# Patient Record
Sex: FEMALE | Race: BLACK OR AFRICAN AMERICAN | NOT HISPANIC OR LATINO | Employment: UNEMPLOYED | URBAN - METROPOLITAN AREA
[De-identification: names, ages, dates, MRNs, and addresses within clinical notes are randomized per-mention and may not be internally consistent; named-entity substitution may affect disease eponyms.]

---

## 2023-07-06 ENCOUNTER — HOSPITAL ENCOUNTER (INPATIENT)
Facility: HOSPITAL | Age: 53
LOS: 2 days | Discharge: HOME HEALTH | DRG: 305 | End: 2023-07-08
Attending: EMERGENCY MEDICINE | Admitting: HOSPITALIST
Payer: MEDICAID

## 2023-07-06 DIAGNOSIS — E87.6 HYPOKALEMIA: ICD-10-CM

## 2023-07-06 DIAGNOSIS — L30.9 ECZEMA, UNSPECIFIED TYPE: ICD-10-CM

## 2023-07-06 DIAGNOSIS — R51.9 ACUTE NONINTRACTABLE HEADACHE, UNSPECIFIED HEADACHE TYPE: ICD-10-CM

## 2023-07-06 DIAGNOSIS — I10 HYPERTENSION: ICD-10-CM

## 2023-07-06 DIAGNOSIS — R06.02 SHORTNESS OF BREATH: ICD-10-CM

## 2023-07-06 DIAGNOSIS — I10 HYPERTENSION, UNSPECIFIED TYPE: ICD-10-CM

## 2023-07-06 DIAGNOSIS — I16.0 HYPERTENSIVE URGENCY: Primary | ICD-10-CM

## 2023-07-06 DIAGNOSIS — R00.0 TACHYCARDIA: ICD-10-CM

## 2023-07-06 PROBLEM — R79.89 ELEVATED SERUM CREATININE: Status: ACTIVE | Noted: 2023-07-06

## 2023-07-06 LAB
ALBUMIN SERPL BCP-MCNC: 3.3 G/DL (ref 3.5–5.2)
ALP SERPL-CCNC: 81 U/L (ref 55–135)
ALT SERPL W/O P-5'-P-CCNC: 7 U/L (ref 10–44)
ANION GAP SERPL CALC-SCNC: 8 MMOL/L (ref 8–16)
AST SERPL-CCNC: 15 U/L (ref 10–40)
BASOPHILS # BLD AUTO: 0.05 K/UL (ref 0–0.2)
BASOPHILS # BLD AUTO: 0.05 K/UL (ref 0–0.2)
BASOPHILS NFR BLD: 0.6 % (ref 0–1.9)
BASOPHILS NFR BLD: 0.7 % (ref 0–1.9)
BILIRUB SERPL-MCNC: 0.4 MG/DL (ref 0.1–1)
BNP SERPL-MCNC: 78 PG/ML (ref 0–99)
BUN SERPL-MCNC: 22 MG/DL (ref 6–20)
CALCIUM SERPL-MCNC: 9.7 MG/DL (ref 8.7–10.5)
CHLORIDE SERPL-SCNC: 100 MMOL/L (ref 95–110)
CO2 SERPL-SCNC: 32 MMOL/L (ref 23–29)
CREAT SERPL-MCNC: 1.7 MG/DL (ref 0.5–1.4)
DIFFERENTIAL METHOD: ABNORMAL
DIFFERENTIAL METHOD: NORMAL
EOSINOPHIL # BLD AUTO: 0.5 K/UL (ref 0–0.5)
EOSINOPHIL # BLD AUTO: 0.6 K/UL (ref 0–0.5)
EOSINOPHIL NFR BLD: 6.7 % (ref 0–8)
EOSINOPHIL NFR BLD: 8.5 % (ref 0–8)
ERYTHROCYTE [DISTWIDTH] IN BLOOD BY AUTOMATED COUNT: 11.9 % (ref 11.5–14.5)
ERYTHROCYTE [DISTWIDTH] IN BLOOD BY AUTOMATED COUNT: 12 % (ref 11.5–14.5)
EST. GFR  (NO RACE VARIABLE): 36 ML/MIN/1.73 M^2
GLUCOSE SERPL-MCNC: 87 MG/DL (ref 70–110)
HCT VFR BLD AUTO: 38.2 % (ref 37–48.5)
HCT VFR BLD AUTO: 44.4 % (ref 37–48.5)
HGB BLD-MCNC: 12.7 G/DL (ref 12–16)
HGB BLD-MCNC: 15 G/DL (ref 12–16)
IMM GRANULOCYTES # BLD AUTO: 0.01 K/UL (ref 0–0.04)
IMM GRANULOCYTES # BLD AUTO: 0.03 K/UL (ref 0–0.04)
IMM GRANULOCYTES NFR BLD AUTO: 0.1 % (ref 0–0.5)
IMM GRANULOCYTES NFR BLD AUTO: 0.4 % (ref 0–0.5)
LACTATE SERPL-SCNC: 1.5 MMOL/L (ref 0.5–2.2)
LYMPHOCYTES # BLD AUTO: 2.6 K/UL (ref 1–4.8)
LYMPHOCYTES # BLD AUTO: 2.7 K/UL (ref 1–4.8)
LYMPHOCYTES NFR BLD: 34.2 % (ref 18–48)
LYMPHOCYTES NFR BLD: 38.4 % (ref 18–48)
MCH RBC QN AUTO: 29.3 PG (ref 27–31)
MCH RBC QN AUTO: 29.4 PG (ref 27–31)
MCHC RBC AUTO-ENTMCNC: 33.2 G/DL (ref 32–36)
MCHC RBC AUTO-ENTMCNC: 33.8 G/DL (ref 32–36)
MCV RBC AUTO: 87 FL (ref 82–98)
MCV RBC AUTO: 88 FL (ref 82–98)
MONOCYTES # BLD AUTO: 0.4 K/UL (ref 0.3–1)
MONOCYTES # BLD AUTO: 0.5 K/UL (ref 0.3–1)
MONOCYTES NFR BLD: 5.3 % (ref 4–15)
MONOCYTES NFR BLD: 6.5 % (ref 4–15)
NEUTROPHILS # BLD AUTO: 3.2 K/UL (ref 1.8–7.7)
NEUTROPHILS # BLD AUTO: 4.1 K/UL (ref 1.8–7.7)
NEUTROPHILS NFR BLD: 45.8 % (ref 38–73)
NEUTROPHILS NFR BLD: 52.8 % (ref 38–73)
NRBC BLD-RTO: 0 /100 WBC
NRBC BLD-RTO: 0 /100 WBC
PLATELET # BLD AUTO: 219 K/UL (ref 150–450)
PLATELET # BLD AUTO: 250 K/UL (ref 150–450)
PMV BLD AUTO: 9.2 FL (ref 9.2–12.9)
PMV BLD AUTO: 9.3 FL (ref 9.2–12.9)
POTASSIUM SERPL-SCNC: 3.3 MMOL/L (ref 3.5–5.1)
PROT SERPL-MCNC: 8.4 G/DL (ref 6–8.4)
RBC # BLD AUTO: 4.33 M/UL (ref 4–5.4)
RBC # BLD AUTO: 5.1 M/UL (ref 4–5.4)
SODIUM SERPL-SCNC: 140 MMOL/L (ref 136–145)
TROPONIN I SERPL DL<=0.01 NG/ML-MCNC: 0.02 NG/ML (ref 0–0.03)
WBC # BLD AUTO: 7.06 K/UL (ref 3.9–12.7)
WBC # BLD AUTO: 7.73 K/UL (ref 3.9–12.7)

## 2023-07-06 PROCEDURE — 93005 ELECTROCARDIOGRAM TRACING: CPT

## 2023-07-06 PROCEDURE — 99285 EMERGENCY DEPT VISIT HI MDM: CPT | Mod: 25

## 2023-07-06 PROCEDURE — 85025 COMPLETE CBC W/AUTO DIFF WBC: CPT | Performed by: EMERGENCY MEDICINE

## 2023-07-06 PROCEDURE — 96376 TX/PRO/DX INJ SAME DRUG ADON: CPT

## 2023-07-06 PROCEDURE — 83605 ASSAY OF LACTIC ACID: CPT | Performed by: STUDENT IN AN ORGANIZED HEALTH CARE EDUCATION/TRAINING PROGRAM

## 2023-07-06 PROCEDURE — 85025 COMPLETE CBC W/AUTO DIFF WBC: CPT | Mod: 91 | Performed by: STUDENT IN AN ORGANIZED HEALTH CARE EDUCATION/TRAINING PROGRAM

## 2023-07-06 PROCEDURE — 94640 AIRWAY INHALATION TREATMENT: CPT

## 2023-07-06 PROCEDURE — 80053 COMPREHEN METABOLIC PANEL: CPT | Performed by: EMERGENCY MEDICINE

## 2023-07-06 PROCEDURE — 93010 EKG 12-LEAD: ICD-10-PCS | Mod: ,,, | Performed by: INTERNAL MEDICINE

## 2023-07-06 PROCEDURE — 96361 HYDRATE IV INFUSION ADD-ON: CPT

## 2023-07-06 PROCEDURE — 96375 TX/PRO/DX INJ NEW DRUG ADDON: CPT

## 2023-07-06 PROCEDURE — 83880 ASSAY OF NATRIURETIC PEPTIDE: CPT | Performed by: EMERGENCY MEDICINE

## 2023-07-06 PROCEDURE — 96374 THER/PROPH/DIAG INJ IV PUSH: CPT

## 2023-07-06 PROCEDURE — 25000242 PHARM REV CODE 250 ALT 637 W/ HCPCS: Performed by: EMERGENCY MEDICINE

## 2023-07-06 PROCEDURE — 12000002 HC ACUTE/MED SURGE SEMI-PRIVATE ROOM

## 2023-07-06 PROCEDURE — 94761 N-INVAS EAR/PLS OXIMETRY MLT: CPT

## 2023-07-06 PROCEDURE — 25000003 PHARM REV CODE 250: Performed by: EMERGENCY MEDICINE

## 2023-07-06 PROCEDURE — 84484 ASSAY OF TROPONIN QUANT: CPT | Performed by: EMERGENCY MEDICINE

## 2023-07-06 PROCEDURE — 93010 ELECTROCARDIOGRAM REPORT: CPT | Mod: ,,, | Performed by: INTERNAL MEDICINE

## 2023-07-06 PROCEDURE — 63600175 PHARM REV CODE 636 W HCPCS: Performed by: EMERGENCY MEDICINE

## 2023-07-06 RX ORDER — IPRATROPIUM BROMIDE AND ALBUTEROL SULFATE 2.5; .5 MG/3ML; MG/3ML
3 SOLUTION RESPIRATORY (INHALATION)
Status: COMPLETED | OUTPATIENT
Start: 2023-07-06 | End: 2023-07-06

## 2023-07-06 RX ORDER — ASPIRIN 325 MG
325 TABLET ORAL
Status: COMPLETED | OUTPATIENT
Start: 2023-07-06 | End: 2023-07-06

## 2023-07-06 RX ORDER — LOSARTAN POTASSIUM 25 MG/1
50 TABLET ORAL ONCE
Status: COMPLETED | OUTPATIENT
Start: 2023-07-06 | End: 2023-07-06

## 2023-07-06 RX ORDER — NICARDIPINE HYDROCHLORIDE 0.2 MG/ML
0-15 INJECTION INTRAVENOUS CONTINUOUS
Status: DISCONTINUED | OUTPATIENT
Start: 2023-07-06 | End: 2023-07-07

## 2023-07-06 RX ORDER — AMLODIPINE BESYLATE 5 MG/1
10 TABLET ORAL
Status: COMPLETED | OUTPATIENT
Start: 2023-07-06 | End: 2023-07-06

## 2023-07-06 RX ORDER — SODIUM CHLORIDE 0.9 % (FLUSH) 0.9 %
10 SYRINGE (ML) INJECTION
Status: DISCONTINUED | OUTPATIENT
Start: 2023-07-06 | End: 2023-07-08 | Stop reason: HOSPADM

## 2023-07-06 RX ORDER — HYDRALAZINE HYDROCHLORIDE 25 MG/1
50 TABLET, FILM COATED ORAL
Status: COMPLETED | OUTPATIENT
Start: 2023-07-06 | End: 2023-07-06

## 2023-07-06 RX ORDER — NICARDIPINE HYDROCHLORIDE 0.2 MG/ML
0-15 INJECTION INTRAVENOUS CONTINUOUS
Status: DISCONTINUED | OUTPATIENT
Start: 2023-07-06 | End: 2023-07-06

## 2023-07-06 RX ORDER — PROCHLORPERAZINE EDISYLATE 5 MG/ML
10 INJECTION INTRAMUSCULAR; INTRAVENOUS ONCE
Status: COMPLETED | OUTPATIENT
Start: 2023-07-06 | End: 2023-07-06

## 2023-07-06 RX ORDER — HYDRALAZINE HYDROCHLORIDE 20 MG/ML
10 INJECTION INTRAMUSCULAR; INTRAVENOUS
Status: COMPLETED | OUTPATIENT
Start: 2023-07-06 | End: 2023-07-06

## 2023-07-06 RX ORDER — POTASSIUM CHLORIDE 20 MEQ/1
20 TABLET, EXTENDED RELEASE ORAL
Status: COMPLETED | OUTPATIENT
Start: 2023-07-06 | End: 2023-07-06

## 2023-07-06 RX ORDER — HYDROCORTISONE 1 %
CREAM (GRAM) TOPICAL
Qty: 30 G | Refills: 0 | Status: SHIPPED | OUTPATIENT
Start: 2023-07-06 | End: 2023-07-08 | Stop reason: HOSPADM

## 2023-07-06 RX ADMIN — HYDRALAZINE HYDROCHLORIDE 10 MG: 20 INJECTION INTRAMUSCULAR; INTRAVENOUS at 06:07

## 2023-07-06 RX ADMIN — POTASSIUM CHLORIDE 20 MEQ: 1500 TABLET, EXTENDED RELEASE ORAL at 07:07

## 2023-07-06 RX ADMIN — LOSARTAN POTASSIUM 50 MG: 25 TABLET, FILM COATED ORAL at 08:07

## 2023-07-06 RX ADMIN — AMLODIPINE BESYLATE 10 MG: 5 TABLET ORAL at 06:07

## 2023-07-06 RX ADMIN — NICARDIPINE HYDROCHLORIDE 5 MG/HR: 0.2 INJECTION, SOLUTION INTRAVENOUS at 11:07

## 2023-07-06 RX ADMIN — SODIUM CHLORIDE 500 ML: 9 INJECTION, SOLUTION INTRAVENOUS at 06:07

## 2023-07-06 RX ADMIN — HYDRALAZINE HYDROCHLORIDE 50 MG: 25 TABLET, FILM COATED ORAL at 07:07

## 2023-07-06 RX ADMIN — HYDRALAZINE HYDROCHLORIDE 10 MG: 20 INJECTION INTRAMUSCULAR; INTRAVENOUS at 10:07

## 2023-07-06 RX ADMIN — PROCHLORPERAZINE EDISYLATE 10 MG: 5 INJECTION INTRAMUSCULAR; INTRAVENOUS at 06:07

## 2023-07-06 RX ADMIN — IPRATROPIUM BROMIDE AND ALBUTEROL SULFATE 3 ML: .5; 3 SOLUTION RESPIRATORY (INHALATION) at 06:07

## 2023-07-06 RX ADMIN — ASPIRIN 325 MG ORAL TABLET 325 MG: 325 PILL ORAL at 08:07

## 2023-07-06 NOTE — ED PROVIDER NOTES
"Encounter Date: 7/6/2023    SCRIBE #1 NOTE: I, Janes Shepard, am scribing for, and in the presence of,  Billy Hoffmann MD. I have scribed the following portions of the note - Other sections scribed: HPI, ROS, PE, procedure note.     History     Chief Complaint   Patient presents with    Hypertension     253/124. Out of BP meds for unspecified amount of time. +headache and nontraumatic right arm pain since last stroke in 2018. Denies chest pain, dizziness, blurred vision     Jacqueline Manuel is a 53 y.o. female with a PMHx of HTN, asthma, CVA x3, who presents to the ED via EMS for evaluation of hypertension today. Patient reports home health nurse was checking her blood pressure today when they noticed it was high, after which EMS was called. She further notes she began having a headache this morning. Patient states she has not been compliant with her antihypertensives for the past few months, [place] and [place] not have a vehicle to  her medications. Additional History provided by independent historian, patient's daughter, who reports patient has some balance issues at baseline, noting she "falls or is about to fall when she walks" at baseline, which she reports is unchanged. Patient also reports some intermittent SOB. No medications taken PTA. No alleviating or exacerbating factors noted. Denies cough, double vision, or other associated symptoms. Patient reports she is a current smoker, and occasional EtOH user. NKDA.    The history is provided by the patient and a relative. No  was used.   Review of patient's allergies indicates:  No Known Allergies  History reviewed. No pertinent past medical history.  History reviewed. No pertinent surgical history.  History reviewed. No pertinent family history.     Review of Systems   Constitutional:  Negative for chills and fever.   HENT:  Negative for sore throat.    Eyes:  Negative for visual disturbance.   Respiratory:  " Positive for shortness of breath. Negative for cough.    Cardiovascular:  Negative for chest pain.        (+) Hypertension   Gastrointestinal:  Negative for nausea.   Genitourinary:  Negative for dysuria.   Musculoskeletal:  Negative for back pain.   Skin:  Negative for rash.   Neurological:  Positive for headaches. Negative for weakness.   Psychiatric/Behavioral:  Negative for confusion.      Physical Exam     Initial Vitals [07/06/23 1729]   BP Pulse Resp Temp SpO2   (!) 253/124 78 17 98.3 °F (36.8 °C) 98 %      MAP       --         Physical Exam    Nursing note and vitals reviewed.  Constitutional: She appears well-developed and well-nourished. She does not appear ill. No distress.   HENT:   Head: Normocephalic and atraumatic.   Mouth/Throat: Oropharynx is clear and moist.   Eyes: Conjunctivae and EOM are normal. Pupils are equal, round, and reactive to light. Right eye exhibits no nystagmus. Left eye exhibits no nystagmus.   Neck:   Normal range of motion.  Cardiovascular:  Regular rhythm and normal heart sounds.   Tachycardia present.         No murmur heard.  Pulmonary/Chest: No respiratory distress. She has wheezes (subtle, expiratory).   Abdominal: Abdomen is soft. There is no abdominal tenderness.   Musculoskeletal:         General: No edema.      Cervical back: Normal range of motion.     Neurological: She is alert. GCS score is 15.   No facial asymmetry.   Skin: Skin is warm.   Psychiatric: She has a normal mood and affect.       ED Course   Critical Care    Date/Time: 7/6/2023 10:54 PM  Performed by: Billy Hoffmann MD  Authorized by: Billy Hoffmann MD   Direct patient critical care time: 7 minutes  Additional history critical care time: 7 minutes  Ordering / reviewing critical care time: 7 minutes  Documentation critical care time: 7 minutes  Consulting other physicians critical care time: 7 minutes  Consult with family critical care time: 5 minutes  Other critical care time: 5 minutes  Total  critical care time (exclusive of procedural time) : 45 minutes  Critical care time was exclusive of separately billable procedures and treating other patients and teaching time.  Critical care was necessary to treat or prevent imminent or life-threatening deterioration of the following conditions: hypertension requiring multiple IV antihypertensives and titratable drip.  Critical care was time spent personally by me on the following activities: development of treatment plan with patient or surrogate, discussions with consultants, interpretation of cardiac output measurements, evaluation of patient's response to treatment, examination of patient, obtaining history from patient or surrogate, ordering and performing treatments and interventions, ordering and review of laboratory studies, ordering and review of radiographic studies and re-evaluation of patient's condition.  Comments: Hypertension requiring multiple IV antihypertensives and titratable antihypertensive      Labs Reviewed   CBC W/ AUTO DIFFERENTIAL - Abnormal; Notable for the following components:       Result Value    Eos # 0.6 (*)     Eosinophil % 8.5 (*)     All other components within normal limits   COMPREHENSIVE METABOLIC PANEL - Abnormal; Notable for the following components:    Potassium 3.3 (*)     CO2 32 (*)     BUN 22 (*)     Creatinine 1.7 (*)     Albumin 3.3 (*)     ALT 7 (*)     eGFR 36 (*)     All other components within normal limits   TROPONIN I   B-TYPE NATRIURETIC PEPTIDE   LACTIC ACID, PLASMA   CBC W/ AUTO DIFFERENTIAL   COMPREHENSIVE METABOLIC PANEL   MAGNESIUM   PHOSPHORUS          Imaging Results               CT Head Without Contrast (Final result)  Result time 07/06/23 19:37:10      Final result by Ayleen Jarquin MD (07/06/23 19:37:10)                   Impression:      Right temporoparietal encephalomalacia change.  Findings are likely related to prior infarct. Age-indeterminate bilateral basal ganglia and bilateral thalamic  lacunar infarcts.  Consider MRI of the brain with diffusion-weighted sequencing.    This report was flagged in Epic as abnormal.      Electronically signed by: Ayleen Jarquin  Date:    07/06/2023  Time:    19:37               Narrative:    EXAMINATION:  CT OF THE HEAD WITHOUT    CLINICAL HISTORY:  Headache, sudden, severe;    TECHNIQUE:  5 mm unenhanced axial images were obtained from the skull base to the vertex.    COMPARISON:  None.    FINDINGS:  Mild cerebral atrophy is seen.  Moderate chronic small vessel ischemic changes are present.  There are age-indeterminate bilateral basal ganglia and bilateral thalamic lacunar infarcts.  Encephalomalacia changes are seen in the right temporoparietal region.  There is no acute intracranial hemorrhage, mass effect, or midline shift. The visualized paranasal sinuses and mastoid air cells are clear.                                       Medications   niCARdipine 40 mg/200 mL (0.2 mg/mL) infusion (10 mg/hr Intravenous Verify Only 7/7/23 0222)   sodium chloride 0.9% flush 10 mL (has no administration in time range)   potassium chloride SA CR tablet 30 mEq (has no administration in time range)   0.9%  NaCl infusion ( Intravenous Verify Only 7/7/23 0222)   sodium chloride 0.9% bolus 500 mL 500 mL (0 mLs Intravenous Stopped 7/6/23 1900)   prochlorperazine injection Soln 10 mg (10 mg Intravenous Given 7/6/23 1806)   albuterol-ipratropium 2.5 mg-0.5 mg/3 mL nebulizer solution 3 mL (3 mLs Nebulization Given 7/6/23 1814)   amLODIPine tablet 10 mg (10 mg Oral Given 7/6/23 1806)   hydrALAZINE injection 10 mg (10 mg Intravenous Given 7/6/23 1806)   potassium chloride SA CR tablet 20 mEq (20 mEq Oral Given 7/6/23 1919)   losartan tablet 50 mg (50 mg Oral Given 7/6/23 2008)   hydrALAZINE tablet 50 mg (50 mg Oral Given 7/6/23 1944)   aspirin tablet 325 mg (325 mg Oral Given 7/6/23 2008)   hydrALAZINE injection 10 mg (10 mg Intravenous Given 7/6/23 2211)     Medical Decision Making:    History:   I obtained history from: someone other than patient.       <> Summary of History: Additional history provided by independent historian, patient's daughter.  Old Medical Records: I decided to obtain old medical records.  Old Records Summarized: other records.       <> Summary of Records: External documents reviewed.  Initial Assessment:     53-year-old female presenting with severe headache and uncontrolled hypertension.  Patient has been out of all medications for multiple months.  Initial exam revealed no extremity or cranial nerve deficit.  No dysarthria noted.  CT head obtained for rule out ICH.  History of previous CVA.  CT imaging showed age indeterminate lacunar infarcts.  Given history of previous CVA and no medications consideration must be given for possible new CVA.  Difficulty controlling the patient's hypertension with p.o. and multiple doses of IV antihypertensive.  Patient was transitioned to a Cardene drip.  Patient did report headache had improved.  Patient placed in ICU for titratable antihypertensive drip    Differential Diagnosis:   Renal failure, migraine, ICH  Clinical Tests:   Lab Tests: Ordered and Reviewed  Radiological Study: Ordered and Reviewed  Medical Tests: Ordered and Reviewed  ED Management:    Shared decision making with patient.       Problems considered includes headache (Signs & symptoms, differentials)  Chronic problems impacting care includes hypertension, previous CVA.  Please see workup section for emergency physician independent ECG interpretation.  Imaging independently reviewed.  Agree with radiologist's interpretation. Imaging includes no ICH noted on CT imaging.  All labs reviewed and considered in the patient's differential diagnosis. Discussion of labs includes elevated creatinine of 1.7.  Unknown baseline..      Decision regarding hospitalization.  The patient requires additional care in the hospital overnight.   Dr. Bill with hospital medicine will accept  the patient. Labs, imaging, or procedures were discussed.    Plan was discussed with the patient.  This includes plan for admission to the hospital, lab and imaging results, differential diagnosis.    All questions or concerns have been addressed.            Scribe Attestation:   Scribe #1: I performed the above scribed service and the documentation accurately describes the services I performed. I attest to the accuracy of the note.      ED Course as of 07/07/23 0247   Thu Jul 06, 2023   1843 Creatinine(!): 1.7  Unknown previous baseline.  CKD versus JEY.  Unclear if [JM]   1902 EKG 12-lead  Time 5:49 p.m.     Rate 70, sinus, regular rhythm, normal axis. [JM]   1936 Blood pressure continues to be elevated.  We will give other p.o. doses of medication [JM]   1939 The patient does not know her antihypertensives.  No records [JM]   2009 EKG 12-lead  Time 7:11 p.m.     Rate 79, sinus, regular rhythm, normal axis.    .  No ST elevation or depression.  T-wave inversion lead aVL, V2.  No hyperacute T-waves.  Q-wave aVL.      Normal sinus rhythm nonspecific Q-wave and T-wave inversion. [JM]      ED Course User Index  [JM] Billy Hoffmann MD                 Clinical Impression:   Final diagnoses:  [R51.9] Acute nonintractable headache, unspecified headache type (Primary)  [L30.9] Eczema, unspecified type  [I10] Hypertension, unspecified type  [E87.6] Hypokalemia  [I10] Hypertension        ED Disposition Condition    Admit                I, Billy Hoffmann, personally performed the services described in this documentation. All medical record entries made by the scribe were at my direction and in my presence. I have reviewed the chart and agree that the record reflects my personal performance and is accurate and complete.      Billy Hoffmann MD  07/07/23 0247

## 2023-07-06 NOTE — ED TRIAGE NOTES
Pt presents to the ED via EMS for CC of hypertension. Pt BP initially 253/124 in triage and is now 269/140. Pt endorses a HA but denies weakness and dizziness. Pt has hx of 3 previous strokes. Pt has not been taking her medications because she moved and states she was unable to obtain them because of lack of a vehicle. Pt also endorses SOB and states she has asthma and smokes cigarettes. Pt has also been out of her asthma medications and no longer has her at home nebulizer.Pt denies chest pain. She is not tachypniec or diaphoretic. Pt is able to speak complete sentences. Pt is AAOX4, daughter at bedside. Pt is in no apparent distress at this moment in time.

## 2023-07-07 PROBLEM — E83.39 HYPOPHOSPHATEMIA: Status: ACTIVE | Noted: 2023-07-07

## 2023-07-07 PROBLEM — I63.81 MULTIPLE LACUNAR INFARCTS: Status: RESOLVED | Noted: 2023-07-07 | Resolved: 2023-07-07

## 2023-07-07 PROBLEM — R93.0 ABNORMAL CT OF THE HEAD: Status: ACTIVE | Noted: 2023-07-07

## 2023-07-07 PROBLEM — I63.81 MULTIPLE LACUNAR INFARCTS: Status: ACTIVE | Noted: 2023-07-07

## 2023-07-07 PROBLEM — N28.9 RENAL INSUFFICIENCY: Status: ACTIVE | Noted: 2023-07-06

## 2023-07-07 PROBLEM — Z59.82: Status: ACTIVE | Noted: 2023-07-07

## 2023-07-07 PROBLEM — F19.90 SUBSTANCE USE: Status: ACTIVE | Noted: 2023-07-07

## 2023-07-07 PROBLEM — E87.6 HYPOKALEMIA: Status: ACTIVE | Noted: 2023-07-07

## 2023-07-07 PROBLEM — E87.8 ELECTROLYTE ABNORMALITY: Status: ACTIVE | Noted: 2023-07-07

## 2023-07-07 PROBLEM — Z86.73 HISTORY OF STROKE: Status: ACTIVE | Noted: 2023-07-07

## 2023-07-07 LAB
ALBUMIN SERPL BCP-MCNC: 3.6 G/DL (ref 3.5–5.2)
ALP SERPL-CCNC: 95 U/L (ref 55–135)
ALT SERPL W/O P-5'-P-CCNC: 8 U/L (ref 10–44)
AMPHET+METHAMPHET UR QL: NEGATIVE
ANION GAP SERPL CALC-SCNC: 14 MMOL/L (ref 8–16)
AST SERPL-CCNC: 16 U/L (ref 10–40)
BACTERIA #/AREA URNS HPF: NORMAL /HPF
BARBITURATES UR QL SCN>200 NG/ML: NEGATIVE
BENZODIAZ UR QL SCN>200 NG/ML: NEGATIVE
BILIRUB SERPL-MCNC: 0.5 MG/DL (ref 0.1–1)
BILIRUB UR QL STRIP: NEGATIVE
BUN SERPL-MCNC: 20 MG/DL (ref 6–20)
BZE UR QL SCN: ABNORMAL
CALCIUM SERPL-MCNC: 10.5 MG/DL (ref 8.7–10.5)
CANNABINOIDS UR QL SCN: ABNORMAL
CHLORIDE SERPL-SCNC: 101 MMOL/L (ref 95–110)
CLARITY UR: CLEAR
CO2 SERPL-SCNC: 27 MMOL/L (ref 23–29)
COLOR UR: YELLOW
CREAT SERPL-MCNC: 1.7 MG/DL (ref 0.5–1.4)
CREAT UR-MCNC: 61.3 MG/DL (ref 15–325)
EST. GFR  (NO RACE VARIABLE): 36 ML/MIN/1.73 M^2
GLUCOSE SERPL-MCNC: 113 MG/DL (ref 70–110)
GLUCOSE UR QL STRIP: NEGATIVE
HGB UR QL STRIP: NEGATIVE
KETONES UR QL STRIP: NEGATIVE
LEUKOCYTE ESTERASE UR QL STRIP: ABNORMAL
MAGNESIUM SERPL-MCNC: 1.6 MG/DL (ref 1.6–2.6)
METHADONE UR QL SCN>300 NG/ML: NEGATIVE
MICROSCOPIC COMMENT: NORMAL
NITRITE UR QL STRIP: NEGATIVE
OPIATES UR QL SCN: NEGATIVE
PCP UR QL SCN>25 NG/ML: NEGATIVE
PH UR STRIP: 8 [PH] (ref 5–8)
PHOSPHATE SERPL-MCNC: 1.8 MG/DL (ref 2.7–4.5)
POTASSIUM SERPL-SCNC: 3 MMOL/L (ref 3.5–5.1)
PROT SERPL-MCNC: 9.4 G/DL (ref 6–8.4)
PROT UR QL STRIP: ABNORMAL
RBC #/AREA URNS HPF: 4 /HPF (ref 0–4)
SODIUM SERPL-SCNC: 142 MMOL/L (ref 136–145)
SP GR UR STRIP: 1.01 (ref 1–1.03)
SQUAMOUS #/AREA URNS HPF: 2 /HPF
TOXICOLOGY INFORMATION: ABNORMAL
URN SPEC COLLECT METH UR: ABNORMAL
UROBILINOGEN UR STRIP-ACNC: ABNORMAL EU/DL
WBC #/AREA URNS HPF: 2 /HPF (ref 0–5)

## 2023-07-07 PROCEDURE — 94761 N-INVAS EAR/PLS OXIMETRY MLT: CPT

## 2023-07-07 PROCEDURE — 80307 DRUG TEST PRSMV CHEM ANLYZR: CPT | Performed by: HOSPITALIST

## 2023-07-07 PROCEDURE — 25000003 PHARM REV CODE 250: Performed by: HOSPITALIST

## 2023-07-07 PROCEDURE — 84100 ASSAY OF PHOSPHORUS: CPT | Performed by: STUDENT IN AN ORGANIZED HEALTH CARE EDUCATION/TRAINING PROGRAM

## 2023-07-07 PROCEDURE — 80053 COMPREHEN METABOLIC PANEL: CPT | Performed by: STUDENT IN AN ORGANIZED HEALTH CARE EDUCATION/TRAINING PROGRAM

## 2023-07-07 PROCEDURE — 25000003 PHARM REV CODE 250: Performed by: STUDENT IN AN ORGANIZED HEALTH CARE EDUCATION/TRAINING PROGRAM

## 2023-07-07 PROCEDURE — 81000 URINALYSIS NONAUTO W/SCOPE: CPT | Mod: 59 | Performed by: HOSPITALIST

## 2023-07-07 PROCEDURE — 83735 ASSAY OF MAGNESIUM: CPT | Performed by: STUDENT IN AN ORGANIZED HEALTH CARE EDUCATION/TRAINING PROGRAM

## 2023-07-07 PROCEDURE — 63600175 PHARM REV CODE 636 W HCPCS: Performed by: HOSPITALIST

## 2023-07-07 PROCEDURE — 97165 OT EVAL LOW COMPLEX 30 MIN: CPT

## 2023-07-07 PROCEDURE — 25000003 PHARM REV CODE 250: Performed by: EMERGENCY MEDICINE

## 2023-07-07 PROCEDURE — 20000000 HC ICU ROOM

## 2023-07-07 PROCEDURE — 97161 PT EVAL LOW COMPLEX 20 MIN: CPT

## 2023-07-07 PROCEDURE — 36415 COLL VENOUS BLD VENIPUNCTURE: CPT | Performed by: STUDENT IN AN ORGANIZED HEALTH CARE EDUCATION/TRAINING PROGRAM

## 2023-07-07 RX ORDER — MAGNESIUM SULFATE HEPTAHYDRATE 40 MG/ML
2 INJECTION, SOLUTION INTRAVENOUS ONCE
Status: COMPLETED | OUTPATIENT
Start: 2023-07-07 | End: 2023-07-07

## 2023-07-07 RX ORDER — MUPIROCIN 20 MG/G
OINTMENT TOPICAL 2 TIMES DAILY
Status: DISCONTINUED | OUTPATIENT
Start: 2023-07-07 | End: 2023-07-08 | Stop reason: HOSPADM

## 2023-07-07 RX ORDER — TALC
6 POWDER (GRAM) TOPICAL NIGHTLY PRN
Status: DISCONTINUED | OUTPATIENT
Start: 2023-07-07 | End: 2023-07-08 | Stop reason: HOSPADM

## 2023-07-07 RX ORDER — ONDANSETRON 2 MG/ML
4 INJECTION INTRAMUSCULAR; INTRAVENOUS EVERY 6 HOURS PRN
Status: DISCONTINUED | OUTPATIENT
Start: 2023-07-07 | End: 2023-07-08 | Stop reason: HOSPADM

## 2023-07-07 RX ORDER — LOSARTAN POTASSIUM 25 MG/1
100 TABLET ORAL DAILY
Status: DISCONTINUED | OUTPATIENT
Start: 2023-07-07 | End: 2023-07-08 | Stop reason: HOSPADM

## 2023-07-07 RX ORDER — HYDRALAZINE HYDROCHLORIDE 25 MG/1
25 TABLET, FILM COATED ORAL EVERY 8 HOURS PRN
Status: DISCONTINUED | OUTPATIENT
Start: 2023-07-07 | End: 2023-07-08

## 2023-07-07 RX ORDER — NICARDIPINE HYDROCHLORIDE 0.2 MG/ML
0-15 INJECTION INTRAVENOUS CONTINUOUS
Status: DISCONTINUED | OUTPATIENT
Start: 2023-07-07 | End: 2023-07-07

## 2023-07-07 RX ORDER — SODIUM CHLORIDE 9 MG/ML
INJECTION, SOLUTION INTRAVENOUS CONTINUOUS
Status: DISCONTINUED | OUTPATIENT
Start: 2023-07-07 | End: 2023-07-07

## 2023-07-07 RX ORDER — POTASSIUM CHLORIDE 750 MG/1
30 TABLET, EXTENDED RELEASE ORAL ONCE
Status: DISCONTINUED | OUTPATIENT
Start: 2023-07-08 | End: 2023-07-08

## 2023-07-07 RX ORDER — ACETAMINOPHEN 325 MG/1
650 TABLET ORAL EVERY 6 HOURS PRN
Status: DISCONTINUED | OUTPATIENT
Start: 2023-07-07 | End: 2023-07-08 | Stop reason: HOSPADM

## 2023-07-07 RX ORDER — AMOXICILLIN 250 MG
1 CAPSULE ORAL DAILY PRN
Status: DISCONTINUED | OUTPATIENT
Start: 2023-07-07 | End: 2023-07-08 | Stop reason: HOSPADM

## 2023-07-07 RX ORDER — AMLODIPINE BESYLATE 5 MG/1
10 TABLET ORAL DAILY
Status: DISCONTINUED | OUTPATIENT
Start: 2023-07-07 | End: 2023-07-08 | Stop reason: HOSPADM

## 2023-07-07 RX ORDER — SODIUM,POTASSIUM PHOSPHATES 280-250MG
2 POWDER IN PACKET (EA) ORAL ONCE
Status: COMPLETED | OUTPATIENT
Start: 2023-07-07 | End: 2023-07-07

## 2023-07-07 RX ORDER — CARVEDILOL 12.5 MG/1
25 TABLET ORAL 2 TIMES DAILY
Status: DISCONTINUED | OUTPATIENT
Start: 2023-07-07 | End: 2023-07-08 | Stop reason: HOSPADM

## 2023-07-07 RX ADMIN — LOSARTAN POTASSIUM 100 MG: 25 TABLET, FILM COATED ORAL at 08:07

## 2023-07-07 RX ADMIN — SODIUM CHLORIDE 1000 ML: 9 INJECTION, SOLUTION INTRAVENOUS at 12:07

## 2023-07-07 RX ADMIN — NICARDIPINE HYDROCHLORIDE 5 MG/HR: 0.2 INJECTION, SOLUTION INTRAVENOUS at 04:07

## 2023-07-07 RX ADMIN — MUPIROCIN: 20 OINTMENT TOPICAL at 08:07

## 2023-07-07 RX ADMIN — CARVEDILOL 25 MG: 12.5 TABLET, FILM COATED ORAL at 01:07

## 2023-07-07 RX ADMIN — MAGNESIUM SULFATE 2 G: 2 INJECTION INTRAVENOUS at 09:07

## 2023-07-07 RX ADMIN — MUPIROCIN: 20 OINTMENT TOPICAL at 10:07

## 2023-07-07 RX ADMIN — NICARDIPINE HYDROCHLORIDE 2.5 MG/HR: 0.2 INJECTION, SOLUTION INTRAVENOUS at 11:07

## 2023-07-07 RX ADMIN — AMLODIPINE BESYLATE 10 MG: 5 TABLET ORAL at 08:07

## 2023-07-07 RX ADMIN — CARVEDILOL 25 MG: 12.5 TABLET, FILM COATED ORAL at 08:07

## 2023-07-07 RX ADMIN — Medication 2 PACKET: at 08:07

## 2023-07-07 NOTE — PLAN OF CARE
Problem: Physical Therapy  Goal: Physical Therapy Goal  Description: Goals to be met by: 23     Patient will increase functional independence with mobility by performin. Supine to sit with Modified [place]  2. Sit to stand transfer with Modified [place]  3. Gait  x 150 feet with Modified [place]    4. Lower extremity exercise program x10 reps per handout, with independence    Outcome: Ongoing, Progressing   Initial PT evaluation completed today.  Pt could benefit from skilled PT services 2-3x/wk in order to maximize function prior to D/C.  Outpatient PT recommended for strengthening and conditioning.  OK for OOB to chair and bathroom with nursing staff.

## 2023-07-07 NOTE — ASSESSMENT & PLAN NOTE
Patient unable to get to her doctor appointments and get her medications due to lack of vehicle. Likely large reason she was hypertensive on admission.   -Consult case management.

## 2023-07-07 NOTE — H&P
"[facility] Medicine  History & Physical    Patient Name: Jacqueline Manuel  MRN: [id number]  Patient Class: IP- Inpatient  Admission Date: 7/6/2023  Attending Physician: Lucero Mcnally MD   Primary Care Provider: No primary care provider on file.         Patient information was obtained from patient, relative(s) and ER records.     Subjective:     Principal Problem:<principal problem not specified>    Chief Complaint:   Chief Complaint   Patient presents with    Hypertension     253/124. Out of BP meds for unspecified amount of time. +headache and nontraumatic right arm pain since last stroke in 2018. Denies chest pain, dizziness, blurred vision        HPI: Jacqueline Manuel is a 53 y.o. female with a PMHx of HTN, asthma, CVA x3, who presents to the ED via EMS for evaluation of hypertension today. Patient reports home health nurse was checking her blood pressure today when they noticed it was high, after which EMS was called. She further notes she began having a headache this morning. Patient states she has not been compliant with her antihypertensives and has not taken them for the past 6 months, endorsing [place] and does not have a vehicle to  her medications. Additional History provided by independent historian, patient's daughter, who reports patient has some balance issues at baseline, noting she "falls or is about to fall when she walks" at baseline, which she reports is unchanged. Patient also reports some intermittent SOB. No medications taken PTA. No alleviating or exacerbating factors noted. Denies cough, double vision, or other associated symptoms. Patient reports she is a current smoker, and occasional EtOH user. NKDA.     In the ED patient hypertensive with /140. Potassium 3.3 Cr 1.7, BUN mildly elevated at 22. CT head reveals Right temporoparietal encephalomalacia change.  Findings are likely related to prior infarct. Age-indeterminate bilateral " basal ganglia and bilateral thalamic lacunar infarcts.  Consider MRI of the brain with diffusion-weighted sequencing. Patient given several pushes of IV Hydralazine, given PO Losartan, and eventually started on Nicardipine Drip. Patient seen by medicine team and admitted for hypertensive emergency.       No past medical history on file.    No past surgical history on file.    Review of patient's allergies indicates:  No Known Allergies    No current facility-administered medications on file prior to encounter.     No current outpatient medications on file prior to encounter.     Family History    None       Tobacco Use    Smoking status: Not on file    Smokeless tobacco: Not on file   Substance and Sexual Activity    Alcohol use: Not on file    Drug use: Not on file    Sexual activity: Not on file     Review of Systems   All other systems reviewed and are negative.  Objective:     Vital Signs (Most Recent):  Temp: 97.9 °F (36.6 °C) (07/06/23 2008)  Pulse: 65 (07/06/23 2211)  Resp: 16 (07/06/23 2211)  BP: (!) 219/114 (07/06/23 2211)  SpO2: 98 % (07/06/23 2211) Vital Signs (24h Range):  Temp:  [97.9 °F (36.6 °C)-98.3 °F (36.8 °C)] 97.9 °F (36.6 °C)  Pulse:  [65-84] 65  Resp:  [16-17] 16  SpO2:  [96 %-99 %] 98 %  BP: (202-269)/(104-140) 219/114     Weight: 49.9 kg (110 lb)  There is no height or weight on file to calculate BMI.     Physical Exam  Constitutional:       Appearance: Normal appearance. She is normal weight.   HENT:      Head: Normocephalic and atraumatic.      Right Ear: External ear normal.      Left Ear: External ear normal.      Nose: Nose normal.      Mouth/Throat:      Mouth: Mucous membranes are dry.      Pharynx: Oropharynx is clear.   Eyes:      Extraocular Movements: Extraocular movements intact.      Pupils: Pupils are equal, round, and reactive to light.   Cardiovascular:      Rate and Rhythm: Regular rhythm. Tachycardia present.      Pulses: Normal pulses.      Heart sounds: Normal heart  sounds.   Pulmonary:      Effort: Pulmonary effort is normal.      Breath sounds: Normal breath sounds.   Abdominal:      General: Abdomen is flat.      Palpations: Abdomen is soft.   Musculoskeletal:         General: Normal range of motion.      Cervical back: Normal range of motion and neck supple.   Skin:     General: Skin is warm and dry.      Capillary Refill: Capillary refill takes less than 2 seconds.   Neurological:      General: No focal deficit present.      Mental Status: She is alert and oriented to person, place, and time.   Psychiatric:         Mood and Affect: Mood normal.         Behavior: Behavior normal.         Thought Content: Thought content normal.            CRANIAL NERVES     CN III, IV, VI   Pupils are equal, round, and reactive to light.     Significant Labs: All pertinent labs within the past 24 hours have been reviewed.    Significant Imaging: I have reviewed all pertinent imaging results/findings within the past 24 hours.    Assessment/Plan:     Transportation insecurity due to lack of access to vehicle  Patient unable to get to her doctor appointments and get her medications due to lack of vehicle. Likely large reason she was hypertensive on admission.   -Consult case management.       Abnormal CT of the head    Right temporoparietal encephalomalacia change.  Findings are likely related to prior infarct. Age-indeterminate bilateral basal ganglia and bilateral thalamic lacunar infarcts.    -Consider MRI of the brain with diffusion-weighted sequencing.       Electrolyte abnormality  Patient potassium 3.3. Will replete        Elevated serum creatinine  Patient with Cr 1.7 at admission. Unsure of patient baseline.   -Patient states she is on ARB at home, no medications listed. Given Losartan in the ED. Would continue with this as it will help her BP and is renal protective.  -Avoid other nephrotoxins  -Repeat AM CMP      Hypertensive urgency  Patient has a current diagnosis of Hypertensive  emergency with end organ damage evidenced by acute kidney injury which is uncontrolled.  Latest blood pressure and vitals reviewed-   Temp:  [97.9 °F (36.6 °C)-98.3 °F (36.8 °C)]   Pulse:  [65-84]   Resp:  [16-17]   BP: (202-269)/(104-140)   SpO2:  [96 %-99 %] .   Patient currently on IV antihypertensives.   Home meds for hypertension were reviewed and noted below.   Hypertension Medications     No Active Medications          Medication adjustment for hospital antihypertensives is as follows- Continue an titrate Nicardipine drip to acheive goal of BP <180/<120 mmHg for the first hour and <160/<110 mmHg for the next 23 hours . Plan to transition to home PO medications once BP reaches <160/110.     Will aim for controlled BP reduction by medications noted above. Monitor and mitigate end organ damage as indicated.      VTE Risk Mitigation (From admission, onward)         Ordered     IP VTE LOW RISK PATIENT  Once         07/06/23 2248     Place sequential compression device  Until discontinued         07/06/23 2248                           Uziel Bill DO  Department of Hospital Medicine  [facility] [place]

## 2023-07-07 NOTE — NURSING
Patient arrives to the ICU from ER. Patient was on the ER stretcher and was accompanied by ER nurse. Patient was AAOx4 with no signs of acute distress. Patient able to move self from stretcher to the bed. Hospital gown placed on patient. Patient connected to the cardiac monitor. Patient on room air, with only 1 functioning IV. New IV started to the RFA 22g. Cardene and 0.9% Normal Saline infusing. Cardene drip titrated down due to ER decreasing the Systolic BP to fast.

## 2023-07-07 NOTE — CARE UPDATE
Ms Jacqueline Manuel was admitted with hypertensive urgency with /124 at max. She denies any other complaints. She states she has not had BP Rx for 6 months but does have a  nurse coming to the house for multiple visits. She has had multiple strokes in the past. She lives with her . Started on cardene gtt with improved BP control. Started amlodipine and losartan with goal to wean off cardene gtt. She does have Cr 1.7, unknown if acute or chronic. UA pending. Electrolytes supplemented. Social work consulted for assistance with access to care.     Lucero Mcnally MD  07/07/2023  9:14 AM

## 2023-07-07 NOTE — SUBJECTIVE & OBJECTIVE
No past medical history on file.    No past surgical history on file.    Review of patient's allergies indicates:  No Known Allergies    No current facility-administered medications on file prior to encounter.     No current outpatient medications on file prior to encounter.     Family History    None       Tobacco Use    Smoking status: Not on file    Smokeless tobacco: Not on file   Substance and Sexual Activity    Alcohol use: Not on file    Drug use: Not on file    Sexual activity: Not on file     Review of Systems   All other systems reviewed and are negative.  Objective:     Vital Signs (Most Recent):  Temp: 97.9 °F (36.6 °C) (07/06/23 2008)  Pulse: 65 (07/06/23 2211)  Resp: 16 (07/06/23 2211)  BP: (!) 219/114 (07/06/23 2211)  SpO2: 98 % (07/06/23 2211) Vital Signs (24h Range):  Temp:  [97.9 °F (36.6 °C)-98.3 °F (36.8 °C)] 97.9 °F (36.6 °C)  Pulse:  [65-84] 65  Resp:  [16-17] 16  SpO2:  [96 %-99 %] 98 %  BP: (202-269)/(104-140) 219/114     Weight: 49.9 kg (110 lb)  There is no height or weight on file to calculate BMI.     Physical Exam  Constitutional:       Appearance: Normal appearance. She is normal weight.   HENT:      Head: Normocephalic and atraumatic.      Right Ear: External ear normal.      Left Ear: External ear normal.      Nose: Nose normal.      Mouth/Throat:      Mouth: Mucous membranes are dry.      Pharynx: Oropharynx is clear.   Eyes:      Extraocular Movements: Extraocular movements intact.      Pupils: Pupils are equal, round, and reactive to light.   Cardiovascular:      Rate and Rhythm: Regular rhythm. Tachycardia present.      Pulses: Normal pulses.      Heart sounds: Normal heart sounds.   Pulmonary:      Effort: Pulmonary effort is normal.      Breath sounds: Normal breath sounds.   Abdominal:      General: Abdomen is flat.      Palpations: Abdomen is soft.   Musculoskeletal:         General: Normal range of motion.      Cervical back: Normal range of motion and neck supple.   Skin:      General: Skin is warm and dry.      Capillary Refill: Capillary refill takes less than 2 seconds.   Neurological:      General: No focal deficit present.      Mental Status: She is alert and oriented to person, place, and time.   Psychiatric:         Mood and Affect: Mood normal.         Behavior: Behavior normal.         Thought Content: Thought content normal.            CRANIAL NERVES     CN III, IV, VI   Pupils are equal, round, and reactive to light.     Significant Labs: All pertinent labs within the past 24 hours have been reviewed.    Significant Imaging: I have reviewed all pertinent imaging results/findings within the past 24 hours.

## 2023-07-07 NOTE — PT/OT/SLP EVAL
Occupational Therapy Evaluation     Name: Jacqueline Manuel  MRN: [id number]  Admitting Diagnosis: Hypertensive urgency  Recent Surgery: * No surgery found *      Recommendations:     Discharge Recommendations: outpatient OT, outpatient PT  Level of Assistance Recommended: 24 hours light assistance  Discharge Equipment Recommendations:  (TBD pending progress)  Barriers to discharge:  (generalized weakness, currently in ICU)    Assessment:     Jacqueline Manuel is a 53 y.o. female with a medical diagnosis of Hypertensive urgency. She presents with performance deficits affecting function including weakness, impaired endurance, impaired self care skills, impaired functional mobility, gait instability, impaired balance, decreased upper extremity function, decreased lower extremity function, decreased safety awareness, impaired cardiopulmonary response to activity. The patient participated in OT eval with encouragement. The patient was initially sleeping and required verbal cues to awaken and remain alert to participate in OT eval. The patient was able to transfer yo the chair with CGA. The patient will benefit from OT to address functional deficits.       Rehab Prognosis: Good; patient would benefit from acute OT services to address these deficits and reach maximum level of function.    Plan:     Patient to be seen 3 x/week to address the above listed problems via self-care/home management, therapeutic activities, therapeutic exercises  Plan of Care Expires: 07/21/23  Plan of Care Reviewed with: patient, spouse    Subjective     Chief Complaint: feels sleepy  Patient Comments/Goals: agreeable to sit in the chair  Pain/Comfort:  Pain Rating 1: 0/10    Patients cultural, spiritual, Latter day conflicts given the current situation: no    Social History:  Living Environment: Patient lives with their spouse in a first floor apartment with number of outside stair(s): 0 and tub-shower combo  Prior Level of Function: Prior to  admission, patient was modified independent with ADLs using straight cane for mobility  Roles and Routines: Patient was not driving and not working prior to admission.  Equipment Used at Home: none  DME owned (not currently used): none  Assistance Upon Discharge: significant other    Objective:     Communicated with nurse prior to session. Patient found HOB elevated with blood pressure cuff, telemetry, pulse ox (continuous), peripheral IV upon OT entry to room.    General Precautions: Standard, fall   Orthopedic Precautions: N/A   Braces: N/A    Respiratory Status: Room air    Occupational Performance    Gait belt applied - Yes    Bed Mobility:   Scooting anteriorly to EOB to have both feet planted on floor: stand by assistance  Supine to sit from right side of bed with stand by assistance and verbal cues and assist to manage ICU lines/IV line    Functional Mobility/Transfers:  Sit <> Stand Transfer with contact guard assistance with hand-held assist  Functional Mobility: The patient stepped from bed to chair with CGA and VC.    Activities of Daily Living:  Upper Body Dressing: minimum assistance      Cognitive/Visual Perceptual:  Cognitive/Psychosocial Skills:    -     Oriented to: Person, Place, and unable to fully assess 2* drowsy and limited verbal response  -     Follows Commands/attention: Follows one-step commands and limited by drowsiness  -     Communication: clear/fluent and minimal response to questions  -     Memory: unable to fully assess  -     Safety awareness/insight to disability: impaired  -     Mood/Affect/Coping skills/emotional control: sleepy and became irritated with attempts to obtain PLOF info  Visual/Perceptual:    -     verbal cues to remain alert , appears intact    Physical Exam:  Balance:    -     Sitting: stand by assistance  -     Standing: contact guard assistance  Postural examination/scapula alignment:    -       Rounded shoulders  -       Forward head  Skin integrity: Visible skin  intact  Edema:  None noted  Upper Extremity Range of Motion:     -       Right Upper Extremity: WFL  -       Left Upper Extremity: WFL  Upper Extremity Strength:    -       Right Upper Extremity: WFL  -       Left Upper Extremity: WFL    AMPAC 6 Click ADL:  AMPAC Total Score: 20    Treatment & Education:  Participated in OT eval with her spouse present  HR 91, /76, SpO2 96%, RR 12  Educated the patient and spouse re: OT role and POC      Patient left up in chair with all lines intact, call button in reach, RN notified, and spouse present.    GOALS:   Multidisciplinary Problems       Occupational Therapy Goals          Problem: Occupational Therapy    Goal Priority Disciplines Outcome Interventions   Occupational Therapy Goal     OT, PT/OT Ongoing, Progressing    Description: Goals to be met by: 7/21/23     Patient will increase functional independence with ADLs by performing:    UE Dressing with Modified [place].  LE Dressing with Modified [place].  Grooming while standing at sink with Modified [place] and Assistive Devices as needed.  Toileting from toilet with Modified [place], Supervision, and Assistive Devices as needed for hygiene and clothing management.   Supine to sit with Modified [place].  Step transfer with Modified [place] and Supervision  Toilet transfer to toilet with Modified [place] and Supervision.  Upper extremity exercise program x15 reps per handout, with independence.                         History:     History reviewed. No pertinent past medical history.    History reviewed. No pertinent surgical history.    Time Tracking:     OT Date of Treatment: 07/07/23  OT Start Time: 1051  OT Stop Time: 1100  OT Total Time (min): 9 min    Billable Minutes: Evaluation 9 (with PT)    7/7/2023

## 2023-07-07 NOTE — NURSING
24 Hour chart check completed. Nurse Orders reviewed / No Omitted orders, labs / No duplicate orders / No Benzo's, Opioids, narcotics which are NOT in use and should be considered for DC.

## 2023-07-07 NOTE — ASSESSMENT & PLAN NOTE
Right temporoparietal encephalomalacia change.  Findings are likely related to prior infarct. Age-indeterminate bilateral basal ganglia and bilateral thalamic lacunar infarcts.    -Consider MRI of the brain with diffusion-weighted sequencing.

## 2023-07-07 NOTE — PLAN OF CARE
Problem: Occupational Therapy  Goal: Occupational Therapy Goal  Description: Goals to be met by: 7/21/23     Patient will increase functional independence with ADLs by performing:    UE Dressing with Modified [place].  LE Dressing with Modified [place].  Grooming while standing at sink with Modified [place] and Assistive Devices as needed.  Toileting from toilet with Modified [place], Supervision, and Assistive Devices as needed for hygiene and clothing management.   Supine to sit with Modified [place].  Step transfer with Modified [place] and Supervision  Toilet transfer to toilet with Modified [place] and Supervision.  Upper extremity exercise program x15 reps per handout, with independence.    Outcome: Ongoing, Progressing   The patient will benefit from OT to address her functional deficits.

## 2023-07-07 NOTE — EICU
New Patient Evaluation    HPI:  53 F history of hypertension, CVA, asthma presented with hypertension presented after home health nurse noted that her BP was high. She did have a headache and admits to not being compliant with her medications as he has moved 6 months ago and is without transportation to  her medications. /140 on presentation not responding to IV meds an eventually started on nicardipine drip.    Head CT Right temporoparietal encephalomalacia change.  Findings are likely related to prior infarct. Age-indeterminate bilateral basal ganglia and bilateral thalamic lacunar infarcts    Camera Assessment:  /111    O2 95%  Seen awake not in distress    Data:  WBC 7.73, H/H 15/44.4, platelets 250  Na 140, K 3.3, CO2 32, BUN 22, creatinine 1.7  BNP 78, Trop I 0.016    Assessment and Plans:   Hypertensive emergency due to medication non-compliance. Resume oral meds and titrate nicardipine to off  CVA Ct findings seems chronic. MRI if with clinical indication

## 2023-07-07 NOTE — ASSESSMENT & PLAN NOTE
Patient has a current diagnosis of Hypertensive emergency with end organ damage evidenced by acute kidney injury which is uncontrolled.  Latest blood pressure and vitals reviewed-   Temp:  [97.9 °F (36.6 °C)-98.3 °F (36.8 °C)]   Pulse:  [65-84]   Resp:  [16-17]   BP: (202-269)/(104-140)   SpO2:  [96 %-99 %] .   Patient currently on IV antihypertensives.   Home meds for hypertension were reviewed and noted below.   Hypertension Medications     No Active Medications          Medication adjustment for hospital antihypertensives is as follows- Continue an titrate Nicardipine drip to acheive goal of BP <180/<120 mmHg for the first hour and <160/<110 mmHg for the next 23 hours . Plan to transition to home PO medications once BP reaches <160/110.     Will aim for controlled BP reduction by medications noted above. Monitor and mitigate end organ damage as indicated.

## 2023-07-07 NOTE — PT/OT/SLP EVAL
Physical Therapy Evaluation    Patient Name:  Jacqueline Manuel   MRN:  [id number]    Recommendations:     Discharge Recommendations: outpatient PT   Discharge Equipment Recommendations:  (Ongoing assessment pending pt progress)   Barriers to discharge:  Pt in ICU    Assessment:     Jacqueline Manuel is a 53 y.o. female admitted with a medical diagnosis of Hypertensive urgency.  She presents with the following impairments/functional limitations: weakness, gait instability, impaired endurance, impaired balance, impaired coordination, decreased safety awareness, impaired self care skills, impaired functional mobility, decreased coordination .    Rehab Prognosis: Good; patient would benefit from acute skilled PT services to address these deficits and reach maximum level of function.    Recent Surgery: * No surgery found *      Plan:     During this hospitalization, patient to be seen  (2-3x/wk) to address the identified rehab impairments via gait training, therapeutic exercises, therapeutic activities, neuromuscular re-education and progress toward the following goals:    Plan of Care Expires:  07/14/23    Subjective     Chief Complaint: weakness and fatigue  Patient/Family Comments/goals: Pt agreeable to evaluation and OOB to chair  Pain/Comfort:  Pain Rating 1: 0/10    Patients cultural, spiritual, Mu-ism conflicts given the current situation: no    Living Environment:  Pt lives with her spouse in a [facility] with no concerns  Prior to admission, patients level of function was Independent.  Equipment used at home: none.  DME owned (not currently used): none.  Upon discharge, patient will have assistance from Spouse.    Objective:     Communicated with nsg prior to session.  Patient found HOB elevated with peripheral IV (ICU monitoring)  upon PT entry to room.    General Precautions: Standard, fall  Orthopedic Precautions:N/A   Braces: N/A  Respiratory Status: Room air    Exams:  Cognitive Exam:  Patient is oriented to  Person, Place, Time, and Situation  Gross Motor Coordination:  mildly impaired 2/2 weakness  Postural Exam:  Patient presented with the following abnormalities:    -       Rounded shoulders  -       Forward head  Sensation:    -       Intact  light/touch B LE's  Skin Integrity/Edema:      -       Skin integrity: Visible skin intact  -       Edema: None noted    RLE ROM: WFL  RLE Strength: WFL  LLE ROM: WFL  LLE Strength: WFL    Functional Mobility:  Bed Mobility:     Scooting: modified independence  Supine to Sit: modified independence  Transfers:     Sit to Stand:  contact guard assistance with no AD  Gait: Approx 5 steps from bed to chair with CGA via step transfer  Balance: Fair+ sit, Fair stand      AM-PAC 6 CLICK MOBILITY  Total Score:16       Treatment & Education:  Educated pt to perform AP's, GS, and TKE's while up in chair.  Pt verbalized/demonstrated understanding     Patient left up in chair with all lines intact, call button in reach, nsg notified, and spouse present.    GOALS:   Multidisciplinary Problems       Physical Therapy Goals          Problem: Physical Therapy    Goal Priority Disciplines Outcome Goal Variances Interventions   Physical Therapy Goal     PT, PT/OT Ongoing, Progressing     Description: Goals to be met by: 23     Patient will increase functional independence with mobility by performin. Supine to sit with Modified [place]  2. Sit to stand transfer with Modified [place]  3. Gait  x 150 feet with Modified [place]    4. Lower extremity exercise program x10 reps per handout, with independence                         History:     History reviewed. No pertinent past medical history.    History reviewed. No pertinent surgical history.    Time Tracking:     PT Received On: 23  PT Start Time: 1051     PT Stop Time: 1104  PT Total Time (min): 13 min     Billable Minutes: Evaluation 13 Co-evaluation with OT      2023

## 2023-07-07 NOTE — PLAN OF CARE
Patient remains in ICU, alert and oriented.Nicardipine stopped, blood pressure stable. Magnesium and Potasium replaced. Patient ambulates to toilet without difficulty. Family at bed side throughout the shift. Care ongoing.    Problem: Adult Inpatient Plan of Care  Goal: Plan of Care Review  Outcome: Ongoing, Progressing  Goal: Patient-Specific Goal (Individualized)  Outcome: Ongoing, Progressing  Goal: Absence of Hospital-Acquired Illness or Injury  Outcome: Ongoing, Progressing  Goal: Optimal Comfort and Wellbeing  Outcome: Ongoing, Progressing  Goal: Readiness for Transition of Care  Outcome: Ongoing, Progressing

## 2023-07-07 NOTE — PLAN OF CARE
"   07/07/23 1549   Discharge Assessment   Assessment Type Discharge Planning Assessment   Confirmed/corrected address, phone number and insurance Yes   Confirmed Demographics Correct on Facesheet   Source of Information patient   Communicated CLAY with patient/caregiver Yes   Reason For Admission Hypokalemia   People in Home spouse;child(sandor), adult   Do you expect to return to your current living situation? Yes   Do you have help at home or someone to help you manage your care at home? Yes   Prior to hospitilization cognitive status: Alert/Oriented   Current cognitive status: Alert/Oriented   Walking or Climbing Stairs ambulation difficulty, requires equipment   Equipment Currently Used at Home none   Readmission within 30 days? No   Patient currently being followed by outpatient case management? Unable to determine (comments)   Do you currently have service(s) that help you manage your care at home? No   Do you take prescription medications? Yes   Do you have prescription coverage? Yes   Do you have any problems affording any of your prescribed medications? No   Is the patient taking medications as prescribed? yes   Who is going to help you get home at discharge? "One of my children"   How do you get to doctors appointments? family or friend will provide   Are you on dialysis? No   Do you take coumadin? No   Discharge Plan A Home with family  (with OP PT/OT)   Discharge Plan B   (tbd)   DME Needed Upon Discharge  none   Transition of Care Barriers None   OTHER   Name(s) of People in Home spouse, Molina, 17 and 15 yo       "

## 2023-07-07 NOTE — HPI
"Jacqueline Manuel is a 53 y.o. female with a PMHx of HTN, asthma, CVA x3, who presents to the ED via EMS for evaluation of hypertension today. Patient reports home health nurse was checking her blood pressure today when they noticed it was high, after which EMS was called. She further notes she began having a headache this morning. Patient states she has not been compliant with her antihypertensives and has not taken them for the past 6 months, [place] a vehicle to  her medications. Additional History provided by independent historian, patient's daughter, who reports patient has some balance issues at baseline, noting she "falls or is about to fall when she walks" at baseline, which she reports is unchanged. Patient also reports some intermittent SOB. No medications taken PTA. No alleviating or exacerbating factors noted. Denies cough, double vision, or other associated symptoms. Patient reports she is a current smoker, and occasional EtOH user. NKDA.     In the ED patient hypertensive with /140. Potassium 3.3 Cr 1.7, BUN mildly elevated at 22. CT head reveals Right temporoparietal encephalomalacia change.  Findings are likely related to prior infarct. Age-indeterminate bilateral basal ganglia and bilateral thalamic lacunar infarcts.  Consider MRI of the brain with diffusion-weighted sequencing. Patient given several pushes of IV Hydralazine, given PO Losartan, and eventually started on Nicardipine Drip. Patient seen by medicine team and admitted for hypertensive emergency.   "

## 2023-07-07 NOTE — PLAN OF CARE
Patient remains free of distress. Patient is AAOx4. Afebrile. Room air with O2 Sats in the 96-99%. Sinus tach on the cardiac monitor. 2 PIV's that remain functional. NS infusing at 125mL/hr. Continuing to titrate the Cardene drip down. Chart check has been performed. All signed and held orders released. Spouse at bedside. Patient is fully aware of her non-compliance with home medications.

## 2023-07-07 NOTE — NURSING
[facility] [place]  Nurses Note -- 4 Eyes      7/7/2023       Skin assessed on: Q Shift      [x] No Pressure Injuries Present    [x]Prevention Measures Documented    [] Yes LDA  for Pressure Injury Previously documented     [] Yes New Pressure Injury Discovered   [] LDA for New Pressure Injury Added      Attending RN:  Kandy Castanon RN     Second RN:  JET Dong RN

## 2023-07-07 NOTE — ASSESSMENT & PLAN NOTE
Patient with Cr 1.7 at admission. Unsure of patient baseline.   -Patient states she is on ARB at home, no medications listed. Given Losartan in the ED. Would continue with this as it will help her BP and is renal protective.  -Avoid other nephrotoxins  -Repeat AM CMP

## 2023-07-07 NOTE — PLAN OF CARE
Case Management Assessment     PCP: none agrees to go to [facility]  [facility]    Patient Arrived From: home  Existing Help at Home: spouse and 18 yo    Barriers to Discharge: none    Discharge Plan:    A. Home with OP PT/OT   B. tbd        This patient has been screened for Case Management needs.  Treatment is ongoing at this time.     Patient's choice of someone to speak on her behalf if unable if no designated MPOA:  spouse, Molina    CM provided patient with contact info and encouraged her to call for any discharge needs.  CM will continue to follow while in the ICU and assist with DC planning as needed.

## 2023-07-07 NOTE — NURSING
[facility]  Nurses Note -- 4 Eyes      7/7/2023       Skin assessed on: Admit      [x] No Pressure Injuries Present    [x]Prevention Measures Documented    [] Yes LDA  for Pressure Injury Previously documented     [] Yes New Pressure Injury Discovered   [] LDA for New Pressure Injury Added      Attending RN:  Jaky Dong, RN     Second RN:  Lisa

## 2023-07-08 VITALS
WEIGHT: 127 LBS | RESPIRATION RATE: 57 BRPM | SYSTOLIC BLOOD PRESSURE: 156 MMHG | TEMPERATURE: 99 F | HEART RATE: 95 BPM | OXYGEN SATURATION: 97 % | BODY MASS INDEX: 18.18 KG/M2 | DIASTOLIC BLOOD PRESSURE: 97 MMHG | HEIGHT: 70 IN

## 2023-07-08 LAB
ANION GAP SERPL CALC-SCNC: 8 MMOL/L (ref 8–16)
ANION GAP SERPL CALC-SCNC: 8 MMOL/L (ref 8–16)
AV INDEX (PROSTH): 0.43
AV MEAN GRADIENT: 5 MMHG
AV PEAK GRADIENT: 10 MMHG
AV REGURGITATION PRESSURE HALF TIME: 610.66 MS
AV VALVE AREA: 1.49 CM2
AV VELOCITY RATIO: 0.51
BASOPHILS # BLD AUTO: 0.03 K/UL (ref 0–0.2)
BASOPHILS NFR BLD: 0.4 % (ref 0–1.9)
BNP SERPL-MCNC: 27 PG/ML (ref 0–99)
BSA FOR ECHO PROCEDURE: 1.69 M2
BUN SERPL-MCNC: 23 MG/DL (ref 6–20)
BUN SERPL-MCNC: 24 MG/DL (ref 6–20)
CALCIUM SERPL-MCNC: 10 MG/DL (ref 8.7–10.5)
CALCIUM SERPL-MCNC: 10 MG/DL (ref 8.7–10.5)
CHLORIDE SERPL-SCNC: 101 MMOL/L (ref 95–110)
CHLORIDE SERPL-SCNC: 103 MMOL/L (ref 95–110)
CO2 SERPL-SCNC: 29 MMOL/L (ref 23–29)
CO2 SERPL-SCNC: 31 MMOL/L (ref 23–29)
CREAT SERPL-MCNC: 1.6 MG/DL (ref 0.5–1.4)
CREAT SERPL-MCNC: 1.7 MG/DL (ref 0.5–1.4)
CV ECHO LV RWT: 0.49 CM
DIFFERENTIAL METHOD: ABNORMAL
DOP CALC AO PEAK VEL: 1.55 M/S
DOP CALC AO VTI: 23.2 CM
DOP CALC LVOT AREA: 3.5 CM2
DOP CALC LVOT DIAMETER: 2.11 CM
DOP CALC LVOT PEAK VEL: 0.79 M/S
DOP CALC LVOT STROKE VOLUME: 34.6 CM3
DOP CALCLVOT PEAK VEL VTI: 9.9 CM
E WAVE DECELERATION TIME: 120.15 MSEC
E/A RATIO: 0.87
E/E' RATIO: 15.27 M/S
ECHO LV POSTERIOR WALL: 1.16 CM (ref 0.6–1.1)
EJECTION FRACTION: 55 %
EOSINOPHIL # BLD AUTO: 0.4 K/UL (ref 0–0.5)
EOSINOPHIL NFR BLD: 5.1 % (ref 0–8)
ERYTHROCYTE [DISTWIDTH] IN BLOOD BY AUTOMATED COUNT: 12.2 % (ref 11.5–14.5)
EST. GFR  (NO RACE VARIABLE): 36 ML/MIN/1.73 M^2
EST. GFR  (NO RACE VARIABLE): 39 ML/MIN/1.73 M^2
FRACTIONAL SHORTENING: 24 % (ref 28–44)
GLUCOSE SERPL-MCNC: 100 MG/DL (ref 70–110)
GLUCOSE SERPL-MCNC: 101 MG/DL (ref 70–110)
HCT VFR BLD AUTO: 43.9 % (ref 37–48.5)
HGB BLD-MCNC: 13.9 G/DL (ref 12–16)
IMM GRANULOCYTES # BLD AUTO: 0.01 K/UL (ref 0–0.04)
IMM GRANULOCYTES NFR BLD AUTO: 0.1 % (ref 0–0.5)
INTERVENTRICULAR SEPTUM: 1.03 CM (ref 0.6–1.1)
LA MAJOR: 5.37 CM
LA MINOR: 4.62 CM
LA WIDTH: 4.6 CM
LEFT ATRIUM SIZE: 3.62 CM
LEFT ATRIUM VOLUME INDEX: 40.9 ML/M2
LEFT ATRIUM VOLUME: 70.3 CM3
LEFT INTERNAL DIMENSION IN SYSTOLE: 3.62 CM (ref 2.1–4)
LEFT VENTRICLE DIASTOLIC VOLUME INDEX: 61.35 ML/M2
LEFT VENTRICLE DIASTOLIC VOLUME: 105.53 ML
LEFT VENTRICLE MASS INDEX: 111 G/M2
LEFT VENTRICLE SYSTOLIC VOLUME INDEX: 32.1 ML/M2
LEFT VENTRICLE SYSTOLIC VOLUME: 55.17 ML
LEFT VENTRICULAR INTERNAL DIMENSION IN DIASTOLE: 4.76 CM (ref 3.5–6)
LEFT VENTRICULAR MASS: 190.17 G
LV LATERAL E/E' RATIO: 14 M/S
LV SEPTAL E/E' RATIO: 16.8 M/S
LVOT MG: 1.25 MMHG
LVOT MV: 0.53 CM/S
LYMPHOCYTES # BLD AUTO: 2.3 K/UL (ref 1–4.8)
LYMPHOCYTES NFR BLD: 33.4 % (ref 18–48)
MAGNESIUM SERPL-MCNC: 2.1 MG/DL (ref 1.6–2.6)
MCH RBC QN AUTO: 28.8 PG (ref 27–31)
MCHC RBC AUTO-ENTMCNC: 31.7 G/DL (ref 32–36)
MCV RBC AUTO: 91 FL (ref 82–98)
MONOCYTES # BLD AUTO: 0.5 K/UL (ref 0.3–1)
MONOCYTES NFR BLD: 6.9 % (ref 4–15)
MV PEAK A VEL: 0.97 M/S
MV PEAK E VEL: 0.84 M/S
MV STENOSIS PRESSURE HALF TIME: 34.84 MS
MV VALVE AREA P 1/2 METHOD: 6.31 CM2
NEUTROPHILS # BLD AUTO: 3.7 K/UL (ref 1.8–7.7)
NEUTROPHILS NFR BLD: 54.1 % (ref 38–73)
NRBC BLD-RTO: 0 /100 WBC
OHS LV EJECTION FRACTION SIMPSONS BIPLANE MOD: 5 %
PHOSPHATE SERPL-MCNC: 3.5 MG/DL (ref 2.7–4.5)
PISA AR MAX VEL: 5.34 M/S
PLATELET # BLD AUTO: 262 K/UL (ref 150–450)
PMV BLD AUTO: 9.2 FL (ref 9.2–12.9)
POTASSIUM SERPL-SCNC: 3.3 MMOL/L (ref 3.5–5.1)
POTASSIUM SERPL-SCNC: 3.7 MMOL/L (ref 3.5–5.1)
RA MAJOR: 4.34 CM
RA PRESSURE: 3 MMHG
RA WIDTH: 3.6 CM
RBC # BLD AUTO: 4.82 M/UL (ref 4–5.4)
RIGHT VENTRICULAR END-DIASTOLIC DIMENSION: 3.31 CM
RV TISSUE DOPPLER FREE WALL SYSTOLIC VELOCITY 1 (APICAL 4 CHAMBER VIEW): 9.19 CM/S
SINUS: 2.51 CM
SODIUM SERPL-SCNC: 140 MMOL/L (ref 136–145)
SODIUM SERPL-SCNC: 140 MMOL/L (ref 136–145)
STJ: 2.39 CM
TDI LATERAL: 0.06 M/S
TDI SEPTAL: 0.05 M/S
TDI: 0.06 M/S
TRICUSPID ANNULAR PLANE SYSTOLIC EXCURSION: 1.47 CM
WBC # BLD AUTO: 6.83 K/UL (ref 3.9–12.7)

## 2023-07-08 PROCEDURE — 36415 COLL VENOUS BLD VENIPUNCTURE: CPT | Performed by: STUDENT IN AN ORGANIZED HEALTH CARE EDUCATION/TRAINING PROGRAM

## 2023-07-08 PROCEDURE — 25000242 PHARM REV CODE 250 ALT 637 W/ HCPCS: Performed by: INTERNAL MEDICINE

## 2023-07-08 PROCEDURE — 80048 BASIC METABOLIC PNL TOTAL CA: CPT | Performed by: HOSPITALIST

## 2023-07-08 PROCEDURE — 94761 N-INVAS EAR/PLS OXIMETRY MLT: CPT

## 2023-07-08 PROCEDURE — 84100 ASSAY OF PHOSPHORUS: CPT | Performed by: STUDENT IN AN ORGANIZED HEALTH CARE EDUCATION/TRAINING PROGRAM

## 2023-07-08 PROCEDURE — 25000003 PHARM REV CODE 250: Performed by: HOSPITALIST

## 2023-07-08 PROCEDURE — 36415 COLL VENOUS BLD VENIPUNCTURE: CPT | Performed by: INTERNAL MEDICINE

## 2023-07-08 PROCEDURE — 94640 AIRWAY INHALATION TREATMENT: CPT

## 2023-07-08 PROCEDURE — 63600175 PHARM REV CODE 636 W HCPCS: Performed by: INTERNAL MEDICINE

## 2023-07-08 PROCEDURE — 25000003 PHARM REV CODE 250: Performed by: INTERNAL MEDICINE

## 2023-07-08 PROCEDURE — 80048 BASIC METABOLIC PNL TOTAL CA: CPT | Mod: 91 | Performed by: INTERNAL MEDICINE

## 2023-07-08 PROCEDURE — 85025 COMPLETE CBC W/AUTO DIFF WBC: CPT | Performed by: INTERNAL MEDICINE

## 2023-07-08 PROCEDURE — 83735 ASSAY OF MAGNESIUM: CPT | Performed by: STUDENT IN AN ORGANIZED HEALTH CARE EDUCATION/TRAINING PROGRAM

## 2023-07-08 PROCEDURE — 83880 ASSAY OF NATRIURETIC PEPTIDE: CPT | Performed by: INTERNAL MEDICINE

## 2023-07-08 RX ORDER — CARVEDILOL 25 MG/1
25 TABLET ORAL 2 TIMES DAILY
Qty: 180 TABLET | Refills: 3 | Status: SHIPPED | OUTPATIENT
Start: 2023-07-08 | End: 2024-07-07

## 2023-07-08 RX ORDER — HYDRALAZINE HYDROCHLORIDE 25 MG/1
100 TABLET, FILM COATED ORAL EVERY 8 HOURS
Status: DISCONTINUED | OUTPATIENT
Start: 2023-07-08 | End: 2023-07-08

## 2023-07-08 RX ORDER — LABETALOL HYDROCHLORIDE 5 MG/ML
10 INJECTION, SOLUTION INTRAVENOUS EVERY 6 HOURS PRN
Status: DISCONTINUED | OUTPATIENT
Start: 2023-07-08 | End: 2023-07-08 | Stop reason: HOSPADM

## 2023-07-08 RX ORDER — HYDRALAZINE HYDROCHLORIDE 20 MG/ML
10 INJECTION INTRAMUSCULAR; INTRAVENOUS ONCE
Status: COMPLETED | OUTPATIENT
Start: 2023-07-08 | End: 2023-07-08

## 2023-07-08 RX ORDER — HYDRALAZINE HYDROCHLORIDE 25 MG/1
100 TABLET, FILM COATED ORAL EVERY 8 HOURS
Status: DISCONTINUED | OUTPATIENT
Start: 2023-07-08 | End: 2023-07-08 | Stop reason: HOSPADM

## 2023-07-08 RX ORDER — LOSARTAN POTASSIUM 100 MG/1
100 TABLET ORAL DAILY
Qty: 90 TABLET | Refills: 3 | Status: ON HOLD | OUTPATIENT
Start: 2023-07-09 | End: 2024-03-04 | Stop reason: HOSPADM

## 2023-07-08 RX ORDER — ACETAMINOPHEN 500 MG
1 TABLET ORAL DAILY
Qty: 1 EACH | Refills: 0 | Status: SHIPPED | OUTPATIENT
Start: 2023-07-08

## 2023-07-08 RX ORDER — HYDRALAZINE HYDROCHLORIDE 100 MG/1
100 TABLET, FILM COATED ORAL EVERY 8 HOURS
Qty: 270 TABLET | Refills: 3 | Status: SHIPPED | OUTPATIENT
Start: 2023-07-08 | End: 2024-07-07

## 2023-07-08 RX ORDER — IPRATROPIUM BROMIDE AND ALBUTEROL SULFATE 2.5; .5 MG/3ML; MG/3ML
3 SOLUTION RESPIRATORY (INHALATION) EVERY 4 HOURS PRN
Status: DISCONTINUED | OUTPATIENT
Start: 2023-07-08 | End: 2023-07-08 | Stop reason: HOSPADM

## 2023-07-08 RX ORDER — AMLODIPINE BESYLATE 10 MG/1
10 TABLET ORAL DAILY
Qty: 90 TABLET | Refills: 3 | Status: SHIPPED | OUTPATIENT
Start: 2023-07-09 | End: 2024-07-08

## 2023-07-08 RX ORDER — POTASSIUM CHLORIDE 20 MEQ/1
40 TABLET, EXTENDED RELEASE ORAL ONCE
Status: COMPLETED | OUTPATIENT
Start: 2023-07-08 | End: 2023-07-08

## 2023-07-08 RX ADMIN — LOSARTAN POTASSIUM 100 MG: 25 TABLET, FILM COATED ORAL at 08:07

## 2023-07-08 RX ADMIN — MUPIROCIN: 20 OINTMENT TOPICAL at 09:07

## 2023-07-08 RX ADMIN — HYDRALAZINE HYDROCHLORIDE 25 MG: 25 TABLET, FILM COATED ORAL at 03:07

## 2023-07-08 RX ADMIN — CARVEDILOL 25 MG: 12.5 TABLET, FILM COATED ORAL at 08:07

## 2023-07-08 RX ADMIN — HYDRALAZINE HYDROCHLORIDE 100 MG: 25 TABLET, FILM COATED ORAL at 09:07

## 2023-07-08 RX ADMIN — POTASSIUM CHLORIDE 40 MEQ: 1500 TABLET, EXTENDED RELEASE ORAL at 06:07

## 2023-07-08 RX ADMIN — HYDRALAZINE HYDROCHLORIDE 10 MG: 20 INJECTION INTRAMUSCULAR; INTRAVENOUS at 04:07

## 2023-07-08 RX ADMIN — IPRATROPIUM BROMIDE AND ALBUTEROL SULFATE 3 ML: .5; 3 SOLUTION RESPIRATORY (INHALATION) at 04:07

## 2023-07-08 RX ADMIN — AMLODIPINE BESYLATE 10 MG: 5 TABLET ORAL at 08:07

## 2023-07-08 NOTE — PLAN OF CARE
Pt remains in ICU in room air. AAO x4. Pt's SBP >180, prn hydralazine PO given, no any change. So, informed to , inj. Hydralazine given, SBP  decreased. Pt had wheezing, informed to DR Espitia, breathing treatment given, relief obtained. Plan of care updated with patient and patient's daughter. No any injuries and fall during the shift. Possible transfer to the floor.

## 2023-07-08 NOTE — NURSING
[facility]  Nurses Note -- 4 Eyes      7/8/2023       Skin assessed on: Q Shift      [x] No Pressure Injuries Present    [x]Prevention Measures Documented    [] Yes LDA  for Pressure Injury Previously documented     [] Yes New Pressure Injury Discovered   [] LDA for New Pressure Injury Added      Attending RN:  Kandy Castanon RN     Second RN:  Erinn Gan RN

## 2023-07-08 NOTE — DISCHARGE SUMMARY
"[facility] Medicine  Discharge Summary      Patient Name: Jacqueline Manuel  MRN: [id number]  SHABBIR: [id number]  Patient Class: IP- Inpatient  Admission Date: 7/6/2023  Hospital Length of Stay: 2 days  Discharge Date and Time:  07/08/2023 10:16 AM  Attending Physician: Lucero Mcnally MD   Discharging Provider: Lucero Mcnally MD  Primary Care Provider: Primary Doctor No    Primary Care Team: Networked reference to record PCT     HPI:   Jacqueline Manuel is a 53 y.o. female with a PMHx of HTN, asthma, CVA x3, who presents to the ED via EMS for evaluation of hypertension today. Patient reports home health nurse was checking her blood pressure today when they noticed it was high, after which EMS was called. She further notes she began having a headache this morning. Patient states she has not been compliant with her antihypertensives and has not taken them for the past 6 months, [place] a vehicle to  her medications. Additional History provided by independent historian, patient's daughter, who reports patient has some balance issues at baseline, noting she "falls or is about to fall when she walks" at baseline, which she reports is unchanged. Patient also reports some intermittent SOB. No medications taken PTA. No alleviating or exacerbating factors noted. Denies cough, double vision, or other associated symptoms. Patient reports she is a current smoker, and occasional EtOH user. NKDA.     In the ED patient hypertensive with /140. Potassium 3.3 Cr 1.7, BUN mildly elevated at 22. CT head reveals Right temporoparietal encephalomalacia change.  Findings are likely related to prior infarct. Age-indeterminate bilateral basal ganglia and bilateral thalamic lacunar infarcts.  Consider MRI of the brain with diffusion-weighted sequencing. Patient given several pushes of IV Hydralazine, given PO Losartan, and eventually started on Nicardipine " Drip. Patient seen by medicine team and admitted for hypertensive emergency.       * No surgery found *      Hospital Course:   Ms Jacqueline Manuel is a 53 y.o. woman with HTN, prior CVA who presented with chronically uncontrolled hypertension/hypertensive urgency. She was placed on cardene gtt. She has not had access to medications in months. She does use cocaine. Started PO medications. BP control improved and weaned off cardene gtt. She had episode of shortness of breath that resolved with albuterol neb. TTE showed G2DD most likely secondary to uncontrolled HTN. She has no signs of volume overload and does not require lasix. Cr stable around 1.6-1.7- suspect this is more CKD than JEY. Stable for discharge to home. Prescribed 1 year of medications. Prescribed BP cuff. Encouraged her to follow up with PCP. [facility] referral placed. Home health nursing to continue.        Goals of Care Treatment Preferences:  Code Status: Full Code      Consults:   Consults (From admission, onward)          Status Ordering Provider     Inpatient consult to Social Work  Once        Provider:  (Not yet assigned)    SHERITA Conti            No new Assessment & Plan notes have been filed under this hospital service since the last note was generated.  Service: Hospital Medicine    Final Active Diagnoses:    Diagnosis Date Noted POA    PRINCIPAL PROBLEM:  Hypertensive urgency [I16.0] 07/06/2023 Yes    History of stroke [Z86.73] 07/07/2023 Not Applicable    Transportation insecurity due to lack of access to vehicle [Z59.82] 07/07/2023 Yes    Hypokalemia [E87.6] 07/07/2023 Yes    Hypophosphatemia [E83.39] 07/07/2023 Yes    Substance use [F19.90] 07/07/2023 Yes    Renal insufficiency [N28.9] 07/06/2023 Yes      Problems Resolved During this Admission:    Diagnosis Date Noted Date Resolved POA    Multiple lacunar infarcts [I63.81] 07/07/2023 07/07/2023 Unknown       Discharged Condition: good    Disposition:  [place] [facility]    Follow Up:   Follow-up Information       [facility] Follow up.    Contact information:  [address] [facility] [id number] [phone]                           Patient Instructions:      Ambulatory referral/consult to [facility]   Standing Status: Future   Referral Priority: Routine Referral Type: Consultation   Referral Reason: Specialty Services Required   Number of Visits Requested: 1     Diet Cardiac     Notify your health care provider if you experience any of the following:  temperature >100.4     Notify your health care provider if you experience any of the following:  persistent nausea and vomiting or diarrhea     Notify your health care provider if you experience any of the following:  severe uncontrolled pain     Notify your health care provider if you experience any of the following:  redness, tenderness, or signs of infection (pain, swelling, redness, odor or green/yellow discharge around incision site)     Notify your health care provider if you experience any of the following:  difficulty breathing or increased cough     Notify your health care provider if you experience any of the following:  severe persistent headache     Notify your health care provider if you experience any of the following:  worsening rash     Notify your health care provider if you experience any of the following:  persistent dizziness, light-headedness, or visual disturbances     Notify your health care provider if you experience any of the following:  increased confusion or weakness     Activity as tolerated       Significant Diagnostic Studies: N/A    Pending Diagnostic Studies:       Procedure Component Value Units Date/Time    Basic metabolic panel [[id number]]     Order Status: Sent Lab Status: No result     Specimen: Blood            Medications:  Reconciled Home Medications:      Medication List        START taking these medications       amLODIPine 10 MG tablet  Commonly known as: NORVASC  Take 1 tablet (10 mg total) by mouth once daily.  Start taking on: July 9, 2023     blood pressure monitor Kit  1 kit by Misc.(Non-Drug; Combo Route) route once daily. Use daily to check blood pressure     carvediloL 25 MG tablet  Commonly known as: COREG  Take 1 tablet (25 mg total) by mouth 2 (two) times daily.     hydrALAZINE 100 MG tablet  Commonly known as: APRESOLINE  Take 1 tablet (100 mg total) by mouth every 8 (eight) hours.     losartan 100 MG tablet  Commonly known as: COZAAR  Take 1 tablet (100 mg total) by mouth once daily.  Start taking on: July 9, 2023              Indwelling Lines/Drains at time of discharge:   Lines/Drains/Airways       None                   Time spent on the discharge of patient: 35 minutes        Lucero Mcnally MD  Department of Hospital Medicine  [facility] [place] - Intensive Care

## 2023-07-08 NOTE — NURSING
Patient discharged in the care of her daughter. Discharge instruction, medications, follow up appointments reviewed. All questions answered, patient verbalizes understanding of all information given.

## 2023-07-08 NOTE — HOSPITAL COURSE
Ms Jacqueline Manuel is a 53 y.o. woman with HTN, prior CVA who presented with chronically uncontrolled hypertension/hypertensive urgency. She was placed on cardene gtt. She has not had access to medications in months. She does use cocaine. Started PO medications. BP control improved and weaned off cardene gtt. She had episode of shortness of breath that resolved with albuterol neb. TTE showed G2DD most likely secondary to uncontrolled HTN. She has no signs of volume overload and does not require lasix. Stable for discharge to home. Prescribed 1 year of medications. Prescribed BP cuff. Encouraged her to follow up with PCP. [facility] referral placed. Home health nursing to continue.

## 2023-07-08 NOTE — PLAN OF CARE
[facility]      HOME HEALTH ORDERS  FACE TO FACE ENCOUNTER    Patient Name: Jacqueline Manuel  YOB: 1970    PCP: Primary Doctor No   PCP Address: None  PCP Phone Number: None  PCP Fax: None    Encounter Date: 7/6/23    Admit to Home Health    Diagnoses:  Active Hospital Problems    Diagnosis  POA    *Hypertensive urgency [I16.0]  Yes    History of stroke [Z86.73]  Not Applicable    Transportation insecurity due to lack of access to vehicle [Z59.82]  Yes    Hypokalemia [E87.6]  Yes    Hypophosphatemia [E83.39]  Yes    Substance use [F19.90]  Yes    Renal insufficiency [N28.9]  Yes      Resolved Hospital Problems    Diagnosis Date Resolved POA    Multiple lacunar infarcts [I63.81] 07/07/2023 Unknown       Follow Up Appointments:  Future Appointments   Date Time Provider Department Center   7/14/2023  9:00 AM VICKY Ramirez [facility]       Allergies:Review of patient's allergies indicates:  No Known Allergies    Medications: Review discharge medications with patient and family and provide education.    Current Facility-Administered Medications   Medication Dose Route Frequency Provider Last Rate Last Admin    acetaminophen tablet 650 mg  650 mg Oral Q6H PRN Lucero Mcnally MD        albuterol-ipratropium 2.5 mg-0.5 mg/3 mL nebulizer solution 3 mL  3 mL Nebulization Q4H PRN Jorge L De La Torre MD   3 mL at 07/08/23 0429    amLODIPine tablet 10 mg  10 mg Oral Daily Lucero Mcnally MD   10 mg at 07/08/23 0836    carvediloL tablet 25 mg  25 mg Oral BID Lucero Mcnally MD   25 mg at 07/08/23 0836    hydrALAZINE tablet 100 mg  100 mg Oral Q8H Lucero Mcnally MD   100 mg at 07/08/23 0951    labetaloL injection 10 mg  10 mg Intravenous Q6H PRN Lucero Mcnally MD        losartan tablet 100 mg  100 mg Oral Daily Lucero Mcnally MD   100 mg at 07/08/23 0837    melatonin tablet 6 mg  6 mg Oral Nightly PRN Lucero Mcnally MD        mupirocin 2 % ointment   Nasal BID Lucero Golden  MD Gege   Given at 07/08/23 0951    ondansetron injection 4 mg  4 mg Intravenous Q6H PRN Lucero Mcnally MD        senna-docusate 8.6-50 mg per tablet 1 tablet  1 tablet Oral Daily PRN Lucero Mcnally MD        sodium chloride 0.9% flush 10 mL  10 mL Intravenous PRN Uziel Bill,          Current Discharge Medication List        START taking these medications    Details   amLODIPine (NORVASC) 10 MG tablet Take 1 tablet (10 mg total) by mouth once daily.  Qty: 90 tablet, Refills: 3    Comments: .      blood pressure monitor Kit 1 kit by Misc.(Non-Drug; Combo Route) route once daily. Use daily to check blood pressure  Qty: 1 each, Refills: 0      carvediloL (COREG) 25 MG tablet Take 1 tablet (25 mg total) by mouth 2 (two) times daily.  Qty: 180 tablet, Refills: 3    Comments: .      hydrALAZINE (APRESOLINE) 100 MG tablet Take 1 tablet (100 mg total) by mouth every 8 (eight) hours.  Qty: 270 tablet, Refills: 3    Comments: .      losartan (COZAAR) 100 MG tablet Take 1 tablet (100 mg total) by mouth once daily.  Qty: 90 tablet, Refills: 3    Comments: .               I have seen and examined this patient within the last 30 days. My clinical findings that support the need for the home health skilled services and home bound status are the following:no   Patient with medication mismanagement issues requiring home bound status as evidenced by  Unstable vital signs (blood pressure, heart rate), Poor understanding of medication regimen/dosage, and Poor adherence to medication regimen/dosage.     Diet:   cardiac diet    Activities:   activity as tolerated    Nursing:   Agency to admit patient within 24 hours of hospital discharge unless specified on physician order or at patient request    SN to complete comprehensive assessment including routine vital signs. Instruct on disease process and s/s of complications to report to MD. Review/verify medication list sent home with the patient at time of discharge  and  instruct patient/caregiver as needed. Frequency may be adjusted depending on start of care date.     Skilled nurse to perform up to 3 visits PRN for symptoms related to diagnosis    Notify MD if SBP > 160 or < 90; DBP > 90 or < 50; HR > 120 or < 50; Temp > 101; O2 < 88%    Ok to schedule additional visits based on staff availability and patient request on consecutive days within the home health episode.    For all post-discharge communication and subsequent orders please contact patient's primary care physician.         I certify that this patient is confined to her home and needs intermittent skilled nursing care.    Lucero Mcnally MD  07/08/2023  10:00 AM

## 2023-07-08 NOTE — PLAN OF CARE
[facility] - Intensive Care  Discharge Final Note    Primary Care Provider: Primary Doctor No    Expected Discharge Date: 7/8/2023    Final Discharge Note (most recent)       Final Note - 07/08/23 1108          Final Note    Assessment Type Final Discharge Note     Anticipated Discharge Disposition Home-Health Care Svc     What phone number can be called within the next 1-3 days to see how you are doing after discharge? [phone]     Hospital Resources/Appts/Education Provided Appointments scheduled and added to AVS;Appointments scheduled by Navigator/Coordinator        Post-Acute Status    Post-Acute Authorization Home Health     Home Health Status Set-up Complete/Auth obtained     Discharge Delays None known at this time                     Important Message from Medicare             Contact Info       [facility]    [address] [place] [address] [address]   Phone: [phone]       Next Steps: Follow up    [facility] [place]   Specialty: Home Health Services, Home Therapy Services, Home Living Aide Services    [address] [facility] [address]  [facility] [place] [address]   Phone: [phone]       Next Steps: Follow up    Instructions: Home Health          SW secure chat nurses Kandy/Erinn: Patient cleared from case management standpoint.

## 2023-07-08 NOTE — EICU
Intervention Initiated From:  Bedside    Melanie intervened regarding:  Labs    Nurse Notified:  Yes    Doctor Notified:  Yes    Comments: bedside nurse called to report k-3.3. Infomred Dr. De La Torre.

## 2023-07-08 NOTE — NURSING
[facility]  Nurses Note -- 4 Eyes      7/7/2023       Skin assessed on: Q Shift      [x] No Pressure Injuries Present    [x]Prevention Measures Documented    [] Yes LDA  for Pressure Injury Previously documented     [] Yes New Pressure Injury Discovered   [] LDA for New Pressure Injury Added      Attending RN:  Karina Perez RN     Second RN:  Tunde Cano RN

## 2023-07-11 ENCOUNTER — PES CALL (OUTPATIENT)
Dept: ADMINISTRATIVE | Facility: CLINIC | Age: 53
End: 2023-07-11
Payer: MEDICAID

## 2023-07-14 ENCOUNTER — PES CALL (OUTPATIENT)
Dept: ADMINISTRATIVE | Facility: CLINIC | Age: 53
End: 2023-07-14
Payer: MEDICAID

## 2023-10-28 ENCOUNTER — HOSPITAL ENCOUNTER (EMERGENCY)
Facility: HOSPITAL | Age: 53
Discharge: HOME | End: 2023-10-28
Attending: EMERGENCY MEDICINE
Payer: MEDICAID

## 2023-10-28 VITALS
WEIGHT: 135 LBS | HEIGHT: 60 IN | HEART RATE: 83 BPM | DIASTOLIC BLOOD PRESSURE: 75 MMHG | SYSTOLIC BLOOD PRESSURE: 125 MMHG | TEMPERATURE: 98 F | RESPIRATION RATE: 29 BRPM | OXYGEN SATURATION: 98 % | BODY MASS INDEX: 26.5 KG/M2

## 2023-10-28 DIAGNOSIS — R06.2 WHEEZING: Primary | ICD-10-CM

## 2023-10-28 DIAGNOSIS — R06.02 SHORTNESS OF BREATH: ICD-10-CM

## 2023-10-28 DIAGNOSIS — N28.9 ABNORMAL KIDNEY FUNCTION: ICD-10-CM

## 2023-10-28 DIAGNOSIS — R74.01 TRANSAMINITIS: ICD-10-CM

## 2023-10-28 LAB
ALBUMIN SERPL BCP-MCNC: 3.1 G/DL (ref 3.5–5.2)
ALP SERPL-CCNC: 101 U/L (ref 55–135)
ALT SERPL W/O P-5'-P-CCNC: 301 U/L (ref 10–44)
ANION GAP SERPL CALC-SCNC: 6 MMOL/L (ref 8–16)
AST SERPL-CCNC: 22 U/L (ref 10–40)
BASOPHILS # BLD AUTO: 0.03 K/UL (ref 0–0.2)
BASOPHILS NFR BLD: 0.3 % (ref 0–1.9)
BILIRUB SERPL-MCNC: 0.5 MG/DL (ref 0.1–1)
BNP SERPL-MCNC: 138 PG/ML (ref 0–99)
BUN SERPL-MCNC: 22 MG/DL (ref 6–20)
CALCIUM SERPL-MCNC: 9.3 MG/DL (ref 8.7–10.5)
CHLORIDE SERPL-SCNC: 104 MMOL/L (ref 95–110)
CO2 SERPL-SCNC: 31 MMOL/L (ref 23–29)
CREAT SERPL-MCNC: 1.8 MG/DL (ref 0.5–1.4)
DIFFERENTIAL METHOD: ABNORMAL
EOSINOPHIL # BLD AUTO: 0.3 K/UL (ref 0–0.5)
EOSINOPHIL NFR BLD: 2.4 % (ref 0–8)
ERYTHROCYTE [DISTWIDTH] IN BLOOD BY AUTOMATED COUNT: 13.7 % (ref 11.5–14.5)
EST. GFR  (NO RACE VARIABLE): 34 ML/MIN/1.73 M^2
GLUCOSE SERPL-MCNC: 70 MG/DL (ref 70–110)
HCT VFR BLD AUTO: 35.8 % (ref 37–48.5)
HGB BLD-MCNC: 10.9 G/DL (ref 12–16)
IMM GRANULOCYTES # BLD AUTO: 0.04 K/UL (ref 0–0.04)
IMM GRANULOCYTES NFR BLD AUTO: 0.4 % (ref 0–0.5)
LYMPHOCYTES # BLD AUTO: 2.9 K/UL (ref 1–4.8)
LYMPHOCYTES NFR BLD: 27.6 % (ref 18–48)
MCH RBC QN AUTO: 30.9 PG (ref 27–31)
MCHC RBC AUTO-ENTMCNC: 30.4 G/DL (ref 32–36)
MCV RBC AUTO: 101 FL (ref 82–98)
MONOCYTES # BLD AUTO: 1.4 K/UL (ref 0.3–1)
MONOCYTES NFR BLD: 13.6 % (ref 4–15)
NEUTROPHILS # BLD AUTO: 5.8 K/UL (ref 1.8–7.7)
NEUTROPHILS NFR BLD: 55.7 % (ref 38–73)
NRBC BLD-RTO: 0 /100 WBC
PLATELET # BLD AUTO: 269 K/UL (ref 150–450)
PMV BLD AUTO: 9.7 FL (ref 9.2–12.9)
POTASSIUM SERPL-SCNC: 4 MMOL/L (ref 3.5–5.1)
PROT SERPL-MCNC: 8.2 G/DL (ref 6–8.4)
RBC # BLD AUTO: 3.53 M/UL (ref 4–5.4)
SODIUM SERPL-SCNC: 141 MMOL/L (ref 136–145)
TROPONIN I SERPL DL<=0.01 NG/ML-MCNC: 0.02 NG/ML (ref 0–0.03)
WBC # BLD AUTO: 10.32 K/UL (ref 3.9–12.7)

## 2023-10-28 PROCEDURE — 63600175 PHARM REV CODE 636 W HCPCS: Performed by: EMERGENCY MEDICINE

## 2023-10-28 PROCEDURE — 93005 ELECTROCARDIOGRAM TRACING: CPT

## 2023-10-28 PROCEDURE — 93010 EKG 12-LEAD: ICD-10-PCS | Mod: ,,, | Performed by: INTERNAL MEDICINE

## 2023-10-28 PROCEDURE — 85025 COMPLETE CBC W/AUTO DIFF WBC: CPT | Performed by: EMERGENCY MEDICINE

## 2023-10-28 PROCEDURE — 84484 ASSAY OF TROPONIN QUANT: CPT | Performed by: EMERGENCY MEDICINE

## 2023-10-28 PROCEDURE — 96366 THER/PROPH/DIAG IV INF ADDON: CPT

## 2023-10-28 PROCEDURE — 99285 EMERGENCY DEPT VISIT HI MDM: CPT | Mod: 25

## 2023-10-28 PROCEDURE — 96375 TX/PRO/DX INJ NEW DRUG ADDON: CPT

## 2023-10-28 PROCEDURE — 96365 THER/PROPH/DIAG IV INF INIT: CPT

## 2023-10-28 PROCEDURE — 83880 ASSAY OF NATRIURETIC PEPTIDE: CPT | Performed by: EMERGENCY MEDICINE

## 2023-10-28 PROCEDURE — 80053 COMPREHEN METABOLIC PANEL: CPT | Performed by: EMERGENCY MEDICINE

## 2023-10-28 PROCEDURE — 93010 ELECTROCARDIOGRAM REPORT: CPT | Mod: ,,, | Performed by: INTERNAL MEDICINE

## 2023-10-28 PROCEDURE — 25000242 PHARM REV CODE 250 ALT 637 W/ HCPCS: Performed by: EMERGENCY MEDICINE

## 2023-10-28 PROCEDURE — 94644 CONT INHLJ TX 1ST HOUR: CPT

## 2023-10-28 RX ORDER — IPRATROPIUM BROMIDE AND ALBUTEROL SULFATE 2.5; .5 MG/3ML; MG/3ML
3 SOLUTION RESPIRATORY (INHALATION)
Status: COMPLETED | OUTPATIENT
Start: 2023-10-28 | End: 2023-10-28

## 2023-10-28 RX ORDER — PREDNISONE 20 MG/1
40 TABLET ORAL DAILY
Qty: 10 TABLET | Refills: 0 | Status: SHIPPED | OUTPATIENT
Start: 2023-10-28 | End: 2023-11-02

## 2023-10-28 RX ORDER — METHYLPREDNISOLONE SOD SUCC 125 MG
125 VIAL (EA) INJECTION
Status: COMPLETED | OUTPATIENT
Start: 2023-10-28 | End: 2023-10-28

## 2023-10-28 RX ORDER — ALBUTEROL SULFATE 90 UG/1
1-2 AEROSOL, METERED RESPIRATORY (INHALATION) EVERY 4 HOURS PRN
Qty: 18 G | Refills: 3 | Status: SHIPPED | OUTPATIENT
Start: 2023-10-28 | End: 2023-11-16 | Stop reason: CLARIF

## 2023-10-28 RX ORDER — ALBUTEROL SULFATE 2.5 MG/.5ML
15 SOLUTION RESPIRATORY (INHALATION) CONTINUOUS
Status: DISCONTINUED | OUTPATIENT
Start: 2023-10-28 | End: 2023-10-28 | Stop reason: HOSPADM

## 2023-10-28 RX ORDER — DOXYCYCLINE 100 MG/1
100 CAPSULE ORAL 2 TIMES DAILY
Qty: 14 CAPSULE | Refills: 0 | Status: SHIPPED | OUTPATIENT
Start: 2023-10-28 | End: 2023-11-04

## 2023-10-28 RX ORDER — MAGNESIUM SULFATE 1 G/100ML
1 INJECTION INTRAVENOUS
Status: COMPLETED | OUTPATIENT
Start: 2023-10-28 | End: 2023-10-28

## 2023-10-28 RX ORDER — IPRATROPIUM BROMIDE 0.5 MG/2.5ML
1.5 SOLUTION RESPIRATORY (INHALATION) CONTINUOUS
Status: DISCONTINUED | OUTPATIENT
Start: 2023-10-28 | End: 2023-10-28 | Stop reason: HOSPADM

## 2023-10-28 RX ADMIN — IPRATROPIUM BROMIDE AND ALBUTEROL SULFATE 3 ML: 2.5; .5 SOLUTION RESPIRATORY (INHALATION) at 02:10

## 2023-10-28 RX ADMIN — IPRATROPIUM BROMIDE 1.5 MG: 0.5 SOLUTION RESPIRATORY (INHALATION) at 02:10

## 2023-10-28 RX ADMIN — ALBUTEROL SULFATE 15 MG: 2.5 SOLUTION RESPIRATORY (INHALATION) at 02:10

## 2023-10-28 RX ADMIN — MAGNESIUM SULFATE 1 G: 1 INJECTION INTRAVENOUS at 02:10

## 2023-10-28 RX ADMIN — METHYLPREDNISOLONE SODIUM SUCCINATE 125 MG: 125 INJECTION, POWDER, FOR SOLUTION INTRAMUSCULAR; INTRAVENOUS at 02:10

## 2023-10-28 NOTE — ED PROVIDER NOTES
"Encounter Date: 10/28/2023       History     Chief Complaint   Patient presents with    Shortness of Breath     Patient arrived via EMS w CC: asthma exacerbation which started approx 2 days ago, recently hospitalized w [facility] in [place] for 1 week.  Nebs given en route, 1 ventolin and 1 duoneb, EMS EKG wnls. Denies chest pain. Endorses some leg pain. Has "pumps" for her asthma which don't work. No PIV access so no steroids given.      Chief complaint:  Shortness of breath     History of present illness:  52-year-old female who states that she was recently admitted to hospital in [place], [place], for shortness of breath, presents with 2 days of worsening shortness of breath at home.  She denies chest pain.  She denies abdominal pain or vomiting.  No fever or chills.  She states that she is been using her inhalers at home.  She does smoke cigarettes.  She does not use home oxygen.      Review of patient's allergies indicates:  No Known Allergies  No past medical history on file.  No past surgical history on file.  No family history on file.     Review of Systems   Constitutional:  Negative for fever.   HENT:  Negative for sore throat.    Respiratory:  Positive for shortness of breath.    Cardiovascular:  Negative for chest pain.   Gastrointestinal:  Negative for nausea and vomiting.   Genitourinary:  Negative for dysuria.   Musculoskeletal:  Negative for back pain.   Skin:  Negative for rash.   Neurological:  Negative for weakness.   Hematological:  Does not bruise/bleed easily.       Physical Exam     Initial Vitals [10/28/23 1343]   BP Pulse Resp Temp SpO2   (!) 150/80 100 (!) 38 98.1 °F (36.7 °C) 100 %      MAP       --         Physical Exam    Nursing note and vitals reviewed.  Constitutional: She appears well-developed and well-nourished. She is not diaphoretic. No distress.   HENT:   Head: Normocephalic and atraumatic.   Right Ear: External ear normal.   Left Ear: External ear normal.   Nose: Nose normal. "   Mouth/Throat: Oropharynx is clear and moist.   Eyes: Conjunctivae and EOM are normal. Pupils are equal, round, and reactive to light. Right eye exhibits no discharge. Left eye exhibits no discharge. No scleral icterus.   Neck: Neck supple.   Normal range of motion.  Cardiovascular:  Normal rate, regular rhythm, normal heart sounds and intact distal pulses.           No murmur heard.  Pulmonary/Chest: Breath sounds normal. No respiratory distress. She has no wheezes. She has no rhonchi. She has no rales.   Patient has increased work of breathing.  She is tachypneic.  She has expiratory wheezes auscultated in all lung fields with decreased breath sounds.  There are scattered rhonchi.   Abdominal: Abdomen is soft. Bowel sounds are normal. She exhibits no distension and no mass. There is no abdominal tenderness. There is no rebound and no guarding.   Musculoskeletal:         General: No tenderness or edema. Normal range of motion.      Cervical back: Normal range of motion and neck supple.      Comments: No lower extremity edema, erythema, or asymmetry.     Neurological: She is alert and oriented to person, place, and time. She has normal strength. No cranial nerve deficit or sensory deficit.   Skin: Skin is warm and dry. No rash noted. No erythema. No pallor.   Psychiatric: Her behavior is normal. Judgment and thought content normal.   Patient tearful.         ED Course   Procedures  Labs Reviewed   CBC W/ AUTO DIFFERENTIAL - Abnormal; Notable for the following components:       Result Value    RBC 3.53 (*)     Hemoglobin 10.9 (*)     Hematocrit 35.8 (*)      (*)     MCHC 30.4 (*)     Mono # 1.4 (*)     All other components within normal limits   COMPREHENSIVE METABOLIC PANEL - Abnormal; Notable for the following components:    CO2 31 (*)     BUN 22 (*)     Creatinine 1.8 (*)     Albumin 3.1 (*)      (*)     eGFR 34 (*)     Anion Gap 6 (*)     All other components within normal limits   B-TYPE  NATRIURETIC PEPTIDE - Abnormal; Notable for the following components:     (*)     All other components within normal limits   TROPONIN I     EKG Readings: (Independently Interpreted)   Initial Reading: No STEMI. Ectopy: No Ectopy.   EKG reviewed and interpreted by me shows sinus rhythm at a rate of 86 beats per minute.  The RI, QRS, QTC intervals are grossly within normal limits.  There is a normal axis.  There is normal R-wave progression across the precordium.  There are no ST segment or T-wave ischemic findings.         Imaging Results              X-Ray Chest AP Portable (Final result)  Result time 10/28/23 15:09:32      Final result by Flaco Haskins MD (10/28/23 15:09:32)                   Impression:      No acute process.      Electronically signed by: Flaco Haskins MD  Date:    10/28/2023  Time:    15:09               Narrative:    EXAMINATION:  XR CHEST AP PORTABLE    CLINICAL HISTORY:  Shortness of breath    TECHNIQUE:  Single frontal view of the chest was performed.    COMPARISON:  None    FINDINGS:  Monitoring EKG leads are present.  The trachea is unremarkable.  The cardiomediastinal silhouette is within normal limits.  The hilar structures are unremarkable.  There is no evidence of free air beneath the hemidiaphragms.  There are no pleural effusions.  There is no evidence of a pneumothorax.  There is no evidence of pneumomediastinum.  No airspace opacity is present.  The osseous structures are unremarkable.                                    X-Rays:   Independently Interpreted Readings:   Other Readings:  Chest x-ray reveals no evidence of infiltrate, consolidation, pleural effusion, pulmonary edema, or pneumothorax    Medications   albuterol sulfate nebulizer solution 15 mg (15 mg Nebulization New Bag 10/28/23 1435)   ipratropium 0.02 % nebulizer solution 1.5 mg (1.5 mg Nebulization New Bag 10/28/23 1435)   magnesium sulfate in dextrose IVPB (premix) 1 g (1 g Intravenous New Bag 10/28/23 1438)  "  methylPREDNISolone sodium succinate injection 125 mg (125 mg Intravenous Given 10/28/23 1432)   albuterol-ipratropium 2.5 mg-0.5 mg/3 mL nebulizer solution 3 mL (3 mLs Nebulization Given 10/28/23 1435)     Medical Decision Making  This is the emergent evaluation of a 52-year-old female presents emergency department for evaluation of shortness of breath for the past 2 days.  Differential diagnosis at the time of initial evaluation included, but was not limited to:  Asthma/COPD exacerbation, pneumonia, pulmonary edema.  I considered but doubt pulmonary embolus.  Patient has wheezing and scattered rhonchi on exam.  She is afebrile.  She is a cigarette smoker.  She likely has reactive airway disease, either asthma or COPD.   She denies chest pain.    Patient denies history of DVT/PE.  No lower extremity edema, asymmetry, or erythema.  Patient is not tachycardic.  No hypotension.  No hypoxemia upon arrival to the emergency department.  No elevation in the patient's troponin.  Creatinine 1.8 with a BUN of 22.  Patient referred to "problems with her kidneys" that were diagnosed at recent hospitalization.  I do not have access to those records.  I advised follow-up with Nephrology.  She also reported that she had "liver problems".  She is no abdominal pain or vomiting.  She is no jaundice.  Total bilirubin is normal.  There is an elevation of her transaminases.  I will have her follow-up with Gastroenterology.  This patient likely has COPD.  I encouraged her to continue attempting smoking cessation.  I prescribed albuterol MDI as well as prednisone and doxycycline for what is likely COPD exacerbation with increased sputum production.  Patient advised return for new or worsening symptoms such as chest pain, fever, or worsening shortness of breath.    Patient had reassuring EKG.  No leukocytosis.  There is a macrocytic anemia.  Chest x-ray without acute concerning abnormalities.  BNP nonspecifically elevated in the " 100s.    Amount and/or Complexity of Data Reviewed  Labs: ordered. Decision-making details documented in ED Course.  Radiology: ordered and independent interpretation performed. Decision-making details documented in ED Course.  ECG/medicine tests: ordered and independent interpretation performed. Decision-making details documented in ED Course.    Risk  Prescription drug management.                               Clinical Impression:   Final diagnoses:  [R06.02] Shortness of breath  [R06.2] Wheezing (Primary)  [R74.01] Transaminitis  [N28.9] Abnormal kidney function        ED Disposition Condition    Discharge Stable          ED Prescriptions       Medication Sig Dispense Start Date End Date Auth. Provider    albuterol (PROVENTIL/VENTOLIN HFA) 90 mcg/actuation inhaler Inhale 1-2 puffs into the lungs every 4 (four) hours as needed for Wheezing or Shortness of Breath. 18 g 10/28/2023 10/27/2024 Varinder Lynn Jr., MD    predniSONE (DELTASONE) 20 MG tablet Take 2 tablets (40 mg total) by mouth once daily. for 5 days 10 tablet 10/28/2023 11/2/2023 Varinder Lynn Jr., MD    doxycycline (VIBRAMYCIN) 100 MG Cap Take 1 capsule (100 mg total) by mouth 2 (two) times daily. for 7 days 14 capsule 10/28/2023 11/4/2023 Varinder Lynn Jr., MD          Follow-up Information       Follow up With Specialties Details Why Contact Info Additional Information    [facility] Nephrology Schedule an appointment as soon as possible for a visit in 1 week  [address]  [facility] [address]  [phone]     [facility] Gastroenterology Schedule an appointment as soon as possible for a visit in 1 week  [address]  [facility] [id number] [phone] Please park in garage or surface lot and use Medical Office Bldg entrance. Check in at [address]             Varinder Lynn Jr., MD  10/28/23 5076

## 2023-10-28 NOTE — DISCHARGE INSTRUCTIONS
As discussed at your prior hospital admission, you do have some abnormal liver function tests and abnormal kidney function tests.  It is important that you follow-up with Gastroenterology and with a nephrologist for further evaluation.  Please use medications for what is likely COPD or possibly asthma.  Please continue to try to quit smoking.  Return if you get worse or if new symptoms develop such as fever, chest pain, or worsening shortness of breath.

## 2023-10-28 NOTE — ED NOTES
Pt daughter was called per pt request,  & was informed pt is ready for discharge. Daughter stated she will be on her way shortly.

## 2023-11-11 ENCOUNTER — HOSPITAL ENCOUNTER (EMERGENCY)
Facility: HOSPITAL | Age: 53
Discharge: HOME | End: 2023-11-11
Attending: EMERGENCY MEDICINE
Payer: MEDICAID

## 2023-11-11 VITALS
OXYGEN SATURATION: 95 % | SYSTOLIC BLOOD PRESSURE: 131 MMHG | WEIGHT: 125 LBS | BODY MASS INDEX: 24.54 KG/M2 | HEART RATE: 81 BPM | HEIGHT: 60 IN | RESPIRATION RATE: 17 BRPM | TEMPERATURE: 99 F | DIASTOLIC BLOOD PRESSURE: 73 MMHG

## 2023-11-11 DIAGNOSIS — J98.01 BRONCHOSPASM: ICD-10-CM

## 2023-11-11 DIAGNOSIS — R79.89 POSITIVE D DIMER: ICD-10-CM

## 2023-11-11 DIAGNOSIS — J44.9 CHRONIC OBSTRUCTIVE PULMONARY DISEASE, UNSPECIFIED COPD TYPE: Primary | ICD-10-CM

## 2023-11-11 DIAGNOSIS — R06.02 SHORTNESS OF BREATH: ICD-10-CM

## 2023-11-11 LAB
ALBUMIN SERPL BCP-MCNC: 2.8 G/DL (ref 3.5–5.2)
ALP SERPL-CCNC: 66 U/L (ref 55–135)
ALT SERPL W/O P-5'-P-CCNC: 17 U/L (ref 10–44)
ANION GAP SERPL CALC-SCNC: 9 MMOL/L (ref 8–16)
AST SERPL-CCNC: 11 U/L (ref 10–40)
BACTERIA #/AREA URNS HPF: ABNORMAL /HPF
BASOPHILS # BLD AUTO: 0.04 K/UL (ref 0–0.2)
BASOPHILS NFR BLD: 0.4 % (ref 0–1.9)
BILIRUB SERPL-MCNC: 0.5 MG/DL (ref 0.1–1)
BILIRUB UR QL STRIP: NEGATIVE
BNP SERPL-MCNC: 56 PG/ML (ref 0–99)
BUN SERPL-MCNC: 31 MG/DL (ref 6–20)
CALCIUM SERPL-MCNC: 8.9 MG/DL (ref 8.7–10.5)
CHLORIDE SERPL-SCNC: 106 MMOL/L (ref 95–110)
CLARITY UR: ABNORMAL
CO2 SERPL-SCNC: 26 MMOL/L (ref 23–29)
COLOR UR: YELLOW
CREAT SERPL-MCNC: 2 MG/DL (ref 0.5–1.4)
CTP QC/QA: YES
CTP QC/QA: YES
D DIMER PPP IA.FEU-MCNC: 0.59 MG/L FEU
DIFFERENTIAL METHOD: ABNORMAL
EOSINOPHIL # BLD AUTO: 0.2 K/UL (ref 0–0.5)
EOSINOPHIL NFR BLD: 2 % (ref 0–8)
ERYTHROCYTE [DISTWIDTH] IN BLOOD BY AUTOMATED COUNT: 14.4 % (ref 11.5–14.5)
EST. GFR  (NO RACE VARIABLE): 30 ML/MIN/1.73 M^2
GLUCOSE SERPL-MCNC: 99 MG/DL (ref 70–110)
GLUCOSE UR QL STRIP: NEGATIVE
HCT VFR BLD AUTO: 34.8 % (ref 37–48.5)
HGB BLD-MCNC: 11.1 G/DL (ref 12–16)
HGB UR QL STRIP: ABNORMAL
HYALINE CASTS #/AREA URNS LPF: 0 /LPF
IMM GRANULOCYTES # BLD AUTO: 0.07 K/UL (ref 0–0.04)
IMM GRANULOCYTES NFR BLD AUTO: 0.6 % (ref 0–0.5)
KETONES UR QL STRIP: NEGATIVE
LEUKOCYTE ESTERASE UR QL STRIP: ABNORMAL
LYMPHOCYTES # BLD AUTO: 2 K/UL (ref 1–4.8)
LYMPHOCYTES NFR BLD: 18.2 % (ref 18–48)
MCH RBC QN AUTO: 31.7 PG (ref 27–31)
MCHC RBC AUTO-ENTMCNC: 31.9 G/DL (ref 32–36)
MCV RBC AUTO: 99 FL (ref 82–98)
MICROSCOPIC COMMENT: ABNORMAL
MONOCYTES # BLD AUTO: 1.7 K/UL (ref 0.3–1)
MONOCYTES NFR BLD: 15.2 % (ref 4–15)
NEUTROPHILS # BLD AUTO: 7 K/UL (ref 1.8–7.7)
NEUTROPHILS NFR BLD: 63.6 % (ref 38–73)
NITRITE UR QL STRIP: NEGATIVE
NRBC BLD-RTO: 0 /100 WBC
PH UR STRIP: 7 [PH] (ref 5–8)
PLATELET # BLD AUTO: 253 K/UL (ref 150–450)
PMV BLD AUTO: 10.2 FL (ref 9.2–12.9)
POC MOLECULAR INFLUENZA A AGN: NEGATIVE
POC MOLECULAR INFLUENZA B AGN: NEGATIVE
POTASSIUM SERPL-SCNC: 4.1 MMOL/L (ref 3.5–5.1)
PROCALCITONIN SERPL IA-MCNC: 0.26 NG/ML
PROT SERPL-MCNC: 7.2 G/DL (ref 6–8.4)
PROT UR QL STRIP: ABNORMAL
RBC # BLD AUTO: 3.5 M/UL (ref 4–5.4)
RBC #/AREA URNS HPF: 25 /HPF (ref 0–4)
SARS-COV-2 RDRP RESP QL NAA+PROBE: NEGATIVE
SODIUM SERPL-SCNC: 141 MMOL/L (ref 136–145)
SP GR UR STRIP: 1.01 (ref 1–1.03)
SQUAMOUS #/AREA URNS HPF: 5 /HPF
TRICHOMONAS UR QL MICRO: ABNORMAL
TROPONIN I SERPL DL<=0.01 NG/ML-MCNC: 0.01 NG/ML (ref 0–0.03)
URN SPEC COLLECT METH UR: ABNORMAL
UROBILINOGEN UR STRIP-ACNC: NEGATIVE EU/DL
WBC # BLD AUTO: 10.94 K/UL (ref 3.9–12.7)
WBC #/AREA URNS HPF: 92 /HPF (ref 0–5)

## 2023-11-11 PROCEDURE — 83880 ASSAY OF NATRIURETIC PEPTIDE: CPT | Performed by: EMERGENCY MEDICINE

## 2023-11-11 PROCEDURE — 87635 SARS-COV-2 COVID-19 AMP PRB: CPT | Performed by: EMERGENCY MEDICINE

## 2023-11-11 PROCEDURE — 99285 EMERGENCY DEPT VISIT HI MDM: CPT | Mod: 25

## 2023-11-11 PROCEDURE — 81000 URINALYSIS NONAUTO W/SCOPE: CPT | Performed by: EMERGENCY MEDICINE

## 2023-11-11 PROCEDURE — 63600175 PHARM REV CODE 636 W HCPCS: Performed by: EMERGENCY MEDICINE

## 2023-11-11 PROCEDURE — 87086 URINE CULTURE/COLONY COUNT: CPT | Performed by: EMERGENCY MEDICINE

## 2023-11-11 PROCEDURE — 80053 COMPREHEN METABOLIC PANEL: CPT | Performed by: EMERGENCY MEDICINE

## 2023-11-11 PROCEDURE — 84484 ASSAY OF TROPONIN QUANT: CPT | Performed by: EMERGENCY MEDICINE

## 2023-11-11 PROCEDURE — 94640 AIRWAY INHALATION TREATMENT: CPT

## 2023-11-11 PROCEDURE — 93005 ELECTROCARDIOGRAM TRACING: CPT

## 2023-11-11 PROCEDURE — 93010 EKG 12-LEAD: ICD-10-PCS | Mod: ,,, | Performed by: INTERNAL MEDICINE

## 2023-11-11 PROCEDURE — 94760 N-INVAS EAR/PLS OXIMETRY 1: CPT

## 2023-11-11 PROCEDURE — 25000003 PHARM REV CODE 250: Performed by: EMERGENCY MEDICINE

## 2023-11-11 PROCEDURE — 85025 COMPLETE CBC W/AUTO DIFF WBC: CPT | Performed by: EMERGENCY MEDICINE

## 2023-11-11 PROCEDURE — 87502 INFLUENZA DNA AMP PROBE: CPT

## 2023-11-11 PROCEDURE — 25000242 PHARM REV CODE 250 ALT 637 W/ HCPCS: Performed by: EMERGENCY MEDICINE

## 2023-11-11 PROCEDURE — 85379 FIBRIN DEGRADATION QUANT: CPT | Performed by: EMERGENCY MEDICINE

## 2023-11-11 PROCEDURE — 93010 ELECTROCARDIOGRAM REPORT: CPT | Mod: ,,, | Performed by: INTERNAL MEDICINE

## 2023-11-11 PROCEDURE — 96374 THER/PROPH/DIAG INJ IV PUSH: CPT

## 2023-11-11 PROCEDURE — 84145 PROCALCITONIN (PCT): CPT | Performed by: EMERGENCY MEDICINE

## 2023-11-11 RX ORDER — DEXAMETHASONE SODIUM PHOSPHATE 4 MG/ML
6 INJECTION, SOLUTION INTRA-ARTICULAR; INTRALESIONAL; INTRAMUSCULAR; INTRAVENOUS; SOFT TISSUE
Status: COMPLETED | OUTPATIENT
Start: 2023-11-11 | End: 2023-11-11

## 2023-11-11 RX ORDER — IPRATROPIUM BROMIDE AND ALBUTEROL SULFATE 2.5; .5 MG/3ML; MG/3ML
3 SOLUTION RESPIRATORY (INHALATION) ONCE
Status: COMPLETED | OUTPATIENT
Start: 2023-11-11 | End: 2023-11-11

## 2023-11-11 RX ORDER — ALBUTEROL SULFATE 90 UG/1
1-2 AEROSOL, METERED RESPIRATORY (INHALATION) EVERY 6 HOURS PRN
Qty: 18 G | Refills: 2 | Status: SHIPPED | OUTPATIENT
Start: 2023-11-11 | End: 2024-02-09

## 2023-11-11 RX ORDER — PREDNISONE 20 MG/1
40 TABLET ORAL DAILY
Qty: 10 TABLET | Refills: 0 | Status: ON HOLD | OUTPATIENT
Start: 2023-11-11 | End: 2023-11-17 | Stop reason: HOSPADM

## 2023-11-11 RX ORDER — QUETIAPINE FUMARATE 50 MG/1
50 TABLET, FILM COATED ORAL NIGHTLY
Qty: 30 TABLET | Refills: 0 | Status: SHIPPED | OUTPATIENT
Start: 2023-11-11 | End: 2024-03-27

## 2023-11-11 RX ORDER — QUETIAPINE FUMARATE 25 MG/1
50 TABLET, FILM COATED ORAL ONCE
Status: COMPLETED | OUTPATIENT
Start: 2023-11-11 | End: 2023-11-11

## 2023-11-11 RX ADMIN — QUETIAPINE 50 MG: 25 TABLET ORAL at 08:11

## 2023-11-11 RX ADMIN — IPRATROPIUM BROMIDE AND ALBUTEROL SULFATE 3 ML: 2.5; .5 SOLUTION RESPIRATORY (INHALATION) at 04:11

## 2023-11-11 RX ADMIN — IPRATROPIUM BROMIDE AND ALBUTEROL SULFATE 3 ML: 2.5; .5 SOLUTION RESPIRATORY (INHALATION) at 02:11

## 2023-11-11 RX ADMIN — DEXAMETHASONE SODIUM PHOSPHATE 6 MG: 4 INJECTION INTRA-ARTICULAR; INTRALESIONAL; INTRAMUSCULAR; INTRAVENOUS; SOFT TISSUE at 02:11

## 2023-11-11 NOTE — ED PROVIDER NOTES
SCRIBE #1 NOTE: I, Carlie Gupta, am scribing for, and in the presence of,  Kelly Morales MD. I have scribed the following portions of the note - Other sections scribed: HPI, ROS, PE.           EM PHYSICIAN NOTE       This patient presents with a complaint of   Chief Complaint   Patient presents with    Shortness of Breath     Ems called to 51yo female that began getting SOB about 3 hours ago. Stated that she has not been feeling well x3 days. Denied any cough. Initial spo2 was 100% per Ems but rhonchi and expiratory wheezes noted and RR was 40. Given duoneb enroute and SOB improved but not resolved. Denied any pain       HPI: 51 y/o female with PMHx of COPD, who presents to the ED via EMS for worsening SOB beginning 3 hours PTA. Patient c/o weakness and intermittent episodes of alternating sweating and chills for the past 3 days. Patient admits to mid sternal chest pain for 2 weeks. Information provided by an independent historian, EMS personnel, who notes patient attempted treatment of SOB with home rescue inhaler with no relief. EMS notes administering DuoNeb en route. Patient denies fever, vomiting, and any other associated symptoms.     Review of patient's allergies indicates:  No Known Allergies    Preferred pharmacy: [facility]      Pertinent REVIEW of SYSTEMS  Source: Patient, EMS  The nurse's notes and triage vital signs were reviewed.  GENERAL/CONSTITUTIONAL: There is not a report of fever. There is a report of intermittent, alternating episodes of chills and diaphoresis   CARDIOVASCULAR: There is a report of chest pain   RESPIRATORY: There is not a report of cough. There is a report of SOB  GASTROINTESTINAL: There is not a report of  vomiting, diarrhea  HEMATOLOGIC/LYMPHATIC: There is not a report of anticoagulant/antithrombotic use.  NEUROLOGICAL: There is a report of generalized weakness         PHYSICAL EXAMINATION    ED Triage Vitals   Enc Vitals Group      BP 11/11/23 1319  118/74      Pulse 11/11/23 1319 90      Resp 11/11/23 1319 (!) 24      Temp 11/11/23 1354 99 °F (37.2 °C)      Temp Source 11/11/23 1354 Oral      SpO2 11/11/23 1319 100 %      Weight 11/11/23 1319 125 lb      Height 11/11/23 1319 5'      Head Circumference --       Peak Flow --       Pain Score --       Pain Loc --       Pain Edu? --       Excl. in GC? --      Vital signs and Pulse Ox reviewed in clinical context. Abnormalities noted:  Tachypnea noted with a normal pulse ox  Body mass index is 24.41 kg/m².  Pt's level of consciousness is Awake and Alert, and the patient is in no distress.  Skin: warm, pink and dry.  Capillary refill is less than 2 seconds.  Mucosa: normal  Head and Neck: mild JVD, neck supple  Cardiac exam: RRR. Unable to palpate pulse in left DP. +1 in right DP.  No lower extremity pain or discoloration.  Pulmonary exam: There are coarse expiratory wheezes bilaterally  Abd Exam: soft nontender   Musculoskeletal: no joint tenderness, deformity or swelling.  No calf pain or edema.  Neurologic: GCS 15; moving all extremities equally, no facial droop       Medical decision making:   Nurses notes and Vital Signs reviewed.     Problems: Today's visit reveals bronchospasm and tachypnea which is a/an Acute problem that is concerning for deterioration due to a differential diagnosis that includes pneumonia, COPD exacerbation, ACS.     Other problems today include worsening CKD    MDM Components integrated into this visit: Prescription drug management    Considerations: My decision to discharge home this patient is based on response to treatment and workup which ruled out pulmonary embolism, pneumonia, ACS.          See ER course below for lab test ordered, results reviewed, independent interpretation of images or EKG, discussion with consultants, data obtained from sources other than patient:  ED Course as of 11/11/23 1958   Sat Nov 11, 2023   1407 CBC reveals mild anemia of 11 and 34.  On chart review of  labs performed outside of the ED visit, this appears chronic [MH]   1407 My independent interpretation of the EKG is sinus rhythm at a rate of 90.  QT corrected is normal.  There are prominent T-waves in the inferior and anterior lateral leads.  There is flipped T-waves in V1 and V2.  There is no ST segment elevation or depression concerning for infarct. [MH]   1417 D-dimer is elevated at 0.59.  I will order a PE study. [MH]   1419 My wet read of the chest x-ray: I do not see an infiltrate.  There is cardiomegaly. [MH]   1431 CMP is concerning for a creatinine of 2.  When compared to prior labs performed outside of the CD visit this is an acute worsening of CKD [MH]   1431 I will cancel the CTA of the chest.  I will order lower extremity Dopplers. [MH]   1432 Troponin is normal.  BNP is normal. [MH]   1432 COVID and influenza are negative [MH]   1434 CXR final read:  Coarse interstitial attenuation, accentuated by habitus.  Early edema is a consideration versus other pneumonitis.  No large focal consolidation. [MH]   1519 BNP is normal.  Procalcitonin 0.26 just above the limit of normal [MH]   1557 No DVT on ultrasound [MH]   1558 Patient reports she is still a bit short of breath.  I will order a another breathing treatment.  Her sats are 96% on room air.  She also reports that she is out of her Seroquel had not had it for 2 days and would like a dose. []   1827 Call to [facility]:   Pt has been on seroquel 50 mg po qhs last refill was in October.  She is also been prescribed amlodipine and carvedilol     [MH]   1954 I discussed with the patient at length the findings of my workup.  I will discharge her home with a prescription for prednisone as well as a bridge prescription of her Seroquel. []      ED Course User Index  [] Kelly Morales MD   7:58 PM patient ambulating.  Post ambulatory sat is 93% on room air.      Orders Placed This Encounter   Procedures    X-Ray Chest AP Portable    US Lower Extremity  Veins Bilateral    CBC Auto Differential    Comprehensive Metabolic Panel    BNP    D-Dimer, Quantitative    Troponin I    Procalcitonin    Urinalysis, Reflex to Urine Culture Urine, Clean Catch    Urinalysis Microscopic    Nursing communication    Nursing communication    Pulse Oximetry Continuous    Inhalation Treatment Once    Inhalation Treatment Once    POCT COVID-19 Rapid Screening    POCT Influenza A/B Molecular    EKG 12-lead    Saline lock IV         Diagnoses that have been ruled out:   None   Diagnoses that are still under consideration:   None   Final diagnoses:   Shortness of breath   Positive D dimer   Chronic obstructive pulmonary disease, unspecified COPD type   Bronchospasm               Referral for follow-up  Follow-up Information       Follow up With Specialties Details Why Contact Info    [id number] -   [address] [facility] [place] [address] [facility] [phone], Call to schedule an appointment [address] [address] [phone]      [address] [place] [address]  Call in 2 days Call to schedule an appointment Call the following number to help you navigate the healthcare system and find a provider [phone]            Prescription management:  ED Prescriptions       Medication Sig Dispense Start Date End Date Auth. Provider    albuterol (PROVENTIL/VENTOLIN HFA) 90 mcg/actuation inhaler Inhale 1-2 puffs into the lungs every 6 (six) hours as needed for Wheezing. Rescue 18 g 11/11/2023 2/9/2024 Kelly Morales MD    predniSONE (DELTASONE) 20 MG tablet Take 2 tablets (40 mg total) by mouth once daily. for 5 days 10 tablet 11/11/2023 11/16/2023 Kelly Morales MD    QUEtiapine (SEROQUEL) 50 MG tablet Take 1 tablet (50 mg total) by mouth every evening. 30 tablet 11/11/2023 11/10/2024 Kelly Morales MD Micelle J Haydel      This note was created using Dict[employer].  This program may occasionally misinterpret certain words and phrases.      SCRIBE  ATTESTATION NOTE:   I attest that I personally performed the services documented by the scribe and acknowledged and confirm the content of the note.   Nurses notes were reviewed.  Kelly Figueredo MD  11/11/23 1826       Kelly Morales MD  11/11/23 1959

## 2023-11-12 NOTE — ED NOTES
Confirmed with MD aranda to give pt seroquel before departure so that she can rest at home. Pt reports ride is outside.

## 2023-11-12 NOTE — ED NOTES
Received report from CHARLES Pradhan. Introduced self at bedside, pt is resting comfortably, denies any pain at this time. Pt is alert, calm, and oriented x4, no acute distress noted.

## 2023-11-13 LAB — BACTERIA UR CULT: NORMAL

## 2023-11-16 ENCOUNTER — HOSPITAL ENCOUNTER (INPATIENT)
Facility: HOSPITAL | Age: 53
LOS: 1 days | Discharge: LEFT BEFORE DISCHARGE | DRG: 190 | End: 2023-11-17
Attending: EMERGENCY MEDICINE | Admitting: STUDENT IN AN ORGANIZED HEALTH CARE EDUCATION/TRAINING PROGRAM
Payer: MEDICAID

## 2023-11-16 DIAGNOSIS — R07.9 CHEST PAIN: ICD-10-CM

## 2023-11-16 DIAGNOSIS — J44.1 COPD EXACERBATION: Primary | ICD-10-CM

## 2023-11-16 DIAGNOSIS — J96.02 ACUTE RESPIRATORY FAILURE WITH HYPERCAPNIA: ICD-10-CM

## 2023-11-16 DIAGNOSIS — R06.02 SOB (SHORTNESS OF BREATH): ICD-10-CM

## 2023-11-16 PROBLEM — I10 ESSENTIAL HYPERTENSION: Status: ACTIVE | Noted: 2018-07-11

## 2023-11-16 PROBLEM — N18.30 STAGE 3 CHRONIC KIDNEY DISEASE: Status: ACTIVE | Noted: 2023-11-16

## 2023-11-16 LAB
ALBUMIN SERPL BCP-MCNC: 2.8 G/DL (ref 3.5–5.2)
ALLENS TEST: ABNORMAL
ALP SERPL-CCNC: 83 U/L (ref 55–135)
ALT SERPL W/O P-5'-P-CCNC: 7 U/L (ref 10–44)
ANION GAP SERPL CALC-SCNC: 12 MMOL/L (ref 8–16)
AST SERPL-CCNC: 10 U/L (ref 10–40)
BASOPHILS # BLD AUTO: 0.02 K/UL (ref 0–0.2)
BASOPHILS NFR BLD: 0.1 % (ref 0–1.9)
BILIRUB SERPL-MCNC: 0.1 MG/DL (ref 0.1–1)
BNP SERPL-MCNC: 17 PG/ML (ref 0–99)
BUN SERPL-MCNC: 48 MG/DL (ref 6–20)
CALCIUM SERPL-MCNC: 8.8 MG/DL (ref 8.7–10.5)
CHLORIDE SERPL-SCNC: 107 MMOL/L (ref 95–110)
CO2 SERPL-SCNC: 25 MMOL/L (ref 23–29)
CREAT SERPL-MCNC: 2.5 MG/DL (ref 0.5–1.4)
D DIMER PPP IA.FEU-MCNC: 0.46 MG/L FEU
DELSYS: ABNORMAL
DIFFERENTIAL METHOD: ABNORMAL
EOSINOPHIL # BLD AUTO: 0 K/UL (ref 0–0.5)
EOSINOPHIL NFR BLD: 0.2 % (ref 0–8)
ERYTHROCYTE [DISTWIDTH] IN BLOOD BY AUTOMATED COUNT: 14.2 % (ref 11.5–14.5)
EST. GFR  (NO RACE VARIABLE): 23 ML/MIN/1.73 M^2
GLUCOSE SERPL-MCNC: 149 MG/DL (ref 70–110)
HCO3 UR-SCNC: 30.6 MMOL/L (ref 24–28)
HCT VFR BLD AUTO: 32.6 % (ref 37–48.5)
HGB BLD-MCNC: 10.2 G/DL (ref 12–16)
IMM GRANULOCYTES # BLD AUTO: 0.07 K/UL (ref 0–0.04)
IMM GRANULOCYTES NFR BLD AUTO: 0.5 % (ref 0–0.5)
LYMPHOCYTES # BLD AUTO: 2.4 K/UL (ref 1–4.8)
LYMPHOCYTES NFR BLD: 17.3 % (ref 18–48)
MAGNESIUM SERPL-MCNC: 1.8 MG/DL (ref 1.6–2.6)
MCH RBC QN AUTO: 30.5 PG (ref 27–31)
MCHC RBC AUTO-ENTMCNC: 31.3 G/DL (ref 32–36)
MCV RBC AUTO: 98 FL (ref 82–98)
MONOCYTES # BLD AUTO: 0.9 K/UL (ref 0.3–1)
MONOCYTES NFR BLD: 6.2 % (ref 4–15)
NEUTROPHILS # BLD AUTO: 10.4 K/UL (ref 1.8–7.7)
NEUTROPHILS NFR BLD: 75.7 % (ref 38–73)
NRBC BLD-RTO: 0 /100 WBC
PCO2 BLDA: 58.3 MMHG (ref 35–45)
PH SMN: 7.33 [PH] (ref 7.35–7.45)
PLATELET # BLD AUTO: 215 K/UL (ref 150–450)
PMV BLD AUTO: 8.9 FL (ref 9.2–12.9)
PO2 BLDA: 79 MMHG (ref 40–60)
POC BE: 4 MMOL/L
POC SATURATED O2: 94 % (ref 95–100)
POC TCO2: 32 MMOL/L (ref 24–29)
POTASSIUM SERPL-SCNC: 3.7 MMOL/L (ref 3.5–5.1)
PROT SERPL-MCNC: 6.6 G/DL (ref 6–8.4)
RBC # BLD AUTO: 3.34 M/UL (ref 4–5.4)
SAMPLE: ABNORMAL
SITE: ABNORMAL
SODIUM SERPL-SCNC: 144 MMOL/L (ref 136–145)
TROPONIN I SERPL DL<=0.01 NG/ML-MCNC: 0.01 NG/ML (ref 0–0.03)
WBC # BLD AUTO: 13.78 K/UL (ref 3.9–12.7)

## 2023-11-16 PROCEDURE — 82803 BLOOD GASES ANY COMBINATION: CPT

## 2023-11-16 PROCEDURE — 99285 EMERGENCY DEPT VISIT HI MDM: CPT | Mod: 25

## 2023-11-16 PROCEDURE — 85379 FIBRIN DEGRADATION QUANT: CPT | Performed by: EMERGENCY MEDICINE

## 2023-11-16 PROCEDURE — 99900035 HC TECH TIME PER 15 MIN (STAT)

## 2023-11-16 PROCEDURE — 63600175 PHARM REV CODE 636 W HCPCS: Performed by: STUDENT IN AN ORGANIZED HEALTH CARE EDUCATION/TRAINING PROGRAM

## 2023-11-16 PROCEDURE — 94644 CONT INHLJ TX 1ST HOUR: CPT

## 2023-11-16 PROCEDURE — 63600175 PHARM REV CODE 636 W HCPCS: Performed by: EMERGENCY MEDICINE

## 2023-11-16 PROCEDURE — 84484 ASSAY OF TROPONIN QUANT: CPT | Performed by: EMERGENCY MEDICINE

## 2023-11-16 PROCEDURE — 93010 EKG 12-LEAD: ICD-10-PCS | Mod: ,,, | Performed by: INTERNAL MEDICINE

## 2023-11-16 PROCEDURE — 96365 THER/PROPH/DIAG IV INF INIT: CPT

## 2023-11-16 PROCEDURE — 85025 COMPLETE CBC W/AUTO DIFF WBC: CPT | Performed by: EMERGENCY MEDICINE

## 2023-11-16 PROCEDURE — 80053 COMPREHEN METABOLIC PANEL: CPT | Performed by: EMERGENCY MEDICINE

## 2023-11-16 PROCEDURE — 83735 ASSAY OF MAGNESIUM: CPT | Performed by: EMERGENCY MEDICINE

## 2023-11-16 PROCEDURE — 25000242 PHARM REV CODE 250 ALT 637 W/ HCPCS: Performed by: EMERGENCY MEDICINE

## 2023-11-16 PROCEDURE — 21400001 HC TELEMETRY ROOM

## 2023-11-16 PROCEDURE — 83880 ASSAY OF NATRIURETIC PEPTIDE: CPT | Performed by: EMERGENCY MEDICINE

## 2023-11-16 PROCEDURE — 93005 ELECTROCARDIOGRAM TRACING: CPT

## 2023-11-16 PROCEDURE — 93010 ELECTROCARDIOGRAM REPORT: CPT | Mod: ,,, | Performed by: INTERNAL MEDICINE

## 2023-11-16 RX ORDER — POLYETHYLENE GLYCOL 3350 17 G/17G
17 POWDER, FOR SOLUTION ORAL DAILY
Status: DISCONTINUED | OUTPATIENT
Start: 2023-11-17 | End: 2023-11-17 | Stop reason: HOSPADM

## 2023-11-16 RX ORDER — ALBUTEROL SULFATE 2.5 MG/.5ML
15 SOLUTION RESPIRATORY (INHALATION)
Status: COMPLETED | OUTPATIENT
Start: 2023-11-16 | End: 2023-11-16

## 2023-11-16 RX ORDER — CARVEDILOL 25 MG/1
1 TABLET ORAL 2 TIMES DAILY WITH MEALS
COMMUNITY
Start: 2023-11-02 | End: 2024-11-01

## 2023-11-16 RX ORDER — PROCHLORPERAZINE EDISYLATE 5 MG/ML
5 INJECTION INTRAMUSCULAR; INTRAVENOUS EVERY 6 HOURS PRN
Status: DISCONTINUED | OUTPATIENT
Start: 2023-11-16 | End: 2023-11-17 | Stop reason: HOSPADM

## 2023-11-16 RX ORDER — SIMETHICONE 80 MG
1 TABLET,CHEWABLE ORAL 4 TIMES DAILY PRN
Status: DISCONTINUED | OUTPATIENT
Start: 2023-11-16 | End: 2023-11-17 | Stop reason: HOSPADM

## 2023-11-16 RX ORDER — ONDANSETRON 2 MG/ML
4 INJECTION INTRAMUSCULAR; INTRAVENOUS EVERY 8 HOURS PRN
Status: DISCONTINUED | OUTPATIENT
Start: 2023-11-16 | End: 2023-11-17 | Stop reason: HOSPADM

## 2023-11-16 RX ORDER — AMLODIPINE BESYLATE 5 MG/1
10 TABLET ORAL DAILY
Status: DISCONTINUED | OUTPATIENT
Start: 2023-11-17 | End: 2023-11-17 | Stop reason: HOSPADM

## 2023-11-16 RX ORDER — GLUCAGON 1 MG
1 KIT INJECTION
Status: DISCONTINUED | OUTPATIENT
Start: 2023-11-16 | End: 2023-11-17 | Stop reason: HOSPADM

## 2023-11-16 RX ORDER — NALOXONE HCL 0.4 MG/ML
0.02 VIAL (ML) INJECTION
Status: DISCONTINUED | OUTPATIENT
Start: 2023-11-16 | End: 2023-11-17 | Stop reason: HOSPADM

## 2023-11-16 RX ORDER — LANOLIN ALCOHOL/MO/W.PET/CERES
800 CREAM (GRAM) TOPICAL
Status: DISCONTINUED | OUTPATIENT
Start: 2023-11-16 | End: 2023-11-17 | Stop reason: HOSPADM

## 2023-11-16 RX ORDER — AMLODIPINE BESYLATE 10 MG/1
1 TABLET ORAL DAILY
COMMUNITY
Start: 2023-11-02

## 2023-11-16 RX ORDER — SODIUM CHLORIDE 0.9 % (FLUSH) 0.9 %
10 SYRINGE (ML) INJECTION EVERY 12 HOURS PRN
Status: DISCONTINUED | OUTPATIENT
Start: 2023-11-16 | End: 2023-11-17 | Stop reason: HOSPADM

## 2023-11-16 RX ORDER — CARVEDILOL 12.5 MG/1
25 TABLET ORAL 2 TIMES DAILY WITH MEALS
Status: DISCONTINUED | OUTPATIENT
Start: 2023-11-17 | End: 2023-11-17 | Stop reason: HOSPADM

## 2023-11-16 RX ORDER — SODIUM CHLORIDE 0.9 % (FLUSH) 0.9 %
3 SYRINGE (ML) INJECTION
Status: DISCONTINUED | OUTPATIENT
Start: 2023-11-16 | End: 2023-11-17 | Stop reason: HOSPADM

## 2023-11-16 RX ORDER — TALC
6 POWDER (GRAM) TOPICAL NIGHTLY PRN
Status: DISCONTINUED | OUTPATIENT
Start: 2023-11-16 | End: 2023-11-17 | Stop reason: HOSPADM

## 2023-11-16 RX ORDER — ACETAMINOPHEN 325 MG/1
650 TABLET ORAL EVERY 8 HOURS PRN
Status: DISCONTINUED | OUTPATIENT
Start: 2023-11-16 | End: 2023-11-17 | Stop reason: HOSPADM

## 2023-11-16 RX ORDER — BISACODYL 10 MG
10 SUPPOSITORY, RECTAL RECTAL DAILY PRN
Status: DISCONTINUED | OUTPATIENT
Start: 2023-11-16 | End: 2023-11-17 | Stop reason: HOSPADM

## 2023-11-16 RX ORDER — HEPARIN SODIUM 5000 [USP'U]/ML
5000 INJECTION, SOLUTION INTRAVENOUS; SUBCUTANEOUS EVERY 8 HOURS
Status: DISCONTINUED | OUTPATIENT
Start: 2023-11-16 | End: 2023-11-17 | Stop reason: HOSPADM

## 2023-11-16 RX ORDER — ATORVASTATIN CALCIUM 40 MG/1
40 TABLET, FILM COATED ORAL DAILY
Status: DISCONTINUED | OUTPATIENT
Start: 2023-11-17 | End: 2023-11-17 | Stop reason: HOSPADM

## 2023-11-16 RX ORDER — METHYLPREDNISOLONE SOD SUCC 125 MG
40 VIAL (EA) INJECTION ONCE
Status: COMPLETED | OUTPATIENT
Start: 2023-11-16 | End: 2023-11-16

## 2023-11-16 RX ORDER — IBUPROFEN 200 MG
24 TABLET ORAL
Status: DISCONTINUED | OUTPATIENT
Start: 2023-11-16 | End: 2023-11-17 | Stop reason: HOSPADM

## 2023-11-16 RX ORDER — SODIUM,POTASSIUM PHOSPHATES 280-250MG
2 POWDER IN PACKET (EA) ORAL
Status: DISCONTINUED | OUTPATIENT
Start: 2023-11-16 | End: 2023-11-17 | Stop reason: HOSPADM

## 2023-11-16 RX ORDER — ACETAMINOPHEN 325 MG/1
650 TABLET ORAL EVERY 4 HOURS PRN
Status: DISCONTINUED | OUTPATIENT
Start: 2023-11-16 | End: 2023-11-17 | Stop reason: HOSPADM

## 2023-11-16 RX ORDER — IPRATROPIUM BROMIDE AND ALBUTEROL SULFATE 2.5; .5 MG/3ML; MG/3ML
3 SOLUTION RESPIRATORY (INHALATION) EVERY 4 HOURS
Status: DISCONTINUED | OUTPATIENT
Start: 2023-11-17 | End: 2023-11-17 | Stop reason: HOSPADM

## 2023-11-16 RX ORDER — ATORVASTATIN CALCIUM 40 MG/1
40 TABLET, FILM COATED ORAL DAILY
COMMUNITY
Start: 2023-11-02

## 2023-11-16 RX ORDER — DOXYCYCLINE 100 MG/1
CAPSULE ORAL
Status: ON HOLD | COMMUNITY
Start: 2023-11-12 | End: 2024-03-29 | Stop reason: HOSPADM

## 2023-11-16 RX ORDER — MAGNESIUM SULFATE HEPTAHYDRATE 40 MG/ML
2 INJECTION, SOLUTION INTRAVENOUS ONCE
Status: COMPLETED | OUTPATIENT
Start: 2023-11-16 | End: 2023-11-16

## 2023-11-16 RX ORDER — PREDNISONE 20 MG/1
40 TABLET ORAL DAILY
Status: DISCONTINUED | OUTPATIENT
Start: 2023-11-17 | End: 2023-11-17 | Stop reason: HOSPADM

## 2023-11-16 RX ORDER — IPRATROPIUM BROMIDE AND ALBUTEROL SULFATE 2.5; .5 MG/3ML; MG/3ML
3 SOLUTION RESPIRATORY (INHALATION) EVERY 4 HOURS PRN
Status: DISCONTINUED | OUTPATIENT
Start: 2023-11-16 | End: 2023-11-17 | Stop reason: HOSPADM

## 2023-11-16 RX ORDER — DULOXETIN HYDROCHLORIDE 60 MG/1
1 CAPSULE, DELAYED RELEASE ORAL DAILY
COMMUNITY
Start: 2023-11-02 | End: 2024-03-27

## 2023-11-16 RX ORDER — MAG HYDROX/ALUMINUM HYD/SIMETH 200-200-20
30 SUSPENSION, ORAL (FINAL DOSE FORM) ORAL 4 TIMES DAILY PRN
Status: DISCONTINUED | OUTPATIENT
Start: 2023-11-16 | End: 2023-11-17 | Stop reason: HOSPADM

## 2023-11-16 RX ORDER — IBUPROFEN 200 MG
16 TABLET ORAL
Status: DISCONTINUED | OUTPATIENT
Start: 2023-11-16 | End: 2023-11-17 | Stop reason: HOSPADM

## 2023-11-16 RX ADMIN — MAGNESIUM SULFATE HEPTAHYDRATE 2 G: 40 INJECTION, SOLUTION INTRAVENOUS at 08:11

## 2023-11-16 RX ADMIN — ALBUTEROL SULFATE 15 MG: 2.5 SOLUTION RESPIRATORY (INHALATION) at 07:11

## 2023-11-16 RX ADMIN — HEPARIN SODIUM 5000 UNITS: 5000 INJECTION INTRAVENOUS; SUBCUTANEOUS at 11:11

## 2023-11-16 RX ADMIN — METHYLPREDNISOLONE SODIUM SUCCINATE 40 MG: 125 INJECTION, POWDER, FOR SOLUTION INTRAMUSCULAR; INTRAVENOUS at 10:11

## 2023-11-17 VITALS
DIASTOLIC BLOOD PRESSURE: 83 MMHG | HEIGHT: 66 IN | SYSTOLIC BLOOD PRESSURE: 159 MMHG | BODY MASS INDEX: 27.25 KG/M2 | RESPIRATION RATE: 18 BRPM | OXYGEN SATURATION: 97 % | HEART RATE: 84 BPM | WEIGHT: 169.56 LBS | TEMPERATURE: 98 F

## 2023-11-17 LAB
ANION GAP SERPL CALC-SCNC: 9 MMOL/L (ref 8–16)
BASOPHILS # BLD AUTO: 0.01 K/UL (ref 0–0.2)
BASOPHILS NFR BLD: 0.1 % (ref 0–1.9)
BUN SERPL-MCNC: 42 MG/DL (ref 6–20)
CALCIUM SERPL-MCNC: 9.1 MG/DL (ref 8.7–10.5)
CHLORIDE SERPL-SCNC: 107 MMOL/L (ref 95–110)
CO2 SERPL-SCNC: 28 MMOL/L (ref 23–29)
CREAT SERPL-MCNC: 2 MG/DL (ref 0.5–1.4)
DIFFERENTIAL METHOD: ABNORMAL
EOSINOPHIL # BLD AUTO: 0 K/UL (ref 0–0.5)
EOSINOPHIL NFR BLD: 0.2 % (ref 0–8)
ERYTHROCYTE [DISTWIDTH] IN BLOOD BY AUTOMATED COUNT: 13.8 % (ref 11.5–14.5)
EST. GFR  (NO RACE VARIABLE): 30 ML/MIN/1.73 M^2
GLUCOSE SERPL-MCNC: 107 MG/DL (ref 70–110)
HCT VFR BLD AUTO: 35.9 % (ref 37–48.5)
HGB BLD-MCNC: 10.9 G/DL (ref 12–16)
IMM GRANULOCYTES # BLD AUTO: 0.07 K/UL (ref 0–0.04)
IMM GRANULOCYTES NFR BLD AUTO: 0.7 % (ref 0–0.5)
LYMPHOCYTES # BLD AUTO: 1.1 K/UL (ref 1–4.8)
LYMPHOCYTES NFR BLD: 10.1 % (ref 18–48)
MAGNESIUM SERPL-MCNC: 2.2 MG/DL (ref 1.6–2.6)
MCH RBC QN AUTO: 30.1 PG (ref 27–31)
MCHC RBC AUTO-ENTMCNC: 30.4 G/DL (ref 32–36)
MCV RBC AUTO: 99 FL (ref 82–98)
MONOCYTES # BLD AUTO: 0.1 K/UL (ref 0.3–1)
MONOCYTES NFR BLD: 1 % (ref 4–15)
NEUTROPHILS # BLD AUTO: 9.3 K/UL (ref 1.8–7.7)
NEUTROPHILS NFR BLD: 87.9 % (ref 38–73)
NRBC BLD-RTO: 0 /100 WBC
PHOSPHATE SERPL-MCNC: 2.8 MG/DL (ref 2.7–4.5)
PLATELET # BLD AUTO: 237 K/UL (ref 150–450)
PMV BLD AUTO: 9 FL (ref 9.2–12.9)
POTASSIUM SERPL-SCNC: 4.3 MMOL/L (ref 3.5–5.1)
RBC # BLD AUTO: 3.62 M/UL (ref 4–5.4)
SODIUM SERPL-SCNC: 144 MMOL/L (ref 136–145)
WBC # BLD AUTO: 10.62 K/UL (ref 3.9–12.7)

## 2023-11-17 PROCEDURE — 94640 AIRWAY INHALATION TREATMENT: CPT

## 2023-11-17 PROCEDURE — 85025 COMPLETE CBC W/AUTO DIFF WBC: CPT | Performed by: STUDENT IN AN ORGANIZED HEALTH CARE EDUCATION/TRAINING PROGRAM

## 2023-11-17 PROCEDURE — 84100 ASSAY OF PHOSPHORUS: CPT | Performed by: STUDENT IN AN ORGANIZED HEALTH CARE EDUCATION/TRAINING PROGRAM

## 2023-11-17 PROCEDURE — 25000242 PHARM REV CODE 250 ALT 637 W/ HCPCS: Performed by: STUDENT IN AN ORGANIZED HEALTH CARE EDUCATION/TRAINING PROGRAM

## 2023-11-17 PROCEDURE — 83735 ASSAY OF MAGNESIUM: CPT | Performed by: STUDENT IN AN ORGANIZED HEALTH CARE EDUCATION/TRAINING PROGRAM

## 2023-11-17 PROCEDURE — 36415 COLL VENOUS BLD VENIPUNCTURE: CPT | Performed by: STUDENT IN AN ORGANIZED HEALTH CARE EDUCATION/TRAINING PROGRAM

## 2023-11-17 PROCEDURE — 25000003 PHARM REV CODE 250: Performed by: STUDENT IN AN ORGANIZED HEALTH CARE EDUCATION/TRAINING PROGRAM

## 2023-11-17 PROCEDURE — 63600175 PHARM REV CODE 636 W HCPCS: Performed by: STUDENT IN AN ORGANIZED HEALTH CARE EDUCATION/TRAINING PROGRAM

## 2023-11-17 PROCEDURE — 94761 N-INVAS EAR/PLS OXIMETRY MLT: CPT

## 2023-11-17 PROCEDURE — 80048 BASIC METABOLIC PNL TOTAL CA: CPT | Performed by: STUDENT IN AN ORGANIZED HEALTH CARE EDUCATION/TRAINING PROGRAM

## 2023-11-17 RX ORDER — AZITHROMYCIN 250 MG/1
TABLET, FILM COATED ORAL
Qty: 6 TABLET | Refills: 0 | Status: SHIPPED | OUTPATIENT
Start: 2023-11-17 | End: 2023-11-22

## 2023-11-17 RX ORDER — METHYLPREDNISOLONE 4 MG/1
TABLET ORAL
Qty: 21 EACH | Refills: 0 | Status: SHIPPED | OUTPATIENT
Start: 2023-11-17 | End: 2024-03-27

## 2023-11-17 RX ADMIN — CARVEDILOL 25 MG: 12.5 TABLET, FILM COATED ORAL at 09:11

## 2023-11-17 RX ADMIN — PREDNISONE 40 MG: 20 TABLET ORAL at 09:11

## 2023-11-17 RX ADMIN — ATORVASTATIN CALCIUM 40 MG: 40 TABLET, FILM COATED ORAL at 09:11

## 2023-11-17 RX ADMIN — HEPARIN SODIUM 5000 UNITS: 5000 INJECTION INTRAVENOUS; SUBCUTANEOUS at 06:11

## 2023-11-17 RX ADMIN — IPRATROPIUM BROMIDE AND ALBUTEROL SULFATE 3 ML: 2.5; .5 SOLUTION RESPIRATORY (INHALATION) at 08:11

## 2023-11-17 RX ADMIN — IPRATROPIUM BROMIDE AND ALBUTEROL SULFATE 3 ML: 2.5; .5 SOLUTION RESPIRATORY (INHALATION) at 11:11

## 2023-11-17 RX ADMIN — AMLODIPINE BESYLATE 10 MG: 5 TABLET ORAL at 09:11

## 2023-11-17 RX ADMIN — IPRATROPIUM BROMIDE AND ALBUTEROL SULFATE 3 ML: 2.5; .5 SOLUTION RESPIRATORY (INHALATION) at 01:11

## 2023-11-17 RX ADMIN — HEPARIN SODIUM 5000 UNITS: 5000 INJECTION INTRAVENOUS; SUBCUTANEOUS at 02:11

## 2023-11-17 NOTE — PLAN OF CARE
Problem: Functional Ability Impaired COPD (Chronic Obstructive Pulmonary Disease)  Goal: Optimal Level of Functional Meagher  11/17/2023 1440 by Brianne Rehman, RN  Outcome: Ongoing, Progressing  11/17/2023 1338 by Brianne Rehman, RN  Outcome: Ongoing, Progressing

## 2023-11-17 NOTE — ASSESSMENT & PLAN NOTE
Patient's COPD is with exacerbation noted by continued dyspnea currently.  Patient is currently on COPD Pathway. Continue scheduled inhalers Steroids and Supplemental oxygen, DuoNebs and monitor respiratory status closely.

## 2023-11-17 NOTE — HPI
This is a 53-year-old female with a past medical history of COPD, hypertension, CKD who presents with shortness of breath.    Patient presents with shortness of breath that started the morning of presentation.  Additional symptoms include productive sputum, intermittent chest pain and headache.  Patient endorses smoking, about 3-4 cigarettes daily.    In the ED, the patient was placed on 3L O2, saturating 99%, and was quickly weaned off to room air.  Labs were remarkable for leukocytosis (13.7):  Elevated creatinine (2.5-baseline of 2.0).  VBG showed a pH of 7.32, pCO2 of 58.3.  Chest x-ray showed no acute process.  Patient was given DuoNeb, nitro paste and  mg by EMS, and received albuterol x1, Mag sulfate 2 g IV in the ED.  Patient was admitted for further management.

## 2023-11-17 NOTE — DISCHARGE SUMMARY
[facility] Medicine  Discharge Summary      Patient Name: Jacqueline Manuel  MRN: [id number]  [facility]: [id number]  Patient Class: IP- Inpatient  Admission Date: 11/16/2023  Hospital Length of Stay: 1 days  Discharge Date and Time:  11/17/2023 2:18 PM  Attending Physician: Torito Blas MD   Discharging Provider: Torito Blas MD  Primary Care Provider: Fan Ling    Primary Care Team: Networked reference to record PCT     HPI:   This is a 53-year-old female with a past medical history of COPD, hypertension, CKD who presents with shortness of breath.    Patient presents with shortness of breath that started the morning of presentation.  Additional symptoms include productive sputum, intermittent chest pain and headache.  Patient endorses smoking, about 3-4 cigarettes daily.    In the ED, the patient was placed on 3L O2, saturating 99%, and was quickly weaned off to room air.  Labs were remarkable for leukocytosis (13.7):  Elevated creatinine (2.5-baseline of 2.0).  VBG showed a pH of 7.32, pCO2 of 58.3.  Chest x-ray showed no acute process.  Patient was given DuoNeb, nitro paste and  mg by EMS, and received albuterol x1, Mag sulfate 2 g IV in the ED.  Patient was admitted for further management.    * No surgery found *      Hospital Course:   Patient admitted with AHHRF with pCO2 of 58, with wheezing and confusion. Getting standard tx for COPD ex.  Patient still dropped with ambulation today advised to stay another night but politely would like to leave AMA. She is AOX4.     Will send a steroid pack and azithromycin pack to your pharmacy  Follow up with your PCP soon    Thank you for trusting [facility] and me with your care.  We are honored that you entrusted us with your healthcare needs. Your satisfaction is very important to us and we hope you have been very pleased with your experience at [facility]. After your discharge you may receive a survey asking you to  rate your hospital experience. We encourage you to take the time to complete the survey as your feedback allows us to identify areas for improvement as well as recognize our staff for their care. We hope that you have received the very best care possible during your hospitalization at [facility], as your satisfaction is our top priority.    Let me know if there is anything more I can do!!        Torito Blas MD  Board-Certified Internal Medicine Attending  Section Head of Hospital Medicine    Goals of Care Treatment Preferences:  Code Status: Full Code      Consults:     No new Assessment & Plan notes have been filed under this hospital service since the last note was generated.  Service: Hospital Medicine    Final Active Diagnoses:    Diagnosis Date Noted POA    PRINCIPAL PROBLEM:  Acute respiratory failure with hypercapnia [J96.02] 11/16/2023 Yes    Stage 3 chronic kidney disease [N18.30] 11/16/2023 Yes    COPD exacerbation [J44.1] 05/22/2021 Yes    Essential hypertension [I10] 07/11/2018 Yes      Problems Resolved During this Admission:       Discharged Condition: good    Disposition: Left Against Medical Adv*    Follow Up:    Patient Instructions:      Ambulatory referral/consult to Internal Medicine   Standing Status: Future   Referral Priority: Routine Referral Type: Consultation   Referral Reason: Specialty Services Required   Requested Specialty: Internal Medicine   Number of Visits Requested: 1       Significant Diagnostic Studies:   Recent Results (from the past 100 hour(s))   CBC auto differential    Collection Time: 11/16/23  6:25 PM   Result Value Ref Range    WBC 13.78 (H) 3.90 - 12.70 K/uL    RBC 3.34 (L) 4.00 - 5.40 M/uL    Hemoglobin 10.2 (L) 12.0 - 16.0 g/dL    Hematocrit 32.6 (L) 37.0 - 48.5 %    MCV 98 82 - 98 fL    MCH 30.5 27.0 - 31.0 pg    MCHC 31.3 (L) 32.0 - 36.0 g/dL    RDW 14.2 11.5 - 14.5 %    Platelets 215 150 - 450 K/uL    MPV 8.9 (L) 9.2 - 12.9 fL    Immature Granulocytes 0.5  0.0 - 0.5 %    Gran # (ANC) 10.4 (H) 1.8 - 7.7 K/uL    Immature Grans (Abs) 0.07 (H) 0.00 - 0.04 K/uL    Lymph # 2.4 1.0 - 4.8 K/uL    Mono # 0.9 0.3 - 1.0 K/uL    Eos # 0.0 0.0 - 0.5 K/uL    Baso # 0.02 0.00 - 0.20 K/uL    nRBC 0 0 /100 WBC    Gran % 75.7 (H) 38.0 - 73.0 %    Lymph % 17.3 (L) 18.0 - 48.0 %    Mono % 6.2 4.0 - 15.0 %    Eosinophil % 0.2 0.0 - 8.0 %    Basophil % 0.1 0.0 - 1.9 %    Differential Method Automated    Comprehensive metabolic panel    Collection Time: 11/16/23  6:25 PM   Result Value Ref Range    Sodium 144 136 - 145 mmol/L    Potassium 3.7 3.5 - 5.1 mmol/L    Chloride 107 95 - 110 mmol/L    CO2 25 23 - 29 mmol/L    Glucose 149 (H) 70 - 110 mg/dL    BUN 48 (H) 6 - 20 mg/dL    Creatinine 2.5 (H) 0.5 - 1.4 mg/dL    Calcium 8.8 8.7 - 10.5 mg/dL    Total Protein 6.6 6.0 - 8.4 g/dL    Albumin 2.8 (L) 3.5 - 5.2 g/dL    Total Bilirubin 0.1 0.1 - 1.0 mg/dL    Alkaline Phosphatase 83 55 - 135 U/L    AST 10 10 - 40 U/L    ALT 7 (L) 10 - 44 U/L    eGFR 23 (A) >60 mL/min/1.73 m^2    Anion Gap 12 8 - 16 mmol/L   Troponin I #1    Collection Time: 11/16/23  6:25 PM   Result Value Ref Range    Troponin I 0.007 0.000 - 0.026 ng/mL   B-Type natriuretic peptide (BNP)    Collection Time: 11/16/23  6:25 PM   Result Value Ref Range    BNP 17 0 - 99 pg/mL   D dimer, quantitative    Collection Time: 11/16/23  7:22 PM   Result Value Ref Range    D-Dimer 0.46 <0.50 mg/L FEU   Magnesium    Collection Time: 11/16/23  7:22 PM   Result Value Ref Range    Magnesium 1.8 1.6 - 2.6 mg/dL   ISTAT PROCEDURE    Collection Time: 11/16/23  7:29 PM   Result Value Ref Range    POC PH 7.328 (L) 7.35 - 7.45    POC PCO2 58.3 (H) 35 - 45 mmHg    POC PO2 79 (HH) 40 - 60 mmHg    POC HCO3 30.6 (H) 24 - 28 mmol/L    POC BE 4 (H) -2 to 2 mmol/L    POC SATURATED O2 94 95 - 100 %    POC TCO2 32 (H) 24 - 29 mmol/L    Sample VENOUS     Site Other     Allens Test N/A     DelSys Nasal Can    Basic metabolic panel    Collection Time: 11/17/23   3:27 AM   Result Value Ref Range    Sodium 144 136 - 145 mmol/L    Potassium 4.3 3.5 - 5.1 mmol/L    Chloride 107 95 - 110 mmol/L    CO2 28 23 - 29 mmol/L    Glucose 107 70 - 110 mg/dL    BUN 42 (H) 6 - 20 mg/dL    Creatinine 2.0 (H) 0.5 - 1.4 mg/dL    Calcium 9.1 8.7 - 10.5 mg/dL    Anion Gap 9 8 - 16 mmol/L    eGFR 30 (A) >60 mL/min/1.73 m^2   Magnesium    Collection Time: 11/17/23  3:27 AM   Result Value Ref Range    Magnesium 2.2 1.6 - 2.6 mg/dL   Phosphorus    Collection Time: 11/17/23  3:27 AM   Result Value Ref Range    Phosphorus 2.8 2.7 - 4.5 mg/dL   CBC auto differential    Collection Time: 11/17/23  3:27 AM   Result Value Ref Range    WBC 10.62 3.90 - 12.70 K/uL    RBC 3.62 (L) 4.00 - 5.40 M/uL    Hemoglobin 10.9 (L) 12.0 - 16.0 g/dL    Hematocrit 35.9 (L) 37.0 - 48.5 %    MCV 99 (H) 82 - 98 fL    MCH 30.1 27.0 - 31.0 pg    MCHC 30.4 (L) 32.0 - 36.0 g/dL    RDW 13.8 11.5 - 14.5 %    Platelets 237 150 - 450 K/uL    MPV 9.0 (L) 9.2 - 12.9 fL    Immature Granulocytes 0.7 (H) 0.0 - 0.5 %    Gran # (ANC) 9.3 (H) 1.8 - 7.7 K/uL    Immature Grans (Abs) 0.07 (H) 0.00 - 0.04 K/uL    Lymph # 1.1 1.0 - 4.8 K/uL    Mono # 0.1 (L) 0.3 - 1.0 K/uL    Eos # 0.0 0.0 - 0.5 K/uL    Baso # 0.01 0.00 - 0.20 K/uL    nRBC 0 0 /100 WBC    Gran % 87.9 (H) 38.0 - 73.0 %    Lymph % 10.1 (L) 18.0 - 48.0 %    Mono % 1.0 (L) 4.0 - 15.0 %    Eosinophil % 0.2 0.0 - 8.0 %    Basophil % 0.1 0.0 - 1.9 %    Differential Method Automated        Microbiology Results (last 7 days)       ** No results found for the last 168 hours. **            Imaging Results              X-Ray Chest AP Portable (Final result)  Result time 11/16/23 18:32:27      Final result by Ivonne Almeida MD (11/16/23 18:32:27)                   Impression:      No acute cardiopulmonary process identified.      Electronically signed by: Ivonne Almeida MD  Date:    11/16/2023  Time:    18:32               Narrative:    EXAMINATION:  XR CHEST AP PORTABLE    CLINICAL  HISTORY:  Chest Pain;    TECHNIQUE:  Single frontal view of the chest was performed.    COMPARISON:  11/11/2023.    FINDINGS:  Cardiac silhouette is normal in size.  Lungs are symmetrically expanded.  No evidence of focal consolidative process, pneumothorax, or significant pleural effusion.  No acute osseous abnormality identified.                                          Pending Diagnostic Studies:       None           Medications:  Reconciled Home Medications:      Medication List        START taking these medications      azithromycin 250 MG tablet  Commonly known as: Z-ROX  Take 2 tablets by mouth on day 1; Take 1 tablet by mouth on days 2-5     methylPREDNISolone 4 mg tablet  Commonly known as: MEDROL DOSEPACK  use as directed            CONTINUE taking these medications      albuterol 90 mcg/actuation inhaler  Commonly known as: PROVENTIL/VENTOLIN HFA  Inhale 1-2 puffs into the lungs every 6 (six) hours as needed for Wheezing. Rescue     amLODIPine 10 MG tablet  Commonly known as: NORVASC  Take 1 tablet by mouth once daily.     atorvastatin 40 MG tablet  Commonly known as: LIPITOR  Take 40 mg by mouth once daily.     carvediloL 25 MG tablet  Commonly known as: COREG  Take 1 tablet by mouth 2 (two) times daily with meals.     doxycycline 100 MG Cap  Commonly known as: VIBRAMYCIN     DULoxetine 60 MG capsule  Commonly known as: CYMBALTA  Take 1 capsule by mouth once daily.     QUEtiapine 50 MG tablet  Commonly known as: SEROQUEL  Take 1 tablet (50 mg total) by mouth every evening.            STOP taking these medications      predniSONE 20 MG tablet  Commonly known as: DELTASONE              Indwelling Lines/Drains at time of discharge:   Lines/Drains/Airways       Drain  Duration             Female External Urinary Catheter 11/16/23 2200 <1 day                    Time spent on the discharge of patient: 35 minutes         Torito Blas MD  Department of [facility] Medicine  [facility]

## 2023-11-17 NOTE — ED PROVIDER NOTES
Encounter Date: 11/16/2023       History     Chief Complaint   Patient presents with    Shortness of Breath     BIB [facility] from home for shortness of breath starting this am. Hx of CHF and COPD. On arrival ems report pt had increased WOB and chest pain, fire had her on NRB so no RA sats obtained. EMS gave 2 nitro sprays, 1in of nitro paste, 324 ASA and a partial duo neb. Pt is improved at this time. Currently on 3 L NC with sats 99%.     52 yo female w/ Hx of COPD coming in with shortness of breath.  Per EMS, she was in significant extremis when EMS arrived and required a non-rebreather.  EMS reports giving her steroids, nebs, nitro paste.  On arrival here she was 90% on 3L.  She reports she has not been able to sleep for the last 2-3 days due to worsening shortness of breath.  She reports fatigue, cough, chest tightness. Denies f/c/abdominal pain, LE edema. She reports she is had to be hospitalized multiple times for similar presentations.  She was here a few days ago with similar.        Review of patient's allergies indicates:  No Known Allergies  History reviewed. No pertinent past medical history.  History reviewed. No pertinent surgical history.  History reviewed. No pertinent family history.     Review of Systems   Constitutional:  Negative for fever.   Respiratory:  Positive for cough, chest tightness, shortness of breath and wheezing.    Cardiovascular:  Negative for chest pain and leg swelling.   Gastrointestinal:  Negative for nausea and vomiting.   Genitourinary:  Negative for dysuria.   Musculoskeletal:  Negative for back pain.   Skin:  Negative for rash.   Neurological:  Negative for weakness.       Physical Exam     Initial Vitals [11/16/23 1759]   BP Pulse Resp Temp SpO2   135/84 101 (!) 28 97.9 °F (36.6 °C) 99 %      MAP       --         Physical Exam    Nursing note and vitals reviewed.  Constitutional: She appears well-developed and well-nourished. She is not diaphoretic. No distress.   Patient  sleepy but easily aroused with minimal stimulation   HENT:   Head: Normocephalic and atraumatic.   Eyes: Conjunctivae and EOM are normal. Pupils are equal, round, and reactive to light.   Neck: Neck supple.   Normal range of motion.  Cardiovascular:  Normal rate, regular rhythm, normal heart sounds and intact distal pulses.     Exam reveals no gallop and no friction rub.       No murmur heard.  Pulmonary/Chest: No stridor. No respiratory distress. She has wheezes. She has no rhonchi. She has no rales. She exhibits no tenderness.   Diffuse tight wheezing with prolonged expiratory phase   Abdominal: Abdomen is soft. Bowel sounds are normal. She exhibits no distension. There is no abdominal tenderness. There is no rebound and no guarding.   Musculoskeletal:         General: No tenderness or edema. Normal range of motion.      Cervical back: Normal range of motion and neck supple.     Neurological: She is alert and oriented to person, place, and time. She has normal strength. No cranial nerve deficit. GCS score is 15. GCS eye subscore is 4. GCS verbal subscore is 5. GCS motor subscore is 6.   Skin: Skin is warm and dry. No rash noted. No pallor.   Psychiatric: She has a normal mood and affect. Her behavior is normal.         ED Course   Procedures  Labs Reviewed   CBC W/ AUTO DIFFERENTIAL - Abnormal; Notable for the following components:       Result Value    WBC 13.78 (*)     RBC 3.34 (*)     Hemoglobin 10.2 (*)     Hematocrit 32.6 (*)     MCHC 31.3 (*)     MPV 8.9 (*)     Gran # (ANC) 10.4 (*)     Immature Grans (Abs) 0.07 (*)     Gran % 75.7 (*)     Lymph % 17.3 (*)     All other components within normal limits   COMPREHENSIVE METABOLIC PANEL - Abnormal; Notable for the following components:    Glucose 149 (*)     BUN 48 (*)     Creatinine 2.5 (*)     Albumin 2.8 (*)     ALT 7 (*)     eGFR 23 (*)     All other components within normal limits   ISTAT PROCEDURE - Abnormal; Notable for the following components:    POC  PH 7.328 (*)     POC PCO2 58.3 (*)     POC PO2 79 (*)     POC HCO3 30.6 (*)     POC BE 4 (*)     POC TCO2 32 (*)     All other components within normal limits   TROPONIN I   B-TYPE NATRIURETIC PEPTIDE   D DIMER, QUANTITATIVE   MAGNESIUM   TROPONIN I     EKG Readings: (Independently Interpreted)   Initial Reading: No STEMI. Rhythm: Normal Sinus Rhythm. Heart Rate: 82. Ectopy: No Ectopy. Conduction: Normal. ST Segments: Normal ST Segments. T Waves: Normal. Clinical Impression: Normal Sinus Rhythm   No ST segment elevation or depression concerning for acute ischemia.          Imaging Results              X-Ray Chest AP Portable (Final result)  Result time 11/16/23 18:32:27      Final result by Ivonne Almeida MD (11/16/23 18:32:27)                   Impression:      No acute cardiopulmonary process identified.      Electronically signed by: Ivonne Almeida MD  Date:    11/16/2023  Time:    18:32               Narrative:    EXAMINATION:  XR CHEST AP PORTABLE    CLINICAL HISTORY:  Chest Pain;    TECHNIQUE:  Single frontal view of the chest was performed.    COMPARISON:  11/11/2023.    FINDINGS:  Cardiac silhouette is normal in size.  Lungs are symmetrically expanded.  No evidence of focal consolidative process, pneumothorax, or significant pleural effusion.  No acute osseous abnormality identified.                                       Medications   magnesium sulfate 2g in water 50mL IVPB (premix) (2 g Intravenous New Bag 11/16/23 2030)   albuterol sulfate nebulizer solution 15 mg (15 mg Nebulization Given 11/16/23 1945)     Medical Decision Making  Amount and/or Complexity of Data Reviewed  Labs: ordered.  Radiology: ordered.    Risk  Prescription drug management.  Decision regarding hospitalization.                          Medical Decision Making:   Initial Assessment:   52 yo female w/ Hx of COPD coming in with a COPD exacerbation.  She was apparently in significant extremis when EMS arrived and required a  non-rebreather.  Her triage note shows a history of CHF though I can not find that in the chart.  They gave her steroids, nebs, nitro paste.  On arrival here she was still somewhat hypoxic, 90% on my exam.  She received a continuous neb treatment and is now doing much better.  She still has tight wheezing.  She reports she has not been able to sleep for the last 2-3 days due to worsening shortness of breath.  She reports she is had to be hospitalized multiple times for similar presentations.  She was here a few days ago with similar.  She also has a worsening JEY.  D-dimer is negative.  Differential Diagnosis:   Doubt PE given negative D-dimer.  Suspect COPD exacerbation.  Pneumonia felt less likely though not impossible.  History of similar.  Doubt CHF.  No elevated blood pressure, lungs not wet on chest x-ray.  ED Management:  Patient will be admitted to Hospital Medicine for further evaluation and treatment of COPD exacerbation, JEY  , new oxygen requirement.  Patient not on home O2.          Clinical Impression:  Final diagnoses:  [J44.1] COPD exacerbation (Primary)  [R06.02] SOB (shortness of breath)          ED Disposition Condition    Admit Stable                Tod Portillo MD  11/16/23 7199

## 2023-11-17 NOTE — PROGRESS NOTES
"[facility] Medicine  Progress Note    Patient Name: Jacqueline Manuel  MRN: [id number]  Patient Class: IP- Inpatient   Admission Date: 11/16/2023  Length of Stay: 1 days  Attending Physician: Torito Blas MD  Primary Care Provider: Unable, To Obtain        Subjective:     Principal Problem:COPD exacerbation        HPI:  This is a 53-year-old female with a past medical history of COPD, hypertension, CKD who presents with shortness of breath.    Patient presents with shortness of breath that started the morning of presentation.  Additional symptoms include productive sputum, intermittent chest pain and headache.  Patient endorses smoking, about 3-4 cigarettes daily.    In the ED, the patient was placed on 3L O2, saturating 99%, and was quickly weaned off to room air.  Labs were remarkable for leukocytosis (13.7):  Elevated creatinine (2.5-baseline of 2.0).  VBG showed a pH of 7.32, pCO2 of 58.3.  Chest x-ray showed no acute process.  Patient was given DuoNeb, nitro paste and  mg by EMS, and received albuterol x1, Mag sulfate 2 g IV in the ED.  Patient was admitted for further management.    Overview/Hospital Course:  Patient admitted with AHHRF with pCO2 of 58, with wheezing and confusion. Getting standard tx for COPD ex.     Interval History:  NAEON.        ROS:  General: Negative for fevers or chills.  Cardiac: Negative for chest pain or orthopnea   Pulmonary: +dyspnea and wheezing.  GI: Negative for abdominal distention or pain     Vitals:    11/16/23 2326 11/17/23 0100 11/17/23 0355 11/17/23 0736   BP: (!) 169/91  (!) 165/89 (!) 161/82   BP Location: Right arm  Right arm Right arm   Patient Position: Lying  Lying Lying   Pulse: 82 84 79 67   Resp: (!) 22 18 18 19   Temp: 97.5 °F (36.4 °C)  98.2 °F (36.8 °C) 97.8 °F (36.6 °C)   TempSrc: Oral  Oral Oral   SpO2: 95% 98% (!) 94% 96%   Weight: 76.9 kg (169 lb 8.5 oz)      Height: 5' 6" (1.676 m)             Body mass index is 27.36 " kg/m².      PHYSICAL EXAM:  GENERAL APPEARANCE: alert and cooperative  HEENT:     HEAD: NC/AT  NECK: Neck supple, non-tender without LAD, masses or thyromegaly.  CARDIAC: There is no peripheral edema, cyanosis or pallor.   LUNGS:+wheezing  ABDOMEN: Non-distended. No guarding.  MSK: No joint erythema or tenderness.   EXTREMITIES: No significant deformity or joint abnormality. No edema.   NEUROLOGICAL: CN II-XII grossly intact.   SKIN: Skin normal color, texture and turgor with no lesions or eruptions.  PSYCHIATRIC: No tangential speech. No Hyperactive features.          Recent Results (from the past 24 hour(s))   CBC auto differential    Collection Time: 11/16/23  6:25 PM   Result Value Ref Range    WBC 13.78 (H) 3.90 - 12.70 K/uL    RBC 3.34 (L) 4.00 - 5.40 M/uL    Hemoglobin 10.2 (L) 12.0 - 16.0 g/dL    Hematocrit 32.6 (L) 37.0 - 48.5 %    MCV 98 82 - 98 fL    MCH 30.5 27.0 - 31.0 pg    MCHC 31.3 (L) 32.0 - 36.0 g/dL    RDW 14.2 11.5 - 14.5 %    Platelets 215 150 - 450 K/uL    MPV 8.9 (L) 9.2 - 12.9 fL    Immature Granulocytes 0.5 0.0 - 0.5 %    Gran # (ANC) 10.4 (H) 1.8 - 7.7 K/uL    Immature Grans (Abs) 0.07 (H) 0.00 - 0.04 K/uL    Lymph # 2.4 1.0 - 4.8 K/uL    Mono # 0.9 0.3 - 1.0 K/uL    Eos # 0.0 0.0 - 0.5 K/uL    Baso # 0.02 0.00 - 0.20 K/uL    nRBC 0 0 /100 WBC    Gran % 75.7 (H) 38.0 - 73.0 %    Lymph % 17.3 (L) 18.0 - 48.0 %    Mono % 6.2 4.0 - 15.0 %    Eosinophil % 0.2 0.0 - 8.0 %    Basophil % 0.1 0.0 - 1.9 %    Differential Method Automated    Comprehensive metabolic panel    Collection Time: 11/16/23  6:25 PM   Result Value Ref Range    Sodium 144 136 - 145 mmol/L    Potassium 3.7 3.5 - 5.1 mmol/L    Chloride 107 95 - 110 mmol/L    CO2 25 23 - 29 mmol/L    Glucose 149 (H) 70 - 110 mg/dL    BUN 48 (H) 6 - 20 mg/dL    Creatinine 2.5 (H) 0.5 - 1.4 mg/dL    Calcium 8.8 8.7 - 10.5 mg/dL    Total Protein 6.6 6.0 - 8.4 g/dL    Albumin 2.8 (L) 3.5 - 5.2 g/dL    Total Bilirubin 0.1 0.1 - 1.0 mg/dL    Alkaline  Phosphatase 83 55 - 135 U/L    AST 10 10 - 40 U/L    ALT 7 (L) 10 - 44 U/L    eGFR 23 (A) >60 mL/min/1.73 m^2    Anion Gap 12 8 - 16 mmol/L   Troponin I #1    Collection Time: 11/16/23  6:25 PM   Result Value Ref Range    Troponin I 0.007 0.000 - 0.026 ng/mL   B-Type natriuretic peptide (BNP)    Collection Time: 11/16/23  6:25 PM   Result Value Ref Range    BNP 17 0 - 99 pg/mL   D dimer, quantitative    Collection Time: 11/16/23  7:22 PM   Result Value Ref Range    D-Dimer 0.46 <0.50 mg/L FEU   Magnesium    Collection Time: 11/16/23  7:22 PM   Result Value Ref Range    Magnesium 1.8 1.6 - 2.6 mg/dL   ISTAT PROCEDURE    Collection Time: 11/16/23  7:29 PM   Result Value Ref Range    POC PH 7.328 (L) 7.35 - 7.45    POC PCO2 58.3 (H) 35 - 45 mmHg    POC PO2 79 (HH) 40 - 60 mmHg    POC HCO3 30.6 (H) 24 - 28 mmol/L    POC BE 4 (H) -2 to 2 mmol/L    POC SATURATED O2 94 95 - 100 %    POC TCO2 32 (H) 24 - 29 mmol/L    Sample VENOUS     Site Other     Allens Test N/A     DelSys Nasal Can    Basic metabolic panel    Collection Time: 11/17/23  3:27 AM   Result Value Ref Range    Sodium 144 136 - 145 mmol/L    Potassium 4.3 3.5 - 5.1 mmol/L    Chloride 107 95 - 110 mmol/L    CO2 28 23 - 29 mmol/L    Glucose 107 70 - 110 mg/dL    BUN 42 (H) 6 - 20 mg/dL    Creatinine 2.0 (H) 0.5 - 1.4 mg/dL    Calcium 9.1 8.7 - 10.5 mg/dL    Anion Gap 9 8 - 16 mmol/L    eGFR 30 (A) >60 mL/min/1.73 m^2   Magnesium    Collection Time: 11/17/23  3:27 AM   Result Value Ref Range    Magnesium 2.2 1.6 - 2.6 mg/dL   Phosphorus    Collection Time: 11/17/23  3:27 AM   Result Value Ref Range    Phosphorus 2.8 2.7 - 4.5 mg/dL   CBC auto differential    Collection Time: 11/17/23  3:27 AM   Result Value Ref Range    WBC 10.62 3.90 - 12.70 K/uL    RBC 3.62 (L) 4.00 - 5.40 M/uL    Hemoglobin 10.9 (L) 12.0 - 16.0 g/dL    Hematocrit 35.9 (L) 37.0 - 48.5 %    MCV 99 (H) 82 - 98 fL    MCH 30.1 27.0 - 31.0 pg    MCHC 30.4 (L) 32.0 - 36.0 g/dL    RDW 13.8 11.5 - 14.5  %    Platelets 237 150 - 450 K/uL    MPV 9.0 (L) 9.2 - 12.9 fL    Immature Granulocytes 0.7 (H) 0.0 - 0.5 %    Gran # (ANC) 9.3 (H) 1.8 - 7.7 K/uL    Immature Grans (Abs) 0.07 (H) 0.00 - 0.04 K/uL    Lymph # 1.1 1.0 - 4.8 K/uL    Mono # 0.1 (L) 0.3 - 1.0 K/uL    Eos # 0.0 0.0 - 0.5 K/uL    Baso # 0.01 0.00 - 0.20 K/uL    nRBC 0 0 /100 WBC    Gran % 87.9 (H) 38.0 - 73.0 %    Lymph % 10.1 (L) 18.0 - 48.0 %    Mono % 1.0 (L) 4.0 - 15.0 %    Eosinophil % 0.2 0.0 - 8.0 %    Basophil % 0.1 0.0 - 1.9 %    Differential Method Automated        Microbiology Results (last 7 days)       ** No results found for the last 168 hours. **             Imaging Results              X-Ray Chest AP Portable (Final result)  Result time 11/16/23 18:32:27      Final result by Ivonne Almeida MD (11/16/23 18:32:27)                   Impression:      No acute cardiopulmonary process identified.      Electronically signed by: Ivonne Almeida MD  Date:    11/16/2023  Time:    18:32               Narrative:    EXAMINATION:  XR CHEST AP PORTABLE    CLINICAL HISTORY:  Chest Pain;    TECHNIQUE:  Single frontal view of the chest was performed.    COMPARISON:  11/11/2023.    FINDINGS:  Cardiac silhouette is normal in size.  Lungs are symmetrically expanded.  No evidence of focal consolidative process, pneumothorax, or significant pleural effusion.  No acute osseous abnormality identified.                                             Assessment/Plan:      * COPD exacerbation  Patient's COPD is with exacerbation noted by continued dyspnea currently.  Patient is currently on COPD Pathway. Continue scheduled inhalers Steroids and Supplemental oxygen, DuoNebs and monitor respiratory status closely.     Acute respiratory failure with hypercapnia  Patient with Hypercapnic Respiratory failure which is Acute.  she is not on home oxygen. Supplemental oxygen was provided and noted-    See plan for COPD    Stage 3 chronic kidney disease  Creatine stable for now.  BMP reviewed- noted Estimated Creatinine Clearance: 21.4 mL/min (A) (based on SCr of 2.5 mg/dL (H)). according to latest data. Based on current GFR, CKD stage is stage 3 - GFR 30-59.  Monitor UOP and serial BMP and adjust therapy as needed. Renally dose meds. Avoid nephrotoxic medications and procedures.    Essential hypertension  Chronic, controlled. Latest blood pressure and vitals reviewed-     Temp:  [97.8 °F (36.6 °C)-97.9 °F (36.6 °C)]   Pulse:  []   Resp:  [12-28]   BP: (109-143)/(58-84)   SpO2:  [96 %-100 %] .   Home meds for hypertension were reviewed and noted below.   Hypertension Medications               amLODIPine (NORVASC) 10 MG tablet Take 1 tablet by mouth once daily.    carvediloL (COREG) 25 MG tablet Take 1 tablet by mouth 2 (two) times daily with meals.            While in the hospital, will manage blood pressure as follows; Continue home antihypertensive regimen    Will utilize p.r.n. blood pressure medication only if patient's blood pressure greater than 180/110 and she develops symptoms such as worsening chest pain or shortness of breath.      VTE Risk Mitigation (From admission, onward)           Ordered     heparin (porcine) injection 5,000 Units  Every 8 hours         11/16/23 2217     Place sequential compression device  Until discontinued         11/16/23 2217     IP VTE HIGH RISK PATIENT  Once         11/16/23 2217     Place sequential compression device  Until discontinued         11/16/23 2217                    Discharge Planning   CLAY:      Code Status: Full Code   Is the patient medically ready for discharge?:     Reason for patient still in hospital (select all that apply): Patient trending condition and Treatment                     Torito Blas MD  Department of Hospital Medicine   [facility]

## 2023-11-17 NOTE — NURSING
[facility]  Nurses Note -- 4 Eyes      11/17/2023       Skin assessed on: Q Shift      [x] No Pressure Injuries Present    [x]Prevention Measures Documented    [] Yes LDA  for Pressure Injury Previously documented     [] Yes New Pressure Injury Discovered   [] LDA for New Pressure Injury Added      Attending RN:  Brianne Rehman RN     Second RN:  Chioma Weathers RN

## 2023-11-17 NOTE — NURSING
[facility], [facility]  Nurses Note -- 4 Eyes      11/17/2023       Skin assessed on: Admit      [x] No Pressure Injuries Present    [x]Prevention Measures Documented    [] Yes LDA  for Pressure Injury Previously documented     [] Yes New Pressure Injury Discovered   [] LDA for New Pressure Injury Added      Attending RN:  Chioma Weathers, RN     Second RN:  Alee Ibrahim RN

## 2023-11-17 NOTE — H&P
[facility]  [facility]  History & Physical    Patient Name: Jacqueline Manuel  MRN: [id number]  Patient Class: IP- Inpatient  Admission Date: 11/16/2023  Attending Physician: Torito Blas MD   Primary Care Provider: Unable, To Obtain         Patient information was obtained from patient and ER records.     Subjective:     Principal Problem:COPD exacerbation    Chief Complaint:   Chief Complaint   Patient presents with    Shortness of Breath     BIB [facility] from home for shortness of breath starting this am. Hx of CHF and COPD. On arrival ems report pt had increased WOB and chest pain, fire had her on NRB so no RA sats obtained. EMS gave 2 nitro sprays, 1in of nitro paste, 324 ASA and a partial duo neb. Pt is improved at this time. Currently on 3 L NC with sats 99%.        HPI: This is a 53-year-old female with a past medical history of COPD, hypertension, CKD who presents with shortness of breath.    Patient presents with shortness of breath that started the morning of presentation.  Additional symptoms include productive sputum, intermittent chest pain and headache.  Patient endorses smoking, about 3-4 cigarettes daily.    In the ED, the patient was placed on 3L O2, saturating 99%, and was quickly weaned off to room air.  Labs were remarkable for leukocytosis (13.7):  Elevated creatinine (2.5-baseline of 2.0).  VBG showed a pH of 7.32, pCO2 of 58.3.  Chest x-ray showed no acute process.  Patient was given DuoNeb, nitro paste and  mg by EMS, and received albuterol x1, Mag sulfate 2 g IV in the ED.  Patient was admitted for further management.    History reviewed. No pertinent past medical history.    History reviewed. No pertinent surgical history.    Review of patient's allergies indicates:  No Known Allergies    No current facility-administered medications on file prior to encounter.     Current Outpatient Medications on File Prior to Encounter   Medication Sig    amLODIPine (NORVASC) 10 MG  tablet Take 1 tablet by mouth once daily.    atorvastatin (LIPITOR) 40 MG tablet Take 40 mg by mouth once daily.    carvediloL (COREG) 25 MG tablet Take 1 tablet by mouth 2 (two) times daily with meals.    doxycycline (VIBRAMYCIN) 100 MG Cap     DULoxetine (CYMBALTA) 60 MG capsule Take 1 capsule by mouth once daily.    albuterol (PROVENTIL/VENTOLIN HFA) 90 mcg/actuation inhaler Inhale 1-2 puffs into the lungs every 6 (six) hours as needed for Wheezing. Rescue    predniSONE (DELTASONE) 20 MG tablet Take 2 tablets (40 mg total) by mouth once daily. for 5 days    QUEtiapine (SEROQUEL) 50 MG tablet Take 1 tablet (50 mg total) by mouth every evening.    [DISCONTINUED] albuterol (PROVENTIL/VENTOLIN HFA) 90 mcg/actuation inhaler Inhale 1-2 puffs into the lungs every 4 (four) hours as needed for Wheezing or Shortness of Breath.     Family History    None       Tobacco Use    Smoking status: Not on file    Smokeless tobacco: Not on file   Substance and Sexual Activity    Alcohol use: Not on file    Drug use: Not on file    Sexual activity: Not on file     Review of Systems   Constitutional: Negative.    HENT: Negative.     Eyes: Negative.    Respiratory:  Positive for cough and shortness of breath.    Cardiovascular: Negative.    Gastrointestinal: Negative.    Endocrine: Negative.    Genitourinary: Negative.    Musculoskeletal: Negative.    Skin: Negative.    Allergic/Immunologic: Negative.    Neurological: Negative.    Psychiatric/Behavioral: Negative.       Objective:     Vital Signs (Most Recent):  Temp: 97.8 °F (36.6 °C) (11/16/23 2202)  Pulse: 79 (11/16/23 2202)  Resp: 20 (11/16/23 2202)  BP: (!) 143/73 (11/16/23 2202)  SpO2: 99 % (11/16/23 2202) Vital Signs (24h Range):  Temp:  [97.8 °F (36.6 °C)-97.9 °F (36.6 °C)] 97.8 °F (36.6 °C)  Pulse:  [] 79  Resp:  [12-28] 20  SpO2:  [96 %-100 %] 99 %  BP: (109-143)/(58-84) 143/73     Weight: 59 kg (130 lb)  Body mass index is 25.39 kg/m².     Physical Exam  Vitals and  nursing note reviewed.   Constitutional:       General: She is not in acute distress.     Appearance: Normal appearance. She is not ill-appearing.   HENT:      Head: Normocephalic and atraumatic.      Nose: Nose normal.      Mouth/Throat:      Mouth: Mucous membranes are moist.   Eyes:      Extraocular Movements: Extraocular movements intact.   Cardiovascular:      Rate and Rhythm: Normal rate.      Pulses: Normal pulses.      Heart sounds: No murmur heard.  Pulmonary:      Effort: Pulmonary effort is normal. No respiratory distress.      Breath sounds: Wheezing present.   Abdominal:      General: Abdomen is flat.      Palpations: Abdomen is soft.      Tenderness: There is no abdominal tenderness.   Musculoskeletal:      Right lower leg: No edema.      Left lower leg: No edema.   Skin:     General: Skin is warm.      Capillary Refill: Capillary refill takes less than 2 seconds.   Neurological:      Mental Status: She is alert and oriented to person, place, and time.      Comments: Confused, but reorientable    Psychiatric:         Mood and Affect: Mood normal.                Significant Labs: All pertinent labs within the past 24 hours have been reviewed.    Significant Imaging: I have reviewed all pertinent imaging results/findings within the past 24 hours.  Assessment/Plan:     * COPD exacerbation  Patient's COPD is with exacerbation noted by continued dyspnea currently.  Patient is currently on COPD Pathway. Continue scheduled inhalers Steroids and Supplemental oxygen, DuoNebs and monitor respiratory status closely.     Acute respiratory failure with hypercapnia  Patient with Hypercapnic Respiratory failure which is Acute.  she is not on home oxygen. Supplemental oxygen was provided and noted-    See plan for COPD    Stage 3 chronic kidney disease  Creatine stable for now. BMP reviewed- noted Estimated Creatinine Clearance: 21.4 mL/min (A) (based on SCr of 2.5 mg/dL (H)). according to latest data. Based on  current GFR, CKD stage is stage 3 - GFR 30-59.  Monitor UOP and serial BMP and adjust therapy as needed. Renally dose meds. Avoid nephrotoxic medications and procedures.    Essential hypertension  Chronic, controlled. Latest blood pressure and vitals reviewed-     Temp:  [97.8 °F (36.6 °C)-97.9 °F (36.6 °C)]   Pulse:  []   Resp:  [12-28]   BP: (109-143)/(58-84)   SpO2:  [96 %-100 %] .   Home meds for hypertension were reviewed and noted below.   Hypertension Medications               amLODIPine (NORVASC) 10 MG tablet Take 1 tablet by mouth once daily.    carvediloL (COREG) 25 MG tablet Take 1 tablet by mouth 2 (two) times daily with meals.            While in the hospital, will manage blood pressure as follows; Continue home antihypertensive regimen    Will utilize p.r.n. blood pressure medication only if patient's blood pressure greater than 180/110 and she develops symptoms such as worsening chest pain or shortness of breath.      VTE Risk Mitigation (From admission, onward)           Ordered     heparin (porcine) injection 5,000 Units  Every 8 hours         11/16/23 2217     Place sequential compression device  Until discontinued         11/16/23 2217     IP VTE HIGH RISK PATIENT  Once         11/16/23 2217     Place sequential compression device  Until discontinued         11/16/23 2217                                   David Martínez MD  Department of Hospital Medicine  [facility]    N/A  History reviewed. No pertinent family history.  Pertinent information:

## 2023-11-17 NOTE — PLAN OF CARE
Case Management Assessment     PCP: [facility]  Pharmacy: [facility]     Patient Arrived From: Home   Existing Help at Home: Daughter     Barriers to Discharge: none    Discharge Plan:    A. Home with Family; TBD   B. Home with Family; TBD    Patient provided updated address and PCP location name. Patient stated she lives with her daughter who will be her help at home. Patient stated her plan is to return home with daughter and her daughter will provide transportation.      11/17/23 1355   Discharge Assessment   Assessment Type Discharge Planning Assessment   Confirmed/corrected address, phone number and insurance Yes   Confirmed Demographics Correct on Facesheet   Source of Information patient   If unable to respond/provide information was family/caregiver contacted? No Contact Information Available   Communicated CLAY with patient/caregiver Yes   Reason For Admission Shortness of Breath   People in Home child(sandor), adult   Facility Arrived From: Home   Do you expect to return to your current living situation? Yes   Do you have help at home or someone to help you manage your care at home? Yes   Who are your caregiver(s) and their phone number(s)? ManuelScottbambi (Daughter) [phone]   Prior to hospitilization cognitive status: Alert/Oriented   Current cognitive status: Alert/Oriented   Equipment Currently Used at Home cane, straight   Readmission within 30 days? No   Patient currently being followed by outpatient case management? No   Do you currently have service(s) that help you manage your care at home? No   Do you take prescription medications? Yes   Do you have prescription coverage? Yes   Coverage [place] [employer] [facility]   Do you have any problems affording any of your prescribed medications? No   Is the patient taking medications as prescribed? yes   Who is going to help you get home at discharge? ManuelScottbambi (Daughter) [phone]   How do you get to doctors  appointments? family or friend will provide   Are you on dialysis? No   Do you take coumadin? No   DME Needed Upon Discharge  other (see comments)  (TBD)   Discharge Plan discussed with: Patient   Transition of Care Barriers None   Discharge Plan A Home with family   Discharge Plan B Home with family   OTHER   Name(s) of People in Home Linda Manuel (Daughter) [phone]

## 2023-11-17 NOTE — HOSPITAL COURSE
Patient admitted with AHHRF with pCO2 of 58, with wheezing and confusion. Getting standard tx for COPD ex.

## 2023-11-17 NOTE — NURSING
Patient request to go home; stated that she can take care of herself.   Provider was notified and orders were written to discharge patient. Patient removed her IV during shift change, cardiac monitor was remove per nurse and patient was given medical advice prior leaving .  Patient didn't wait for virtual nurse discharge teaching or AVS print out.  Patient appear to be having difficult breathing but still refuse to stay.

## 2023-11-17 NOTE — PLAN OF CARE
Problem: Functional Ability Impaired COPD (Chronic Obstructive Pulmonary Disease)  Goal: Optimal Level of Functional [place]  Outcome: Ongoing, Progressing  Intervention: Optimize Functional Ability  Flowsheets (Taken 11/17/2023 0305)  Self-Care Promotion:   BADL personal objects within reach   BADL personal routines maintained   independence encouraged  Activity Management:   Rolling - L1   Sitting at edge of bed - L2  Environmental Support:   calm environment promoted   rest periods encouraged     Problem: Infection COPD (Chronic Obstructive Pulmonary Disease)  Goal: Absence of Infection Signs and Symptoms  Outcome: Ongoing, Progressing     Problem: Respiratory Compromise COPD (Chronic Obstructive Pulmonary Disease)  Goal: Effective Oxygenation and Ventilation  Outcome: Ongoing, Progressing  Intervention: Promote Airway Secretion Clearance  Flowsheets (Taken 11/17/2023 0305)  Cough And Deep Breathing: done independently per patient  Activity Management:   Rolling - L1   Sitting at edge of bed - L2   No c/o SOB. RA with O2 sat >95%.

## 2023-11-17 NOTE — ASSESSMENT & PLAN NOTE
Patient with Hypercapnic Respiratory failure which is Acute.  she is not on home oxygen. Supplemental oxygen was provided and noted-    See plan for COPD

## 2023-11-17 NOTE — ED NOTES
Jacqueline Manuel, a 53 y.o. female presents to the ED w/ complaint of SOB and CP for the past week. Patient also reports pain and weakness to her right lower extremity. Patient is AAOx4, VSS, NAD. Denies N/V/D, cough, fever, numbness, or tingling. Bed locked, in lowest position, bed rails up x2, call light in reach, all monitoring attached.

## 2023-11-17 NOTE — SUBJECTIVE & OBJECTIVE
History reviewed. No pertinent past medical history.    History reviewed. No pertinent surgical history.    Review of patient's allergies indicates:  No Known Allergies    No current facility-administered medications on file prior to encounter.     Current Outpatient Medications on File Prior to Encounter   Medication Sig    amLODIPine (NORVASC) 10 MG tablet Take 1 tablet by mouth once daily.    atorvastatin (LIPITOR) 40 MG tablet Take 40 mg by mouth once daily.    carvediloL (COREG) 25 MG tablet Take 1 tablet by mouth 2 (two) times daily with meals.    doxycycline (VIBRAMYCIN) 100 MG Cap     DULoxetine (CYMBALTA) 60 MG capsule Take 1 capsule by mouth once daily.    albuterol (PROVENTIL/VENTOLIN HFA) 90 mcg/actuation inhaler Inhale 1-2 puffs into the lungs every 6 (six) hours as needed for Wheezing. Rescue    predniSONE (DELTASONE) 20 MG tablet Take 2 tablets (40 mg total) by mouth once daily. for 5 days    QUEtiapine (SEROQUEL) 50 MG tablet Take 1 tablet (50 mg total) by mouth every evening.    [DISCONTINUED] albuterol (PROVENTIL/VENTOLIN HFA) 90 mcg/actuation inhaler Inhale 1-2 puffs into the lungs every 4 (four) hours as needed for Wheezing or Shortness of Breath.     Family History    None       Tobacco Use    Smoking status: Not on file    Smokeless tobacco: Not on file   Substance and Sexual Activity    Alcohol use: Not on file    Drug use: Not on file    Sexual activity: Not on file     Review of Systems   Constitutional: Negative.    HENT: Negative.     Eyes: Negative.    Respiratory:  Positive for cough and shortness of breath.    Cardiovascular: Negative.    Gastrointestinal: Negative.    Endocrine: Negative.    Genitourinary: Negative.    Musculoskeletal: Negative.    Skin: Negative.    Allergic/Immunologic: Negative.    Neurological: Negative.    Psychiatric/Behavioral: Negative.       Objective:     Vital Signs (Most Recent):  Temp: 97.8 °F (36.6 °C) (11/16/23 2202)  Pulse: 79 (11/16/23 2202)  Resp: 20  (11/16/23 2202)  BP: (!) 143/73 (11/16/23 2202)  SpO2: 99 % (11/16/23 2202) Vital Signs (24h Range):  Temp:  [97.8 °F (36.6 °C)-97.9 °F (36.6 °C)] 97.8 °F (36.6 °C)  Pulse:  [] 79  Resp:  [12-28] 20  SpO2:  [96 %-100 %] 99 %  BP: (109-143)/(58-84) 143/73     Weight: 59 kg (130 lb)  Body mass index is 25.39 kg/m².     Physical Exam  Vitals and nursing note reviewed.   Constitutional:       General: She is not in acute distress.     Appearance: Normal appearance. She is not ill-appearing.   HENT:      Head: Normocephalic and atraumatic.      Nose: Nose normal.      Mouth/Throat:      Mouth: Mucous membranes are moist.   Eyes:      Extraocular Movements: Extraocular movements intact.   Cardiovascular:      Rate and Rhythm: Normal rate.      Pulses: Normal pulses.      Heart sounds: No murmur heard.  Pulmonary:      Effort: Pulmonary effort is normal. No respiratory distress.      Breath sounds: Wheezing present.   Abdominal:      General: Abdomen is flat.      Palpations: Abdomen is soft.      Tenderness: There is no abdominal tenderness.   Musculoskeletal:      Right lower leg: No edema.      Left lower leg: No edema.   Skin:     General: Skin is warm.      Capillary Refill: Capillary refill takes less than 2 seconds.   Neurological:      Mental Status: She is alert and oriented to person, place, and time.      Comments: Confused, but reorientable    Psychiatric:         Mood and Affect: Mood normal.                Significant Labs: All pertinent labs within the past 24 hours have been reviewed.    Significant Imaging: I have reviewed all pertinent imaging results/findings within the past 24 hours.

## 2023-11-17 NOTE — ASSESSMENT & PLAN NOTE
Creatine stable for now. BMP reviewed- noted Estimated Creatinine Clearance: 21.4 mL/min (A) (based on SCr of 2.5 mg/dL (H)). according to latest data. Based on current GFR, CKD stage is stage 3 - GFR 30-59.  Monitor UOP and serial BMP and adjust therapy as needed. Renally dose meds. Avoid nephrotoxic medications and procedures.

## 2023-11-17 NOTE — DISCHARGE INSTRUCTIONS
Will send a steroid pack and azithromycin pack to your pharmacy  Follow up with your PCP soon    Thank you for trusting [facility] and me with your care.  We are honored that you entrusted us with your healthcare needs. Your satisfaction is very important to us and we hope you have been very pleased with your experience at [facility]. After your discharge you may receive a survey asking you to rate your hospital experience. We encourage you to take the time to complete the survey as your feedback allows us to identify areas for improvement as well as recognize our staff for their care. We hope that you have received the very best care possible during your hospitalization at [facility], as your satisfaction is our top priority.    Let me know if there is anything more I can do!!        Torito Blas MD  Board-Certified Internal Medicine Attending  Section Head of Hospital Medicine      PATIENT GENERAL DISCHARGE INFORMATION   Things that YOU are responsible for to Manage Your Care At Home:  1. Getting your prescriptions filled.  2. Taking you medications as directed. (DO NOT MISS ANY DOSES!)  3. Going to your follow-up doctor appointments.                 *This is important because it allows the doctor to monitor your progress and make changes.      If you are unable to make your follow up appointments, please call the number listed and reschedule this appointment.   After discharge, if you need assistance, you can call [facility] for 24/7 assistance at [phone]  If you are experience any signs or symptoms, Call your doctor or Call 911 and come to your nearest Emergency Room.    You should receive a call from [facility] within 48-72 hours to help manage your care after discharge.   Please try to make sure that you answer your phone for this important phone call.

## 2023-11-17 NOTE — PLAN OF CARE
Problem: Adult Inpatient Plan of Care  Goal: Plan of Care Review  11/17/2023 1441 by Brianne Rehman RN  Outcome: Ongoing, Not Progressing  11/17/2023 1440 by Brianne Rehman RN  Outcome: Ongoing, Progressing  11/17/2023 1338 by Brianne Rehman RN  Outcome: Ongoing, Progressing  Goal: Patient-Specific Goal (Individualized)  11/17/2023 1441 by Brianne Rehman RN  Outcome: Ongoing, Not Progressing  11/17/2023 1440 by Brianne Rehman RN  Outcome: Ongoing, Progressing  11/17/2023 1338 by Brianne Rehman RN  Outcome: Ongoing, Progressing  Goal: Absence of Hospital-Acquired Illness or Injury  11/17/2023 1441 by Brianne Rehman RN  Outcome: Ongoing, Not Progressing  11/17/2023 1440 by Brianne Rehman RN  Outcome: Ongoing, Progressing  11/17/2023 1338 by Brianne Rehman RN  Outcome: Ongoing, Progressing  Goal: Optimal Comfort and Wellbeing  11/17/2023 1441 by Brianne Rehman RN  Outcome: Ongoing, Not Progressing  11/17/2023 1440 by Brianne Rehman RN  Outcome: Ongoing, Progressing  11/17/2023 1338 by Brianne Rehman RN  Outcome: Ongoing, Progressing  Goal: Readiness for Transition of Care  11/17/2023 1441 by Brianne Rehman RN  Outcome: Ongoing, Not Progressing  11/17/2023 1440 by Brianne Rehman RN  Outcome: Ongoing, Progressing  11/17/2023 1338 by Brianne Rehman RN  Outcome: Ongoing, Progressing     Problem: Adjustment to Illness COPD (Chronic Obstructive Pulmonary Disease)  Goal: Optimal Chronic Illness Coping  11/17/2023 1441 by Brianne Rehman RN  Outcome: Ongoing, Not Progressing  11/17/2023 1440 by Brianne Rehman RN  Outcome: Ongoing, Progressing  11/17/2023 1338 by Brianne Rehman RN  Outcome: Ongoing, Progressing     Problem: Functional Ability Impaired COPD (Chronic Obstructive Pulmonary Disease)  Goal: Optimal Level of Functional [place]  11/17/2023 1441 by Brianne Rehman RN  Outcome: Ongoing, Not Progressing  11/17/2023 1440 by Brianne Rehman  RN  Outcome: Ongoing, Progressing  11/17/2023 1338 by Brianne Rehman RN  Outcome: Ongoing, Progressing     Problem: Infection COPD (Chronic Obstructive Pulmonary Disease)  Goal: Absence of Infection Signs and Symptoms  11/17/2023 1441 by Brianne Rehman RN  Outcome: Ongoing, Not Progressing  11/17/2023 1440 by Brianne Rehman RN  Outcome: Ongoing, Progressing  11/17/2023 1338 by Brianne Rehman RN  Outcome: Ongoing, Progressing     Problem: Oral Intake Inadequate COPD (Chronic Obstructive Pulmonary Disease)  Goal: Improved Nutrition Intake  11/17/2023 1441 by Brianne Rehman RN  Outcome: Ongoing, Not Progressing  11/17/2023 1440 by Brianne Rehman RN  Outcome: Ongoing, Progressing  11/17/2023 1338 by Brianne Rehman RN  Outcome: Ongoing, Progressing     Problem: Respiratory Compromise COPD (Chronic Obstructive Pulmonary Disease)  Goal: Effective Oxygenation and Ventilation  11/17/2023 1441 by Brianne Rehman RN  Outcome: Ongoing, Not Progressing  11/17/2023 1440 by Brianne Rehman RN  Outcome: Ongoing, Progressing  11/17/2023 1338 by Brianne Rehman RN  Outcome: Ongoing, Progressing     Problem: Adult Inpatient Plan of Care  Goal: Readiness for Transition of Care  11/17/2023 1441 by Brianne Rehman RN  Outcome: Ongoing, Not Progressing  11/17/2023 1440 by Brianne Rehman RN  Outcome: Ongoing, Progressing  11/17/2023 1338 by Brianne Rehman RN  Outcome: Ongoing, Progressing     Problem: Adjustment to Illness COPD (Chronic Obstructive Pulmonary Disease)  Goal: Optimal Chronic Illness Coping  11/17/2023 1441 by Brianne Rehman RN  Outcome: Ongoing, Not Progressing  11/17/2023 1440 by Brianne Rehman RN  Outcome: Ongoing, Progressing  11/17/2023 1338 by Brianne Rehman RN  Outcome: Ongoing, Progressing     Problem: Functional Ability Impaired COPD (Chronic Obstructive Pulmonary Disease)  Goal: Optimal Level of Functional [place]  11/17/2023 1441 by Brianne Rehman  RN  Outcome: Ongoing, Not Progressing  11/17/2023 1440 by Brianne Rehman RN  Outcome: Ongoing, Progressing  11/17/2023 1338 by Brianne Rehman RN  Outcome: Ongoing, Progressing     Problem: Infection COPD (Chronic Obstructive Pulmonary Disease)  Goal: Absence of Infection Signs and Symptoms  11/17/2023 1441 by Brianne Rehman RN  Outcome: Ongoing, Not Progressing  11/17/2023 1440 by Brianne Rehman RN  Outcome: Ongoing, Progressing  11/17/2023 1338 by Brianne Rehman RN  Outcome: Ongoing, Progressing     Problem: Oral Intake Inadequate COPD (Chronic Obstructive Pulmonary Disease)  Goal: Improved Nutrition Intake  11/17/2023 1441 by Brianne Rehman RN  Outcome: Ongoing, Not Progressing  11/17/2023 1440 by Brianne Rehman RN  Outcome: Ongoing, Progressing  11/17/2023 1338 by Brianne Rehman RN  Outcome: Ongoing, Progressing     Problem: Respiratory Compromise COPD (Chronic Obstructive Pulmonary Disease)  Goal: Effective Oxygenation and Ventilation  11/17/2023 1441 by Brianne Rehman RN  Outcome: Ongoing, Not Progressing  11/17/2023 1440 by Brianne Rehman RN  Outcome: Ongoing, Progressing  11/17/2023 1338 by Brianne Rehman RN  Outcome: Ongoing, Progressing

## 2023-11-17 NOTE — NURSING TRANSFER
Nursing Transfer Note      11/16/2023  11:50 PM      Nurse giving handoff:Madhuri  Nurse receiving handoff:Chioma RUIZ Rn    Reason patient is being transferred: Continuation of care    Transfer To: telemetry from ED    Transfer via stretcher    Transfer with cardiac monitoring    Transported by     Transfer Vital Signs:  Blood Pressure:169/91  Heart Rate:82  O2:95%  Temperature:97.5  Respirations:22    Telemetry: Box Number [id number], Rate 87, Rhythm SR, and Telemetry  Shannen  Order for Tele Monitor? Yes    Additional Lines: purwick in place    4eyes on Skin: yes    Medicines sent: None    Any special needs or follow-up needed: Resp treatments    Patient belongings transferred with patient: Yes    Chart send with patient: No    Notified: Pt notified family    Patient reassessed at: 11/16/2023, 2350  Upon arrival to floor: cardiac monitor applied, patient oriented to room, call bell in reach, and bed in lowest position. Explained plan of care, verbalized understanding.

## 2023-11-17 NOTE — ADMISSIONCARE
AdmissionCare    Guideline: COPD - INPT, Inpatient    Based on the indications selected for the patient, the bed status of Admit to Inpatient was determined to be MET    The following indications were selected as present at the time of evaluation of the patient:      New or worsened (eg, from baseline) signs or symptoms of COPD (eg, dyspnea, Tachypnea) that persist despite observation care   -    - New-onset hypoxemia (room air SaO2 less than 90%, PO2 less than 60 mm Hg (8.0 kPa)) that persists despite observation care   - Worsening of pre-existing hypoxemia (eg, increased requirement for supplemental oxygen to maintain oxygenation at baseline level) that persists despite observation care, with oxygen treatment needs performable only in acute inpatient setting (eg, need for close monitoring of clinical status)   - Hypercarbia (PCO2 greater than 40 mm Hg (5.3 kPa))-induced respiratory acidosis (pH less than 7.35) that persists despite observation care   High-risk active acute comorbidity (eg, dysrhythmia, heart failure, pleural effusion, pneumothorax, myopathy, rib fracture) with new or worsened (eg, from baseline) signs or symptoms of COPD (eg, dyspnea, Tachypnea)   -     AdmissionCare documentation entered by: SARA Narayan    [facility], 27th edition, Copyright © 2023 [facility] [place], [facility] All Rights Reserved.  [id number]:15:[id number]:00

## 2023-11-17 NOTE — PLAN OF CARE
11/17/23 1432   Final Note   Assessment Type Final Discharge Note   Anticipated Discharge Disposition Left Against   What phone number can be called within the next 1-3 days to see how you are doing after discharge? [phone]   Hospital Resources/Appts/Education Provided Appointments scheduled and added to AVS   Post-Acute Status   Coverage [place] [facility] (Medicaid)   Discharge Delays None known at this time

## 2023-12-17 ENCOUNTER — HOSPITAL ENCOUNTER (EMERGENCY)
Facility: HOSPITAL | Age: 53
Discharge: HOME | End: 2023-12-17
Attending: EMERGENCY MEDICINE
Payer: MEDICAID

## 2023-12-17 VITALS
RESPIRATION RATE: 17 BRPM | BODY MASS INDEX: 27 KG/M2 | HEART RATE: 81 BPM | DIASTOLIC BLOOD PRESSURE: 93 MMHG | HEIGHT: 66 IN | OXYGEN SATURATION: 95 % | SYSTOLIC BLOOD PRESSURE: 144 MMHG | TEMPERATURE: 98 F | WEIGHT: 168 LBS
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
## 2023-12-17 LAB
ALBUMIN SERPL BCP-MCNC: 3.8 G/DL (ref 3.5–5.2)
ALP SERPL-CCNC: 81 U/L (ref 55–135)
ALT SERPL W/O P-5'-P-CCNC: 16 U/L (ref 10–44)
AMPHET+METHAMPHET UR QL: NEGATIVE
ANION GAP SERPL CALC-SCNC: 9 MMOL/L (ref 8–16)
AST SERPL-CCNC: 22 U/L (ref 10–40)
BACTERIA #/AREA URNS HPF: ABNORMAL /HPF
BARBITURATES UR QL SCN>200 NG/ML: NEGATIVE
BASOPHILS # BLD AUTO: 0.04 K/UL (ref 0–0.2)
BASOPHILS NFR BLD: 0.5 % (ref 0–1.9)
BENZODIAZ UR QL SCN>200 NG/ML: NEGATIVE
BILIRUB SERPL-MCNC: 0.9 MG/DL (ref 0.1–1)
BILIRUB UR QL STRIP: NEGATIVE
BNP SERPL-MCNC: 21 PG/ML (ref 0–99)
BUN SERPL-MCNC: 17 MG/DL (ref 6–20)
BZE UR QL SCN: NEGATIVE
CALCIUM SERPL-MCNC: 10.1 MG/DL (ref 8.7–10.5)
CANNABINOIDS UR QL SCN: ABNORMAL
CHLORIDE SERPL-SCNC: 104 MMOL/L (ref 95–110)
CLARITY UR: CLEAR
CO2 SERPL-SCNC: 29 MMOL/L (ref 23–29)
COLOR UR: YELLOW
CREAT SERPL-MCNC: 2 MG/DL (ref 0.5–1.4)
CREAT UR-MCNC: 224.9 MG/DL (ref 15–325)
DIFFERENTIAL METHOD: NORMAL
EOSINOPHIL # BLD AUTO: 0.2 K/UL (ref 0–0.5)
EOSINOPHIL NFR BLD: 3.1 % (ref 0–8)
ERYTHROCYTE [DISTWIDTH] IN BLOOD BY AUTOMATED COUNT: 12.7 % (ref 11.5–14.5)
EST. GFR  (NO RACE VARIABLE): 30 ML/MIN/1.73 M^2
ETHANOL SERPL-MCNC: <10 MG/DL
GLUCOSE SERPL-MCNC: 98 MG/DL (ref 70–110)
GLUCOSE UR QL STRIP: NEGATIVE
HCT VFR BLD AUTO: 44.4 % (ref 37–48.5)
HGB BLD-MCNC: 14.6 G/DL (ref 12–16)
HGB UR QL STRIP: NEGATIVE
HYALINE CASTS #/AREA URNS LPF: 3 /LPF
IMM GRANULOCYTES # BLD AUTO: 0.01 K/UL (ref 0–0.04)
IMM GRANULOCYTES NFR BLD AUTO: 0.1 % (ref 0–0.5)
KETONES UR QL STRIP: NEGATIVE
LEUKOCYTE ESTERASE UR QL STRIP: ABNORMAL
LYMPHOCYTES # BLD AUTO: 3 K/UL (ref 1–4.8)
LYMPHOCYTES NFR BLD: 40.8 % (ref 18–48)
MCH RBC QN AUTO: 29.4 PG (ref 27–31)
MCHC RBC AUTO-ENTMCNC: 32.9 G/DL (ref 32–36)
MCV RBC AUTO: 90 FL (ref 82–98)
METHADONE UR QL SCN>300 NG/ML: NEGATIVE
MICROSCOPIC COMMENT: ABNORMAL
MONOCYTES # BLD AUTO: 0.8 K/UL (ref 0.3–1)
MONOCYTES NFR BLD: 11 % (ref 4–15)
NEUTROPHILS # BLD AUTO: 3.3 K/UL (ref 1.8–7.7)
NEUTROPHILS NFR BLD: 44.5 % (ref 38–73)
NITRITE UR QL STRIP: NEGATIVE
NRBC BLD-RTO: 0 /100 WBC
OPIATES UR QL SCN: ABNORMAL
PCP UR QL SCN>25 NG/ML: NEGATIVE
PH UR STRIP: 7 [PH] (ref 5–8)
PLATELET # BLD AUTO: 208 K/UL (ref 150–450)
PMV BLD AUTO: 9.5 FL (ref 9.2–12.9)
POTASSIUM SERPL-SCNC: 3.3 MMOL/L (ref 3.5–5.1)
PROT SERPL-MCNC: 7.7 G/DL (ref 6–8.4)
PROT UR QL STRIP: ABNORMAL
RBC # BLD AUTO: 4.96 M/UL (ref 4–5.4)
RBC #/AREA URNS HPF: 0 /HPF (ref 0–4)
SODIUM SERPL-SCNC: 142 MMOL/L (ref 136–145)
SP GR UR STRIP: 1.01 (ref 1–1.03)
SQUAMOUS #/AREA URNS HPF: 21 /HPF
TOXICOLOGY INFORMATION: ABNORMAL
TRICHOMONAS UR QL MICRO: ABNORMAL
TROPONIN I SERPL DL<=0.01 NG/ML-MCNC: 0.02 NG/ML (ref 0–0.03)
TROPONIN I SERPL DL<=0.01 NG/ML-MCNC: 0.03 NG/ML (ref 0–0.03)
URN SPEC COLLECT METH UR: ABNORMAL
UROBILINOGEN UR STRIP-ACNC: NEGATIVE EU/DL
WBC # BLD AUTO: 7.37 K/UL (ref 3.9–12.7)
WBC #/AREA URNS HPF: 5 /HPF (ref 0–5)

## 2023-12-17 PROCEDURE — 80307 DRUG TEST PRSMV CHEM ANLYZR: CPT | Performed by: EMERGENCY MEDICINE

## 2023-12-17 PROCEDURE — 99285 EMERGENCY DEPT VISIT HI MDM: CPT | Mod: 25

## 2023-12-17 PROCEDURE — 63600175 PHARM REV CODE 636 W HCPCS: Performed by: EMERGENCY MEDICINE

## 2023-12-17 PROCEDURE — 82077 ASSAY SPEC XCP UR&BREATH IA: CPT | Performed by: EMERGENCY MEDICINE

## 2023-12-17 PROCEDURE — 81000 URINALYSIS NONAUTO W/SCOPE: CPT | Performed by: EMERGENCY MEDICINE

## 2023-12-17 PROCEDURE — 96374 THER/PROPH/DIAG INJ IV PUSH: CPT

## 2023-12-17 PROCEDURE — 93010 EKG 12-LEAD: ICD-10-PCS | Mod: ,,, | Performed by: INTERNAL MEDICINE

## 2023-12-17 PROCEDURE — 85025 COMPLETE CBC W/AUTO DIFF WBC: CPT | Performed by: EMERGENCY MEDICINE

## 2023-12-17 PROCEDURE — 84484 ASSAY OF TROPONIN QUANT: CPT | Mod: 91 | Performed by: EMERGENCY MEDICINE

## 2023-12-17 PROCEDURE — 83880 ASSAY OF NATRIURETIC PEPTIDE: CPT | Performed by: EMERGENCY MEDICINE

## 2023-12-17 PROCEDURE — 96375 TX/PRO/DX INJ NEW DRUG ADDON: CPT

## 2023-12-17 PROCEDURE — 93010 ELECTROCARDIOGRAM REPORT: CPT | Mod: ,,, | Performed by: INTERNAL MEDICINE

## 2023-12-17 PROCEDURE — 93005 ELECTROCARDIOGRAM TRACING: CPT

## 2023-12-17 PROCEDURE — 80053 COMPREHEN METABOLIC PANEL: CPT | Performed by: EMERGENCY MEDICINE

## 2023-12-17 RX ORDER — MORPHINE SULFATE 4 MG/ML
4 INJECTION, SOLUTION INTRAMUSCULAR; INTRAVENOUS
Status: COMPLETED | OUTPATIENT
Start: 2023-12-17 | End: 2023-12-17

## 2023-12-17 RX ORDER — PREDNISONE 20 MG/1
40 TABLET ORAL DAILY
Qty: 10 TABLET | Refills: 0 | Status: SHIPPED | OUTPATIENT
Start: 2023-12-17 | End: 2023-12-22

## 2023-12-17 RX ORDER — ALBUTEROL SULFATE 90 UG/1
2 AEROSOL, METERED RESPIRATORY (INHALATION) EVERY 6 HOURS PRN
Qty: 18 G | Refills: 0 | Status: ON HOLD | OUTPATIENT
Start: 2023-12-17 | End: 2024-03-29 | Stop reason: HOSPADM

## 2023-12-17 RX ORDER — HYDROCODONE BITARTRATE AND ACETAMINOPHEN 5; 325 MG/1; MG/1
1 TABLET ORAL EVERY 6 HOURS PRN
Qty: 10 TABLET | Refills: 0 | Status: SHIPPED | OUTPATIENT
Start: 2023-12-17 | End: 2024-03-27 | Stop reason: DRUGHIGH

## 2023-12-17 RX ORDER — ONDANSETRON 4 MG/1
4 TABLET, ORALLY DISINTEGRATING ORAL EVERY 8 HOURS PRN
Qty: 20 TABLET | Refills: 0 | Status: SHIPPED | OUTPATIENT
Start: 2023-12-17 | End: 2024-03-27

## 2023-12-17 RX ORDER — ONDANSETRON 2 MG/ML
4 INJECTION INTRAMUSCULAR; INTRAVENOUS
Status: COMPLETED | OUTPATIENT
Start: 2023-12-17 | End: 2023-12-17

## 2023-12-17 RX ADMIN — ONDANSETRON 4 MG: 2 INJECTION INTRAMUSCULAR; INTRAVENOUS at 12:12

## 2023-12-17 RX ADMIN — MORPHINE SULFATE 4 MG: 4 INJECTION, SOLUTION INTRAMUSCULAR; INTRAVENOUS at 12:12

## 2023-12-17 NOTE — ED TRIAGE NOTES
Pt fell on back today after legs gave out. Denies head trauma, LOC, chest pain, nvd. Hx copd. Wheezing, given duo neb by ems. JENNIFER

## 2023-12-17 NOTE — ED PROVIDER NOTES
Encounter Date: 12/17/2023    SCRIBE #1 NOTE: I, Ramin Gill, am scribing for, and in the presence of,  Lito Nielsen MD. I have scribed the following portions of the note - Other sections scribed: HPI, ROS.       History     Chief Complaint   Patient presents with    Fall     EMS called to 54yo female that stated her legs gave out and she fell. No LOC and denied hitting head. Ems reports her initial dp was 84/48  and had some mild SOB. [facility] reports inspiratory and expiratory wheezes. She was given a duoneb enroute. C/o right shoulder and upper back pain from the fall.      Jacqueline Manuel is a 53 y.o. female, with no pertinent PMHx, who presents to the ED with fall. Patient states that she fell on her back today after her legs gave out when she went to use the restroom. Patient denies head trauma and LOC. Patient says right before she fell she felt lightheaded and dizzy. Patient reports she was not able to get off the floor by herself. Patient states she came directly to ER after fall occurred. Patient says yesterday she felt completely fine. No other exacerbating or alleviating factors. Patient denies fever, chills, diarrhea, nausea, or other associated symptoms.       The history is provided by the patient. No  was used.     Review of patient's allergies indicates:  No Known Allergies  No past medical history on file.  No past surgical history on file.  No family history on file.     Review of Systems   Constitutional: Negative.    HENT: Negative.     Eyes: Negative.    Respiratory: Negative.     Cardiovascular: Negative.    Gastrointestinal: Negative.    Genitourinary: Negative.    Musculoskeletal:  Positive for back pain.   Skin: Negative.    Neurological:  Positive for dizziness and light-headedness.       Physical Exam     Initial Vitals [12/17/23 1152]   BP Pulse Resp Temp SpO2   118/64 78 20 98.2 °F (36.8 °C) 100 %      MAP       --         Physical Exam    Nursing note and  vitals reviewed.  Constitutional: She appears well-developed and well-nourished. She is not diaphoretic. No distress.   HENT:   Head: Normocephalic and atraumatic.   Nose: Nose normal.   Eyes: EOM are normal. Pupils are equal, round, and reactive to light.   Neck: Neck supple. No JVD present.   Normal range of motion.  Cardiovascular:  Normal rate, regular rhythm, normal heart sounds and intact distal pulses.           Pulmonary/Chest: Breath sounds normal. No stridor. No respiratory distress. She has no wheezes. She has no rales.   Abdominal: Abdomen is soft. Bowel sounds are normal. She exhibits no distension. There is no abdominal tenderness.   Musculoskeletal:         General: Tenderness present. No edema. Normal range of motion.      Cervical back: Normal range of motion and neck supple.      Comments: mild tenderness over bilateral paralumbar spinal areas, right side greater than left, oral left some tenderness noted of the right lateral greater trochanter, no overlying skin changes present. Full range of motion present, strength intact throughout extremities.     Neurological: She is alert and oriented to person, place, and time. She has normal strength. No cranial nerve deficit.   Skin: Skin is warm and dry. Capillary refill takes less than 2 seconds. No rash noted. No erythema.         ED Course   Procedures  Labs Reviewed   URINALYSIS, REFLEX TO URINE CULTURE - Abnormal; Notable for the following components:       Result Value    Protein, UA 1+ (*)     Leukocytes, UA 2+ (*)     All other components within normal limits    Narrative:     Specimen Source->Urine   COMPREHENSIVE METABOLIC PANEL - Abnormal; Notable for the following components:    Potassium 3.3 (*)     Creatinine 2.0 (*)     eGFR 30 (*)     All other components within normal limits   TROPONIN I - Abnormal; Notable for the following components:    Troponin I 0.027 (*)     All other components within normal limits   DRUG SCREEN PANEL, URINE  EMERGENCY - Abnormal; Notable for the following components:    Opiate Scrn, Ur Presumptive Positive (*)     THC Presumptive Positive (*)     All other components within normal limits    Narrative:     Specimen Source->Urine   URINALYSIS MICROSCOPIC - Abnormal; Notable for the following components:    Hyaline Casts, UA 3 (*)     Trichomonas, UA Rare (*)     All other components within normal limits    Narrative:     Specimen Source->Urine   CBC W/ AUTO DIFFERENTIAL   B-TYPE NATRIURETIC PEPTIDE   ALCOHOL,MEDICAL (ETHANOL)   TROPONIN I        ECG Results              EKG 12-lead (Final result)  Result time 12/18/23 15:28:35      Final result by Interface, Lab In [facility] (12/18/23 15:28:35)                   Narrative:    Test Reason : W19.XXXA,    Vent. Rate : 066 BPM     Atrial Rate : 066 BPM     P-R Int : 178 ms          QRS Dur : 106 ms      QT Int : 430 ms       P-R-T Axes : 076 -50 089 degrees     QTc Int : 450 ms    Normal sinus rhythm  Left axis deviation  Incomplete right bundle branch block  Minimal voltage criteria for LVH, may be normal variant ( [place] product )  Abnormal ECG  When compared with ECG of 16-NOV-2023 18:12,  No significant change was found  Confirmed by Eric Rogers MD ([id number]) on 12/18/2023 3:28:22 PM    Referred By: [email or web address]   SELF           Confirmed By:Eric Rogers MD                                     EKG 12-LEAD (Final result)  Result time 12/18/23 11:25:13      Final result by Unknown User (12/18/23 11:25:13)                                      Imaging Results              CT Head Without Contrast (Final result)  Result time 12/17/23 13:21:28      Final result by Ney Houston MD (12/17/23 13:21:28)                   Impression:      Motion compromised examination.    Noting this limitation, no definite acute intracranial findings are identified by noncontrast CT.      Electronically signed by: Ney Houston  Date:    12/17/2023  Time:    13:21               Narrative:     EXAMINATION:  CT HEAD WITHOUT CONTRAST    CLINICAL HISTORY:  Head trauma, moderate-severe;    TECHNIQUE:  Low dose axial images were obtained through the head.  Coronal and sagittal reformations were also performed. Contrast was not administered.    COMPARISON:  None.    FINDINGS:  Comment: Motion compromised examination.    Blood: No acute intracranial hemorrhage.    Parenchyma: No definite loss of gray-white differentiation to suggest acute or subacute transcortical infarct. Extensive right temporoparietal lobe encephalomalacia and gliosis.  Nonspecific areas of white matter hypoattenuation may relate to sequela of chronic small vessel ischemic disease.  Remote lacunar type infarcts involve the deep gray nuclei.    Ventricles/Extra-axial spaces: No abnormal extra-axial fluid collection. Basal cisterns are patent.    Vessels: Mild-to-moderate atherosclerotic calcifications.    Orbits: Unremarkable.    Scalp: Unremarkable.    Skull: There are no depressed skull fractures or destructive bone lesions.    Sinuses and mastoids: Essentially clear.    Other findings: None                                       X-Ray Chest 1 View (Final result)  Result time 12/17/23 13:05:38      Final result by Dawit Martínez MD (12/17/23 13:05:38)                   Impression:      No detrimental change or radiographic acute intrathoracic process seen on this single view.      Electronically signed by: Dawit Martínez MD  Date:    12/17/2023  Time:    13:05               Narrative:    EXAMINATION:  XR CHEST 1 VIEW    CLINICAL HISTORY:  Unspecified fall, initial encounter    TECHNIQUE:  Single frontal view of the chest was performed.    COMPARISON:  Chest radiograph 11/16/2023    FINDINGS:  Patient is somewhat rotated.  EKG stickers and tubing overlie the chest.    No detrimental change.  Cardiomediastinal silhouette is midline and stable without evidence of failure.  Pulmonary vasculature and hilar contours are within normal limits.   Bibasilar minimal platelike scarring versus atelectasis.  The lungs are otherwise well expanded without consolidation, pleural effusion or pneumothorax.  No acute osseous process seen.  PA and lateral views can be obtained.                                       X-Ray Hip 2 or 3 views Right (with Pelvis when performed) (Final result)  Result time 12/17/23 13:00:19      Final result by Ney Houston MD (12/17/23 13:00:19)                   Impression:      No convincing evidence of acute displaced fracture or dislocation.      Electronically signed by: Ney Houston  Date:    12/17/2023  Time:    13:00               Narrative:    EXAMINATION:  XR HIP WITH PELVIS WHEN PERFORMED, 2 OR 3  VIEWS RIGHT    CLINICAL HISTORY:  Unspecified fall, initial encounter    TECHNIQUE:  AP view of the pelvis and frog leg lateral view of the right hip were performed.    COMPARISON:  None    FINDINGS:  Monitoring leads are noted.  Examination limited by somewhat suboptimal patient positioning and overlying bowel gas and stool.  Noting this, no convincing evidence of acute displaced fracture or dislocation is seen.  Degenerative findings are noted involving the spine, sacroiliac joints, pubic symphysis, and hip joints.    Phleboliths appear to project within the pelvis.  Atherosclerotic vascular calcifications are noted.    No definite radiopaque foreign body.                                       Medications   morphine injection 4 mg (4 mg Intravenous Given 12/17/23 1238)   ondansetron injection 4 mg (4 mg Intravenous Given 12/17/23 1238)     Medical Decision Making  Amount and/or Complexity of Data Reviewed  Labs: ordered.  Radiology: ordered.    Risk  Prescription drug management.      MDM:    53-year-old female with past medical history as noted above presenting with fall.  Differential Diagnosis includes:  Fracture, intracranial hemorrhage, electrolyte abnormality.  Physical exam as noted above.  ED workup notable for  urinalysis with 2+ leukocytes, rare bacteriuria, UDS positive for THC and opiates, CBC/CMP with potassium 3.3, creatinine 2.0, troponin 0.027, BNP 21, alcohol negative, CT head is unremarkable, chest x-ray unremarkable, x-ray of right hip is unremarkable, EKG shows normal sinus rhythm rate of 66 beats per minute, left axis deviation present, incomplete right bundle-branch block present,  MS, no STEMI.  Patient presentation appears consistent with fall with musculoskeletal contusion, unclear etiology for fall earlier today appears to be possibly secondary to some mild dehydration as patient reported some symptoms of orthostasis.  She is ambulatory with steady gait to the bathroom and back with no further complaints after initial assessment and treatment.  She will be stable for continued outpatient evaluation and management.  Do not suspect any additional surgical or medical emergency. Discussed diagnosis and further treatment with patient, including f/u.  Return precautions given and all questions answered.  Patient in understanding of plan.  Pt discharged to home improved and stable.        Note was created using voice recognition software. Note may have occasional typographical or grammatical errors, garbled syntax, and other bizarre constructions that may not have been identified and edited despite good allison initial review prior to signing.               Scribe Attestation:   Scribe #1: I performed the above scribed service and the documentation accurately describes the services I performed. I attest to the accuracy of the note.                       I, Lito Nielsen M.D., personally performed the services described in this documentation. All medical record entries made by the scribe were at my direction and in my presence. I have reviewed the chart and agree that the record reflects my personal performance and is accurate and complete.          Clinical Impression:  Final diagnoses:  [W19.XXXA]  Fall  [W19.XXXA] Fall, initial encounter (Primary)          ED Disposition Condition    Discharge Stable          ED Prescriptions       Medication Sig Dispense Start Date End Date Auth. Provider    HYDROcodone-acetaminophen (NORCO) 5-325 mg per tablet Take 1 tablet by mouth every 6 (six) hours as needed for Pain. 10 tablet 2023 -- Lito Nielsen MD    ondansetron (ZOFRAN-ODT) 4 MG TbDL Take 1 tablet (4 mg total) by mouth every 8 (eight) hours as needed. 20 tablet 2023 -- Lito Nielsen MD    predniSONE (DELTASONE) 20 MG tablet () Take 2 tablets (40 mg total) by mouth once daily. for 5 days 10 tablet 2023 Lito Nielsen MD    albuterol (PROVENTIL HFA) 90 mcg/actuation inhaler Inhale 2 puffs into the lungs every 6 (six) hours as needed for Wheezing. Rescue 18 g 2023 Lito Nielsen MD          Follow-up Information       Follow up With Specialties Details Why Contact Info    [facility] Emergency Medicine Go to  If symptoms worsen [address] [facility] [place] [address] [phone]             Lito Nielsen MD  [phone]

## 2024-02-19 ENCOUNTER — HOSPITAL ENCOUNTER (EMERGENCY)
Facility: HOSPITAL | Age: 54
Discharge: HOME | End: 2024-02-19
Attending: EMERGENCY MEDICINE
Payer: MEDICAID

## 2024-02-19 VITALS
SYSTOLIC BLOOD PRESSURE: 113 MMHG | HEART RATE: 103 BPM | RESPIRATION RATE: 20 BRPM | BODY MASS INDEX: 27 KG/M2 | TEMPERATURE: 98 F | DIASTOLIC BLOOD PRESSURE: 72 MMHG | OXYGEN SATURATION: 99 % | WEIGHT: 168 LBS | HEIGHT: 66 IN

## 2024-02-19 DIAGNOSIS — J06.9 UPPER RESPIRATORY TRACT INFECTION, UNSPECIFIED TYPE: ICD-10-CM

## 2024-02-19 DIAGNOSIS — N17.9 ACUTE RENAL FAILURE SUPERIMPOSED ON CHRONIC KIDNEY DISEASE, UNSPECIFIED ACUTE RENAL FAILURE TYPE, UNSPECIFIED CKD STAGE: ICD-10-CM

## 2024-02-19 DIAGNOSIS — N18.9 ACUTE RENAL FAILURE SUPERIMPOSED ON CHRONIC KIDNEY DISEASE, UNSPECIFIED ACUTE RENAL FAILURE TYPE, UNSPECIFIED CKD STAGE: ICD-10-CM

## 2024-02-19 DIAGNOSIS — R05.9 COUGH, UNSPECIFIED TYPE: ICD-10-CM

## 2024-02-19 DIAGNOSIS — M79.10 MYALGIA: Primary | ICD-10-CM

## 2024-02-19 PROBLEM — J96.02 ACUTE RESPIRATORY FAILURE WITH HYPERCAPNIA: Status: RESOLVED | Noted: 2023-11-16 | Resolved: 2024-02-19

## 2024-02-19 LAB
ALBUMIN SERPL BCP-MCNC: 3.7 G/DL (ref 3.5–5.2)
ALP SERPL-CCNC: 80 U/L (ref 55–135)
ALT SERPL W/O P-5'-P-CCNC: 15 U/L (ref 10–44)
ANION GAP SERPL CALC-SCNC: 11 MMOL/L (ref 8–16)
AST SERPL-CCNC: 21 U/L (ref 10–40)
BASOPHILS # BLD AUTO: 0.06 K/UL (ref 0–0.2)
BASOPHILS NFR BLD: 0.8 % (ref 0–1.9)
BILIRUB SERPL-MCNC: 0.8 MG/DL (ref 0.1–1)
BUN SERPL-MCNC: 45 MG/DL (ref 6–20)
CALCIUM SERPL-MCNC: 9.4 MG/DL (ref 8.7–10.5)
CHLORIDE SERPL-SCNC: 103 MMOL/L (ref 95–110)
CO2 SERPL-SCNC: 27 MMOL/L (ref 23–29)
CREAT SERPL-MCNC: 3.9 MG/DL (ref 0.5–1.4)
CTP QC/QA: YES
CTP QC/QA: YES
DIFFERENTIAL METHOD BLD: ABNORMAL
EOSINOPHIL # BLD AUTO: 0.3 K/UL (ref 0–0.5)
EOSINOPHIL NFR BLD: 3.5 % (ref 0–8)
ERYTHROCYTE [DISTWIDTH] IN BLOOD BY AUTOMATED COUNT: 13.1 % (ref 11.5–14.5)
EST. GFR  (NO RACE VARIABLE): 13 ML/MIN/1.73 M^2
GLUCOSE SERPL-MCNC: 95 MG/DL (ref 70–110)
HCT VFR BLD AUTO: 43.5 % (ref 37–48.5)
HGB BLD-MCNC: 14.9 G/DL (ref 12–16)
IMM GRANULOCYTES # BLD AUTO: 0.01 K/UL (ref 0–0.04)
IMM GRANULOCYTES NFR BLD AUTO: 0.1 % (ref 0–0.5)
LYMPHOCYTES # BLD AUTO: 3.9 K/UL (ref 1–4.8)
LYMPHOCYTES NFR BLD: 53.5 % (ref 18–48)
MCH RBC QN AUTO: 30.3 PG (ref 27–31)
MCHC RBC AUTO-ENTMCNC: 34.3 G/DL (ref 32–36)
MCV RBC AUTO: 89 FL (ref 82–98)
MONOCYTES # BLD AUTO: 0.6 K/UL (ref 0.3–1)
MONOCYTES NFR BLD: 8.6 % (ref 4–15)
NEUTROPHILS # BLD AUTO: 2.5 K/UL (ref 1.8–7.7)
NEUTROPHILS NFR BLD: 33.5 % (ref 38–73)
NRBC BLD-RTO: 0 /100 WBC
PLATELET # BLD AUTO: ABNORMAL K/UL (ref 150–450)
PLATELET BLD QL SMEAR: ABNORMAL
PMV BLD AUTO: ABNORMAL FL (ref 9.2–12.9)
POC MOLECULAR INFLUENZA A AGN: NEGATIVE
POC MOLECULAR INFLUENZA B AGN: NEGATIVE
POTASSIUM SERPL-SCNC: 3.5 MMOL/L (ref 3.5–5.1)
PROT SERPL-MCNC: 8.6 G/DL (ref 6–8.4)
RBC # BLD AUTO: 4.91 M/UL (ref 4–5.4)
SARS-COV-2 RDRP RESP QL NAA+PROBE: NEGATIVE
SODIUM SERPL-SCNC: 141 MMOL/L (ref 136–145)
WBC # BLD AUTO: 7.33 K/UL (ref 3.9–12.7)

## 2024-02-19 PROCEDURE — 96374 THER/PROPH/DIAG INJ IV PUSH: CPT

## 2024-02-19 PROCEDURE — 99285 EMERGENCY DEPT VISIT HI MDM: CPT | Mod: 25

## 2024-02-19 PROCEDURE — 87502 INFLUENZA DNA AMP PROBE: CPT

## 2024-02-19 PROCEDURE — 93005 ELECTROCARDIOGRAM TRACING: CPT

## 2024-02-19 PROCEDURE — 80053 COMPREHEN METABOLIC PANEL: CPT | Performed by: EMERGENCY MEDICINE

## 2024-02-19 PROCEDURE — 96361 HYDRATE IV INFUSION ADD-ON: CPT

## 2024-02-19 PROCEDURE — 63600175 PHARM REV CODE 636 W HCPCS: Performed by: EMERGENCY MEDICINE

## 2024-02-19 PROCEDURE — 25000003 PHARM REV CODE 250: Performed by: EMERGENCY MEDICINE

## 2024-02-19 PROCEDURE — 85025 COMPLETE CBC W/AUTO DIFF WBC: CPT | Performed by: EMERGENCY MEDICINE

## 2024-02-19 PROCEDURE — 87635 SARS-COV-2 COVID-19 AMP PRB: CPT | Performed by: EMERGENCY MEDICINE

## 2024-02-19 PROCEDURE — 93010 ELECTROCARDIOGRAM REPORT: CPT | Mod: ,,, | Performed by: INTERNAL MEDICINE

## 2024-02-19 RX ORDER — KETOROLAC TROMETHAMINE 30 MG/ML
15 INJECTION, SOLUTION INTRAMUSCULAR; INTRAVENOUS
Status: COMPLETED | OUTPATIENT
Start: 2024-02-19 | End: 2024-02-19

## 2024-02-19 RX ORDER — ACETAMINOPHEN 325 MG/1
650 TABLET ORAL
Status: COMPLETED | OUTPATIENT
Start: 2024-02-19 | End: 2024-02-19

## 2024-02-19 RX ORDER — BENZONATATE 200 MG/1
200 CAPSULE ORAL 3 TIMES DAILY PRN
Qty: 30 CAPSULE | Refills: 0 | Status: SHIPPED | OUTPATIENT
Start: 2024-02-19 | End: 2024-02-29

## 2024-02-19 RX ADMIN — SODIUM CHLORIDE 1000 ML: 9 INJECTION, SOLUTION INTRAVENOUS at 04:02

## 2024-02-19 RX ADMIN — ACETAMINOPHEN 650 MG: 325 TABLET ORAL at 04:02

## 2024-02-19 RX ADMIN — KETOROLAC TROMETHAMINE 15 MG: 30 INJECTION, SOLUTION INTRAMUSCULAR; INTRAVENOUS at 04:02

## 2024-02-19 NOTE — ED PROVIDER NOTES
Encounter Date: 2/19/2024       History     Chief Complaint   Patient presents with    Body pain     Pt arrived via ems,pt chief complaint Body pain. Pt states is having all over body pain for 1 day.        53-year-old female presenting with myalgias.  Patient reports symptom onset within the last 2 days.  Patient reports main complaint is myalgias.  She reports nonproductive cough.  She denies any chest pain, dyspnea.  She reports she has not taken any analgesia.      Review of patient's allergies indicates:  No Known Allergies  No past medical history on file.  No past surgical history on file.  No family history on file.     Review of Systems   Constitutional:  Positive for chills and fatigue. Negative for fever.   HENT:  Positive for rhinorrhea. Negative for congestion and sore throat.    Respiratory:  Positive for cough. Negative for chest tightness and shortness of breath.    Cardiovascular:  Negative for chest pain.   Gastrointestinal:  Negative for abdominal pain, diarrhea, nausea and vomiting.   Genitourinary:  Negative for dysuria, flank pain and frequency.   Musculoskeletal:  Negative for back pain.   Skin:  Negative for rash.       Physical Exam     Initial Vitals   BP Pulse Resp Temp SpO2   02/19/24 0425 02/19/24 0425 02/19/24 0425 02/19/24 0432 02/19/24 0425   (!) 131/98 104 20 97.6 °F (36.4 °C) 98 %      MAP       --                Physical Exam    Nursing note and vitals reviewed.  Constitutional: She appears well-developed and well-nourished. She does not appear ill. No distress.   HENT:   Head: Normocephalic and atraumatic.   Mouth/Throat: Oropharynx is clear and moist.   Eyes: Conjunctivae and EOM are normal.   Neck:   Normal range of motion.  Cardiovascular:  Regular rhythm and normal heart sounds.   Tachycardia present.         No murmur heard.  Pulmonary/Chest: Breath sounds normal. No respiratory distress. She has no wheezes.   Abdominal: Abdomen is soft. There is no abdominal tenderness.  "  Musculoskeletal:         General: No edema.      Cervical back: Normal range of motion.      Comments: No lower extremity edema.     Neurological: She is alert. GCS score is 15.   Skin: Skin is warm.   Psychiatric: She has a normal mood and affect.         ED Course   Procedures  Labs Reviewed   CBC W/ AUTO DIFFERENTIAL - Abnormal; Notable for the following components:       Result Value    Gran % 33.5 (*)     Lymph % 53.5 (*)     Platelet Estimate Clumped (*)     All other components within normal limits   COMPREHENSIVE METABOLIC PANEL - Abnormal; Notable for the following components:    BUN 45 (*)     Creatinine 3.9 (*)     Total Protein 8.6 (*)     eGFR 13 (*)     All other components within normal limits   SARS-COV-2 RDRP GENE   POCT INFLUENZA A/B MOLECULAR          Imaging Results              X-Ray Chest 1 View (Final result)  Result time 02/19/24 06:04:43      Final result by Radames Cortes MD (02/19/24 06:04:43)                   Impression:      No acute cardiopulmonary finding identified on this single view.      Electronically signed by: Radames Cortes MD  Date:    02/19/2024  Time:    06:04               Narrative:    EXAMINATION:  XR CHEST 1 VIEW    CLINICAL HISTORY:  Provided history is "shortness of breath;  ".    TECHNIQUE:  One view of the chest.    COMPARISON:  12/17/2023.    FINDINGS:  Cardiac wires overlie the chest.  Cardiac silhouette is not enlarged.  No focal consolidation.  No sizable pleural effusion.  No pneumothorax.                                       Medications   ketorolac injection 15 mg (15 mg Intravenous Given 2/19/24 0446)   sodium chloride 0.9% bolus 1,000 mL 1,000 mL (0 mLs Intravenous Stopped 2/19/24 0545)   acetaminophen tablet 650 mg (650 mg Oral Given 2/19/24 0446)     Medical Decision Making    53-year-old female presents with myalgias and nonproductive cough.  History of previous substance use, CVA, CKD.  Patient denies any chest pain or dyspnea.  Chest x-ray " shows no infiltrate to suggest pneumonia.  Patient is overall well-appearing.  No confusion noted.  Patient answering appropriately.  Patient does have notable increase in creatinine from 1.7 in October 23 to 3.9 today.  The patient denies any nausea vomiting or diarrhea.  No volume depletion noted.  Hemodynamically stable.  Blood pressure appears appropriate.  Bedside ultrasound shows no hydronephrosis.  Case was discussed with Nephrology.  The patient will follow up with Nephrology tomorrow in clinic.  The patient's information was confirmed.  Viral screens negative.    Differential diagnosis includes URI, lower respiratory tract infection    Amount and/or Complexity of Data Reviewed  Labs: ordered. Decision-making details documented in ED Course.  Radiology: ordered.  ECG/medicine tests:  Decision-making details documented in ED Course.    Risk  OTC drugs.  Prescription drug management.               ED Course as of 02/19/24 0649   Mon Feb 19, 2024   0546 EKG 12-lead  Time 4:44     Rate 96, sinus, regular rhythm, left axis deviation.   QRS 96 QTC 47.  No ST elevation or depression.  T-wave inversion V2, aVL.  RSR V1, V2.  Q-wave aVL.    Normal sinus rhythm with left axis deviation and incomplete right bundle-branch block. [JM]   0618 Bedside ultrasound shows no hydronephrosis bilaterally.  Patient has history of CKD. [JM]   0632 Creatinine(!): 3.9  Creatinine 1.7 in October 2023. [JM]   0632 Nephrology consult [JM]   0633 Toradol was given earlier in the ER visit.  Unknown that the patient had CKD.  Chart reviewed.  No nephrotoxic medications noted. [JM]   0636 Dr. Macario, nephrology    Can get the patient into the clinic tomorrow.   Discussed the creatinine elevation.  Discussed the no fluid losses.     I confirmed with the patient contact information. She states that the phone number is her daughter's phone and that is the preferred contact number.  [JM]   0642 The patient reports she is making urine is  normal.  She denies any vomiting or diarrhea. [JM]      ED Course User Index  [JM] Billy Hoffmann MD                           Clinical Impression:  Final diagnoses:  [M79.10] Myalgia (Primary)  [N17.9, N18.9] Acute renal failure superimposed on chronic kidney disease, unspecified acute renal failure type, unspecified CKD stage  [J06.9] Upper respiratory tract infection, unspecified type  [R05.9] Cough, unspecified type          ED Disposition Condition    Discharge Stable          ED Prescriptions       Medication Sig Dispense Start Date End Date Auth. Provider    benzonatate (TESSALON) 200 MG capsule Take 1 capsule (200 mg total) by mouth 3 (three) times daily as needed for Cough. 30 capsule 2/19/2024 2/29/2024 Billy Hoffmann MD          Follow-up Information       Follow up With Specialties Details Why Contact Info    Alma Reyes MD Nephrology Call in 1 day Nephrology [address] [address]  [phone]      [facility] - Emergency Dept Emergency Medicine  If symptoms worsen [address] [facility] [place] [id number] [phone]             Billy Hoffmann MD  02/19/24 0649

## 2024-02-19 NOTE — DISCHARGE INSTRUCTIONS
Expect a phone call today to follow up with the nephrologist tomorrow in clinic.    Seek medical care if your symptoms are worsening or any concerns.  Avoid any NSAID containing medications such as ibuprofen or aspirin.  Take Tylenol as needed for body aches.

## 2024-02-19 NOTE — ED TRIAGE NOTES
Pt arrived to ED via EMS with a c/o generalized body pain since 2 days. Reports non productive cough and nasal congestion as well. Denies fever, chest pain, nausea and vomiting.

## 2024-02-20 LAB
OHS QRS DURATION: 96 MS
OHS QTC CALCULATION: 487 MS

## 2024-03-01 ENCOUNTER — HOSPITAL ENCOUNTER (INPATIENT)
Facility: HOSPITAL | Age: 54
LOS: 3 days | Discharge: HOME | DRG: 918 | End: 2024-03-04
Attending: EMERGENCY MEDICINE | Admitting: INTERNAL MEDICINE
Payer: MEDICAID

## 2024-03-01 DIAGNOSIS — R55 SYNCOPE AND COLLAPSE: ICD-10-CM

## 2024-03-01 DIAGNOSIS — R07.9 CHEST PAIN: ICD-10-CM

## 2024-03-01 DIAGNOSIS — R06.02 SOB (SHORTNESS OF BREATH): ICD-10-CM

## 2024-03-01 DIAGNOSIS — N17.9 AKI (ACUTE KIDNEY INJURY): ICD-10-CM

## 2024-03-01 DIAGNOSIS — T40.604A OPIATE OVERDOSE, UNDETERMINED INTENT, INITIAL ENCOUNTER: Primary | ICD-10-CM

## 2024-03-01 DIAGNOSIS — M79.601 RIGHT ARM PAIN: ICD-10-CM

## 2024-03-01 LAB
ALBUMIN SERPL BCP-MCNC: 4 G/DL (ref 3.5–5.2)
ALLENS TEST: ABNORMAL
ALLENS TEST: ABNORMAL
ALP SERPL-CCNC: 78 U/L (ref 55–135)
ALT SERPL W/O P-5'-P-CCNC: 30 U/L (ref 10–44)
ANION GAP SERPL CALC-SCNC: 13 MMOL/L (ref 8–16)
APAP SERPL-MCNC: 3 UG/ML (ref 10–20)
AST SERPL-CCNC: 40 U/L (ref 10–40)
BASOPHILS # BLD AUTO: 0.04 K/UL (ref 0–0.2)
BASOPHILS NFR BLD: 0.5 % (ref 0–1.9)
BILIRUB SERPL-MCNC: 0.4 MG/DL (ref 0.1–1)
BNP SERPL-MCNC: 30 PG/ML (ref 0–99)
BUN SERPL-MCNC: 50 MG/DL (ref 6–20)
CALCIUM SERPL-MCNC: 10.1 MG/DL (ref 8.7–10.5)
CHLORIDE SERPL-SCNC: 101 MMOL/L (ref 95–110)
CO2 SERPL-SCNC: 27 MMOL/L (ref 23–29)
CREAT SERPL-MCNC: 5.9 MG/DL (ref 0.5–1.4)
DELSYS: ABNORMAL
DELSYS: ABNORMAL
DIFFERENTIAL METHOD BLD: ABNORMAL
EOSINOPHIL # BLD AUTO: 0.1 K/UL (ref 0–0.5)
EOSINOPHIL NFR BLD: 0.6 % (ref 0–8)
ERYTHROCYTE [DISTWIDTH] IN BLOOD BY AUTOMATED COUNT: 13.2 % (ref 11.5–14.5)
EST. GFR  (NO RACE VARIABLE): 8 ML/MIN/1.73 M^2
ETHANOL SERPL-MCNC: <10 MG/DL
GLUCOSE SERPL-MCNC: 102 MG/DL (ref 70–110)
HCO3 UR-SCNC: 30.3 MMOL/L (ref 24–28)
HCO3 UR-SCNC: 31.5 MMOL/L (ref 24–28)
HCT VFR BLD AUTO: 41.3 % (ref 37–48.5)
HGB BLD-MCNC: 13.7 G/DL (ref 12–16)
IMM GRANULOCYTES # BLD AUTO: 0.04 K/UL (ref 0–0.04)
IMM GRANULOCYTES NFR BLD AUTO: 0.5 % (ref 0–0.5)
LYMPHOCYTES # BLD AUTO: 1.1 K/UL (ref 1–4.8)
LYMPHOCYTES NFR BLD: 12.1 % (ref 18–48)
MCH RBC QN AUTO: 30.9 PG (ref 27–31)
MCHC RBC AUTO-ENTMCNC: 33.2 G/DL (ref 32–36)
MCV RBC AUTO: 93 FL (ref 82–98)
MONOCYTES # BLD AUTO: 0.7 K/UL (ref 0.3–1)
MONOCYTES NFR BLD: 8.1 % (ref 4–15)
NEUTROPHILS # BLD AUTO: 6.8 K/UL (ref 1.8–7.7)
NEUTROPHILS NFR BLD: 78.2 % (ref 38–73)
NRBC BLD-RTO: 0 /100 WBC
PCO2 BLDA: 70.7 MMHG (ref 35–45)
PCO2 BLDA: 85.8 MMHG (ref 35–45)
PH SMN: 7.17 [PH] (ref 7.35–7.45)
PH SMN: 7.24 [PH] (ref 7.35–7.45)
PLATELET # BLD AUTO: 275 K/UL (ref 150–450)
PMV BLD AUTO: 9.7 FL (ref 9.2–12.9)
PO2 BLDA: 13 MMHG (ref 40–60)
PO2 BLDA: 13 MMHG (ref 40–60)
POC BE: 0 MMOL/L
POC BE: 1 MMOL/L
POC SATURATED O2: 10 % (ref 95–100)
POC SATURATED O2: 11 % (ref 95–100)
POC TCO2: 32 MMOL/L (ref 24–29)
POC TCO2: 34 MMOL/L (ref 24–29)
POTASSIUM SERPL-SCNC: 3.8 MMOL/L (ref 3.5–5.1)
PROT SERPL-MCNC: 8.9 G/DL (ref 6–8.4)
RBC # BLD AUTO: 4.44 M/UL (ref 4–5.4)
SALICYLATES SERPL-MCNC: <5 MG/DL (ref 15–30)
SAMPLE: ABNORMAL
SAMPLE: ABNORMAL
SITE: ABNORMAL
SITE: ABNORMAL
SODIUM SERPL-SCNC: 141 MMOL/L (ref 136–145)
TROPONIN I SERPL DL<=0.01 NG/ML-MCNC: 0.07 NG/ML (ref 0–0.03)
WBC # BLD AUTO: 8.67 K/UL (ref 3.9–12.7)

## 2024-03-01 PROCEDURE — 85025 COMPLETE CBC W/AUTO DIFF WBC: CPT | Performed by: EMERGENCY MEDICINE

## 2024-03-01 PROCEDURE — 93010 ELECTROCARDIOGRAM REPORT: CPT | Mod: ,,, | Performed by: INTERNAL MEDICINE

## 2024-03-01 PROCEDURE — 94640 AIRWAY INHALATION TREATMENT: CPT

## 2024-03-01 PROCEDURE — 82077 ASSAY SPEC XCP UR&BREATH IA: CPT | Performed by: EMERGENCY MEDICINE

## 2024-03-01 PROCEDURE — 83880 ASSAY OF NATRIURETIC PEPTIDE: CPT | Performed by: EMERGENCY MEDICINE

## 2024-03-01 PROCEDURE — 80143 DRUG ASSAY ACETAMINOPHEN: CPT | Performed by: EMERGENCY MEDICINE

## 2024-03-01 PROCEDURE — 94761 N-INVAS EAR/PLS OXIMETRY MLT: CPT | Mod: XB

## 2024-03-01 PROCEDURE — 93005 ELECTROCARDIOGRAM TRACING: CPT

## 2024-03-01 PROCEDURE — 99900035 HC TECH TIME PER 15 MIN (STAT)

## 2024-03-01 PROCEDURE — 80053 COMPREHEN METABOLIC PANEL: CPT | Performed by: EMERGENCY MEDICINE

## 2024-03-01 PROCEDURE — 99215 OFFICE O/P EST HI 40 MIN: CPT | Mod: 95,,, | Performed by: PSYCHIATRY & NEUROLOGY

## 2024-03-01 PROCEDURE — 12000002 HC ACUTE/MED SURGE SEMI-PRIVATE ROOM

## 2024-03-01 PROCEDURE — 63600175 PHARM REV CODE 636 W HCPCS: Performed by: EMERGENCY MEDICINE

## 2024-03-01 PROCEDURE — 96374 THER/PROPH/DIAG INJ IV PUSH: CPT

## 2024-03-01 PROCEDURE — 99285 EMERGENCY DEPT VISIT HI MDM: CPT | Mod: 25

## 2024-03-01 PROCEDURE — 80179 DRUG ASSAY SALICYLATE: CPT | Performed by: EMERGENCY MEDICINE

## 2024-03-01 PROCEDURE — 82803 BLOOD GASES ANY COMBINATION: CPT

## 2024-03-01 PROCEDURE — 84484 ASSAY OF TROPONIN QUANT: CPT | Performed by: EMERGENCY MEDICINE

## 2024-03-01 PROCEDURE — 25000242 PHARM REV CODE 250 ALT 637 W/ HCPCS: Performed by: EMERGENCY MEDICINE

## 2024-03-01 PROCEDURE — 25000003 PHARM REV CODE 250: Performed by: EMERGENCY MEDICINE

## 2024-03-01 RX ORDER — IPRATROPIUM BROMIDE AND ALBUTEROL SULFATE 2.5; .5 MG/3ML; MG/3ML
3 SOLUTION RESPIRATORY (INHALATION)
Status: COMPLETED | OUTPATIENT
Start: 2024-03-01 | End: 2024-03-01

## 2024-03-01 RX ORDER — SODIUM CHLORIDE, SODIUM LACTATE, POTASSIUM CHLORIDE, CALCIUM CHLORIDE 600; 310; 30; 20 MG/100ML; MG/100ML; MG/100ML; MG/100ML
INJECTION, SOLUTION INTRAVENOUS CONTINUOUS
Status: DISCONTINUED | OUTPATIENT
Start: 2024-03-02 | End: 2024-03-04

## 2024-03-01 RX ORDER — LANOLIN ALCOHOL/MO/W.PET/CERES
800 CREAM (GRAM) TOPICAL
Status: DISCONTINUED | OUTPATIENT
Start: 2024-03-01 | End: 2024-03-04 | Stop reason: HOSPADM

## 2024-03-01 RX ORDER — ONDANSETRON HYDROCHLORIDE 2 MG/ML
4 INJECTION, SOLUTION INTRAVENOUS
Status: COMPLETED | OUTPATIENT
Start: 2024-03-01 | End: 2024-03-01

## 2024-03-01 RX ORDER — SODIUM,POTASSIUM PHOSPHATES 280-250MG
2 POWDER IN PACKET (EA) ORAL
Status: DISCONTINUED | OUTPATIENT
Start: 2024-03-01 | End: 2024-03-04 | Stop reason: HOSPADM

## 2024-03-01 RX ORDER — GLUCAGON 1 MG
1 KIT INJECTION
Status: DISCONTINUED | OUTPATIENT
Start: 2024-03-01 | End: 2024-03-04 | Stop reason: HOSPADM

## 2024-03-01 RX ORDER — HYDROCODONE BITARTRATE AND ACETAMINOPHEN 5; 325 MG/1; MG/1
1 TABLET ORAL
Status: COMPLETED | OUTPATIENT
Start: 2024-03-01 | End: 2024-03-01

## 2024-03-01 RX ORDER — NALOXONE HCL 0.4 MG/ML
0.02 VIAL (ML) INJECTION
Status: DISCONTINUED | OUTPATIENT
Start: 2024-03-01 | End: 2024-03-04 | Stop reason: HOSPADM

## 2024-03-01 RX ORDER — ACETAMINOPHEN 325 MG/1
650 TABLET ORAL EVERY 4 HOURS PRN
Status: DISCONTINUED | OUTPATIENT
Start: 2024-03-01 | End: 2024-03-04 | Stop reason: HOSPADM

## 2024-03-01 RX ORDER — ONDANSETRON HYDROCHLORIDE 2 MG/ML
4 INJECTION, SOLUTION INTRAVENOUS EVERY 8 HOURS PRN
Status: DISCONTINUED | OUTPATIENT
Start: 2024-03-01 | End: 2024-03-04 | Stop reason: HOSPADM

## 2024-03-01 RX ORDER — IBUPROFEN 200 MG
16 TABLET ORAL
Status: DISCONTINUED | OUTPATIENT
Start: 2024-03-01 | End: 2024-03-04 | Stop reason: HOSPADM

## 2024-03-01 RX ORDER — IBUPROFEN 200 MG
24 TABLET ORAL
Status: DISCONTINUED | OUTPATIENT
Start: 2024-03-01 | End: 2024-03-04 | Stop reason: HOSPADM

## 2024-03-01 RX ORDER — ALUMINUM HYDROXIDE, MAGNESIUM HYDROXIDE, AND SIMETHICONE 1200; 120; 1200 MG/30ML; MG/30ML; MG/30ML
30 SUSPENSION ORAL 4 TIMES DAILY PRN
Status: DISCONTINUED | OUTPATIENT
Start: 2024-03-01 | End: 2024-03-04 | Stop reason: HOSPADM

## 2024-03-01 RX ORDER — SODIUM CHLORIDE 0.9 % (FLUSH) 0.9 %
10 SYRINGE (ML) INJECTION EVERY 12 HOURS PRN
Status: DISCONTINUED | OUTPATIENT
Start: 2024-03-01 | End: 2024-03-04 | Stop reason: HOSPADM

## 2024-03-01 RX ADMIN — HYDROCODONE BITARTRATE AND ACETAMINOPHEN 1 TABLET: 5; 325 TABLET ORAL at 09:03

## 2024-03-01 RX ADMIN — IPRATROPIUM BROMIDE AND ALBUTEROL SULFATE 3 ML: 2.5; .5 SOLUTION RESPIRATORY (INHALATION) at 07:03

## 2024-03-01 RX ADMIN — SODIUM CHLORIDE, POTASSIUM CHLORIDE, SODIUM LACTATE AND CALCIUM CHLORIDE: 600; 310; 30; 20 INJECTION, SOLUTION INTRAVENOUS at 11:03

## 2024-03-01 RX ADMIN — ONDANSETRON 4 MG: 2 INJECTION INTRAMUSCULAR; INTRAVENOUS at 08:03

## 2024-03-01 RX ADMIN — SODIUM CHLORIDE 1000 ML: 9 INJECTION, SOLUTION INTRAVENOUS at 10:03

## 2024-03-02 PROBLEM — R45.89 AT RISK FOR SELF HARM: Status: ACTIVE | Noted: 2024-03-02

## 2024-03-02 PROBLEM — N17.9 AKI (ACUTE KIDNEY INJURY): Status: ACTIVE | Noted: 2024-03-02

## 2024-03-02 PROBLEM — R55 SYNCOPE AND COLLAPSE: Status: ACTIVE | Noted: 2024-03-02

## 2024-03-02 PROBLEM — I10 HYPERTENSION, ESSENTIAL: Status: ACTIVE | Noted: 2024-03-02

## 2024-03-02 PROBLEM — J44.9 COPD (CHRONIC OBSTRUCTIVE PULMONARY DISEASE): Status: ACTIVE | Noted: 2024-03-02

## 2024-03-02 LAB
AMPHET+METHAMPHET UR QL: NEGATIVE
AMPHET+METHAMPHET UR QL: NEGATIVE
ANION GAP SERPL CALC-SCNC: 9 MMOL/L (ref 8–16)
ASCENDING AORTA: 2.82 CM
AV INDEX (PROSTH): 0.81
AV MEAN GRADIENT: 5 MMHG
AV PEAK GRADIENT: 6 MMHG
AV REGURGITATION PRESSURE HALF TIME: 1295.39 MS
AV VALVE AREA BY VELOCITY RATIO: 2.2 CM²
AV VALVE AREA: 2.21 CM²
AV VELOCITY RATIO: 0.8
BACTERIA #/AREA URNS HPF: ABNORMAL /HPF
BARBITURATES UR QL SCN>200 NG/ML: NEGATIVE
BARBITURATES UR QL SCN>200 NG/ML: NEGATIVE
BASOPHILS # BLD AUTO: 0.02 K/UL (ref 0–0.2)
BASOPHILS NFR BLD: 0.3 % (ref 0–1.9)
BENZODIAZ UR QL SCN>200 NG/ML: NEGATIVE
BENZODIAZ UR QL SCN>200 NG/ML: NEGATIVE
BILIRUB UR QL STRIP: NEGATIVE
BSA FOR ECHO PROCEDURE: 1.61 M2
BUN SERPL-MCNC: 53 MG/DL (ref 6–20)
BZE UR QL SCN: ABNORMAL
BZE UR QL SCN: ABNORMAL
CALCIUM SERPL-MCNC: 9.3 MG/DL (ref 8.7–10.5)
CANNABINOIDS UR QL SCN: ABNORMAL
CANNABINOIDS UR QL SCN: ABNORMAL
CHLORIDE SERPL-SCNC: 102 MMOL/L (ref 95–110)
CK SERPL-CCNC: 119 U/L (ref 20–180)
CLARITY UR: ABNORMAL
CO2 SERPL-SCNC: 27 MMOL/L (ref 23–29)
COLOR UR: ABNORMAL
CREAT SERPL-MCNC: 5.2 MG/DL (ref 0.5–1.4)
CREAT UR-MCNC: 392.8 MG/DL (ref 15–325)
CV ECHO LV RWT: 0.58 CM
DIFFERENTIAL METHOD BLD: NORMAL
DOP CALC AO PEAK VEL: 1.27 M/S
DOP CALC AO VTI: 18.5 CM
DOP CALC LVOT AREA: 2.7 CM2
DOP CALC LVOT DIAMETER: 1.87 CM
DOP CALC LVOT PEAK VEL: 1.02 M/S
DOP CALC LVOT STROKE VOLUME: 40.9 CM3
DOP CALCLVOT PEAK VEL VTI: 14.9 CM
E WAVE DECELERATION TIME: 195.26 MSEC
E/A RATIO: 1.06
E/E' RATIO: 8.12 M/S
ECHO LV POSTERIOR WALL: 1.26 CM (ref 0.6–1.1)
EOSINOPHIL # BLD AUTO: 0 K/UL (ref 0–0.5)
EOSINOPHIL NFR BLD: 0.3 % (ref 0–8)
EOSINOPHIL URNS QL WRIGHT STN: NORMAL
ERYTHROCYTE [DISTWIDTH] IN BLOOD BY AUTOMATED COUNT: 13.2 % (ref 11.5–14.5)
EST. GFR  (NO RACE VARIABLE): 9 ML/MIN/1.73 M^2
FRACTIONAL SHORTENING: 35 % (ref 28–44)
GLUCOSE SERPL-MCNC: 89 MG/DL (ref 70–110)
GLUCOSE UR QL STRIP: NEGATIVE
HCT VFR BLD AUTO: 37 % (ref 37–48.5)
HGB BLD-MCNC: 12.1 G/DL (ref 12–16)
HGB UR QL STRIP: ABNORMAL
HYALINE CASTS #/AREA URNS LPF: 0 /LPF
IMM GRANULOCYTES # BLD AUTO: 0.02 K/UL (ref 0–0.04)
IMM GRANULOCYTES NFR BLD AUTO: 0.3 % (ref 0–0.5)
INTERVENTRICULAR SEPTUM: 1.07 CM (ref 0.6–1.1)
IVC DIAMETER: 1.42 CM
IVRT: 87.54 MSEC
KETONES UR QL STRIP: ABNORMAL
LA MAJOR: 5.29 CM
LA MINOR: 5.1 CM
LA WIDTH: 3.8 CM
LEFT ATRIUM SIZE: 2.18 CM
LEFT ATRIUM VOLUME INDEX: 22.7 ML/M2
LEFT ATRIUM VOLUME: 36.57 CM3
LEFT INTERNAL DIMENSION IN SYSTOLE: 2.83 CM (ref 2.1–4)
LEFT VENTRICLE DIASTOLIC VOLUME INDEX: 52.35 ML/M2
LEFT VENTRICLE DIASTOLIC VOLUME: 84.28 ML
LEFT VENTRICLE MASS INDEX: 111 G/M2
LEFT VENTRICLE SYSTOLIC VOLUME INDEX: 18.9 ML/M2
LEFT VENTRICLE SYSTOLIC VOLUME: 30.39 ML
LEFT VENTRICULAR INTERNAL DIMENSION IN DIASTOLE: 4.33 CM (ref 3.5–6)
LEFT VENTRICULAR MASS: 178.84 G
LEUKOCYTE ESTERASE UR QL STRIP: ABNORMAL
LV LATERAL E/E' RATIO: 8.63 M/S
LV SEPTAL E/E' RATIO: 7.67 M/S
LVOT MG: 2.76 MMHG
LVOT MV: 0.8 CM/S
LYMPHOCYTES # BLD AUTO: 1.7 K/UL (ref 1–4.8)
LYMPHOCYTES NFR BLD: 21.5 % (ref 18–48)
MAGNESIUM SERPL-MCNC: 1.7 MG/DL (ref 1.6–2.6)
MCH RBC QN AUTO: 29.5 PG (ref 27–31)
MCHC RBC AUTO-ENTMCNC: 32.7 G/DL (ref 32–36)
MCV RBC AUTO: 90 FL (ref 82–98)
METHADONE UR QL SCN>300 NG/ML: NEGATIVE
METHADONE UR QL SCN>300 NG/ML: NEGATIVE
MICROSCOPIC COMMENT: ABNORMAL
MONOCYTES # BLD AUTO: 0.8 K/UL (ref 0.3–1)
MONOCYTES NFR BLD: 10.9 % (ref 4–15)
MV PEAK A VEL: 0.65 M/S
MV PEAK E VEL: 0.69 M/S
MV STENOSIS PRESSURE HALF TIME: 56.63 MS
MV VALVE AREA P 1/2 METHOD: 3.88 CM2
NEUTROPHILS # BLD AUTO: 5.2 K/UL (ref 1.8–7.7)
NEUTROPHILS NFR BLD: 66.7 % (ref 38–73)
NITRITE UR QL STRIP: NEGATIVE
NRBC BLD-RTO: 0 /100 WBC
OPIATES UR QL SCN: ABNORMAL
OPIATES UR QL SCN: ABNORMAL
PCP UR QL SCN>25 NG/ML: NEGATIVE
PCP UR QL SCN>25 NG/ML: NEGATIVE
PH UR STRIP: 6 [PH] (ref 5–8)
PHOSPHATE SERPL-MCNC: 4.8 MG/DL (ref 2.7–4.5)
PISA AR MAX VEL: 2.85 M/S
PISA TR MAX VEL: 1.97 M/S
PLATELET # BLD AUTO: 248 K/UL (ref 150–450)
PMV BLD AUTO: 9.5 FL (ref 9.2–12.9)
POTASSIUM SERPL-SCNC: 3.6 MMOL/L (ref 3.5–5.1)
PROT UR QL STRIP: ABNORMAL
PV PEAK GRADIENT: 2 MMHG
PV PEAK VELOCITY: 0.73 M/S
RA MAJOR: 4.87 CM
RA PRESSURE ESTIMATED: 3 MMHG
RA WIDTH: 3.8 CM
RBC # BLD AUTO: 4.1 M/UL (ref 4–5.4)
RBC #/AREA URNS HPF: 38 /HPF (ref 0–4)
RIGHT VENTRICULAR END-DIASTOLIC DIMENSION: 2.81 CM
RV TB RVSP: 5 MMHG
RV TISSUE DOPPLER FREE WALL SYSTOLIC VELOCITY 1 (APICAL 4 CHAMBER VIEW): 7.06 CM/S
SINUS: 2.57 CM
SODIUM SERPL-SCNC: 138 MMOL/L (ref 136–145)
SODIUM UR-SCNC: 27 MMOL/L (ref 20–250)
SP GR UR STRIP: 1.02 (ref 1–1.03)
SQUAMOUS #/AREA URNS HPF: 16 /HPF
TDI LATERAL: 0.08 M/S
TDI SEPTAL: 0.09 M/S
TDI: 0.09 M/S
TOXICOLOGY INFORMATION: ABNORMAL
TOXICOLOGY INFORMATION: ABNORMAL
TR MAX PG: 16 MMHG
TRICUSPID ANNULAR PLANE SYSTOLIC EXCURSION: 1.75 CM
TV REST PULMONARY ARTERY PRESSURE: 19 MMHG
URATE SERPL-MCNC: 12.3 MG/DL (ref 2.4–5.7)
URN SPEC COLLECT METH UR: ABNORMAL
UROBILINOGEN UR STRIP-ACNC: ABNORMAL EU/DL
WBC # BLD AUTO: 7.72 K/UL (ref 3.9–12.7)
WBC #/AREA URNS HPF: 29 /HPF (ref 0–5)
Z-SCORE OF LEFT VENTRICULAR DIMENSION IN END DIASTOLE: -0.53
Z-SCORE OF LEFT VENTRICULAR DIMENSION IN END SYSTOLE: -0.01

## 2024-03-02 PROCEDURE — 84100 ASSAY OF PHOSPHORUS: CPT | Performed by: INTERNAL MEDICINE

## 2024-03-02 PROCEDURE — 82550 ASSAY OF CK (CPK): CPT | Performed by: INTERNAL MEDICINE

## 2024-03-02 PROCEDURE — 80307 DRUG TEST PRSMV CHEM ANLYZR: CPT | Performed by: EMERGENCY MEDICINE

## 2024-03-02 PROCEDURE — 25000003 PHARM REV CODE 250

## 2024-03-02 PROCEDURE — 84550 ASSAY OF BLOOD/URIC ACID: CPT | Performed by: INTERNAL MEDICINE

## 2024-03-02 PROCEDURE — 85025 COMPLETE CBC W/AUTO DIFF WBC: CPT | Performed by: INTERNAL MEDICINE

## 2024-03-02 PROCEDURE — 87086 URINE CULTURE/COLONY COUNT: CPT | Performed by: EMERGENCY MEDICINE

## 2024-03-02 PROCEDURE — 87205 SMEAR GRAM STAIN: CPT | Performed by: INTERNAL MEDICINE

## 2024-03-02 PROCEDURE — 25000003 PHARM REV CODE 250: Performed by: INTERNAL MEDICINE

## 2024-03-02 PROCEDURE — 83970 ASSAY OF PARATHORMONE: CPT | Performed by: INTERNAL MEDICINE

## 2024-03-02 PROCEDURE — 84300 ASSAY OF URINE SODIUM: CPT | Performed by: INTERNAL MEDICINE

## 2024-03-02 PROCEDURE — 80048 BASIC METABOLIC PNL TOTAL CA: CPT | Performed by: INTERNAL MEDICINE

## 2024-03-02 PROCEDURE — 83735 ASSAY OF MAGNESIUM: CPT | Performed by: INTERNAL MEDICINE

## 2024-03-02 PROCEDURE — 63600175 PHARM REV CODE 636 W HCPCS: Performed by: INTERNAL MEDICINE

## 2024-03-02 PROCEDURE — 81000 URINALYSIS NONAUTO W/SCOPE: CPT | Mod: 59 | Performed by: EMERGENCY MEDICINE

## 2024-03-02 PROCEDURE — 11000001 HC ACUTE MED/SURG PRIVATE ROOM

## 2024-03-02 RX ORDER — TALC
6 POWDER (GRAM) TOPICAL NIGHTLY PRN
Status: DISCONTINUED | OUTPATIENT
Start: 2024-03-02 | End: 2024-03-04 | Stop reason: HOSPADM

## 2024-03-02 RX ORDER — ALPRAZOLAM 0.5 MG/1
0.5 TABLET ORAL 3 TIMES DAILY PRN
Status: DISCONTINUED | OUTPATIENT
Start: 2024-03-02 | End: 2024-03-04 | Stop reason: HOSPADM

## 2024-03-02 RX ORDER — IPRATROPIUM BROMIDE AND ALBUTEROL SULFATE 2.5; .5 MG/3ML; MG/3ML
3 SOLUTION RESPIRATORY (INHALATION) EVERY 6 HOURS PRN
Status: DISCONTINUED | OUTPATIENT
Start: 2024-03-02 | End: 2024-03-04 | Stop reason: HOSPADM

## 2024-03-02 RX ADMIN — SODIUM CHLORIDE, POTASSIUM CHLORIDE, SODIUM LACTATE AND CALCIUM CHLORIDE: 600; 310; 30; 20 INJECTION, SOLUTION INTRAVENOUS at 10:03

## 2024-03-02 RX ADMIN — ACETAMINOPHEN 650 MG: 325 TABLET ORAL at 06:03

## 2024-03-02 RX ADMIN — MELATONIN TAB 3 MG 6 MG: 3 TAB at 11:03

## 2024-03-02 RX ADMIN — ALPRAZOLAM 0.5 MG: 0.5 TABLET ORAL at 05:03

## 2024-03-02 RX ADMIN — ALUMINUM HYDROXIDE, MAGNESIUM HYDROXIDE, AND DIMETHICONE 30 ML: 200; 20; 200 SUSPENSION ORAL at 04:03

## 2024-03-02 RX ADMIN — SODIUM CHLORIDE, POTASSIUM CHLORIDE, SODIUM LACTATE AND CALCIUM CHLORIDE: 600; 310; 30; 20 INJECTION, SOLUTION INTRAVENOUS at 04:03

## 2024-03-02 RX ADMIN — ACETAMINOPHEN 650 MG: 325 TABLET ORAL at 04:03

## 2024-03-02 RX ADMIN — ONDANSETRON 4 MG: 2 INJECTION INTRAMUSCULAR; INTRAVENOUS at 12:03

## 2024-03-02 NOTE — ASSESSMENT & PLAN NOTE
-JEY with creatinine of 5.9 with baseline of 1.36 (10/21/2023).  -Continue aggressive IV fluids.  -Renal ultrasound negative for overt abnormalities.  No hydronephrosis.  -Monitor urine output.  -Avoid nephrotoxic agents.  -Nephrology consultation requested.

## 2024-03-02 NOTE — H&P
"  [facility] Medicine  History & Physical    Patient Name: Jacqueline Manuel  MRN: [id number]  Patient Class: IP- Inpatient  Admission Date: 3/1/2024  Attending Physician: Clau Persaud, *   Primary Care Provider: Tamika, Primary Doctor         Patient information was obtained from patient, relative(s), and ER records.     Subjective:     Principal Problem:Syncope and collapse    Chief Complaint:   Chief Complaint   Patient presents with    Drug Overdose     Pt BIB EMS for cardiac arrest initially. Upon EMS arrival, pt was agonally breathing. Gave 2 of narcan IN with minimal response. Pt was a hard stick, so they gave another 2 of narcan IN due to failed IV. Pt had better response to second dose. Pt arrives to ED not answering questions, grunting. VSS.        HPI: Ms. Manuel is a 53-year-old -American female who is brought into the emergency room after being "found down.  Her medical history significant for hypertension, COPD, CKD 3.  She reports that she took 2 pills of her pain medication yesterday because she was having headache and abdominal pain.  The daughter noted that when she went home she found the patient on the floor unresponsive.  EMS was called and the patient was noted to have some pinpoint pupils and was given Narcan with patient being more responsive.  The daughter was concerned about the patient potentially having intentional overdose.    On arrival in the ER, the patient was alert and oriented, CBC within normal limits, BMP shows an JEY with creatinine of 5.9 with baseline of 1.36 (10/21/2023), mild troponin elevation of 0.072 in setting of JEY.  Patient's right elbow x-ray is negative, chest x-ray is negative for acute findings, renal ultrasound is limited but does not show any evidence of hydronephrosis or acute process.  CT head ordered after seeing the patient, which is pending at the time of this admission.    Due to family concerns, psychiatry consultation was " requested from the emergency room, and as per telepsychiatry recommendations the patient was PEC'd.           No past medical history on file.    No past surgical history on file.    Review of patient's allergies indicates:  No Known Allergies    No current facility-administered medications on file prior to encounter.     Current Outpatient Medications on File Prior to Encounter   Medication Sig    amLODIPine (NORVASC) 10 MG tablet Take 1 tablet (10 mg total) by mouth once daily.    blood pressure monitor Kit 1 kit by Misc.(Non-Drug; Combo Route) route once daily. Use daily to check blood pressure    carvediloL (COREG) 25 MG tablet Take 1 tablet (25 mg total) by mouth 2 (two) times daily.    hydrALAZINE (APRESOLINE) 100 MG tablet Take 1 tablet (100 mg total) by mouth every 8 (eight) hours.    losartan (COZAAR) 100 MG tablet Take 1 tablet (100 mg total) by mouth once daily.     Family History    None       Tobacco Use    Smoking status: Not on file    Smokeless tobacco: Not on file   Substance and Sexual Activity    Alcohol use: Not on file    Drug use: Not on file    Sexual activity: Not on file     Review of Systems  12 point systems were reviewed and negative except as mentioned in HPI section.  Objective:     Vital Signs (Most Recent):  Temp: 97 °F (36.1 °C) (03/02/24 0100)  Pulse: 81 (03/02/24 0100)  Resp: 19 (03/02/24 0100)  BP: 136/70 (03/02/24 0100)  SpO2: 99 % (03/02/24 0100) Vital Signs (24h Range):  Temp:  [94.4 °F (34.7 °C)-97.7 °F (36.5 °C)] 97 °F (36.1 °C)  Pulse:  [] 81  Resp:  [18-26] 19  SpO2:  [80 %-99 %] 99 %  BP: ()/(63-79) 136/70     Weight: 57.6 kg (127 lb)  Body mass index is 21.8 kg/m².     Physical Exam     General-alert and oriented, has some pressured speech, somewhat slow to respond  Dry mucosal membranes  Lungs clear to auscultation bilaterally  Heart regular rate and rhythm  Abdomen soft nontender nondistended  No peripheral edema        Significant Labs: All pertinent labs  within the past 24 hours have been reviewed.  CBC:   Recent Labs   Lab 03/01/24 1940   WBC 8.67   HGB 13.7   HCT 41.3        CMP:   Recent Labs   Lab 03/01/24 1940      K 3.8      CO2 27      BUN 50*   CREATININE 5.9*   CALCIUM 10.1   PROT 8.9*   ALBUMIN 4.0   BILITOT 0.4   ALKPHOS 78   AST 40   ALT 30   ANIONGAP 13       Significant Imaging: I have reviewed all pertinent imaging results/findings within the past 24 hours.  Assessment/Plan:     * Syncope and collapse  -felt to be hypovolemia related with associated JEY.  -Continue aggressive IV fluids.  -Differential includes arrhythmia, and seizures.  Continue to monitor.  -CT head ordered after seeing the patient, pending.  -Orthostatic vitals.  -Echocardiogram.  -Drug screen pending.      JEY (acute kidney injury)  -JEY with creatinine of 5.9 with baseline of 1.36 (10/21/2023).  -Continue aggressive IV fluids.  -Renal ultrasound negative for overt abnormalities.  No hydronephrosis.  -Monitor urine output.  -Avoid nephrotoxic agents.  -Nephrology consultation requested.    At risk for self harm  -As related to ER staff by patient's daughter.  -Psychiatry consultation obtained in the ER, with recommendation of PEC.  PEC signed.  -Follow further psychiatry recommendations.      Hypertension, essential  -Antihypertensives currently on hold due to JEY and hypovolemia.    Temp:  [94.4 °F (34.7 °C)-97.7 °F (36.5 °C)]   Pulse:  []   Resp:  [18-26]   BP: ()/(63-79)   SpO2:  [80 %-99 %] .   Home meds for hypertension were reviewed and noted below.   Hypertension Medications      Home medications:           amLODIPine (NORVASC) 10 MG tablet Take 1 tablet (10 mg total) by mouth once daily.    carvediloL (COREG) 25 MG tablet Take 1 tablet (25 mg total) by mouth 2 (two) times daily.    hydrALAZINE (APRESOLINE) 100 MG tablet Take 1 tablet (100 mg total) by mouth every 8 (eight) hours.    losartan (COZAAR) 100 MG tablet Take 1 tablet (100  mg total) by mouth once daily.            COPD (chronic obstructive pulmonary disease)  -Controlled.  As needed DuoNebs.      VTE Risk Mitigation (From admission, onward)           Ordered     IP VTE LOW RISK PATIENT  Once         03/01/24 2257     Place sequential compression device  Until discontinued         03/01/24 2257                     AdmissionCare    Guideline: Renal Failure (Acute) - INPT, Inpatient    Based on the indications selected for the patient, the bed status of Inpatient was determined to be MET    The following indications were selected as present at the time of evaluation of the patient:      - Acute renal failure (stage 3 acute kidney injury), as indicated by 1 or more of the following:    - Rise in creatinine to 3 times its baseline value or higher    AdmissionCare documentation entered by: Emily Kwon    [facility] [employer], 27th edition, Copyright © 2023 [facility] [employer], [employer] All Rights Reserved.  [id number]:00    Jose Hernandez MD  Department of Hospital Medicine  [facility] - Emergency Dept

## 2024-03-02 NOTE — ASSESSMENT & PLAN NOTE
-felt to be hypovolemia related with associated JEY.  -Continue aggressive IV fluids.  -Differential includes arrhythmia, and seizures.  Continue to monitor.  -CT head ordered after seeing the patient, pending.  -Orthostatic vitals.  -Echocardiogram.  -Drug screen pending.

## 2024-03-02 NOTE — CONSULTS
Unfortunate 53 y o f with copd-htn and active drug abuser comes in agonal staet reversed by narcan     Because unexpected high creat renal consult was requested.    Pt denies prior hx of of tati or ckd. Denies dysuria hematuria or hesitancy denies bladder surgeries or K stones. Also denies recent iv antibiotics or using bactrim     Endorsees use of Naprosyn     Awake alert oriented NAD    Denies CNS ENT CP GI  RHEUM OR DERM SX    Social History     Socioeconomic History    Marital status: Single     No family history on file.         Ex drug and alcohol user ( Meth- cocaine  and heroin)     Smoker     No past medical history on file.  No past surgical history on file.  Review of patient's allergies indicates:  No Known Allergies    Current Facility-Administered Medications   Medication    acetaminophen tablet 650 mg    albuterol-ipratropium 2.5 mg-0.5 mg/3 mL nebulizer solution 3 mL    ALPRAZolam tablet 0.5 mg    aluminum-magnesium hydroxide-simethicone 200-200-20 mg/5 mL suspension 30 mL    dextrose 10% bolus 125 mL 125 mL    dextrose 10% bolus 250 mL 250 mL    glucagon (human recombinant) injection 1 mg    glucose chewable tablet 16 g    glucose chewable tablet 24 g    lactated ringers infusion    magnesium oxide tablet 800 mg    magnesium oxide tablet 800 mg    naloxone 0.4 mg/mL injection 0.02 mg    ondansetron injection 4 mg    potassium bicarbonate disintegrating tablet 35 mEq    potassium bicarbonate disintegrating tablet 50 mEq    potassium bicarbonate disintegrating tablet 60 mEq    potassium, sodium phosphates 280-160-250 mg packet 2 packet    potassium, sodium phosphates 280-160-250 mg packet 2 packet    potassium, sodium phosphates 280-160-250 mg packet 2 packet    sodium chloride 0.9% flush 10 mL     Current Outpatient Medications   Medication Sig    amLODIPine (NORVASC) 10 MG tablet Take 1 tablet (10 mg total) by mouth once daily.    blood pressure monitor Kit 1 kit by Misc.(Non-Drug; Combo  Route) route once daily. Use daily to check blood pressure    carvediloL (COREG) 25 MG tablet Take 1 tablet (25 mg total) by mouth 2 (two) times daily.    hydrALAZINE (APRESOLINE) 100 MG tablet Take 1 tablet (100 mg total) by mouth every 8 (eight) hours.    losartan (COZAAR) 100 MG tablet Take 1 tablet (100 mg total) by mouth once daily.       LABS    Recent Results (from the past 24 hour(s))   CBC auto differential    Collection Time: 03/01/24  7:40 PM   Result Value Ref Range    WBC 8.67 3.90 - 12.70 K/uL    RBC 4.44 4.00 - 5.40 M/uL    Hemoglobin 13.7 12.0 - 16.0 g/dL    Hematocrit 41.3 37.0 - 48.5 %    MCV 93 82 - 98 fL    MCH 30.9 27.0 - 31.0 pg    MCHC 33.2 32.0 - 36.0 g/dL    RDW 13.2 11.5 - 14.5 %    Platelets 275 150 - 450 K/uL    MPV 9.7 9.2 - 12.9 fL    Immature Granulocytes 0.5 0.0 - 0.5 %    Gran # (ANC) 6.8 1.8 - 7.7 K/uL    Immature Grans (Abs) 0.04 0.00 - 0.04 K/uL    Lymph # 1.1 1.0 - 4.8 K/uL    Mono # 0.7 0.3 - 1.0 K/uL    Eos # 0.1 0.0 - 0.5 K/uL    Baso # 0.04 0.00 - 0.20 K/uL    nRBC 0 0 /100 WBC    Gran % 78.2 (H) 38.0 - 73.0 %    Lymph % 12.1 (L) 18.0 - 48.0 %    Mono % 8.1 4.0 - 15.0 %    Eosinophil % 0.6 0.0 - 8.0 %    Basophil % 0.5 0.0 - 1.9 %    Differential Method Automated    Comprehensive metabolic panel    Collection Time: 03/01/24  7:40 PM   Result Value Ref Range    Sodium 141 136 - 145 mmol/L    Potassium 3.8 3.5 - 5.1 mmol/L    Chloride 101 95 - 110 mmol/L    CO2 27 23 - 29 mmol/L    Glucose 102 70 - 110 mg/dL    BUN 50 (H) 6 - 20 mg/dL    Creatinine 5.9 (H) 0.5 - 1.4 mg/dL    Calcium 10.1 8.7 - 10.5 mg/dL    Total Protein 8.9 (H) 6.0 - 8.4 g/dL    Albumin 4.0 3.5 - 5.2 g/dL    Total Bilirubin 0.4 0.1 - 1.0 mg/dL    Alkaline Phosphatase 78 55 - 135 U/L    AST 40 10 - 40 U/L    ALT 30 10 - 44 U/L    eGFR 8 (A) >60 mL/min/1.73 m^2    Anion Gap 13 8 - 16 mmol/L   Troponin I    Collection Time: 03/01/24  7:40 PM   Result Value Ref Range    Troponin I 0.072 (H) 0.000 - 0.026 ng/mL    Brain natriuretic peptide    Collection Time: 03/01/24  7:40 PM   Result Value Ref Range    BNP 30 0 - 99 pg/mL   Ethanol    Collection Time: 03/01/24  7:40 PM   Result Value Ref Range    Alcohol, Serum <10 <10 mg/dL   ISTAT PROCEDURE    Collection Time: 03/01/24  7:41 PM   Result Value Ref Range    POC PH 7.172 (LL) 7.35 - 7.45    POC PCO2 85.8 (H) 35 - 45 mmHg    POC PO2 13 (LL) 40 - 60 mmHg    POC HCO3 31.5 (H) 24 - 28 mmol/L    POC BE 0 -2 to 2 mmol/L    POC SATURATED O2 10 95 - 100 %    POC TCO2 34 (H) 24 - 29 mmol/L    Sample VENOUS     Site Other     Allens Test N/A     DelSys Room Air    ISTAT PROCEDURE    Collection Time: 03/01/24  9:16 PM   Result Value Ref Range    POC PH 7.240 (LL) 7.35 - 7.45    POC PCO2 70.7 (H) 35 - 45 mmHg    POC PO2 13 (LL) 40 - 60 mmHg    POC HCO3 30.3 (H) 24 - 28 mmol/L    POC BE 1 -2 to 2 mmol/L    POC SATURATED O2 11 95 - 100 %    POC TCO2 32 (H) 24 - 29 mmol/L    Sample VENOUS     Site Other     Allens Test N/A     [employer]Sys Room Air    Acetaminophen level    Collection Time: 03/01/24 10:24 PM   Result Value Ref Range    Acetaminophen (Tylenol), Serum 3.0 (L) 10.0 - 20.0 ug/mL   Salicylate level    Collection Time: 03/01/24 10:24 PM   Result Value Ref Range    Salicylate Lvl <5.0 (L) 15.0 - 30.0 mg/dL   Drug screen panel, emergency    Collection Time: 03/02/24  4:45 AM   Result Value Ref Range    Benzodiazepines Negative Negative    Methadone metabolites Negative Negative    Cocaine (Metab.) Presumptive Positive (A) Negative    Opiate Scrn, Ur Presumptive Positive (A) Negative    Barbiturate Screen, Ur Negative Negative    Amphetamine Screen, Ur Negative Negative    THC Presumptive Positive (A) Negative    Phencyclidine Negative Negative    Creatinine, Urine 392.8 (H) 15.0 - 325.0 mg/dL    Toxicology Information SEE COMMENT    Drug screen panel, emergency    Collection Time: 03/02/24  4:45 AM   Result Value Ref Range    Benzodiazepines Negative Negative    Methadone metabolites  Negative Negative    Cocaine (Metab.) Presumptive Positive (A) Negative    Opiate Scrn, Ur Presumptive Positive (A) Negative    Barbiturate Screen, Ur Negative Negative    Amphetamine Screen, Ur Negative Negative    THC Presumptive Positive (A) Negative    Phencyclidine Negative Negative    Creatinine, Urine 392.8 (H) 15.0 - 325.0 mg/dL    Toxicology Information SEE COMMENT    Creatinine, Random Urine    Collection Time: 03/02/24  4:45 AM   Result Value Ref Range    Creatinine, Urine 392.8 (H) 15.0 - 325.0 mg/dL   Sodium, Random Urine    Collection Time: 03/02/24  4:45 AM   Result Value Ref Range    Sodium, Urine 27 20 - 250 mmol/L   Urinalysis, Reflex to Urine Culture Urine, Clean Catch    Collection Time: 03/02/24  4:46 AM    Specimen: Urine   Result Value Ref Range    Specimen UA Urine, Clean Catch     Color, UA Orange (A) Yellow, Straw, Tatiana    Appearance, UA Cloudy (A) Clear    pH, UA 6.0 5.0 - 8.0    Specific Gravity, UA 1.025 1.005 - 1.030    Protein, UA 2+ (A) Negative    Glucose, UA Negative Negative    Ketones, UA Trace (A) Negative    Bilirubin (UA) Negative Negative    Occult Blood UA 1+ (A) Negative    Nitrite, UA Negative Negative    Urobilinogen, UA 2.0-3.0 (A) <2.0 EU/dL    Leukocytes, UA 3+ (A) Negative   Sales's Stain, Urine Random    Collection Time: 03/02/24  4:46 AM   Result Value Ref Range    Sales's Stain, Ur No eosinophils seen No eosinophils seen   Urinalysis Microscopic    Collection Time: 03/02/24  4:46 AM   Result Value Ref Range    RBC, UA 38 (H) 0 - 4 /hpf    WBC, UA 29 (H) 0 - 5 /hpf    Bacteria Occasional None-Occ /hpf    Squam Epithel, UA 16 /hpf    Hyaline Casts, UA 0 0-1/lpf /lpf    Microscopic Comment SEE COMMENT    Basic Metabolic Panel (BMP)    Collection Time: 03/02/24  4:48 AM   Result Value Ref Range    Sodium 138 136 - 145 mmol/L    Potassium 3.6 3.5 - 5.1 mmol/L    Chloride 102 95 - 110 mmol/L    CO2 27 23 - 29 mmol/L    Glucose 89 70 - 110 mg/dL    BUN 53 (H) 6 - 20  mg/dL    Creatinine 5.2 (H) 0.5 - 1.4 mg/dL    Calcium 9.3 8.7 - 10.5 mg/dL    Anion Gap 9 8 - 16 mmol/L    eGFR 9 (A) >60 mL/min/1.73 m^2   Magnesium    Collection Time: 03/02/24  4:48 AM   Result Value Ref Range    Magnesium 1.7 1.6 - 2.6 mg/dL   Phosphorus    Collection Time: 03/02/24  4:48 AM   Result Value Ref Range    Phosphorus 4.8 (H) 2.7 - 4.5 mg/dL   CBC with Automated Differential    Collection Time: 03/02/24  4:48 AM   Result Value Ref Range    WBC 7.72 3.90 - 12.70 K/uL    RBC 4.10 4.00 - 5.40 M/uL    Hemoglobin 12.1 12.0 - 16.0 g/dL    Hematocrit 37.0 37.0 - 48.5 %    MCV 90 82 - 98 fL    MCH 29.5 27.0 - 31.0 pg    MCHC 32.7 32.0 - 36.0 g/dL    RDW 13.2 11.5 - 14.5 %    Platelets 248 150 - 450 K/uL    MPV 9.5 9.2 - 12.9 fL    Immature Granulocytes 0.3 0.0 - 0.5 %    Gran # (ANC) 5.2 1.8 - 7.7 K/uL    Immature Grans (Abs) 0.02 0.00 - 0.04 K/uL    Lymph # 1.7 1.0 - 4.8 K/uL    Mono # 0.8 0.3 - 1.0 K/uL    Eos # 0.0 0.0 - 0.5 K/uL    Baso # 0.02 0.00 - 0.20 K/uL    nRBC 0 0 /100 WBC    Gran % 66.7 38.0 - 73.0 %    Lymph % 21.5 18.0 - 48.0 %    Mono % 10.9 4.0 - 15.0 %    Eosinophil % 0.3 0.0 - 8.0 %    Basophil % 0.3 0.0 - 1.9 %    Differential Method Automated    CK    Collection Time: 03/02/24  4:48 AM   Result Value Ref Range     20 - 180 U/L   Echo    Collection Time: 03/02/24  9:14 AM   Result Value Ref Range    BSA 1.61 m2    LVOT stroke volume 40.90 cm3    LVIDd 4.33 3.5 - 6.0 cm    LV Systolic Volume 30.39 mL    LV Systolic Volume Index 18.9 mL/m2    LVIDs 2.83 2.1 - 4.0 cm    LV Diastolic Volume 84.28 mL    LV Diastolic Volume Index 52.35 mL/m2    IVS 1.07 0.6 - 1.1 cm    LVOT diameter 1.87 cm    LVOT area 2.7 cm2    FS 35 28 - 44 %    Left Ventricle Relative Wall Thickness 0.58 cm    Posterior Wall 1.26 (A) 0.6 - 1.1 cm    LV mass 178.84 g    LV Mass Index 111 g/m2    MV Peak E Brian 0.69 m/s    TDI LATERAL 0.08 m/s    TDI SEPTAL 0.09 m/s    E/E' ratio 8.12 m/s    MV Peak A Brian 0.65 m/s     TR Max Brian 1.97 m/s    E/A ratio 1.06     IVRT 87.54 msec    E wave deceleration time 195.26 msec    LV SEPTAL E/E' RATIO 7.67 m/s    LV LATERAL E/E' RATIO 8.63 m/s    LVOT peak brian 1.02 m/s    Left Ventricular Outflow Tract Mean Velocity 0.80 cm/s    Left Ventricular Outflow Tract Mean Gradient 2.76 mmHg    RVDD 2.81 cm    RV S' 7.06 cm/s    TAPSE 1.75 cm    LA size 2.18 cm    Left Atrium Minor Axis 5.10 cm    Left Atrium Major Axis 5.29 cm    RA Major Axis 4.87 cm    AV regurgitation pressure 1/2 time 1,[id number] ms    AR Max Brian 2.85 m/s    AV mean gradient 5 mmHg    AV peak gradient 6 mmHg    Ao peak brian 1.27 m/s    Ao VTI 18.50 cm    LVOT peak VTI 14.90 cm    AV valve area 2.21 cm²    AV Velocity Ratio 0.80     AV index (prosthetic) 0.81     AVE by Velocity Ratio 2.20 cm²    MV stenosis pressure 1/2 time 56.63 ms    MV valve area p 1/2 method 3.88 cm2    Triscuspid Valve Regurgitation Peak Gradient 16 mmHg    PV PEAK VELOCITY 0.73 m/s    PV peak gradient 2 mmHg    Sinus 2.57 cm    Ascending aorta 2.82 cm    IVC diameter 1.42 cm    Mean e' 0.09 m/s    ZLVIDS -0.01     ZLVIDD -0.53     LA Volume Index 22.7 mL/m2    LA volume 36.57 cm3    LA WIDTH 3.8 cm    RA Width 3.8 cm    TV resting pulmonary artery pressure 19 mmHg    RV TB RVSP 5 mmHg    Est. RA pres 3 mmHg   ]    I/O last 3 completed shifts:  In: 1000 [IV Piggyback:1000]  Out: 150 [Urine:150]    Vitals:    03/02/24 0644 03/02/24 0852 03/02/24 0901 03/02/24 1103   BP: (!) 100/58 122/63  106/85   Pulse: 70 77  86   Resp: 19  20    Temp:       TempSrc:       SpO2: 98%  96%    Weight:       Height:           No Jvd, Thyromegaly or Lymphadenopathy  Lungs: Fairly clear anteriorly and laterally  Cor: RRR no G or rubs  Abd: Soft benign good bowel sounds non tender  Ext: No E C C    A)  JEY cause likely related to volume issues -bp issues and maybe naprosyn   Her ua is quite active with rbc's and wbc sg 1025 u na 27  Renal US shows small renal shadows  suggesting advanced ckd  Drug OD  Copd  Hx of htn   CPK ok     P)    Renal Diet  Adjust all meds to the degree of renal fx  Close follow up I/O and weights  Maintain Hydration   Would avoid NSAID's  No acei arbs for now   Check uric acid and pthi   A k bx with small kidneys is something I would not recommend

## 2024-03-02 NOTE — ED NOTES
Daughter Roxanne will like to be updated on pt status. Per family request, only pts kids is allowed to visit pt.

## 2024-03-02 NOTE — PLAN OF CARE
Case Management Assessment     [facility]  Pharmacy: [facility]    Patient Arrived From: home  Existing Help at Home: daughter    Barriers to Discharge: none    Discharge Plan:    A. Home with family   B. Home with family      SW completed initial assessment and discussed discharge planning with patient at her bedside. Patient stated that she lives with her daughter. Patient's daughter will provide transportation for her to get home when discharge from the hospital.      03/02/24 4293   Discharge Assessment   Assessment Type Discharge Planning Assessment   Confirmed/corrected address, phone number and insurance Yes   Confirmed Demographics Correct on Facesheet   Source of Information patient   Communicated CLAY with patient/caregiver Date not available/Unable to determine   Reason For Admission Syncope and collapse   People in Home child(sandor), adult   Do you expect to return to your current living situation? Yes   Do you have help at home or someone to help you manage your care at home? Yes   Who are your caregiver(s) and their phone number(s)? Renetta Manuel (Daughter) [phone] (Home Phone)   Prior to hospitilization cognitive status: Alert/Oriented   Current cognitive status: Alert/Oriented   Walking or Climbing Stairs Difficulty no   Dressing/Bathing Difficulty no   Equipment Currently Used at Home cane, straight;oxygen   Readmission within 30 days? No   Patient currently being followed by outpatient case management? No   Do you currently have service(s) that help you manage your care at home? No   Do you take prescription medications? Yes   Do you have prescription coverage? Yes   Coverage Medicaid   Do you have any problems affording any of your prescribed medications? No   Is the patient taking medications as prescribed? yes   Who is going to help you get home at discharge? Renetta Manuel (Daughter) [phone] (Home Phone)   How do you get to doctors appointments?  public transportation   Are you on dialysis? No   Do you take coumadin? No   Discharge Plan A Home with family   Discharge Plan B Home with family   DME Needed Upon Discharge  none   Discharge Plan discussed with: Patient   Transition of Care Barriers None   OTHER   Name(s) of People in Home Renetta Manuel (Daughter) [phone] (Home Phone)

## 2024-03-02 NOTE — CARE UPDATE
Patient seen and examined at bedside.  Patient adamant that she did not intend self-harm when she took those medications.  She was simply trying to treat her pain.  I have reconsulted Psychiatry for re-evaluation of pec, but upon attempts at reaching out to Psychiatry they state they will only see patient every 24 hours.  We will call again for re-consultation at 8:00 p.m. once 24 hours has gone since previous consultation.

## 2024-03-02 NOTE — CARE UPDATE
Patient seen and examined at bedside.  Patient again describes possible transference of a Jain curse that has caused him to have homicidal ideations.  Patient states the voices in his head are telling him to hurt people, but he has not that type of person.  Patient does endorse smoking at least 3 blunts of marijuana per day, but does not believe this is in any way related to the voices.  Patient states he has been doing that since he was a child.  Awaiting psychiatry evaluation.  Discussed with case management need for possible psych placement

## 2024-03-02 NOTE — ASSESSMENT & PLAN NOTE
-Antihypertensives currently on hold due to JEY and hypovolemia.    Temp:  [94.4 °F (34.7 °C)-97.7 °F (36.5 °C)]   Pulse:  []   Resp:  [18-26]   BP: ()/(63-79)   SpO2:  [80 %-99 %] .   Home meds for hypertension were reviewed and noted below.   Hypertension Medications      Home medications:           amLODIPine (NORVASC) 10 MG tablet Take 1 tablet (10 mg total) by mouth once daily.    carvediloL (COREG) 25 MG tablet Take 1 tablet (25 mg total) by mouth 2 (two) times daily.    hydrALAZINE (APRESOLINE) 100 MG tablet Take 1 tablet (100 mg total) by mouth every 8 (eight) hours.    losartan (COZAAR) 100 MG tablet Take 1 tablet (100 mg total) by mouth once daily.

## 2024-03-02 NOTE — ED PROVIDER NOTES
Encounter Date: 3/1/2024       History     Chief Complaint   Patient presents with    Drug Overdose     Pt BIB EMS for cardiac arrest initially. Upon EMS arrival, pt was agonally breathing. Gave 2 of narcan IN with minimal response. Pt was a hard stick, so they gave another 2 of narcan IN due to failed IV. Pt had better response to second dose. Pt arrives to ED not answering questions, grunting. VSS.     HPI     53-year-old female with past medical history of substance use, emphysema, hypertension presents with potential overdose.  Daughter reports that her   last year and she has been an abdominal viral.  She was living with her but then has moved out.  She reports recent increased drug use, states that she gets her pain medication from her doctor and has been taking it intermittently for pain.  Patient reports last crack cocaine use was yesterday, denies any today.  She reports having pain all over and reports taking 2 Percocet last night and then having continued pain this morning and took another 2.  Daughter reports going home and finding her on the floor unresponsive.  EMS arrived and noted she was breathing actually with pinpoint pupils and responded to  4 mg of Narcan IN.   Daughter reports some concern as there was a bottle and some pills around where she passed out.  She reports  some concern for  an intentional overdose however she has no history of this previously.  Patient reports she has not suicidal, homicidal, is not having any auditory visual hallucinations.  She reports some generalized all-over body pain, denies any further complaints.    Review of patient's allergies indicates:  No Known Allergies  No past medical history on file.  No past surgical history on file.  No family history on file.     Review of Systems   Constitutional:  Positive for activity change.   HENT: Negative.     Eyes: Negative.    Respiratory:  Positive for shortness of breath.    Cardiovascular: Negative.     Gastrointestinal: Negative.    Genitourinary: Negative.    Musculoskeletal: Negative.    Skin: Negative.    Neurological:  Positive for headaches.       Physical Exam     Initial Vitals [03/01/24 1855]   BP Pulse Resp Temp SpO2   90/76 83 18 97.6 °F (36.4 °C) 95 %      MAP       --         Physical Exam    Nursing note and vitals reviewed.  Constitutional: She is not diaphoretic. She appears distressed (moderately).   HENT:   Head: Normocephalic and atraumatic.   Nose: Nose normal.   Eyes: EOM are normal. Pupils are equal, round, and reactive to light. Right eye exhibits no discharge. Left eye exhibits no discharge.   Neck: Neck supple. No tracheal deviation present. No JVD present.   Normal range of motion.  Cardiovascular:  Regular rhythm, normal heart sounds and intact distal pulses.             Tachycardic   Pulmonary/Chest: No stridor. She is in respiratory distress (tachypneic, mildly prolonged expiratory phase noted bilaterally).   Abdominal: Abdomen is soft. Bowel sounds are normal. She exhibits no distension. There is no abdominal tenderness.   Musculoskeletal:         General: No tenderness or edema. Normal range of motion.      Cervical back: Normal range of motion and neck supple.     Neurological: She is alert and oriented to person, place, and time. She has normal strength.   Skin: Skin is warm and dry. Capillary refill takes less than 2 seconds. No rash noted. No erythema.         ED Course   Critical Care    Date/Time: 3/1/2024 7:15 PM    Performed by: Lito Nielsen MD  Authorized by: Lito Nielsen MD  Direct patient critical care time: 15 minutes  Additional history critical care time: 10 minutes  Ordering / reviewing critical care time: 5 minutes  Documentation critical care time: 5 minutes  Total critical care time (exclusive of procedural time) : 35 minutes  Critical care time was exclusive of separately billable procedures and treating other patients and teaching  time.  Critical care was necessary to treat or prevent imminent or life-threatening deterioration of the following conditions: respiratory failure and toxidrome.  Critical care was time spent personally by me on the following activities: development of treatment plan with patient or surrogate, evaluation of patient's response to treatment, examination of patient, obtaining history from patient or surrogate, ordering and performing treatments and interventions, ordering and review of laboratory studies, ordering and review of radiographic studies, re-evaluation of patient's condition, review of old charts and pulse oximetry.        Labs Reviewed   CBC W/ AUTO DIFFERENTIAL - Abnormal; Notable for the following components:       Result Value    Gran % 78.2 (*)     Lymph % 12.1 (*)     All other components within normal limits   COMPREHENSIVE METABOLIC PANEL - Abnormal; Notable for the following components:    BUN 50 (*)     Creatinine 5.9 (*)     Total Protein 8.9 (*)     eGFR 8 (*)     All other components within normal limits   TROPONIN I - Abnormal; Notable for the following components:    Troponin I 0.072 (*)     All other components within normal limits   URINALYSIS, REFLEX TO URINE CULTURE - Abnormal; Notable for the following components:    Color, UA Orange (*)     Appearance, UA Cloudy (*)     Protein, UA 2+ (*)     Ketones, UA Trace (*)     Occult Blood UA 1+ (*)     Urobilinogen, UA 2.0-3.0 (*)     Leukocytes, UA 3+ (*)     All other components within normal limits    Narrative:     Specimen Source->Urine   DRUG SCREEN PANEL, URINE EMERGENCY - Abnormal; Notable for the following components:    Cocaine (Metab.) Presumptive Positive (*)     Opiate Scrn, Ur Presumptive Positive (*)     THC Presumptive Positive (*)     Creatinine, Urine 392.8 (*)     All other components within normal limits    Narrative:     Specimen Source->Urine   ACETAMINOPHEN LEVEL - Abnormal; Notable for the following components:     Acetaminophen (Tylenol), Serum 3.0 (*)     All other components within normal limits   SALICYLATE LEVEL - Abnormal; Notable for the following components:    Salicylate Lvl <5.0 (*)     All other components within normal limits   DRUG SCREEN PANEL, URINE EMERGENCY - Abnormal; Notable for the following components:    Cocaine (Metab.) Presumptive Positive (*)     Opiate Scrn, Ur Presumptive Positive (*)     THC Presumptive Positive (*)     Creatinine, Urine 392.8 (*)     All other components within normal limits    Narrative:     Specimen Source->Urine   CREATININE, URINE, RANDOM - Abnormal; Notable for the following components:    Creatinine, Urine 392.8 (*)     All other components within normal limits    Narrative:     Specimen Source->Urine   BASIC METABOLIC PANEL - Abnormal; Notable for the following components:    BUN 53 (*)     Creatinine 5.2 (*)     eGFR 9 (*)     All other components within normal limits   PHOSPHORUS - Abnormal; Notable for the following components:    Phosphorus 4.8 (*)     All other components within normal limits   URINALYSIS MICROSCOPIC - Abnormal; Notable for the following components:    RBC, UA 38 (*)     WBC, UA 29 (*)     All other components within normal limits    Narrative:     Specimen Source->Urine   URIC ACID - Abnormal; Notable for the following components:    Uric Acid 12.3 (*)     All other components within normal limits   ISTAT PROCEDURE - Abnormal; Notable for the following components:    POC PH 7.172 (*)     POC PCO2 85.8 (*)     POC PO2 13 (*)     POC HCO3 31.5 (*)     POC TCO2 34 (*)     All other components within normal limits   ISTAT PROCEDURE - Abnormal; Notable for the following components:    POC PH 7.240 (*)     POC PCO2 70.7 (*)     POC PO2 13 (*)     POC HCO3 30.3 (*)     POC TCO2 32 (*)     All other components within normal limits   B-TYPE NATRIURETIC PEPTIDE   ALCOHOL,MEDICAL (ETHANOL)   SODIUM, URINE, RANDOM    Narrative:     Specimen Source->Urine    CALERO'S STAIN, URINE RANDOM    Narrative:     Specimen Source->Urine   MAGNESIUM   CBC W/ AUTO DIFFERENTIAL   CK   CK        ECG Results              EKG 12-lead (Final result)        Collection Time Result Time QRS Duration OHS QTC Calculation    03/01/24 19:01:56 03/04/24 21:23:27 94 520                     Final result by Interface, Lab In [facility] (03/04/24 21:23:33)                   Narrative:    Test Reason : R06.02,    Vent. Rate : 095 BPM     Atrial Rate : 095 BPM     P-R Int : 200 ms          QRS Dur : 094 ms      QT Int : 414 ms       P-R-T Axes : 075 185 087 degrees     QTc Int : 520 ms    Normal sinus rhythm  Right atrial enlargement  Right superior axis deviation  Pulmonary disease pattern  Incomplete right bundle branch block  Septal infarct ,age undetermined  Prolonged QT  Abnormal ECG  When compared with ECG of 06-JUL-2023 19:11,  Significant changes have occurred  Confirmed by Tyrone Zendejas MD ([id number]) on 3/4/2024 9:23:24 PM    Referred By: [email or web address]   SELF           Confirmed By:Tyrone Zendejas MD                                  Imaging Results              CT Head Without Contrast (Final result)  Result time 03/02/24 05:14:22      Final result by Claudio Aldrich MD (03/02/24 05:14:22)                   Impression:      Chronic changes are noted, there is no evidence for acute intracranial process.      Electronically signed by: Claudio Aldrich  Date:    03/02/2024  Time:    05:14               Narrative:    EXAMINATION:  CT HEAD WITHOUT CONTRAST    CLINICAL HISTORY:  Syncope, recurrent;    TECHNIQUE:  Low dose axial images were obtained through the head.  Coronal and sagittal reformations were also performed. Contrast was not administered.    COMPARISON:  CT examination of the brain without contrast July 6, 2023    FINDINGS:  The ventricular system, sulcal pattern and parenchymal attenuation characteristics are consistent with chronic change.  Involutional change noted.  There is prominent  gliosis and encephalomalacia involving the right temporal and parietal region, correlating with the appearance on the prior study and consistent with remote ischemia/infarct or other insult/injury.  Chronic appearing white matter change noted.  Focal areas consistent with remote lacunar-type ischemic change again noted.    There is no evidence for intracranial mass, mass effect or midline shift.  There is no evidence for acute intracranial hemorrhage.  Appropriate CSF spaces are seen at the skull base.    The mastoid air cells appear appropriate when accounting for averaging.  Minimal paranasal sinus mucosal thickening is noted.  The osseous structures appear intact.                                       US Retroperitoneal Complete (Final result)  Result time 03/02/24 00:58:30      Final result by Claudio Aldrich MD (03/02/24 00:58:30)                   Impression:      The examination is limited however there is no evidence for hydronephrosis bilaterally and no sonographic findings to suggest acute process.      Electronically signed by: Claudio Aldrich  Date:    03/02/2024  Time:    00:58               Narrative:    EXAMINATION:  US RETROPERITONEAL COMPLETE    CLINICAL HISTORY:  acute renal failure;    TECHNIQUE:  Ultrasound of the kidneys and urinary bladder was performed including color flow and Doppler evaluation of the kidneys.    COMPARISON:  None.    FINDINGS:  The technologist indicates the patient was uncooperative during the examination, thereby limiting the examination.    The right kidney measures approximately 7 cm in length.  Mild accentuated echogenicity is likely artifactual.  There is no evidence for hydronephrosis or focal renal mass lesion.  Color imaging demonstrates evidence for flow to the right kidney with a resistive index measurement of 0.6.    The left kidney measures approximately 6.3 cm in length, evaluation is limited.  There is no evidence for hydronephrosis or focal renal mass  lesion.  Color imaging demonstrates appropriate flow to the left kidney with a resistive index measurement of 0.56.    Splenic artery resistive index measurement is 0.59.  Evaluation of the urinary bladder is limited, the urinary bladder is identified it appears incompletely with a calculated volume of 8.07 mL.                                       X-Ray Chest 1 View (Final result)  Result time 03/01/24 20:26:19      Final result by Ayleen Jarquin MD (03/01/24 20:26:19)                   Impression:      Unremarkable one view of the chest.      Electronically signed by: Ayleen Jarquin  Date:    03/01/2024  Time:    20:26               Narrative:    EXAMINATION:  CHEST ONE VIEW    CLINICAL HISTORY:  Shortness of breath    TECHNIQUE:  One view of the chest.    COMPARISON:  07/08/2023    FINDINGS:  The cardiac silhouette is within normal limits.  There is no focal consolidation, pneumothorax, or pleural effusion.                                       X-Ray Elbow Complete Right (Final result)  Result time 03/01/24 20:25:21      Final result by Ayleen Jarquin MD (03/01/24 20:25:21)                   Impression:      No acute bony abnormality detected.      Electronically signed by: Ayleen Jarquin  Date:    03/01/2024  Time:    20:25               Narrative:    EXAMINATION:  THREE VIEWS OF THE RIGHT ELBOW    CLINICAL HISTORY:  Pain in right arm    TECHNIQUE:  AP, oblique and lateral view of the right elbow    COMPARISON:  None.    FINDINGS:  Three views of the right elbow demonstrate no acute fracture or dislocation.  No elbow effusion is detected.  An IV is present.                                       Medications   albuterol-ipratropium 2.5 mg-0.5 mg/3 mL nebulizer solution 3 mL (3 mLs Nebulization Given 3/1/24 1932)   ondansetron injection 4 mg (4 mg Intravenous Given 3/1/24 2058)   HYDROcodone-acetaminophen 5-325 mg per tablet 1 tablet (1 tablet Oral Given 3/1/24 2130)   sodium chloride 0.9% bolus 1,000  mL 1,000 mL (0 mLs Intravenous Stopped 3/1/24 9753)   potassium bicarbonate disintegrating tablet 25 mEq (25 mEq Oral Given 3/4/24 1145)   magnesium oxide tablet 400 mg (400 mg Oral Given 3/4/24 1145)     Medical Decision Making  Amount and/or Complexity of Data Reviewed  Labs: ordered.  Radiology: ordered.    Risk  Prescription drug management.  Decision regarding hospitalization.      MDM:    53-year-old female with past medical history of substance use, emphysema, hypertension presents with potential overdose. Differential Diagnosis includes: Overdose, suicide ideation, Tylenol overdose, arrhythmia, electrolyte abnormality.  Physical exam as noted above.  ED workup notable for pH 7.17, pCO2 85.8, CBC white blood cell count 8.67, hemoglobin 13.7, CMP with BUN 50, creatinine 5.9, troponin 0.072, BNP 30, alcohol negative, chest x-ray unremarkable, x-ray of right elbow is unremarkable.   Patient presentation appears consistent with suspected opioid overdose evidence of acute renal failure,  given IV fluids, DuoNeb, small dose pain medication as she appears uncomfortable.  She does not appear septic, venous gas appears to be improving,  will admit to Hospital Medicine.  Given daughter's concerns for possible suicide ideation, tele psychiatry consult was placed, pec not indicated currently as patient appears to be improving and is not currently suicidal.  Stable for transfer to the floor at this time.        Note was created using voice recognition software. Note may have occasional typographical or grammatical errors, garbled syntax, and other bizarre constructions that may not have been identified and edited despite good allison initial review prior to signing.                                         Clinical Impression:  Final diagnoses:  [R06.02] SOB (shortness of breath)  [M79.601] Right arm pain  [T40.604A] Opiate overdose, undetermined intent, initial encounter (Primary)          ED Disposition Condition    Admit                  Lito Nielsen MD  03/01/24 0335       Lito Nielsen MD  03/15/24 [id number]

## 2024-03-02 NOTE — ADMISSIONCARE
AdmissionCare    Guideline: Renal Failure (Acute) - INPT, Inpatient    Based on the indications selected for the patient, the bed status of Inpatient was determined to be MET    The following indications were selected as present at the time of evaluation of the patient:      - Acute renal failure (stage 3 acute kidney injury), as indicated by 1 or more of the following:    - Rise in creatinine to 3 times its baseline value or higher    AdmissionCare documentation entered by: Emily Kwon    [facility], 27th edition, Copyright © 2023 [facility], [facility] All Rights Reserved.  [id number]

## 2024-03-02 NOTE — CONSULTS
"[facility]  Psychiatry  Telepsychiatry Consult Note    Please see previous notes:    Patient agreeable to consultation via telepsychiatry.    Tele-Consultation from Psychiatry started: 3/1/2024 at 11:40pm  The chief complaint leading to psychiatric consultation is: overdose  This consultation was requested by Dr Nielsen, the Emergency Department attending physician.  The location of the consulting psychiatrist is  [place] .  The patient location is  [facility] EMERGENCY DEPARTMENT   The patient arrived at the ED at: [facility]    Also present with the patient at the time of the consultation: nobody    Patient Identification:   Jacqueline Manuel is a 53 y.o. female.    Patient information was obtained from patient and past medical records.  Patient presented involuntarily to the Emergency Department     Consults  Teleconsult Time Documentation  Subjective:     History of Present Illness:  54yo F with hx of CVA, crack/cocaine use disorder, depression presents status post overdose, possible suicide attempt.    Per ED note-  "      Pt BIB EMS for cardiac arrest initially. Upon EMS arrival, pt was agonally breathing. Gave 2 of narcan IN with minimal response. Pt was a hard stick, so they gave another 2 of narcan IN due to failed IV. Pt had better response to second dose. Pt arrives to ED not answering questions, grunting. VSS.      HPI      53-year-old female with past medical history of substance use, emphysema, hypertension presents with potential overdose.  Daughter reports that her   last year and she has been an abdominal viral.  She was living with her but then has moved out.  She reports recent increased drug use, states that she gets her pain medication from her doctor and has been taking it intermittently for pain.  Patient reports last crack cocaine use was yesterday, denies any today.  She reports having pain all over and reports taking 2 Percocet last night and then having continued pain this morning " "and took another 2.  Daughter reports going home and finding her on the floor unresponsive.  EMS arrived and noted she was breathing actually with pinpoint pupils and responded to  4 mg of Narcan IN.   Daughter reports some concern as there was a bottle and some pills around where she passed out.  She reports  some concern for  an intentional overdose however she has no history of this previously.  Patient reports she has not suicidal, homicidal, is not having any auditory visual hallucinations.  She reports some generalized all-over body pain, denies any further complaints."    On interview, patient was difficult to understand and emotionally volatile, fidgeting in bed and appeared to be in signficant pain, "I am feeling alright.. just thirsty..." Patient reports she does not recall what happened to her. "I do not know what happened to me". Reports her grandchildren found me. Patient reports she had trouble recalling which pills she took. Only took 2 pills. Reports she does have depression, her mood has been "up and down". She could not clearly elaborate further.   Denied SI or HI  It was unclear whether she hears voices.. patient was moaning in pain during the interview but reports at times she thinks she sees her dead  who  7 months ago  Reports her children and grandchildren prevent her from killing herself  Sleep has been poor.  Increased anxiety  Ongoing increased pain  Unable to obtain ros due to her inability to fully participate    I tried to call patients daughter at number in chart but it did not go through.       Psychiatric History:   Previous Psychiatric Hospitalizations: Yes   Previous Medication Trials: No   Previous Suicide Attempts: no   History of Violence: no  History of Depression: yes  History of Dipika: no  History of Auditory/Visual Hallucination yes  History of Delusions: no  Outpatient psychiatrist (current & past): No    Substance Abuse History:  Tobacco:yes  Alcohol: No  Illicit " "Substances:Yes, crack/cocaine  Detox/Rehab: yes    Legal History: Past charges/incarcerations: none at present    Family Psychiatric History: daughter- mental illness      Social History:  10 children, 17yo daughter lives with her.grand children, denied acccess to firearms.      Psychiatric Mental Status Exam:  Arousal: alert  Sensorium/Orientation: oriented to grossly intact  Behavior/Cooperation: reluctant to participate   Speech: slowed, articulation error, delayed  Language: not tested  Mood: " in pain "   Affect: irritable  Thought Process: blocked  Thought Content:   Auditory hallucinations: she was unclear about this  Visual hallucinations: YES:      Paranoia: she was unclear about this  Delusions:  NO  Suicidal ideation: denied  Homicidal ideation: denied  Attention/Concentration:  impaired  Memory:    Recent:  Decreased   Remote: Intact     Fund of Knowledge: Impaired   Abstract reasoning: unable to assess due to inability to participate  Insight: limited awareness of illness  Judgment: limited      Past Medical History: No past medical history on file.   Laboratory Data:   Labs Reviewed   CBC W/ AUTO DIFFERENTIAL - Abnormal; Notable for the following components:       Result Value    Gran % 78.2 (*)     Lymph % 12.1 (*)     All other components within normal limits   COMPREHENSIVE METABOLIC PANEL - Abnormal; Notable for the following components:    BUN 50 (*)     Creatinine 5.9 (*)     Total Protein 8.9 (*)     eGFR 8 (*)     All other components within normal limits   TROPONIN I - Abnormal; Notable for the following components:    Troponin I 0.072 (*)     All other components within normal limits   ACETAMINOPHEN LEVEL - Abnormal; Notable for the following components:    Acetaminophen (Tylenol), Serum 3.0 (*)     All other components within normal limits   SALICYLATE LEVEL - Abnormal; Notable for the following components:    Salicylate Lvl <5.0 (*)     All other components within normal limits   ISTAT " PROCEDURE - Abnormal; Notable for the following components:    POC PH 7.172 (*)     POC PCO2 85.8 (*)     POC PO2 13 (*)     POC HCO3 31.5 (*)     POC TCO2 34 (*)     All other components within normal limits   ISTAT PROCEDURE - Abnormal; Notable for the following components:    POC PH 7.240 (*)     POC PCO2 70.7 (*)     POC PO2 13 (*)     POC HCO3 30.3 (*)     POC TCO2 32 (*)     All other components within normal limits   B-TYPE NATRIURETIC PEPTIDE   ALCOHOL,MEDICAL (ETHANOL)   URINALYSIS, REFLEX TO URINE CULTURE   DRUG SCREEN PANEL, URINE EMERGENCY   DRUG SCREEN PANEL, URINE EMERGENCY   CREATININE, URINE, RANDOM   SODIUM, URINE, RANDOM   CALERO'S STAIN, URINE RANDOM           Allergies:   Review of patient's allergies indicates:  No Known Allergies    Medications in ER:   Medications   sodium chloride 0.9% flush 10 mL (has no administration in time range)   naloxone 0.4 mg/mL injection 0.02 mg (has no administration in time range)   glucose chewable tablet 16 g (has no administration in time range)   glucose chewable tablet 24 g (has no administration in time range)   glucagon (human recombinant) injection 1 mg (has no administration in time range)   lactated ringers infusion (has no administration in time range)   potassium bicarbonate disintegrating tablet 50 mEq (has no administration in time range)   potassium bicarbonate disintegrating tablet 35 mEq (has no administration in time range)   potassium bicarbonate disintegrating tablet 60 mEq (has no administration in time range)   magnesium oxide tablet 800 mg (has no administration in time range)   magnesium oxide tablet 800 mg (has no administration in time range)   potassium, sodium phosphates 280-160-250 mg packet 2 packet (has no administration in time range)   potassium, sodium phosphates 280-160-250 mg packet 2 packet (has no administration in time range)   potassium, sodium phosphates 280-160-250 mg packet 2 packet (has no administration in time range)    acetaminophen tablet 650 mg (has no administration in time range)   ondansetron injection 4 mg (has no administration in time range)   aluminum-magnesium hydroxide-simethicone 200-200-20 mg/5 mL suspension 30 mL (has no administration in time range)   dextrose 10% bolus 125 mL 125 mL (has no administration in time range)   dextrose 10% bolus 250 mL 250 mL (has no administration in time range)   albuterol-ipratropium 2.5 mg-0.5 mg/3 mL nebulizer solution 3 mL (3 mLs Nebulization Given 3/1/24 1932)   ondansetron injection 4 mg (4 mg Intravenous Given 3/1/24 2058)   HYDROcodone-acetaminophen 5-325 mg per tablet 1 tablet (1 tablet Oral Given 3/1/24 2130)   sodium chloride 0.9% bolus 1,000 mL 1,000 mL (0 mLs Intravenous Stopped 3/1/24 2337)       Medications at home: reviewed with patient and in MAR    No new subjective & objective note has been filed under this hospital service since the last note was generated.      Assessment - Diagnosis - Goals:     Diagnosis/Impression:   Depressive disorder, unspecified  Crack cocaine use disorder    Appears to have an overdose in context of psychotic sx, crack/cocaine use, increased anxiety and and depressive sx. It is still unclear as to what extent this was intentional but numerous concerning factor    Rec:   Recommend PEC for risk of harm to self. Inpatient psychiatric tx once medically cleared.  Ativan 2mg IV/IM q 4hours prn severe agitation   Will defer to inpatient psychiatric team to start/modify scheduled medications.    Time with patient, coordinating care: 25min      More than 50% of the time was spent counseling/coordinating care    Consulting clinician was informed of the encounter and consult note.    Consultation ended: 3/1/2024 at 12:05am    Jay Auguste MD  Psychiatry  [facility]

## 2024-03-02 NOTE — SUBJECTIVE & OBJECTIVE
No past medical history on file.    No past surgical history on file.    Review of patient's allergies indicates:  No Known Allergies    No current facility-administered medications on file prior to encounter.     Current Outpatient Medications on File Prior to Encounter   Medication Sig    amLODIPine (NORVASC) 10 MG tablet Take 1 tablet (10 mg total) by mouth once daily.    blood pressure monitor Kit 1 kit by Misc.(Non-Drug; Combo Route) route once daily. Use daily to check blood pressure    carvediloL (COREG) 25 MG tablet Take 1 tablet (25 mg total) by mouth 2 (two) times daily.    hydrALAZINE (APRESOLINE) 100 MG tablet Take 1 tablet (100 mg total) by mouth every 8 (eight) hours.    losartan (COZAAR) 100 MG tablet Take 1 tablet (100 mg total) by mouth once daily.     Family History    None       Tobacco Use    Smoking status: Not on file    Smokeless tobacco: Not on file   Substance and Sexual Activity    Alcohol use: Not on file    Drug use: Not on file    Sexual activity: Not on file     Review of Systems  12 point systems were reviewed and negative except as mentioned in HPI section.  Objective:     Vital Signs (Most Recent):  Temp: 97 °F (36.1 °C) (03/02/24 0100)  Pulse: 81 (03/02/24 0100)  Resp: 19 (03/02/24 0100)  BP: 136/70 (03/02/24 0100)  SpO2: 99 % (03/02/24 0100) Vital Signs (24h Range):  Temp:  [94.4 °F (34.7 °C)-97.7 °F (36.5 °C)] 97 °F (36.1 °C)  Pulse:  [] 81  Resp:  [18-26] 19  SpO2:  [80 %-99 %] 99 %  BP: ()/(63-79) 136/70     Weight: 57.6 kg (127 lb)  Body mass index is 21.8 kg/m².     Physical Exam     General-alert and oriented, has some pressured speech, somewhat slow to respond  Dry mucosal membranes  Lungs clear to auscultation bilaterally  Heart regular rate and rhythm  Abdomen soft nontender nondistended  No peripheral edema        Significant Labs: All pertinent labs within the past 24 hours have been reviewed.  CBC:   Recent Labs   Lab 03/01/24 1940   WBC 8.67   HGB 13.7    HCT 41.3        CMP:   Recent Labs   Lab 03/01/24 1940      K 3.8      CO2 27      BUN 50*   CREATININE 5.9*   CALCIUM 10.1   PROT 8.9*   ALBUMIN 4.0   BILITOT 0.4   ALKPHOS 78   AST 40   ALT 30   ANIONGAP 13       Significant Imaging: I have reviewed all pertinent imaging results/findings within the past 24 hours.

## 2024-03-02 NOTE — ED TRIAGE NOTES
Pt presents to ED via EMS due to cardiac arrest. Per ems arrival pt was agonally breathing. EMS gave in total 4mg of narcan intranasally. Per daughter pt is acting normal self. Daughter states they unsure how long she was down for. Pt has history of using cocaine and heroine.

## 2024-03-02 NOTE — HPI
"Ms. Manuel is a 53-year-old -American female who is brought into the emergency room after being "found down.  Her medical history significant for hypertension, COPD, CKD 3.  She reports that she took 2 pills of her pain medication yesterday because she was having headache and abdominal pain.  The daughter noted that when she went home she found the patient on the floor unresponsive.  EMS was called and the patient was noted to have some pinpoint pupils and was given Narcan with patient being more responsive.  The daughter was concerned about the patient potentially having intentional overdose.    On arrival in the ER, the patient was alert and oriented, CBC within normal limits, BMP shows an JEY with creatinine of 5.9 with baseline of 1.36 (10/21/2023), mild troponin elevation of 0.072 in setting of JEY.  Patient's right elbow x-ray is negative, chest x-ray is negative for acute findings, renal ultrasound is limited but does not show any evidence of hydronephrosis or acute process.  CT head ordered after seeing the patient, which is pending at the time of this admission.    Due to family concerns, psychiatry consultation was requested from the emergency room, and as per telepsychiatry recommendations the patient was PEC'd.         "

## 2024-03-02 NOTE — ED NOTES
Telepsych consult completed. Per Dr Auguste (psychiatry) reqs to PEC pt for possible self harm. Pt denies SI or HI. Informed Dr Hernandez via epic chat. Pending PEC and orders. ER charge and house sup notified.

## 2024-03-02 NOTE — ASSESSMENT & PLAN NOTE
-As related to ER staff by patient's daughter.  -Psychiatry consultation obtained in the ER, with recommendation of PEC.  PEC signed.  -Follow further psychiatry recommendations.

## 2024-03-02 NOTE — ED NOTES
[facility] [employer] at bedside. Pt wanded. Pt informed of PEC status and Ed hospitalization/ plan of care. Offered to call family. Pt states to call in am.

## 2024-03-02 NOTE — ED NOTES
Pt reports feeling nauseated. Dr Hernandez at bedside. Pt is alert c/o body pain.  Informed pt will need urine, pt states unable to void at present. Per receiving RN pt wads incontinent on ED arrival. Pending admit bed. Call light in reach

## 2024-03-03 LAB
ANION GAP SERPL CALC-SCNC: 9 MMOL/L (ref 8–16)
BASOPHILS # BLD AUTO: 0.03 K/UL (ref 0–0.2)
BASOPHILS NFR BLD: 0.4 % (ref 0–1.9)
BUN SERPL-MCNC: 41 MG/DL (ref 6–20)
CALCIUM SERPL-MCNC: 8.9 MG/DL (ref 8.7–10.5)
CHLORIDE SERPL-SCNC: 101 MMOL/L (ref 95–110)
CO2 SERPL-SCNC: 27 MMOL/L (ref 23–29)
CREAT SERPL-MCNC: 3.8 MG/DL (ref 0.5–1.4)
DIFFERENTIAL METHOD BLD: ABNORMAL
EOSINOPHIL # BLD AUTO: 0.4 K/UL (ref 0–0.5)
EOSINOPHIL NFR BLD: 5.1 % (ref 0–8)
ERYTHROCYTE [DISTWIDTH] IN BLOOD BY AUTOMATED COUNT: 13.2 % (ref 11.5–14.5)
EST. GFR  (NO RACE VARIABLE): 14 ML/MIN/1.73 M^2
GLUCOSE SERPL-MCNC: 87 MG/DL (ref 70–110)
HCT VFR BLD AUTO: 32.1 % (ref 37–48.5)
HGB BLD-MCNC: 10.7 G/DL (ref 12–16)
IMM GRANULOCYTES # BLD AUTO: 0.02 K/UL (ref 0–0.04)
IMM GRANULOCYTES NFR BLD AUTO: 0.3 % (ref 0–0.5)
LYMPHOCYTES # BLD AUTO: 3.1 K/UL (ref 1–4.8)
LYMPHOCYTES NFR BLD: 41.6 % (ref 18–48)
MAGNESIUM SERPL-MCNC: 1.7 MG/DL (ref 1.6–2.6)
MCH RBC QN AUTO: 31 PG (ref 27–31)
MCHC RBC AUTO-ENTMCNC: 33.3 G/DL (ref 32–36)
MCV RBC AUTO: 93 FL (ref 82–98)
MONOCYTES # BLD AUTO: 1 K/UL (ref 0.3–1)
MONOCYTES NFR BLD: 13.1 % (ref 4–15)
NEUTROPHILS # BLD AUTO: 2.9 K/UL (ref 1.8–7.7)
NEUTROPHILS NFR BLD: 39.5 % (ref 38–73)
NRBC BLD-RTO: 0 /100 WBC
PHOSPHATE SERPL-MCNC: 2.9 MG/DL (ref 2.7–4.5)
PLATELET # BLD AUTO: 145 K/UL (ref 150–450)
PLATELET BLD QL SMEAR: ABNORMAL
PMV BLD AUTO: 11.4 FL (ref 9.2–12.9)
POTASSIUM SERPL-SCNC: 3.5 MMOL/L (ref 3.5–5.1)
RBC # BLD AUTO: 3.45 M/UL (ref 4–5.4)
SODIUM SERPL-SCNC: 137 MMOL/L (ref 136–145)
WBC # BLD AUTO: 7.41 K/UL (ref 3.9–12.7)

## 2024-03-03 PROCEDURE — 84100 ASSAY OF PHOSPHORUS: CPT | Performed by: INTERNAL MEDICINE

## 2024-03-03 PROCEDURE — 99232 SBSQ HOSP IP/OBS MODERATE 35: CPT | Mod: 95,,, | Performed by: PSYCHIATRY & NEUROLOGY

## 2024-03-03 PROCEDURE — 11000001 HC ACUTE MED/SURG PRIVATE ROOM

## 2024-03-03 PROCEDURE — 25000003 PHARM REV CODE 250

## 2024-03-03 PROCEDURE — 80048 BASIC METABOLIC PNL TOTAL CA: CPT | Performed by: INTERNAL MEDICINE

## 2024-03-03 PROCEDURE — 25000003 PHARM REV CODE 250: Performed by: STUDENT IN AN ORGANIZED HEALTH CARE EDUCATION/TRAINING PROGRAM

## 2024-03-03 PROCEDURE — 85025 COMPLETE CBC W/AUTO DIFF WBC: CPT | Performed by: INTERNAL MEDICINE

## 2024-03-03 PROCEDURE — 83735 ASSAY OF MAGNESIUM: CPT | Performed by: INTERNAL MEDICINE

## 2024-03-03 PROCEDURE — 25000003 PHARM REV CODE 250: Performed by: INTERNAL MEDICINE

## 2024-03-03 PROCEDURE — 36415 COLL VENOUS BLD VENIPUNCTURE: CPT | Performed by: INTERNAL MEDICINE

## 2024-03-03 PROCEDURE — 63600175 PHARM REV CODE 636 W HCPCS: Performed by: INTERNAL MEDICINE

## 2024-03-03 RX ORDER — ALLOPURINOL 100 MG/1
200 TABLET ORAL DAILY
Status: DISCONTINUED | OUTPATIENT
Start: 2024-03-04 | End: 2024-03-04 | Stop reason: HOSPADM

## 2024-03-03 RX ORDER — CARVEDILOL 12.5 MG/1
25 TABLET ORAL 2 TIMES DAILY
Status: DISCONTINUED | OUTPATIENT
Start: 2024-03-03 | End: 2024-03-04 | Stop reason: HOSPADM

## 2024-03-03 RX ORDER — AMLODIPINE BESYLATE 5 MG/1
10 TABLET ORAL DAILY
Status: DISCONTINUED | OUTPATIENT
Start: 2024-03-03 | End: 2024-03-04 | Stop reason: HOSPADM

## 2024-03-03 RX ORDER — HYDRALAZINE HYDROCHLORIDE 25 MG/1
25 TABLET, FILM COATED ORAL EVERY 8 HOURS
Status: DISCONTINUED | OUTPATIENT
Start: 2024-03-03 | End: 2024-03-04 | Stop reason: HOSPADM

## 2024-03-03 RX ORDER — GABAPENTIN 100 MG/1
100 CAPSULE ORAL DAILY PRN
Status: DISCONTINUED | OUTPATIENT
Start: 2024-03-03 | End: 2024-03-04 | Stop reason: HOSPADM

## 2024-03-03 RX ORDER — HYDRALAZINE HYDROCHLORIDE 20 MG/ML
10 INJECTION INTRAMUSCULAR; INTRAVENOUS EVERY 6 HOURS PRN
Status: DISCONTINUED | OUTPATIENT
Start: 2024-03-03 | End: 2024-03-04 | Stop reason: HOSPADM

## 2024-03-03 RX ADMIN — ACETAMINOPHEN 650 MG: 325 TABLET ORAL at 09:03

## 2024-03-03 RX ADMIN — ALPRAZOLAM 0.5 MG: 0.5 TABLET ORAL at 09:03

## 2024-03-03 RX ADMIN — MELATONIN TAB 3 MG 6 MG: 3 TAB at 09:03

## 2024-03-03 RX ADMIN — ALUMINUM HYDROXIDE, MAGNESIUM HYDROXIDE, AND DIMETHICONE 30 ML: 200; 20; 200 SUSPENSION ORAL at 09:03

## 2024-03-03 RX ADMIN — ALPRAZOLAM 0.5 MG: 0.5 TABLET ORAL at 01:03

## 2024-03-03 RX ADMIN — HYDRALAZINE HYDROCHLORIDE 25 MG: 25 TABLET, FILM COATED ORAL at 09:03

## 2024-03-03 RX ADMIN — CARVEDILOL 25 MG: 12.5 TABLET, FILM COATED ORAL at 05:03

## 2024-03-03 RX ADMIN — AMLODIPINE BESYLATE 10 MG: 5 TABLET ORAL at 12:03

## 2024-03-03 RX ADMIN — SODIUM CHLORIDE, POTASSIUM CHLORIDE, SODIUM LACTATE AND CALCIUM CHLORIDE: 600; 310; 30; 20 INJECTION, SOLUTION INTRAVENOUS at 06:03

## 2024-03-03 RX ADMIN — HYDRALAZINE HYDROCHLORIDE 25 MG: 25 TABLET, FILM COATED ORAL at 02:03

## 2024-03-03 RX ADMIN — ACETAMINOPHEN 650 MG: 325 TABLET ORAL at 01:03

## 2024-03-03 NOTE — ASSESSMENT & PLAN NOTE
-felt to be hypovolemia related with associated JEY and overdose  -Continue aggressive IV fluids.  -Differential includes arrhythmia, and seizures.  Continue to monitor.  -CT head ordered after seeing the patient, pending.  -Orthostatic vitals.  -Echocardiogram with maintained ef  -Drug screen positive

## 2024-03-03 NOTE — ASSESSMENT & PLAN NOTE
-Antihypertensives currently on hold due to JEY and hypovolemia.    Temp:  [97.3 °F (36.3 °C)-98.6 °F (37 °C)]   Pulse:  []   Resp:  [18-24]   BP: ()/(66-88)   SpO2:  [95 %-99 %] .   Home meds for hypertension were reviewed and noted below.   Hypertension Medications      Home medications:           amLODIPine (NORVASC) 10 MG tablet Take 1 tablet (10 mg total) by mouth once daily.    carvediloL (COREG) 25 MG tablet Take 1 tablet (25 mg total) by mouth 2 (two) times daily.    hydrALAZINE (APRESOLINE) 100 MG tablet Take 1 tablet (100 mg total) by mouth every 8 (eight) hours.    losartan (COZAAR) 100 MG tablet Take 1 tablet (100 mg total) by mouth once daily.

## 2024-03-03 NOTE — NURSING
[facility]  Nurses Note -- 4 Eyes      3/3/2024       Skin assessed on: Q Shift      [x] No Pressure Injuries Present    [x]Prevention Measures Documented    [] Yes LDA  for Pressure Injury Previously documented     [] Yes New Pressure Injury Discovered   [] LDA for New Pressure Injury Added      Attending RN:  Mukund Flanagan LPN     Second RN:  Mely De Oliveira RN

## 2024-03-03 NOTE — PROGRESS NOTES
Unfortunate 53 y o f with copd-htn and active drug abuser comes in agonal staet reversed by narcan     Because unexpected high creat renal consult was requested.    Pt denies prior hx of of tati or ckd. Denies dysuria hematuria or hesitancy denies bladder surgeries or K stones. Also denies recent iv antibiotics or using bactrim     Endorsees use of Naprosyn     Awake alert oriented NAD    Denies CNS ENT CP GI  RHEUM OR DERM SX    Social History     Socioeconomic History    Marital status: Single     No family history on file.         Ex drug and alcohol user ( Meth- cocaine  and heroin)     Smoker     No past medical history on file.  No past surgical history on file.  Review of patient's allergies indicates:  No Known Allergies    Current Facility-Administered Medications   Medication    acetaminophen tablet 650 mg    albuterol-ipratropium 2.5 mg-0.5 mg/3 mL nebulizer solution 3 mL    ALPRAZolam tablet 0.5 mg    aluminum-magnesium hydroxide-simethicone 200-200-20 mg/5 mL suspension 30 mL    amLODIPine tablet 10 mg    carvediloL tablet 25 mg    dextrose 10% bolus 125 mL 125 mL    dextrose 10% bolus 250 mL 250 mL    gabapentin capsule 100 mg    glucagon (human recombinant) injection 1 mg    glucose chewable tablet 16 g    glucose chewable tablet 24 g    hydrALAZINE injection 10 mg    hydrALAZINE tablet 25 mg    lactated ringers infusion    magnesium oxide tablet 800 mg    magnesium oxide tablet 800 mg    melatonin tablet 6 mg    naloxone 0.4 mg/mL injection 0.02 mg    ondansetron injection 4 mg    potassium bicarbonate disintegrating tablet 35 mEq    potassium bicarbonate disintegrating tablet 50 mEq    potassium bicarbonate disintegrating tablet 60 mEq    potassium, sodium phosphates 280-160-250 mg packet 2 packet    potassium, sodium phosphates 280-160-250 mg packet 2 packet    potassium, sodium phosphates 280-160-250 mg packet 2 packet    sodium chloride 0.9% flush 10 mL       LABS    Recent Results (from  the past 24 hour(s))   Uric Acid    Collection Time: 03/02/24  6:54 PM   Result Value Ref Range    Uric Acid 12.3 (H) 2.4 - 5.7 mg/dL   Basic Metabolic Panel (BMP)    Collection Time: 03/03/24  5:38 AM   Result Value Ref Range    Sodium 137 136 - 145 mmol/L    Potassium 3.5 3.5 - 5.1 mmol/L    Chloride 101 95 - 110 mmol/L    CO2 27 23 - 29 mmol/L    Glucose 87 70 - 110 mg/dL    BUN 41 (H) 6 - 20 mg/dL    Creatinine 3.8 (H) 0.5 - 1.4 mg/dL    Calcium 8.9 8.7 - 10.5 mg/dL    Anion Gap 9 8 - 16 mmol/L    eGFR 14 (A) >60 mL/min/1.73 m^2   Magnesium    Collection Time: 03/03/24  5:38 AM   Result Value Ref Range    Magnesium 1.7 1.6 - 2.6 mg/dL   Phosphorus    Collection Time: 03/03/24  5:38 AM   Result Value Ref Range    Phosphorus 2.9 2.7 - 4.5 mg/dL   CBC with Automated Differential    Collection Time: 03/03/24  5:38 AM   Result Value Ref Range    WBC 7.41 3.90 - 12.70 K/uL    RBC 3.45 (L) 4.00 - 5.40 M/uL    Hemoglobin 10.7 (L) 12.0 - 16.0 g/dL    Hematocrit 32.1 (L) 37.0 - 48.5 %    MCV 93 82 - 98 fL    MCH 31.0 27.0 - 31.0 pg    MCHC 33.3 32.0 - 36.0 g/dL    RDW 13.2 11.5 - 14.5 %    Platelets 145 (L) 150 - 450 K/uL    MPV 11.4 9.2 - 12.9 fL    Immature Granulocytes 0.3 0.0 - 0.5 %    Gran # (ANC) 2.9 1.8 - 7.7 K/uL    Immature Grans (Abs) 0.02 0.00 - 0.04 K/uL    Lymph # 3.1 1.0 - 4.8 K/uL    Mono # 1.0 0.3 - 1.0 K/uL    Eos # 0.4 0.0 - 0.5 K/uL    Baso # 0.03 0.00 - 0.20 K/uL    nRBC 0 0 /100 WBC    Gran % 39.5 38.0 - 73.0 %    Lymph % 41.6 18.0 - 48.0 %    Mono % 13.1 4.0 - 15.0 %    Eosinophil % 5.1 0.0 - 8.0 %    Basophil % 0.4 0.0 - 1.9 %    Platelet Estimate Decreased (A)     Differential Method Automated    ]    I/O last 3 completed shifts:  In: 1000 [IV Piggyback:1000]  Out: 150 [Urine:150]    Vitals:    03/03/24 0825 03/03/24 1119 03/03/24 1223 03/03/24 1456   BP: (!) 140/85  (!) 189/86    Pulse: 104 66 75 (!) 121   Resp: 20  20    Temp: 98.6 °F (37 °C)  98.7 °F (37.1 °C)    TempSrc: Oral  Oral    SpO2:  99%  95%    Weight:       Height:           No Jvd, Thyromegaly or Lymphadenopathy  Lungs: Fairly clear anteriorly and laterally  Cor: RRR no G or rubs  Abd: Soft benign good bowel sounds non tender  Ext: No E C C    A)  JEY cause likely related to volume issues -bp issues and maybe naprosyn   Her ua is quite active with rbc's and wbc sg 1025 u na 27  Renal US shows small renal shadows suggesting advanced ckd  Fair uo creat down   Drug OD  Copd  Hx of htn   CPK ok   Hyperurecemia    P)    Renal Diet  Adjust all meds to the degree of renal fx  Close follow up I/O and weights  Maintain Hydration   Would avoid NSAID's  No acei arbs for now   A k bx with small kidneys is something I would not recommend   Add allopurinol      URINARY RETENTION/blocked disla catheter

## 2024-03-03 NOTE — CONSULTS
[id number]}        TELEPSYCHIATRY: SUBSEQUENT EVALUATION     ASSESSMENT AND PLAN:     DIAGNOSES & PROBLEMS:  Bereavement  Unintentional Overdose  Cannabis Use Disorder  Cocaine Use Disorder    In Summary:  - Patient with history of multi drug use disorder, presents found down.  There was initially concern for potential intentional overdose, and the picture was unclear - given this, she was PEC'd by telepsych on initial interview.  However her mental status has since improved, collateral obtained from daughter and patient re-interviewed.  Patient denies this was intentional overdose and daughter corroborates - neither feels there is a safety concern.  Patient minimizes drug use.     Plan:  - Does not appear to be danger to self or others, does not appear this was intentional suicide attempt.  Patient was counseled on drug cessation and seeking treatment - rehab and 12 step meetings.     With reasonable medical certainty, based on history, chart review, available collateral information, and a present-state examination:  - the patient does not currently meet the criteria for psychiatric hospitalization  - PEC is not indicated  - recommend patient enroll in addiction rehab program after discharge and medical stabilization  - counseled on full abstinence from alcohol and substances of abuse (illicit and prescription)  - full engagement in 12 step (or equivalent) recovery program(s), including meeting attendance and acquisition/maintenance of sponsor  - relapse prevention and motivational interviewing provided  - provided resources for various addiction rehabilitation options as part of aftercare planning       PRESENTATION:     Jacqueline Manuel is a 53 y.o. patient seen for a follow up psychiatric evaluation.  An interval history of the presenting illness (HPI) was obtained, and a pertinent psychiatric and medical review of systems was performed.    Per Chart:  Seen by telepsychiatry 3/1/24 - see full note   53-year-old female  "with past medical history of substance use, emphysema, hypertension presents with potential overdose.  Daughter reports that her   last year and she has been an abdominal viral.  She was living with her but then has moved out.  She reports recent increased drug use, states that she gets her pain medication from her doctor and has been taking it intermittently for pain.  Patient reports last crack cocaine use was yesterday, denies any today.  She reports having pain all over and reports taking 2 Percocet last night and then having continued pain this morning and took another 2.  Daughter reports going home and finding her on the floor unresponsive.  EMS arrived and noted she was breathing actually with pinpoint pupils and responded to  4 mg of Narcan IN.   Daughter reports some concern as there was a bottle and some pills around where she passed out.  She reports  some concern for  an intentional overdose however she has no history of this previously.  Patient reports she has not suicidal, homicidal, is not having any auditory visual hallucinations.  She reports some generalized all-over body pain, denies any further complaints."     On interview, patient was difficult to understand and emotionally volatile, fidgeting in bed and appeared to be in signficant pain, "I am feeling alright.. just thirsty..." Patient reports she does not recall what happened to her. "I do not know what happened to me". Reports her grandchildren found me. Patient reports she had trouble recalling which pills she took. Only took 2 pills. Reports she does have depression, her mood has been "up and down". She could not clearly elaborate further.   Denied SI or HI  It was unclear whether she hears voices.. patient was moaning in pain during the interview but reports at times she thinks she sees her dead  who  7 months ago  Reports her children and grandchildren prevent her from killing herself  Sleep has been " "poor.  Increased anxiety  Ongoing increased pain  Unable to obtain ros due to her inability to fully participate     I tried to call patients daughter at number in chart but it did not go through.     Appears to have an overdose in context of psychotic sx, crack/cocaine use, increased anxiety and and depressive sx. It is still unclear as to what extent this was intentional but numerous concerning factor       Per Patient:  - states feeling much better today  - not sure how she got here   - granddaughter found her down unresponsive  - thinks could have accidentally taken more medication  - but adamant she was not suicidal or trying to harm herself  - no current or past suicidal ideation, no history of suicide attempt  - use to take depression medication but no longer  - acknowledges using drugs the other day because feeling down about  - but minimizes  - doesn't feel has a drug problem  - has gone to meetings in past - willing to go back    Collateral:   Per Daughter Linda Manuel [phone]  - mom is "schizophrenic/bipolar"  - not sure but thinks they are saying she had a stroke  -   in August - living with daughter then on own  - trying to get her to go rehab  - doesn't feel this was overdose but more medical condition  - she has never spoken about suicidal ideation in past - doesn't feel she is suicidal  - initially concerned because there was bottle of pills on floor   - but after hearing explanation from mother, doesn't feel it was overdose - she fell and likely was spilled  - spoke to her in hospital yesterday and today  - doesn't feel she is danger to self  - "she was her normal self" - no psychotic symptoms noted  - appears future oriented    REVIEW OF SYSTEMS:  I[]I Patient unable or unwilling to provide any ROS.    [x] Y  [] N  sleep disturbance: *longstanding*    [] Y  [x] N  appetite/weight change: **    [x] Y  [] N  fatigue/anergia: **    [] Y  [x] N  impairment in " focus/concentration: **       [] Y  [x] N  depression: *sad about losing  of 33y - but still feels happy when sees family*     [x] Y  [] N  anxiety/worry: *worries about grandchildren*     [x] Y  [] N  somatically preoccupied: **   [] Y  [x] N  dysregulated mood/behavior: **   Negative for: irritability  [] Y  [x] N  manic symptomatology: **     [] Y  [x] N  psychosis: **   Negative for: paranoia, hallucinations        CURRENT PSYCHOTROPIC REGIMEN:  I[]I Y  I[x]I N  I[]I U        PERTINENT PAST HISTORY:     The patient's past psychiatric, family, social and medical history have been reviewed and updated as appropriate within the electronic medical record system.   Problem List:  2024-03: Syncope and collapse  2024-03: JEY (acute kidney injury)  2024-03: At risk for self harm  2024-03: COPD (chronic obstructive pulmonary disease)  2024-03: Hypertension, essential  2023-07: Multiple lacunar infarcts  2023-07: History of stroke  2023-07: Transportation insecurity due to lack of access to vehicle  2023-07: Hypokalemia  2023-07: Hypophosphatemia  2023-07: Substance use  2023-07: Hypertensive urgency  2023-07: Renal insufficiency      The electronic chart was reviewed and updated as appropriate.  See Medcard for details.    Current Facility-Administered Medications:     acetaminophen tablet 650 mg, 650 mg, Oral, Q4H PRN, Jose Hernandez MD, 650 mg at 03/03/24 1349    albuterol-ipratropium 2.5 mg-0.5 mg/3 mL nebulizer solution 3 mL, 3 mL, Nebulization, Q6H PRN, Jose Hernandez MD    [START ON 3/4/2024] allopurinoL tablet 200 mg, 200 mg, Oral, Daily, Bernardo Duran MD    ALPRAZolam tablet 0.5 mg, 0.5 mg, Oral, TID PRN, Jose Hernandez MD, 0.5 mg at 03/03/24 1350    aluminum-magnesium hydroxide-simethicone 200-200-20 mg/5 mL suspension 30 mL, 30 mL, Oral, QID PRN, Jose Hernandez MD, 30 mL at 03/02/24 0433    amLODIPine tablet 10 mg, 10 mg, Oral, Daily, Dax Cordova III, MD, 10  mg at 03/03/24 1249    carvediloL tablet 25 mg, 25 mg, Oral, BID, Dax Cordova III, MD    dextrose 10% bolus 125 mL 125 mL, 12.5 g, Intravenous, PRNMary Dhruv, MD    dextrose 10% bolus 250 mL 250 mL, 25 g, Intravenous, PRNMary Dhruv, MD    gabapentin capsule 100 mg, 100 mg, Oral, Daily PRN, Dax Cordova III, MD    glucagon (human recombinant) injection 1 mg, 1 mg, Intramuscular, PRN, Jose Hernandez MD    glucose chewable tablet 16 g, 16 g, Oral, PRN, Jose Hernandez MD    glucose chewable tablet 24 g, 24 g, Oral, PRNMary Dhruv, MD    hydrALAZINE injection 10 mg, 10 mg, Intravenous, Q6H PRN, Dax Cordova III, MD    hydrALAZINE tablet 25 mg, 25 mg, Oral, Q8H, Dax Cordova III, MD, 25 mg at 03/03/24 1432    lactated ringers infusion, , Intravenous, Continuous, Jose Hernandez MD, Last Rate: 125 mL/hr at 03/03/24 0611, New Bag at 03/03/24 0611    magnesium oxide tablet 800 mg, 800 mg, Oral, PRNMary Dhruv, MD    magnesium oxide tablet 800 mg, 800 mg, Oral, PRNMary Dhruv, MD    melatonin tablet 6 mg, 6 mg, Oral, Nightly PRN, Renetta Lea PA-C, 6 mg at 03/02/24 2322    naloxone 0.4 mg/mL injection 0.02 mg, 0.02 mg, Intravenous, PRNMary Dhruv, MD    ondansetron injection 4 mg, 4 mg, Intravenous, Q8H PRNMary Dhruv, MD, 4 mg at 03/02/24 0020    potassium bicarbonate disintegrating tablet 35 mEq, 35 mEq, Oral, PRN, Jose Hernandez MD    potassium bicarbonate disintegrating tablet 50 mEq, 50 mEq, Oral, PRN, Jose Hernandez MD    potassium bicarbonate disintegrating tablet 60 mEq, 60 mEq, Oral, PRN, Jose Hernandez MD    potassium, sodium phosphates 280-160-250 mg packet 2 packet, 2 packet, Oral, Mary HARTLEY Dhruv, MD    potassium, sodium phosphates 280-160-250 mg packet 2 packet, 2 packet, Oral, PRNMary Dhruv, MD    potassium, sodium phosphates 280-160-250 mg packet 2 packet, 2 packet, Oral, Mary HARTLEY Dhruv, MD    sodium chloride 0.9% flush 10 mL, 10  "mL, Intravenous, Q12H Mary HARTLEY Dhruv, MD    Additional Relevant History, As Applicable:       EXAMINATION:     BP (!) 189/86 (BP Location: Right arm, Patient Position: Lying)   Pulse (!) 121   Temp 98.7 °F (37.1 °C) (Oral)   Resp 20   Ht 5' 4" (1.626 m)   Wt 71 kg (156 lb 8.4 oz)   SpO2 95%   Breastfeeding No   BMI 26.87 kg/m²     MENTAL STATUS EXAMINATION:  General Appearance & Behavior: in hospital garb, calm and cooperative  Involuntary Movements and Motor Activity: no tics or tremors  Speech & Language: conversational, spontaneous  Mood: okay  Affect: euthymic, reactive  Thought Process & Associations: linear  Thought Content & Perceptions: no auditory hallucinations/visual hallucinations/paranoid ideation   Sensorium and Cognition: alert with clear sensorium  Insight & Judgment: both impaired  Reliability: The patient is deemed to be a vague historian. **      RISK MANAGEMENT:     Risk Parameters:  I[]I Y  I[x]I N  I[]I U  I[]I A  Suicidal Ideation/Behavior: **   I[]I Y  I[x]I N  I[]I U  I[]I A  Homicidal Ideation/Behavior: **  I[]I Y  I[x]I N  I[]I U  I[]I A  Violence: **  I[]I Y  I[x]I N  I[]I U  I[]I A  Self-Injurious Behavior: **    I[]I Y  I[x]I N  I[]I U  I[]I A  I[]I N/A  Minimization of Risk Parameters Suspected/Evident: **  I[]I Y  I[x]I N  I[]I U  I[]I A  I[]I N/A  Exaggeration of Risk Parameters Suspected/Evident: **     The patient was able to satisfactorily contract for safety and was noted to be future oriented.  Protective factors were identified.    Current risk is judged to be:   I[]I None/Negligible    I[x]I Low    I[]I Moderate   I[]I High    [] Y  [x] N  I[]I U  I[]I N/A  Danger to Self:   [] Y  [x] N  I[]I U  I[]I N/A  Danger to Others:   [] Y  [x] N  I[]I U  I[]I N/A  Grave Disability:        Dax Clark MD  Department of Psychiatry, [facility] Board Certified, Psychiatry and Addiction Medicine      MANAGEMENT:     I[]I Y = Yes / Present / Endorses.  I[]I " N = No / Absent / Denies.  I[]I U = Unknown / Unable to Assess / Unwilling to Participate.  I[]I A = Ambiguity Exists / Accuracy Uncertain.  I[]I D = Denial or Minimization is Suspected/Evident.  I[]I N/A = Non-Applicable.    Chart Review: Available documentation has been reviewed, and pertinent elements of the chart have been incorporated into this evaluation where appropriate.  Last [facility] encounter with me: Visit date not found.    [x] In cases of emergencies (e.g. SI/HI resulting in danger to self or others, functioning deteriorates to the level of grave disability), call 911 or [phone], or present to the emergency department for immediate assistance.  [x] Patient should not operate a motor vehicle or heavy machinery if effects of medications or underlying symptoms/condition impair the ability to safely do so.  [x] Comply with ANY/ALL medication fully as prescribed/instructed and report ANY/ALL suspected adverse effects to appropriate health care providers.    Written material has been provided to supplement, augment, and reinforce any discussions and interventions, via the AVS and/or other pre-printed handouts.  Alcohol, Tobacco, and Drug Counseling, as well as applicable resources, has been provided, as warranted.  Shared medical decision making and informed consent are the hallmark and bedrock of good clinical care, and as such have been employed and obtained, respectively, to the degree possible.  Risk Mitigation Strategies, Harm Reduction Techniques, and Safety Netting are important interventions that can reduce acute and chronic risk, and as such have been employed to the degree possible.  Prescription Drug Management entails the review, recommendation, or consideration without recommendation of medications, and as such was employed during the encounter.  Additional Psychoeducation has been provided, as warranted.        DIAGNOSTIC TESTING:     Glu 87  3/3/2024   HgA1c *   *    Na 137  3/3/2024  Cr 3.8 (H)   3/3/2024  BUN 41 (H)  3/3/2024    GFR 14 (A)  3/3/2024     Alb 4.0  3/1/2024   T Bili 0.4  3/1/2024   Alk Phos 78  3/1/2024   AST 40  3/1/2024   ALT 30  3/1/2024     Ammonia *   *   Amylase *   *   Lipase *   *    TSH *   *   Free T4 *   *     Prolactin *   *     3/2/2024   Troponin I 0.072 (H)  3/1/2024     PT *   *   INR *   *     WBC 7.41  3/3/2024   Hgb 10.7 (L)  3/3/2024   HCT 32.1 (L)  3/3/2024    (L)  3/3/2024   ANC 2.9; 39.5;   3/3/2024     Cholesterol *   *   Triglycerides *   *   HDL *   *   LDL *   *     B12 *   *   Folate *   *   Thiamine *   *   Vit D *   *      HIV 1/2 Ag/Ab *   *   Hep C *   *   RPR *   *    Lithium *   *   VPA *   *   Clozapine *   *     Alcohol (Urine) *   *   Benzodiazepines Negative; Negative;   3/2/2024   Barbiturates Negative; Negative;   3/2/2024   Cannabis Presumptive Positive; Presumptive Positive (A);  (A)  3/2/2024   Cocaine Presumptive Positive; Presumptive Positive (A);  (A)  3/2/2024   Amphetamines Negative; Negative;   3/2/2024   PCP Negative; Negative;   3/2/2024   Opiates Presumptive Positive; Presumptive Positive (A);  (A)  3/2/2024   Methadone Negative; Negative;   3/2/2024   Buprenorphine *   *   Fentanyl *   *     Ethanol <10  3/1/2024  PETH *   *   EtG *   *   GGT *   *   MCV 93  3/3/2024    UPT *   *    Results for orders placed or performed during the hospital encounter of 07/06/23   EKG 12-lead    Collection Time: 07/06/23  7:11 PM    Narrative    Test Reason : I10,    Vent. Rate : 079 BPM     Atrial Rate : 079 BPM     P-R Int : 188 ms          QRS Dur : 100 ms      QT Int : 410 ms       P-R-T Axes : 080 041 087 degrees     QTc Int : 470 ms    Normal sinus rhythm  Normal ECG  When compared with ECG of 06-JUL-2023 17:49,  No significant change was found  Confirmed by Eric Rogers MD ([id number]) on 7/7/2023 4:18:51 PM    Referred By: [email or web address]   SELF           Confirmed By:Eric Rogers MD        Results for orders placed or performed during the hospital encounter of 03/01/24   CT Head Without Contrast    Narrative    EXAMINATION:  CT HEAD WITHOUT CONTRAST    CLINICAL HISTORY:  Syncope, recurrent;    TECHNIQUE:  Low dose axial images were obtained through the head.  Coronal and sagittal reformations were also performed. Contrast was not administered.    COMPARISON:  CT examination of the brain without contrast July 6, 2023    FINDINGS:  The ventricular system, sulcal pattern and parenchymal attenuation characteristics are consistent with chronic change.  Involutional change noted.  There is prominent gliosis and encephalomalacia involving the right temporal and parietal region, correlating with the appearance on the prior study and consistent with remote ischemia/infarct or other insult/injury.  Chronic appearing white matter change noted.  Focal areas consistent with remote lacunar-type ischemic change again noted.    There is no evidence for intracranial mass, mass effect or midline shift.  There is no evidence for acute intracranial hemorrhage.  Appropriate CSF spaces are seen at the skull base.    The mastoid air cells appear appropriate when accounting for averaging.  Minimal paranasal sinus mucosal thickening is noted.  The osseous structures appear intact.      Impression    Chronic changes are noted, there is no evidence for acute intracranial process.      Electronically signed by: Claudio Aldrich  Date:    03/02/2024  Time:    05:14       TELEPSYCHIATRY:     Patient agreeable to consultation via telepsychiatry.    This consultation was requested by Dax Cordova III, MD.  The location of the consulting psychiatrist is: [place], [place]  The patient location is:  [facility]  Consultation Setting: inpatient unit  Also present with the patient at the time of the evaluation: patient evaluated alone    Inpatient consult to Telemedicine - Psyc  Consult performed  by: Dax Clark MD  Consult ordered by: Dax Cordova III, MD        Consult Start Time: 03/03/2024 16:35 CST  Consult End Time: 03/03/2024 17:10 CST

## 2024-03-03 NOTE — HOSPITAL COURSE
53F with pmh of copd, ckd, htn who presents after being found unresponsive by daughter. EMS was called and the patient was noted to have some pinpoint pupils and was given Narcan with patient being more responsive.  The daughter was concerned about the patient potentially having intentional overdose. On arrival in the ER, the patient was alert and oriented, CBC within normal limits, BMP shows an JEY with creatinine of 5.9 with baseline of 1.36 (10/21/2023), mild troponin elevation of 0.072 in setting of JEY.  Patient's right elbow x-ray is negative, chest x-ray is negative for acute findings, renal ultrasound is limited but does not show any evidence of hydronephrosis or acute process. Psychiatry evaluation on admission with pt put on PEC for concerns this could have been intentional, but pt has been adamant that this is not the case. Psychiatry reconsulted. Due to jey pt followed by nephrology. Renal function improved with fluids and nephrology cleared pt for discharge with outpatient nephrology referral sent. Pt feeling better and after discussion with psychiatry PEC rescinded per psychiatry recommendations. Discussed at length need for drug cessation and patient endorsed understanding.  Patient requesting discharge home and overall doing a lot better.  Discharge plan and return precautions given to patient at bedside who endorsed understanding and all questions answered prior to discharge home.  Patient advised to follow up with primary care doctor within a week.

## 2024-03-03 NOTE — ED NOTES
Care assumed. Pt awake and alert. Sitting up in bed eating. Pt denies any pain at this time. Sitter at bedside. Call bell in reach. Monitoring on going.

## 2024-03-03 NOTE — PROGRESS NOTES
"[facility] Medicine  Progress Note    Patient Name: Jacqueline Manuel  MRN: [id number]  Patient Class: IP- Inpatient   Admission Date: 3/1/2024  Length of Stay: 2 days  Attending Physician: Dax Cordova III, MD  Primary Care Provider: Tamika, Primary Doctor        Subjective:     Principal Problem:Syncope and collapse        HPI:  Ms. Manuel is a 53-year-old -American female who is brought into the emergency room after being "found down.  Her medical history significant for hypertension, COPD, CKD 3.  She reports that she took 2 pills of her pain medication yesterday because she was having headache and abdominal pain.  The daughter noted that when she went home she found the patient on the floor unresponsive.  EMS was called and the patient was noted to have some pinpoint pupils and was given Narcan with patient being more responsive.  The daughter was concerned about the patient potentially having intentional overdose.    On arrival in the ER, the patient was alert and oriented, CBC within normal limits, BMP shows an JEY with creatinine of 5.9 with baseline of 1.36 (10/21/2023), mild troponin elevation of 0.072 in setting of JEY.  Patient's right elbow x-ray is negative, chest x-ray is negative for acute findings, renal ultrasound is limited but does not show any evidence of hydronephrosis or acute process.  CT head ordered after seeing the patient, which is pending at the time of this admission.    Due to family concerns, psychiatry consultation was requested from the emergency room, and as per telepsychiatry recommendations the patient was PEC'd.           Overview/Hospital Course:  No notes on file    Interval History:  Patient states she is feeling much better today.  No fever or chills overnight.  Patient denies lower extremity swelling.  Continuing fluids and attempting to improve renal function.  Psychiatry reassessment ordered.    Review of Systems  Objective:     Vital Signs (Most " Recent):  Temp: 98.6 °F (37 °C) (03/03/24 0825)  Pulse: 104 (03/03/24 0825)  Resp: 20 (03/03/24 0825)  BP: (!) 140/85 (03/03/24 0825)  SpO2: 99 % (03/03/24 0825) Vital Signs (24h Range):  Temp:  [97.3 °F (36.3 °C)-98.6 °F (37 °C)] 98.6 °F (37 °C)  Pulse:  [] 104  Resp:  [18-24] 20  SpO2:  [95 %-99 %] 99 %  BP: ()/(66-88) 140/85     Weight: 71 kg (156 lb 8.4 oz)  Body mass index is 26.87 kg/m².    Intake/Output Summary (Last 24 hours) at 3/3/2024 1103  Last data filed at 3/3/2024 0845  Gross per 24 hour   Intake 240 ml   Output 200 ml   Net 40 ml         Physical Exam  Vitals reviewed.   Constitutional:       General: She is not in acute distress.     Appearance: She is ill-appearing (Chronic).   HENT:      Head: Normocephalic and atraumatic.      Mouth/Throat:      Mouth: Mucous membranes are moist.      Pharynx: Oropharynx is clear.   Eyes:      General: No scleral icterus.     Extraocular Movements: Extraocular movements intact.   Abdominal:      Palpations: Abdomen is soft.      Tenderness: There is no abdominal tenderness.   Musculoskeletal:         General: No swelling or tenderness.   Skin:     General: Skin is warm and dry.   Neurological:      General: No focal deficit present.      Mental Status: She is alert and oriented to person, place, and time.   Psychiatric:         Behavior: Behavior normal.             Significant Labs: All pertinent labs within the past 24 hours have been reviewed.    Significant Imaging: I have reviewed all pertinent imaging results/findings within the past 24 hours.    Assessment/Plan:      * Syncope and collapse  -felt to be hypovolemia related with associated JEY and overdose  -Continue aggressive IV fluids.  -Differential includes arrhythmia, and seizures.  Continue to monitor.  -CT head ordered after seeing the patient, pending.  -Orthostatic vitals.  -Echocardiogram with maintained ef  -Drug screen positive      Hypertension, essential  -Antihypertensives  currently on hold due to JEY and hypovolemia.    Temp:  [97.3 °F (36.3 °C)-98.6 °F (37 °C)]   Pulse:  []   Resp:  [18-24]   BP: ()/(66-88)   SpO2:  [95 %-99 %] .   Home meds for hypertension were reviewed and noted below.   Hypertension Medications      Home medications:           amLODIPine (NORVASC) 10 MG tablet Take 1 tablet (10 mg total) by mouth once daily.    carvediloL (COREG) 25 MG tablet Take 1 tablet (25 mg total) by mouth 2 (two) times daily.    hydrALAZINE (APRESOLINE) 100 MG tablet Take 1 tablet (100 mg total) by mouth every 8 (eight) hours.    losartan (COZAAR) 100 MG tablet Take 1 tablet (100 mg total) by mouth once daily.          COPD (chronic obstructive pulmonary disease)  -Controlled.  As needed DuoNebs.    At risk for self harm  -As related to ER staff by patient's daughter.  -Psychiatry consultation obtained in the ER, with recommendation of PEC.  PEC signed.  -Follow further psychiatry recommendations.      JEY (acute kidney injury)  -JEY with creatinine of 5.9 with baseline of 1.36 (10/21/2023).  -Continue aggressive IV fluids.  -Renal ultrasound negative for overt abnormalities.  No hydronephrosis.  -Monitor urine output.  -Avoid nephrotoxic agents.  -Nephrology consultation requested.    -improving      VTE Risk Mitigation (From admission, onward)           Ordered     IP VTE LOW RISK PATIENT  Once         03/01/24 2257     Place sequential compression device  Until discontinued         03/01/24 2257                    Discharge Planning   CLAY:      Code Status: Full Code   Is the patient medically ready for discharge?:     Reason for patient still in hospital (select all that apply): Treatment and Consult recommendations  Discharge Plan A: Home with family                  Dax Cordova III, MD  Department of Hospital Medicine   [facility] - [place] - Med Surg

## 2024-03-03 NOTE — NURSING
Patient arrived in the unit via wheelchair from ED  Awake, alert, and oriented  No distress noted  With iv line in place  With ongoing ivf of LR @ 125ml/hr  On oxygen 2L via NC  VS taken and recorded  Patient is PEC'd, with sitter  Will continue to monitor    [facility] [place]  Nurses Note -- 4 Eyes      3/2/2024       Skin assessed on: Admit      [x] No Pressure Injuries Present    [x]Prevention Measures Documented    [] Yes LDA  for Pressure Injury Previously documented     [] Yes New Pressure Injury Discovered   [] LDA for New Pressure Injury Added      Attending RN:  Mely De Oliveira, RN     Second RN:  Lorenzo, PCT

## 2024-03-03 NOTE — SUBJECTIVE & OBJECTIVE
Interval History:  Patient states she is feeling much better today.  No fever or chills overnight.  Patient denies lower extremity swelling.  Continuing fluids and attempting to improve renal function.  Psychiatry reassessment ordered.    Review of Systems  Objective:     Vital Signs (Most Recent):  Temp: 98.6 °F (37 °C) (03/03/24 0825)  Pulse: 104 (03/03/24 0825)  Resp: 20 (03/03/24 0825)  BP: (!) 140/85 (03/03/24 0825)  SpO2: 99 % (03/03/24 0825) Vital Signs (24h Range):  Temp:  [97.3 °F (36.3 °C)-98.6 °F (37 °C)] 98.6 °F (37 °C)  Pulse:  [] 104  Resp:  [18-24] 20  SpO2:  [95 %-99 %] 99 %  BP: ()/(66-88) 140/85     Weight: 71 kg (156 lb 8.4 oz)  Body mass index is 26.87 kg/m².    Intake/Output Summary (Last 24 hours) at 3/3/2024 1103  Last data filed at 3/3/2024 0845  Gross per 24 hour   Intake 240 ml   Output 200 ml   Net 40 ml         Physical Exam  Vitals reviewed.   Constitutional:       General: She is not in acute distress.     Appearance: She is ill-appearing (Chronic).   HENT:      Head: Normocephalic and atraumatic.      Mouth/Throat:      Mouth: Mucous membranes are moist.      Pharynx: Oropharynx is clear.   Eyes:      General: No scleral icterus.     Extraocular Movements: Extraocular movements intact.   Abdominal:      Palpations: Abdomen is soft.      Tenderness: There is no abdominal tenderness.   Musculoskeletal:         General: No swelling or tenderness.   Skin:     General: Skin is warm and dry.   Neurological:      General: No focal deficit present.      Mental Status: She is alert and oriented to person, place, and time.   Psychiatric:         Behavior: Behavior normal.             Significant Labs: All pertinent labs within the past 24 hours have been reviewed.    Significant Imaging: I have reviewed all pertinent imaging results/findings within the past 24 hours.

## 2024-03-03 NOTE — ASSESSMENT & PLAN NOTE
-JEY with creatinine of 5.9 with baseline of 1.36 (10/21/2023).  -Continue aggressive IV fluids.  -Renal ultrasound negative for overt abnormalities.  No hydronephrosis.  -Monitor urine output.  -Avoid nephrotoxic agents.  -Nephrology consultation requested.    -improving

## 2024-03-03 NOTE — DISCHARGE INSTRUCTIONS
Thank you for allowing me to participate as part of your health care team, and thank you for choosing [facility].    JENNIFER RUGGIERO MD  Board Certified in Psychiatry & Addiction Medicine      IN CASE OF SUICIDAL THINKING, call the [employer] Suicide Hotline Number: 988    988 Suicide & Crisis Lifeline: 988 , 3-702-514-TALK (8255)  https://[employer].org           AFTER VISIT INSTRUCTIONS:     [x] Take all medication, from all providers, as prescribed.  [x] If questions or concerns arise, or if experiencing side effects, adverse reactions or worsening symptoms, contact your provider through the [facility] portal at [email or web address], or call [phone] to reach the [facility] main line.  [x] In cases of emergencies, call [phone], or present directly to the emergency department for immediate assistance.       REFERRALS AND ADDITIONAL RECOMMENDATIONS:  - abstain from alcohol and illicit drug use  - routinely attend 12 step (or equivalent) mutual self-help meetings  - work with a sponsor  - complete a licensed addiction rehabilitation program  - establish sobriety and maintain a recovery lifestyle      INFORMATION ON MENTAL HEALTH MEDICATIONS:     National [place] of Mental Health:   https://www.nimh.nih.gov/health/topics/mental-health-medications     Web MD:   https://www.webmd.com       RESOURCES:     IN CASE OF SUICIDAL THINKING, call the [employer] Suicide Hotline Number: 988    988 Suicide & Crisis Lifeline: 988 , 4-769-310-TALK (8255)  Provides 24/7, free and confidential support for people in distress, prevention and crisis resources for you or your loved ones, and best practices for professionals.    Call, text or chat.  https://[employer]     National Action [place] for Suicide Prevention: the National Action [place] for Suicide Prevention (Action Brandon) is the nations public-private partnership for suicide prevention, working with more than 250 national partners.    https://theactionalliance.org     National Strategy for Suicide Prevention & Risk Mitigation:  https://theactionalliance.org/our-strategy/national-strategy-suicide-prevention     [x] Fact Sheet:   https://www.hhs.gov/sites/default/files/national-strategy-for-suicide-prevention-factsheet.pdf     [x] Report:   https://www.ncbi.nlm.nih.gov/books/PLO854602/pdf/Bookshelf_NBK109917.pdf     Suicide Prevention Resource Center: The Suicide Prevention Resource Center (SPR) is the only federally supported resource center devoted to advancing the implementation of the National Strategy for Suicide Prevention. [facility] is funded by the U.S. Department of Health and Human Services' Substance Abuse and Mental Health Services Administration ([facility]).  https://www.[facility].org     [x] Safety Plan:   https://[employer]/wp-content/uploads/2021/08/[place]-Safety-Plan-8-6-21.pdf     [x] Suicide Risk Curve:  https://[employer]/wp-content/uploads/2021/08/Qhcaduh-mxsd-filzw-8-6-21.pdf     [place] Mental Health Advocacy Service: the state agency tasked with protecting the legal rights of people with behavioral health diagnoses.  https://mhas.[place].[facility]     Alcoholics Anonymous (AA): find a meeting near you.  https://www.aa.org     SMI Adviser: resources for individuals and families with serious mental illness.  https://smiadviser.org     National [place] for the Mentally Ill (DONNY): the nation's largest grassroots organization dedicated to building better lives for individuals with mental illness.  https://www.donny.org/Home     U.S. Department of Health and Human Services (HHS): the mission of HHS is to enhance the health and well-being of all Americans, by providing for effective health and human services and by fostering sound, sustained advances in the sciences underlying medicine, public health, and .   https://www.hhs.gov     Substance Abuse and Mental Health Services Administration  ([facility]): [facility] is the agency within [facility] that leads public health efforts to advance the behavioral health of the nation. [facility]'s mission is to reduce the impact of substance abuse and mental illness on [place] communities.   https://www.[facility].gov     National Institutes of Health ([facility]): a part of [facility], [facility] is the largest biomedical research agency in the world.   https://www.nih.gov     National [place] on Drug Abuse (TAMY): sponsored by the NIH, the mission of TAMY is to advance science on drug use and addiction and to apply that knowledge to improve individual and public health.  https://tamy.nih.gov     National [place] on Alcohol Abuse and Alcoholism (NIAAA): sponsored by the NIH, the mission of NIAA is to generate and disseminate fundamental knowledge about the effects of alcohol on health and well-being, and apply that knowledge to improve diagnosis, prevention, and treatment of alcohol-related problems, including alcohol use disorder, across the lifespan.   https://www.niaaa.nih.gov     National Harm Reduction Coalition: resources for harm reduction, including techniques, strategies, policy, and advocacy.  https://harmreduction.org     The SHARE Approach - A Model for Shared Decision Making:  [x] Fact Sheet  https://www.ahrq.gov/sites/default/files/publications/files/share-approach_factsheet.pdf     AMA Principles of Medical Ethics - Informed Consent & Shared Decision Making:  [x] Chapter  https://www.ama-assn.org/system/files/2019-06/code-of-medical-ihgmue-cknppuz-7.pdf     Safety Netting for Primary Care:  [x] Article  https://www.ncbi.nlm.nih.gov/pmc/articles/WLX1942356/pdf/ovrmizv-5867--e70.pdf       MEDICATION MANAGEMENT:     [x] In addition to the potential beneficial effects, the use of any medication or drug (prescribed, over the counter or otherwise) carries with it the risk of potential adverse effects.  Each has a set of typical adverse effects - some common, some rare - but  idiosyncratic and unanticipated reactions unique to you are always possible.      [x] It is important to remember that untreated illness can also pose a risk, which must be taken into account when weighing the pros and cons of a medication trial.    [x] Medications and drugs can sometimes interact with each other in the body, leading to adverse effects - it is important that all your providers know all the medications and drugs you take - prescribed, over the counter, or otherwise.  Keep all your practitioners up to date with any changes.  It's always a good idea to keep an up-to-date list in an easily accessible location.    [x] There is an inherent unpredictability to all treatment, including the use of medication.  Unexpected outcomes can occur - keep me up to date with any difficulties you encounter.    [x] It is important to take medication as directed, and to comply fully with the instructions.  Check with the appropriate provider first before adjusting or stopping your medication on your own.    If you require further information pertaining to the issues outlined above, please reach out to your providers through the [facility] portal at [email or web address], or call [phone] to discuss.  See resource list for additional material.     Additional information can be provided pertaining to your diagnosis, intended outcomes, target symptoms for treatment, and possible benefits and risks of medication - you can also access this information through the provided resources.  Possible alternatives to the current treatment plan (including no treatment) can also be reviewed.      GENERAL HEALTH & WELLNESS:     [x] Establish and follow regularly with a primary care physician for routine health maintenance and management of any medical comorbidities.  [x] Follow a healthy diet, exercise routinely, and monitor weight and metabolic parameters.  [x] Allow adequate time for sleep and practice good sleep hygiene.  [x] Do  not operate a motor vehicle or heavy machinery if the effects of medications or the symptoms underlying your condition impair the ability for you to do so safely.    Dietary Guidelines for Americans, 5638-5930:  U.S. Department of Agriculture (USDA)  https://www.dietaryguidelines.gov/sites/default/files/2020-12/Dietary_Guidelines_for_Americans_2020-2025.pdf#page=31     The Nutrition Source:  [facility]  https://www.[facility].[place].[facility]/nutritionsource       SLEEP HYGIENE:     Follow these tips to establish healthy sleep habits:  [x] Keep a consistent sleep schedule. Get up at the same time every day, even on weekends or during vacations.  [x] Set a bedtime that is early enough for you to get at least 7-8 hours of sleep.  [x] Don't go to bed unless you are sleepy.  [x] If you don't fall asleep after 20 minutes, get out of bed. Go do a quiet activity without a lot of light exposure. It is especially important to not get on electronics.  [x] Establish a relaxing bedtime routine.  [x] Use your bed only for sleep and sex.  [x] Make your bedroom quiet and relaxing. Keep the room at a comfortable, cool temperature.  [x] Limit exposure to bright light in the evenings.  [x] Turn off electronic devices at least 30 minutes before bedtime.  [x] Don't eat a large meal before bedtime. If you are hungry at night, eat a light, healthy snack.  [x] Exercise regularly and maintain a healthy diet.  [x] Avoid consuming caffeine in the afternoon or evening.  [x] Avoid consuming alcohol before bedtime.  [x] Reduce your fluid intake before bedtime.    QUICK TIPS FOR BETTER SLEEP  Reduce smartphone usage Create and maintain a nightly ritual Avoid caffeine 4-6 hours before sleeping Don't eat or drink too much at bedtime Sleep at the same time every night        American Academy of Sleep Medicine - Healthy Sleep Habits:  https://sleepeducation.org/healthy-sleep/healthy-sleep-habits     American Academy of Sleep Medicine -  Bedtime Calculator:  https://sleepeducation.org/healthy-sleep/bedtime-calculator     American Academy of Sleep Medicine - Cognitive Behavioral Therapy for Insomnia (CBT-I):  https://sleepeducation.org/patients/cognitive-behavioral-therapy     American Academy of Sleep Medicine - Insomnia:  https://sleepeducation.org/sleep-disorders/insomnia       ALCOHOL & DRUG USE COUNSELING:     Preventing Excessive Alcohol Use (CDC):  https://www.cdc.gov/alcohol/fact-sheets/moderate-drinking.htm#:~:text=To%20reduce%20the%20risk%20of,days%20when%20alcohol%20is%20consumed.     [x] Alcohol consumption is associated with a variety of short- and long-term health risks, including motor vehicle crashes, violence, sexual risk behaviors, high blood pressure, and various cancers (e.g., breast cancer).  [x] The risk of these harms increases with the amount of alcohol you drink. For some conditions, like some cancers, the risk increases even at very low levels of alcohol consumption (less than 1 drink).  [x] To reduce the risk of alcohol-related harms, the 3592-8337 Dietary Guidelines for Americans recommends that adults of legal drinking age can choose not to drink, or to drink in moderation by limiting intake to 2 drinks or less in a day for men or 1 drink or less in a day for women, on days when alcohol is consumed.  [x] The Guidelines also do not recommend that individuals who do not drink alcohol start drinking for any reason and that if adults of legal drinking age choose to drink alcoholic beverages, drinking less is better for health than drinking more.  [x] The Guidelines note that some people should not drink alcohol at all, such as:  - If they are pregnant or might be pregnant.  - If they are younger than age 21.  - If they have certain medical conditions or are taking certain medications that can interact with alcohol.  - If they are recovering from an alcohol use disorder or if they are unable to control the amount they  "drink.  [x] The Guidelines also note that not drinking alcohol is the safest option for women who are lactating. Generally, moderate consumption of alcoholic beverages by a woman who is lactating (up to 1 standard drink in a day) is not known to be harmful to the infant, especially if the woman waits at least 2 hours after a single drink before nursing or expressing breast milk. Women considering consuming alcohol during lactation should talk to their healthcare provider.  [x] The Guidelines note, Emerging evidence suggests that even drinking within the recommended limits may increase the overall risk of death from various causes, such as from several types of cancer and some forms of cardiovascular disease. Alcohol has been found to increase risk for cancer, and for some types of cancer, the risk increases even at low levels of alcohol consumption (less than 1 drink in a day).  [x] Although past studies have indicated that moderate alcohol consumption has protective health benefits (e.g., reducing risk of heart disease), recent studies show this may not be true.  [x] Its important to focus on the amount people drink on the days that they drink. Even if women consume an average of 1 drink per day or men consume an average of 2 drinks per day, binge drinking increases the risk of experiencing alcohol-related harm in the short-term and in the future.    Drinking Levels Defined (NIAAA):  https://www.niaaa.nih.gov/alcohol-health/overview-alcohol-consumption/moderate-binge-drinking     Drinking in Moderation:  According to the "Dietary Guidelines for Americans 5994-8163, U.S. Department of Health and Human Services and U.S. Department of Agriculture, adults of legal drinking age can choose not to drink or to drink in moderation by limiting intake to 2 drinks or less in a day for men and 1 drink or less in a day for women, when alcohol is consumed. Drinking less is better for health than drinking more.    Binge " Drinking:  NIAAA defines binge drinking as a pattern of drinking alcohol that brings blood alcohol concentration (MERLIN) to 0.08 percent - or 0.08 grams of alcohol per deciliter - or higher.  For a typical adult, this pattern corresponds to consuming 5 or more drinks (male), or 4 or more drinks (female), in about 2 hours.    The Substance Abuse and Mental Health Services Administration (SAMHSA), which conducts the annual National Survey on Drug Use and Health (NSDUH), defines binge drinking as 5 or more alcoholic drinks for males or 4 or more alcoholic drinks for females on the same occasion (i.e., at the same time or within a couple of hours of each other) on at least 1 day in the past month.    Heavy Alcohol Use:  NIAAA defines heavy drinking as follows:  - For men, consuming more than 4 drinks on any day or more than 14 drinks per week.  - For women, consuming more than 3 drinks on any day or more than 7 drinks per week.     [facility] defines heavy alcohol use as binge drinking on 5 or more days in the past month.    Patterns of Drinking Associated with Alcohol Use Disorder:  Binge drinking and heavy alcohol use can increase an individual's risk of alcohol use disorder.    Certain people should avoid alcohol completely, including those who:  - Plan to drive or operate machinery, or participate in activities that require skill, coordination, and alertness.  - Take certain over-the-counter or prescription medications.  - Have certain medical conditions.  - Are recovering from alcohol use disorder or are unable to control the amount that they drink.  - Are younger than age 21.  - Are pregnant or may become pregnant.    U.S. Standard Drink  12 oz beer   (5% ABV) 8 oz malt liquor   (7% ABV) 5 oz wine   (12% ABV) 1.5 oz 80-proof distilled spirit  (40% ABV)        Heroin use harm reduction:  1. Carry naloxone. When using heroin, make sure you have at least one dose of naloxone - the overdose reversal drug - and have it in  plain view. Understand how to give it.  2. Try a small dose first. It is best to first try a small amount of the heroin to check the effect.  3. Dont use heroin alone. Always use heroin with someone else and take turns while using.    It is possible to overdose with heroin whether you are snorting, injecting or using it in another form.    Signs of an overdose or emergency:   - The person is awake but unable to talk.  - Their body is limp.  - Their breathing is shallow or slow or stopped.  - Their skin is pale, ashen or clammy/sweaty.  - They are unconscious.    In case of emergency, give naloxone. If you suspect the heroin may contain fentanyl, administer more than one dose. Seek medical help even if naloxone has been given. Call 911 for help.      ADHD TREATMENT AND STIMULANT MEDICATIONS:     [facility] Prescription Stimulants Drug Facts  CMS Stimulant and Related Medications: Use in Adults  [facility] Drug Fact Sheets: Stimulants  FDA Drug Safety Communication: Stimulants  [facility] ADHD  WebMD ADHD Medications and Side Effects  [facility]: ADHD Medication      SHARED DECISION MAKING & INFORMED CONSENT:     Shared medical decision making and informed consent are the hallmark and bedrock of excellent clinical care.  During the encounter, shared medical decision making was employed and informed consent was obtained, to the degree possible, whenever feasible, appropriate and relevant. Those interventions are supplemented here with written materials, detailing the topics in more depth.       PSYCHOEDUCATION:     Psychoeducation pertaining to the following -     Diagnosis Etiology Disease Processes Natural Progression   Treatment Options Time Course Safety Netting Informed Consent   Intended Benefits of Medication Expectable Adverse Effects Target Symptoms for Treatment Alternatives to Current Treatment   Shared   Decision Making Risk Mitigation Strategies Harm Reduction Techniques Associated Bio-Med Complications     - can be  further discussed and reviewed (you can also access additional information through the provided resources in this document).      Effective communication is essential in order to engage in shared medical decision making.  If you had difficulty understanding anything during your encounter or in this supplementary document, please contact your providers through the [facility] portal at [email or web address] or call [phone].     [employer]ian Dictionary  https://dictionary.[employer].org/us       It can be easy to miss, forget, or misremember important important information that was discussed during the session - especially when you're stressed, upset, or don't feel well.  If you or a representative have any additional questions, concerns, or topics to discuss - please contact your providers through the [facility] portal at [email or web address] or call [phone].    Memory Loss  https://www.Bubbly.[employer]/brain/memory-loss    Causes of Memory Loss  https://www.Newstag/what-causes-memory-loss-1371371    Memory loss: When to seek help  https://www.mayoclinic.org/diseases-conditions/alzheimers-disease/in-depth/memory-loss/art-64189474    Memory, Forgetfulness, and Aging: What's Normal and What's Not?  https://www.jamie.nih.gov/health/memory-forgetfulness-and-aging-whats-normal-and-whats-not    Depression and Memory Loss  https://www.[employer]/health/depression/depression-and-memory-loss    The Relationship Between Anxiety and Memory Loss  https://www.[employer].[facility]/academics/blog-posts/the-relationship-between-anxiety-and-memory-loss     PRESCRIPTION DRUG MANAGEMENT:     Prescription Drug Management entails the following:  [x] The review, recommendation, or consideration without recommendation of medications during the encounter.  [x] Discussion (to the extent possible) with the patient and/or other interested parties of the diagnosis, target symptoms, intended outcomes, and possible benefits and risks of  medication, as well as alternatives (including no treatment), if not otherwise known or stated prior.  [x] Discussion (to the extent possible) with the patient and/or other interested parties of possible expectable adverse effects of any proposed individual psychotropic agents, as well as the inherent unpredictability of treatment, if not otherwise known or stated prior.  [x] Informed consent is sought from the patient (and/or guardian/designated decision maker, if applicable) after a thorough discussion (to the extent possible) of the aforementioned points outlined above.  [x] The provision of counseling (to the extent possible) to the patient and/or other interested parties on the importance of full compliance with any prescribed medication, if not otherwise known or stated prior.    Information on psychotropic medication can be found at:   National [place] of Mental Health: Information on Mental Health Medications      RISK MITIGATION, HARM REDUCTION & SAFETY NETTING:     Risk Mitigation Strategies, Harm Reduction Techniques, and Safety Netting are important interventions that can reduce acute and chronic risk.  As such, opportunities were sought to incorporate psychoeducation and practical advice pertaining to these topics into the encounter, to the degree possible, whenever feasible, appropriate and relevant.  Those interventions are supplemented here with written materials, detailing the topics in more depth.       RISK MITIGATION STRATEGIES:     Risk mitigation strategies are used to reduce the likelihood of future episodes of suicide, homicide, violence, and/or other problematic behaviors (e.g. self-injurious, risky, addictive, compulsive, impulsive). The following are examples of risk mitigation strategies which you can employ in order to reduce your overall burden of risk.     [x] Treatment of underlying psychopathology driving acute and chronic risk to the extent possible.  [x] Use of self administered  rating scales and journaling to assist in risk tracking.  [x] Exploration of protective factors to potentially counterbalance risk.  [x] Identification and avoidance of triggers and situations that increase risk, including excessive alcohol and drug use.  [x] Timely follow up and ongoing treatment of mental health issues moving forward.  [x] Full compliance with medication regimen.  [x] A good working knowledge of your medication regimen, including specific instructions on the administration of the medications.  [x] Consultation with an appropriate medical provider prior to altering or deviating from these instructions on your own.  [x] Active involvement and participation of family and natural support wherever feasible and possible.  [x] Development and review of coping strategies that can be immediately deployed in times of acute crisis.  [x] Implementation of home safety practices and the removal/reduction of access to lethal means (including, but not limited to, firearms, certain types and quantities of medication, poisons, or other methods you may have contemplated or identified).  [x] Collaborative development of a written safety plan with your treatment team and loved ones that can be immediately referred to in times of acute crisis.  [x] Utilization of a safety contract to engage your treatment team and further assess/manage risk.  [x] A good working knowledge of how to access emergency treatment in times of acute crisis.  [x] Utilization of suicide hotlines number (528) and resources in times of crisis.    If you require further information pertaining to the issues outlined above, please reach out to your providers through the [facility] portal at [email or web address], or call [phone] to discuss.  See resource list for additional material.      SAFETY NETTING:     In healthcare, safety netting refers to the provision of information to help patients or carers identify the need to consult a health care  professional if a health concern arises or changes.  The relevance of this advice is most obvious with chronic mental illnesses, as their dynamic nature, with symptoms and signs emerging at different times and in different combinations, makes safety netting particularly important.  Specific safety net advice for you includes the following:    [x] The existence of uncertainty. Mental health diagnoses and conditions contain at least some degree of uncertainty - knowing this, you should feel empowered to reconsult if necessary.  [x] What exactly to look out for. Given the recognised risk of possible deterioration or the development of complications, you should become familiar with the specific clinical features (including red flags) to look out for.    [x] How exactly to seek further help. You should know how and where to seek further help if needed.  Make a plan in advance and keep it handy.  It's also a good idea to share the plan with your treatment providers and loved ones.  [x] What to expect about time course. Mental health diagnoses and conditions often have an expected time course, which is important information for you to know.  However, if your difficulties do not conform to this time line and concerns arise, do not delay seeking further medical advice.    If you require further information pertaining to the issues outlined above, please reach out to your providers through the [facility] portal at [email or web address], or call [phone] to discuss.  See resource list for additional material.      HARM REDUCTION:     Harm Reduction techniques are used in an effort to reduce negative consequences associated with risky and maladaptive behaviors, until cessation of the problematic behaviors can be established.  Harm reduction is best thought of as a journey and not a destination; it is not an endorsement of problematic behavior, but an acknowledgement and recognition of the step-by-step nature of recovery.       Although commonly employed in working with people who suffer with drug addiction, harm reduction can be more broadly applied to any problematic behavior.    Harm Reduction and Substance Abuse:  [x] Incorporates a spectrum of strategies that includes safer use, managed use, abstinence, meeting people who use drugs where theyre at, and addressing conditions of use along with the use itself.  [x] Accepts, for better or worse, that licit and illicit drug use is part of our world and chooses to work to minimize its harmful effects rather than simply ignore or condemn them.  [x] Understands drug use as a complex, multi-faceted phenomenon that encompasses a continuum of behaviors from severe use to total abstinence, and acknowledges that some ways of using drugs are clearly safer than others.  [x] Calls for the non-judgmental, non-coercive provision of services and resources to people who use drugs and the communities in which they live in order to assist them in reducing attendant harm.  [x] Affirms people who use drugs themselves as the primary agents of reducing the harms of their drug use and seeks to empower them to share information and support each other in strategies which meet their actual conditions of use.  [x] Does not attempt to minimize or ignore the real and tragic harm and danger that can be associated with illicit drug use.  [x] Meets people where they are, but seeks to not leave them there.  [x] Examples of specific interventions include, but are not limited to, narcan (naloxone), medication assisted treatment, syringe access, overdose prevention, and safer drug use techniques.    Key Harm Reduction Strategies: Opioid Use Disorder  [x] Safe Injection Sites & Equipment  [x] Managed Use  [x] Syringe Exchange Programs  [x] Fentanyl Test Strips  [x] Pharmacotherapy/Medication Assisted Treatment  [x] Narcan  [x] Good Religion Laws  [x] Treatment Instead of assisted  [x] Diversion Programs  [x] Overdose  "Education  [x] Abstinence    Whether or not you struggle with substance abuse, any and all opportunities to employ harm reduction techniques to address difficult to change problematic behaviors should be sought and implemented - whenever and wherever feasible, relevant and applicable. Additionally, harm reduction techniques can be applied broadly, and are relevant for a multitude of situations - even those that do not involve problematic or maladaptive behaviors.     EXAMPLES OF HARM REDUCTION IN OTHER AREAS  SUN SCREEN SEAT BELTS SPEED LIMITS BIRTH CONTROL        If you require further information pertaining to the issues outlined above, please reach out to your providers through the [facility] portal at [email or web address], or call [phone] to discuss.  See resource list for additional material.      FIREARM SAFETY:     THE SIX BASIC GUN SAFETY RULES  There are six basic gun safety rules for gun owners to understand and practice at all times:  Treat all guns as if they are loaded. Always assume that a gun is loaded even if you think it is unloaded. Every time a gun is handled for any reason, check to see that it is unloaded. If you are unable to check a gun to see if it is unloaded, leave it alone and seek help from someone more knowledgeable about guns.  Keep the gun pointed in the safest possible direction. Always be aware of where a gun is pointing. A "safe direction" is one where an accidental discharge of the gun will not cause injury or damage. Only point a gun at an object you intend to shoot. Never point a gun toward yourself or another person.  Keep your finger off the trigger until you are ready to shoot. Always keep your finger off the trigger and outside the trigger guard until you are ready to shoot. Even though it may be comfortable to rest your finger on the trigger, it also is unsafe. If you are moving around with your finger on the trigger and stumble or fall, you could inadvertently pull " the trigger. Sudden loud noises or movements can result in an accidental discharge because there is a natural tendency to tighten the muscles when startled. The trigger is for firing and the handle is for handling.  Know your target, its surroundings and beyond. Check that the areas in front of and behind your target are safe before shooting. Be aware that if the bullet misses or completely passes through the target, it could strike a person or object. Identify the target and make sure it is what you intend to shoot. If you are in doubt, DON'T SHOOT! Never fire at a target that is only a movement, color, sound or unidentifiable shape. Be aware of all the people around you before you shoot.  Know how to properly operate your gun. It is important to become thoroughly familiar with your gun. You should know its mechanical characteristics including how to properly load, unload and clear a malfunction from your gun. Obviously, not all guns are mechanically the same. Never assume that what applies to one make or model is exactly applicable to another. You should direct questions regarding the operation of your gun to your firearms dealer, or contact the  directly.  Store your gun safely and securely to prevent unauthorized use. Guns and ammunition should be stored separately. When the gun is not in your hands, you must still think of safety. Use an approved firearms safety device on the gun, such as a trigger lock or cable lock, so it cannot be fired. Store it unloaded in a locked container, such as an approved lock box or a gun safe. Store your gun in a different location than the ammunition. For maximum safety you should use both a locking device and a storage container.    ADDITIONAL SAFETY POINTS  The six basic safety rules are the foundational rules for gun safety. However, there are additional safety points that must not be overlooked.  [x] Never handle a gun when you are in an emotional state such as  "anger or depression. Your judgment may be impaired. If you have acute or chronic suicidal ideation, a suicide plan, or suicidal intent, have firearms removed and your access restricted by a trusted loved one or other responsible individual or agency.  [x] Never shoot a gun in celebration (the Fourth of July or New Year's [place], for example). Not only is this unsafe, but it is generally illegal. A bullet fired into the air will return to the ground with enough speed to cause injury or death.  [x] Do not shoot at water, flat or hard surfaces. The bullet can ricochet and hit someone or something other than the target.  [x] Hand your gun to someone only after you verify that it is unloaded and the cylinder or action is open. Take a gun from someone only after you verify that it is unloaded and the cylinder or action is open.  [x] Guns, alcohol and drugs don't mix. Alcohol and drugs can negatively affect judgment as well as physical coordination. Alcohol and any other substance likely to impair normal mental or physical functions should not be used before or while handling guns. Avoid handling and using your gun when you are taking medications that cause drowsiness or include a warning to not operate machinery while taking this drug.   [x] The loud noise from a fired gun can cause hearing damage, and the debris and hot gas that is often emitted can result in eye injury. Always wear ear and eye protection when shooting a gun.      GUNS AND CHILDREN - FIREARM OWNER RESPONSIBILITIES    You Cannot Be Too Careful with Children and Guns  [x] There is no such thing as being too careful with children and guns. Never assume that simply because a toddler may lack finger strength, they can't pull the trigger. A child's thumb has twice the strength of the other fingers. When a toddler's thumb "pushes" against a trigger, invariably the barrel of the gun is pointing directly at the child's face. NEVER leave a firearm lying around the " "house.  [x] Child safety precautions still apply even if you have no children or if your children have grown to adulthood and left home. A nephew, niece, neighbor's child or a grandchild may come to visit. Practice gun safety at all times.  [x] To prevent injury or death caused by improper storage of guns in a home where children are likely to be present, you should store all guns unloaded, lock them with a firearms safety device and store them in a locked container. Ammunition should be stored in a location separate from the gun.    Talking to Children About Guns  [x] Children are naturally curious about things they don't know about or think are "forbidden." When a child asks questions or begins to act out "gun play," you may want to address his or her curiosity by answering the questions as honestly and openly as possible. This will remove the mystery and reduce the natural curiosity. Also, it is important to remember to talk to children in a manner they can relate to and understand. This is very important, especially when teaching children about the difference between "real" and "make-believe." Let children know that, even though they may look the same, real guns are very different than toy guns. A real gun will hurt or kill someone who is shot.    Instill a Mind Set of Safety and Responsibility  [x] The American Academy of Pediatrics reports that adolescence is a highly vulnerable stage in life for teenagers struggling to develop traits of identity, independence and autonomy. Children, of course, are both naturally curious and innocently unaware of many dangers around them. Thus, adolescents as well as children may not be sufficiently safeguarded by cautionary words, however frequent. Contrary actions can completely undermine good advice. A "Do as I say and not as I do" approach to gun safety is both irresponsible and dangerous.  [x] Remember that actions speak louder than words. Children learn most by observing " the adults around them. By practicing safe conduct you will also be teaching safe conduct.    Safety and Storage Devices  [x] If you decide to keep a firearm in your home you must consider the issue of how to store the firearm in a safe and secure manner. There are a variety of safety and storage devices currently available to the public in a wide range of prices. Some devices are locking mechanisms designed to keep the firearm from being loaded or fired, but don't prevent the firearm from being handled or stolen. There are also locking storage containers that hold the firearm out of sight. For maximum safety you should use both a firearm safety device and a locking storage container to store your unloaded firearm.   Two of the most common locking mechanisms are trigger locks and cable locks. Trigger locks are typically two-piece devices that fit around the trigger and trigger guard to prevent access to the trigger. One side has a post that fits into a hole in the other side. They are locked by a key or combination locking mechanism. Cable locks typically work by looping a strong steel cable through the action of the firearm to block the firearm's operation and prevent accidental firing. However, neither trigger locks nor cable locks are designed to prevent access to the firearm.   [x] Smaller lock boxes and larger gun safes are two of the most common types of locking storage containers. One advantage of lock boxes and gun safes is that they are designed to completely prevent unintended handling and removal of a firearm. Lock boxes are generally constructed of sturdy, high-grade metal opened by either a key or combination lock. Gun safes are quite heavy, usually weighing at least 50 pounds. While gun safes are typically the most expensive firearm storage devices, they are generally more reliable and secure.     Remember: Safety and storage devices are only as secure as the precautions you take to protect the key or  combination to the lock.    RULES FOR KIDS  Adults should be aware that a child could discover a gun when a parent or another adult is not present. This could happen in the child's own home; the home of a neighbor, friend or relative; or in a public place such as a school or park. If this should happen, a child should know the following rules and be taught to practice them.   Stop  The first rule for a child to follow if he/she finds or sees a gun is to stop what he/she is doing.  Don't Touch!  The second rule is for a child not to touch a gun he/she finds or sees. A child may think the best thing to do if he/she finds a gun is to pick it up and take it to an adult. A child needs to know he/she should NEVER touch a gun he/she may find or see.  Leave the Area  The third rule is to immediately leave the area. This would include never taking a gun away from another child or trying to stop someone from using gun.  Tell an Adult  The last rule is for a child to tell an adult about the gun he/she has seen. This includes times when other kids are playing with or shooting a gun.     METHODS OF CHILDPROOFING YOUR FIREARM  As a responsible handgun owner, you must recognize the need and be aware of the methods of childproofing your handgun, whether or not you have children.  Whenever children could be around, whether your own, or a friend's, relative's or neighbor's, additional safety steps should be taken when storing firearms and ammunition in your home.  [x] Always store your firearm unloaded.  [x] Use a firearms safety device AND store the firearm in a locked container.  [x] Store the ammunition separately in a locked container.  Always storing your firearm securely is the best method of childproofing your firearm; however, your choice of a storage place can add another element of safety. Carefully choose the storage place in your home especially if children may be around.  [x] Do not store your firearm where it is  visible.  [x] Do not store your firearm in a bedside table, under your mattress or pillow, or on a closet shelf.  [x] Do not store your firearm among your valuables (such as jewelry or cameras) unless it is locked in a secure container.  [x] Consider storing firearms not possessed for self-defense in a safe and secure manner away from the home.    [facility] for [facility] Safety:  https://www.everytown.org       Gun Violence: Prediction, Prevention and Policy  American Psychological Association Panel of Experts Report  https://www.apa.org/pubs/reports/gun-violence-report.pdf     If you require further information pertaining to any of the issues outlined above, please reach out to your providers through the [facility] portal at [email or web address], or call [phone] to discuss.  See resource list for additional material.      IN CASE OF SUICIDAL THINKING, call the [facility] Suicide Hotline Number: 988    988 Suicide & Crisis Lifeline: 988 , 4-742-674-TALK (8255)  Provides 24/7, free and confidential support for people in distress, prevention and crisis resources for you or your loved ones, and best practices for professionals.    Call, text or chat.  https://Flazio.org              REFERRAL RECOMMENDATIONS FOR SUBSTANCE ABUSE & MENTAL HEALTH      IN CASE OF SUICIDAL THINKING, call the [facility]In Suicide Hotline Number: 988    988 Suicide & Crisis Lifeline: 988 , 9-726-950-TALK (8255)  https://[email or web address].org       SUBSTANCE ABUSE:     [facility] (formerly known as the [facility])  [x] [phone], Option 2  [x] [address], [place] [address]  [x] [email or web address]  [x] [facility] delivers comprehensive and collaborative treatment for alcohol and substance use disorders.  Excellent program for working professionals or anyone else seeking recovery.  [x] Requires insurance approval prior to starting program, call number above for  more information.  [x] Intensive Outpatient Rehabilitation Program - M-F 9am-3pm - daily groups with psychologists and social workers, sessions with MDs 3x per week   [x] Ambulatory detox and dual diagnosis available      SUBOXONE:     NOTE: some Suboxone clinics require their clients to participate in a structured program (such as an IOP) in order to be prescribed Suboxone.  Some clinics have a long waiting list.  Most of these clinics do not accept walk-in clients, so call first to to learn what must be done to get started on Suboxone.    [facility] - [phone] (can do Sublocade)  [address], [place] [address]    [facility]  [address] [place], [place]  [phone]    [facility] - [phone] (can do Sublocade)  [address] [place] [address]    [facility]  [address] [place]  [phone]     [facility] (very short waiting list, may accept some walk-in's but call first if possible)  [address], [place] [address]  [phone]; [phone]    [facility]   [address] [place]    [phone]    [facility] (can usually be seen within a week but is cash only for appointment)  [address] [place] [address] [place], [facility]  [address] [place], [place]  [phone]    [facility] ([facility])  [address] [place] [address]  [phone]    [place], [place]:    [facility] - [address] [place] [address] - Tel: [phone]    Uziel Smith - [address] [place] [address] - Tel: [phone]    Molina Tello - [address] [place] [address] - Tel: [phone]    Billy Selby - [address] [place] [address] - Tel: [phone]    Hank Tijerina - [address] [place] [address] - Tel: [phone]    [place], [place]:     Dr. Libby Bautista and Dr. Zain Hartley - [address], [place] - Tel: [phone]    Dr. Chioma Hooker - [address]  [place], [place] - Tel: [phone]    Dr. Ramesh Rae - Tel: [phone]    Dr. Alex Agarwal [facility] - [phone]      METHADONE:     [facility] (the only methadone clinic in the [facility], has two locations)  [x] [place] - [address] [place] [address], [phone]  [x] [facility] [place] [address], [phone]      12 STEP PROGRAMS (and similar):     Alcoholics Anonymous (local)  [x] [phone]  [x] www.[email or web address] for schedules for in-person and online meetings  [x] There are AA meetings throughout the day all over [facility]  [x] AA costs nothing to attend; they pass a basket for donations but this is not required    Narcotics Anonymous  [x] [phone]  [x] www.noana.org  [x] There are NA meetings throughout the day all over [facility]  [x] NA costs nothing to attend; they pass a basket for donations but this is not required    Alcoholics Anonymous Online Intergroup (national)  [x] www.aa-intergroup.org  [x] Good resource for large, nation-wide meetings  [x] Can also attend smaller, local meetings in other cities  [x] Countless meetings all day and all night  [x] AA costs nothing to attend; they pass a basket for donations but this is not required    [facility] - 24/7 zoom meetings for women and coed - sign on anytime, anywhere!  [email or web address]    Online Intergroup of AA - [address] [place] Meeting - 24/7 zoom meetings  https://aa-intergroup.org/[email or web address]    LOOKING FOR AN ALTERNATIVE TO 12 STEP PROGRAMS - check out:  [facility]: https://www.smartrecovery.org/about-us  [facility]: [email or web address]      DETOX UNITS (USUALLY 5-7 DAYS):     [facility]: [address], [place]  [phone], call first to ensure bed availability    [facility]: [address] [place]  [phone], Option 1, call first to ensure bed availability    [place] [facility]: [address] [place]   [phone] (intake by appointment only)    [facility]: [address], [facility]  [phone]      INTENSIVE OUTPATIENT PROGRAMS:     [facility] (formerly known as [facility])  [x] [phone], Option 2  [x] [address] [place] [address], [facility] [address]  [x] [email or web address]  [x] [facility] delivers comprehensive and collaborative treatment for alcohol and substance use disorders.  Excellent program for working professionals or anyone else seeking recovery.  [x] Requires insurance approval prior to starting program, call number above for more information.  [x] Intensive Outpatient Rehabilitation Program - M-F 9am-3pm - daily groups with psychologists and social workers, sessions with MDs 3x per week   [x] Ambulatory detox and dual diagnosis available    [facility]  [x] [phone]  [x] [address] (the clinic not on [facility])  [x] Call number above for more info and to check insurance requirements    [facility]  [address] [place], [place] [address] [phone]    [facility]:  [address] [place], [place] [address]?, [phone]  [address] [place], [place] [address]?, [phone]    RESIDENTIAL REHABS (USUALLY 28 DAYS):     [facility]: [address], [phone]    [facility]: [address] , [facility]  [phone] (intake by appointment only)    [facility] (men only) [address], [place], [phone]    [facility] (Female only) [address] [place], [phone]    [facility]: [address], [place], men's program [phone], women's program [phone]    [facility]: [address], [place], [phone]    [facility]: [address], [place], [place], [phone]    [facility]: Men only, [phone], [address] [facility] Jared H[address]rove,  MOISES [facility]: [address] [place], [place], [phone]    [facility]: [address] [place], [place],  [phone]  New Location: [address] [place] [address] [address], [place] [address], [phone]    [facility]:   ?[address] [facility]. [address]?[place], [place] [address]?[phone]    [place]: [address], [place] [address], [phone]    [place]: [place], [place], [phone]     [facility]: [place], [place], [phone]    [facility]: Chacho Henson [place], [phone]    [facility] [place]: [place], [place], [phone]    [place]: Chacho [place], [phone]    [facility]: [address], [place] [address], [phone]    COMMUNITY ADDICTION CLINICS:     ACER: [address] [facility], [address] [place], [phone] -or- [address] [place] [address]    [facility] [place]: [address] [facility], [place]  [address]     [facility]: Clinics [phone]; Crisis [phone]    [facility]: [address] [place] [address]    [facility] Behavioral Health Center: [address] [place] [address]    [facility]: [address] [place] [address],    [facility]: 2nd Floor [address] [place] [address]    [facility]: [address], [place] [address]    [place] [facility] [place], [address]amilah, [place], [place] [address] [place] [address] Behavioral Health Center: [address], [facility] [phone]  (serves youth 16-23 years old)    [facility]: Leo/St. Carlin/Isaias/Geri/[facility] [phone]    [facility]: [address] [facility] [place], [facility] [phone]    [facility]: [address], [facility] [phone]    [facility]: [facility] [facility], [facility],  [phone]     [facility]: [address] [facility].[place]ish, [phone], [phone]    RESOURCES IN OTHER [facility]:     [facility]: [address] [place], [phone], [phone]    [facility]: [address] [facility], [address], [phone]    [facility], [address] [place] [address], [phone]    [facility] Behavioral Health: [address], [phone]    [facility] Health, [address] [facility], [phone] ([facility])    [facility], [address] [facility], [phone] ([facility])    [facility], [address], [place], [phone] ([facility], Burt, and [facility])    [place] [facility], [address] [facility], [phone] ([facility])    [facility] Hotline [phone], [phone]    [facility], [address] [facility], [address] [facility] [address], [facility] [place], [address] [facility] [place], 500 [facility] [address]., [facility], [address], [place]    [facility], [address], [place] [phone]    [facility], [address] [facility] [phone]    [facility], [address] [facility], [phone]    [facility] for Addiction Recovery, [address] [facility], [phone]      Bahamian SPEAKING (en español):     Información de la reunión de Alcohólicos Anónimos  [facility], 10:00 am  Habla español  Esta reunión está abierta y cualquiera puede asistir.    Icelandic speaking Alcoholics  "anonymous meetings:  El "[place] AA Skype" es un jericho on line de Alcohólicos Anónimos en español. El jericho es nilton, gratuito y virtual a través de Skype Audio. El jericho funciona mediante emerson llamada grupal de voz, por lo que no se utiliza la videollamada, ni se pueden terrie las imágenes o rostros de los participantes. Hace vicenta años y medio abrimos el primer Jericho de AA por Skype en [place]yla, danielle actualmente asisten personas desde [place], [place], [place], [place], [place], [place], [place], [place], [place], [place], [place] y [place], entre otros.    El jericho es muy útil para los alcohólicos que residen en lugares donde no se celebran reuniones de AA, o residen en lugares donde las reuniones de AA son un número limitado de días a la semana, o para aquellos compañeros que se hayan de viaje o que, por cualquier motivo, se hayan convalecientes y no pueden desplazarse. Todos los días nos reuniones a las 21:00 (hora española)    Podéis obtener más información sobre el jericho y anastasia sesiones en la página web [email or web address]      MENTAL HEALTH:     [facility] Outpatient Clinic  [phone]    [facility] - General Psychiatry Intensive Outpatient Program  [facility] (formerly known as the BMU)  [phone], option 3    [facility] - Dual Diagnosis Intensive Outpatient Program  [facility] (formerly known as the ABU)  [phone], option 2      [facility] MENTAL HEALTH CENTERS:     [facility]  (aka [facility], [facility])  Serves [facility], and [facility] residents.  Serves uninsured patients & those with Medicaid.  Main location: [address] [place] [address]  [phone]  Walk-in's available during regular business hours.  24/7 Crisis Line: [phone]    [facility]  (aka [facility])  Serves [facility].  Serves uninsured patients, those with Medicaid and some private plans.  Walk-in's available during regular business hours.  Primary care services available as well.  [facility]: [address] [facility] [address] [place] [address];  [phone]  [place]: [address] [place] [address];  [phone]  24/7 Crisis Line: [phone]    [facility]  Serves uninsured patients & those with Medicaid, call for more info.  Primary care, pediatrics, HIV treatment, and dentistry services available as well.  Three locations.  [phone]    [facility] 2d2c of [place]?Corporate Office  Serves patients with Medicaid, Medicare, and private insurance  [address]  [place],?[place] [address] [phone]    [facility]  Serves uninsured on a sliding scale, as well as Medicaid, Medicare, and private plans.  Eight locations around the [facility] area.  [phone]    [facility]  Serves uninsured patients & those with Medicaid, private insurances.  Primary care available as well.  [phone]  [address] [place] [address]    [facility]  Serves veterans who were honorably discharged.  [address] [place] [address]  [phone]  24/7 Veterans Crisis Line: [phone] (Press 1)    If you have private insurance and need to find a specialist, please contact your insurance network to request a list of providers covered by your benefits.      MENTAL HEALTH/ADDICTIVE DISORDERS:     AA ([phone]), NA ([phone])   National Suicide Prevention Lifeline- Call 1-182.414.5929 Available 24 hours everyday  [facility] [phone]; Crisis Line 222-3114 - Call for options A-F:  Intensive Outpatient Treatment/ Day programs   [facility], please contact   [facility], please contact  [phone] or [phone] to speak with an admissions counselor.  [facility] Group (Methadone Maintenance)   [address] [place] [address], [phone]  [address] [place] [address] [phone]  [facility], [address] [address], [phone]  [facility] [phone]  [employer] [facility] [place] please contact [phone]  [facility], [address] [facility], 4th [place] [address] Phone: [phone]   [facility]cer  [place] Office: [address] [address], [phone]  [place] Office: [facility], [address], [place] [address], [phone]  [place] Office: [address] [place] [address] [phone]    Outpatient Substance Abuse Treatment   [facility] (Methadone Maintenance)   [address] [place] [address], [phone]  [address], [place] [address] [phone]  [facility], [address] [address], [phone]  [facility]cer  [place] Office: [address] [place] [address], [phone]  [place] Office: [facility], [address], [place] [address], [phone]  [place] Office: [address] [facility] [address], [place] [address] [phone]  [facility], [address] [place] [address] [phone]   [facility], [address] [facility], [address], [phone]  [facility] for Addiction Recover, [address] [place], [place] [phone]    Residential Substance Abuse Treatment   [facility] [address] [place] [address], [phone] or [phone]  [facility], [facility], [phone]  [facility] (men only) [address] [place] [address], [phone]  Women at the [facility] (women only) [address] [place] [address] [phone]  [address] [place] [address] [phone]  [facility] (women only), intakes at [address] [facility], [phone]  [facility] (7-day program, $100, [address].[place], [phone], [phone], [phone])  [facility] (Men only, [phone]), [address] (Vets*/Non-Vets)  [facility] (Men only, $400/month program fee) [address] [facility] Yolanda Scott, [phone]  [facility] (Women over age 39 only), [address] [facility], [phone]    Out of Area:    [place], [address] [place] [address] 36, [place], [place] [phone])  [facility] Recovery Program (men only), [address] [place] [address], [phone]  State mental health facility, [address] [place], [place] ([phone])  [place], [address] [place] [address] Dr. Sahu, [place] ([phone])  [facility] Addiction [facility], [address] [facility], [phone]  [facility] (Women only, has to have mental illness, can be homeless or substance abuser), [phone]        DOMESTIC VIOLENCE RESOURCES:     Advocacy  [facility] (NO[facility])  [address] [address]    [facility] ? [phone]  Services provided: emergency shelter, individual advocacy, information and referrals, group support, children's program, medical advocacy, forensic medical exams, primary care, legal assistance, counseling, safety planning, and caregiver support    [facility] HEALING AND EMPOWERMENT CENTER  Confidential location  [facility] ? [phone]  Services provided: short term emergency shelter, all services provided are free of charge    [facility] FOR COMMUNITY ADVOCACY  Multiple locations in [facility], [facility], and [facility] ([place], [place], and [place] JESUS ? [phone]  Services provided: emergency shelter, individual advocacy, information and referrals, group support, children's program, medical advocacy, legal assistance in obtaining  restraining orders, counseling, safety planning, and caregiver support    [facility]   Emergency Shelter   [phone]  Emergency Services ,Legal and Financial Assistance Services ,Housing Services ,Support Services     [facility] longterm   [phone]  Emergency Services ,Counseling Services , Housing Services ,Support Services ,Children's Services     WOMEN WITH A VISION  [address] [place] [address]  WWAV ? [phone]  Services provided: advocacy, health education and supportive services, specializing in free healing services for marginalized groups, including LGBTQ individuals and sex workers    SEXUAL TRAUMA AWARENESS AND RESPONSE (STAR)  [address] [place] [address]    STAR ? [phone] [phone]  Services provided: individual advocacy, information and referrals, group support, medical advocacy, legal assistance, counseling, and safety planning for survivors of sexual assault    [facility] ([facility])  2000 [place] [address]  [facility] Forensic Program ? [phone]  Services provided: free forensic medical exams for sexual assault and domestic violence, which can be performed up to 5 days after an incident. It is not necessary to make a police report to receive a forensic medical exam    Legal  [facility]  [address] [facility] 200[place] [address]  Project SAVE ? [phone]  Services provided: free emergency legal representation for survivors of doemstic violence residing in [facility]. Legal services may include temporary restraining orders, temporary child support, custody, and use of property    [facility] LEGAL SERVICES (LS)  [address] [facility] 600[facility] [address]  SLLS ? [phone]  Services provided: free legal representation for survivors of domestic violence residing in [facility]. Legal services may include temporary child support, custody, and divorce      HOTLINES:     [place]  [facility] DOMESTIC VIOLENCE HOTLINE  [phone]    Services provided: free and confidential hotline for victims and survivors of domestic violence. All calls will be routed to a domestic violence service provided in the victim or survivor's area    [facility] HUMAN TRAFFICKING HOTLINE  [phone]    Services provided: national anti-trafficking hotline serving victims and survivors of human trafficking. Provides information about local resources, and access to safe space to report tips, seek services, and ask for help    VIA LINK  211 or [phone]    Service provided: counselors can provide crisis counseling. Counselors can also provide information and referrals to programs which can help with needs such as food, shelter, medical care, financial assistance, mental health services, substance abuse treatment, senior services, childcare, and more      HOMELESS SHELTERS:      Homeless shelters  [facility]  Emergency shelter for individuals and families  [address]  [phone]  [facility]  Emergency shelter for men only  Meals daily 6am, 2pm, & 6pm  Clothing, case management M-F by appointment (ID/job/housing/legal assistance), mail  [address] [facility]  [phone]  [facility]  Emergency shelter for men  [address] [facility]  [phone]  Emergency shelter for women  [address] [facility]  [phone]  Breakfast & lunch daily, dinner M-F  Case management, job counseling services   [facility]  Emergency shelter for teens and young adults up to 22yo  [address] [facility]  [phone]  [facility]  Emergency shelter for women over 17yo and their kids  [address] [place] [address]  [phone]  [facility]  Day program, meals M-F 1PM (arrive early)  Showers, laundry, hygiene kits, showers, phones, , notary services, case management, ID assistance  [address] [facility]  [phone] M-F 8am-2:30pm  [facility]  Day program  MTWF  7:30am-3:30pm,  8:30am-3:30pm  Crisis intervention, employment assistance, food/clothing, hygiene kits, bus tokens, mail  [address]  [phone]  [facility]  Mobile outreach for homeless persons in [facility]  [phone]  Healthcare for the Homeless  Primary healthcare, case management, dental services, TB placement  Call ahead  [address]  2nd Floor  [phone]  Carlie at the [facility]  Connects homeless people with their loved ones in other cities by providing transportation costs   [phone]      [place] RESOURCES:     [place] Mobile Mental Health Crisis Response Team:    Region 12 ([place], [place], [place], and [facility]) ([facility])  [phone]      Outpatient Mental Health & Addiction Clinic Resources for both [facility] and [facility]:    [facility] Mental Healthcare Resources  Website: www.[facility].org  Main Number: [phone]    [facility] ([facility] Area)  [address] ([facility]) [place] [address]  [phone]    [facility] ([facility])  [address] ([address]) [place] [address]  [phone]    [facility]  [address] ([address]) [place], [place] [address] [phone]    [facility]  [address] ([address] [facility]) [place] [address]  [phone]      Addiction Treatment Resources for both [facility] and [facility]:    [facility] (Detox, Residential, PHP, IOP, and Aftercare Programs)  [address] [place] [address] [phone]    [facility] (Residential, IOP, Transitional Living, and Aftercare Programs)  [address] [place] [address]  [phone]    [facility] (Inpatient, Residential, Detox, IOP, Outpatient, and Aftercare Programs)  [address] [place] [address]  Drive, [place] [address]  [phone] or toll free at [phone]      Outpatient Mental Health Psychotherapy Resources for both [facility] and [facility]:    Katie Myrick, [facility]  [address]  [place] [address]  [phone]  Specialties: Depression, Anxiety, and Life Transitions    Natalie Rosa, PhD  [address]  [place] [address]  [phone]  Specialties: Testing and Evaluation, Education and Learning Disabilities, and ADHD    Hannah Singh, [facility] [address] [place] [address]  [phone]  Specialties: Obsessive-Compulsive (OCD), Depression, and Relationship Issues    Zo Perez [facility] [address] [place] [address]  [phone]  Specialties: Trauma & PTSD, Mood Disorders, and Anxiety    Zo Kirkland, PhD, [facility] Counseling [address] [place] [address]  [phone]  Specialties: Family Conflict, Child, and Relationship Issues    Tracie Dave [facility] Counseling [place] [address] [phone]  Specialties: Anxiety, Depression, and Anger Management        IN CASE OF SUICIDAL THINKING, call the National Suicide Hotline Number: 988    988 Suicide & Crisis Lifeline: 988 , 5-054-576-TALK (8255)  Provides 24/7, free and confidential support for people in distress, prevention and crisis resources for you or your loved ones, and best practices for professionals.    Call, text or chat.  https://988[employer] Partnersline.org

## 2024-03-04 VITALS
SYSTOLIC BLOOD PRESSURE: 108 MMHG | TEMPERATURE: 98 F | HEART RATE: 79 BPM | HEIGHT: 64 IN | RESPIRATION RATE: 17 BRPM | WEIGHT: 151.69 LBS | DIASTOLIC BLOOD PRESSURE: 72 MMHG | OXYGEN SATURATION: 96 % | BODY MASS INDEX: 25.9 KG/M2

## 2024-03-04 LAB
ANION GAP SERPL CALC-SCNC: 6 MMOL/L (ref 8–16)
BACTERIA UR CULT: NORMAL
BASOPHILS # BLD AUTO: 0.04 K/UL (ref 0–0.2)
BASOPHILS NFR BLD: 0.7 % (ref 0–1.9)
BUN SERPL-MCNC: 31 MG/DL (ref 6–20)
CALCIUM SERPL-MCNC: 8.9 MG/DL (ref 8.7–10.5)
CHLORIDE SERPL-SCNC: 103 MMOL/L (ref 95–110)
CO2 SERPL-SCNC: 32 MMOL/L (ref 23–29)
CREAT SERPL-MCNC: 2.3 MG/DL (ref 0.5–1.4)
DIFFERENTIAL METHOD BLD: ABNORMAL
EOSINOPHIL # BLD AUTO: 0.3 K/UL (ref 0–0.5)
EOSINOPHIL NFR BLD: 6.1 % (ref 0–8)
ERYTHROCYTE [DISTWIDTH] IN BLOOD BY AUTOMATED COUNT: 13.2 % (ref 11.5–14.5)
EST. GFR  (NO RACE VARIABLE): 25 ML/MIN/1.73 M^2
GLUCOSE SERPL-MCNC: 92 MG/DL (ref 70–110)
HCT VFR BLD AUTO: 31 % (ref 37–48.5)
HGB BLD-MCNC: 10.2 G/DL (ref 12–16)
IMM GRANULOCYTES # BLD AUTO: 0.01 K/UL (ref 0–0.04)
IMM GRANULOCYTES NFR BLD AUTO: 0.2 % (ref 0–0.5)
LYMPHOCYTES # BLD AUTO: 2.7 K/UL (ref 1–4.8)
LYMPHOCYTES NFR BLD: 49.2 % (ref 18–48)
MAGNESIUM SERPL-MCNC: 1.7 MG/DL (ref 1.6–2.6)
MCH RBC QN AUTO: 29.9 PG (ref 27–31)
MCHC RBC AUTO-ENTMCNC: 32.9 G/DL (ref 32–36)
MCV RBC AUTO: 91 FL (ref 82–98)
MONOCYTES # BLD AUTO: 0.7 K/UL (ref 0.3–1)
MONOCYTES NFR BLD: 12.8 % (ref 4–15)
NEUTROPHILS # BLD AUTO: 1.7 K/UL (ref 1.8–7.7)
NEUTROPHILS NFR BLD: 31 % (ref 38–73)
NRBC BLD-RTO: 0 /100 WBC
OHS QRS DURATION: 94 MS
OHS QTC CALCULATION: 520 MS
PHOSPHATE SERPL-MCNC: 2.4 MG/DL (ref 2.7–4.5)
PLATELET # BLD AUTO: 163 K/UL (ref 150–450)
PMV BLD AUTO: 9.6 FL (ref 9.2–12.9)
POTASSIUM SERPL-SCNC: 3.1 MMOL/L (ref 3.5–5.1)
PTH-INTACT SERPL-MCNC: 337.1 PG/ML (ref 9–77)
RBC # BLD AUTO: 3.41 M/UL (ref 4–5.4)
SODIUM SERPL-SCNC: 141 MMOL/L (ref 136–145)
WBC # BLD AUTO: 5.53 K/UL (ref 3.9–12.7)

## 2024-03-04 PROCEDURE — 80048 BASIC METABOLIC PNL TOTAL CA: CPT | Performed by: INTERNAL MEDICINE

## 2024-03-04 PROCEDURE — 25000003 PHARM REV CODE 250: Performed by: INTERNAL MEDICINE

## 2024-03-04 PROCEDURE — 36415 COLL VENOUS BLD VENIPUNCTURE: CPT | Performed by: INTERNAL MEDICINE

## 2024-03-04 PROCEDURE — 83735 ASSAY OF MAGNESIUM: CPT | Performed by: INTERNAL MEDICINE

## 2024-03-04 PROCEDURE — 25000003 PHARM REV CODE 250: Performed by: STUDENT IN AN ORGANIZED HEALTH CARE EDUCATION/TRAINING PROGRAM

## 2024-03-04 PROCEDURE — 85025 COMPLETE CBC W/AUTO DIFF WBC: CPT | Performed by: INTERNAL MEDICINE

## 2024-03-04 PROCEDURE — 63600175 PHARM REV CODE 636 W HCPCS: Performed by: INTERNAL MEDICINE

## 2024-03-04 PROCEDURE — 84100 ASSAY OF PHOSPHORUS: CPT | Performed by: INTERNAL MEDICINE

## 2024-03-04 RX ORDER — ALLOPURINOL 200 MG/1
200 TABLET ORAL DAILY
Qty: 30 TABLET | Refills: 0 | Status: SHIPPED | OUTPATIENT
Start: 2024-03-04

## 2024-03-04 RX ORDER — LANOLIN ALCOHOL/MO/W.PET/CERES
400 CREAM (GRAM) TOPICAL ONCE
Status: COMPLETED | OUTPATIENT
Start: 2024-03-04 | End: 2024-03-04

## 2024-03-04 RX ADMIN — SODIUM CHLORIDE, POTASSIUM CHLORIDE, SODIUM LACTATE AND CALCIUM CHLORIDE: 600; 310; 30; 20 INJECTION, SOLUTION INTRAVENOUS at 06:03

## 2024-03-04 RX ADMIN — AMLODIPINE BESYLATE 10 MG: 5 TABLET ORAL at 08:03

## 2024-03-04 RX ADMIN — ALLOPURINOL 200 MG: 100 TABLET ORAL at 08:03

## 2024-03-04 RX ADMIN — HYDRALAZINE HYDROCHLORIDE 25 MG: 25 TABLET, FILM COATED ORAL at 06:03

## 2024-03-04 RX ADMIN — ALPRAZOLAM 0.5 MG: 0.5 TABLET ORAL at 12:03

## 2024-03-04 RX ADMIN — CARVEDILOL 25 MG: 12.5 TABLET, FILM COATED ORAL at 08:03

## 2024-03-04 RX ADMIN — Medication 400 MG: at 11:03

## 2024-03-04 RX ADMIN — ACETAMINOPHEN 650 MG: 325 TABLET ORAL at 07:03

## 2024-03-04 RX ADMIN — POTASSIUM BICARBONATE 25 MEQ: 977.5 TABLET, EFFERVESCENT ORAL at 11:03

## 2024-03-04 NOTE — DISCHARGE SUMMARY
"[facility] Medicine  Discharge Summary      Patient Name: Jacqueline Manuel  MRN: [id number]  SHABBIR: [id number]  Patient Class: IP- Inpatient  Admission Date: 3/1/2024  Hospital Length of Stay: 3 days  Discharge Date and Time:  03/04/2024 12:33 PM  Attending Physician: Dax Cordova III, MD   Discharging Provider: Dax Cordova III, MD  Primary Care Provider: Tamika, Primary Doctor    Primary Care Team: Networked reference to record PCT     HPI:   Ms. Manuel is a 53-year-old -American female who is brought into the emergency room after being "found down.  Her medical history significant for hypertension, COPD, CKD 3.  She reports that she took 2 pills of her pain medication yesterday because she was having headache and abdominal pain.  The daughter noted that when she went home she found the patient on the floor unresponsive.  EMS was called and the patient was noted to have some pinpoint pupils and was given Narcan with patient being more responsive.  The daughter was concerned about the patient potentially having intentional overdose.    On arrival in the ER, the patient was alert and oriented, CBC within normal limits, BMP shows an JEY with creatinine of 5.9 with baseline of 1.36 (10/21/2023), mild troponin elevation of 0.072 in setting of JEY.  Patient's right elbow x-ray is negative, chest x-ray is negative for acute findings, renal ultrasound is limited but does not show any evidence of hydronephrosis or acute process.  CT head ordered after seeing the patient, which is pending at the time of this admission.    Due to family concerns, psychiatry consultation was requested from the emergency room, and as per telepsychiatry recommendations the patient was PEC'd.           * No surgery found *      Hospital Course:   53F with pmh of copd, ckd, htn who presents after being found unresponsive by daughter. EMS was called and the patient was noted to have some pinpoint pupils and was given Narcan " with patient being more responsive.  The daughter was concerned about the patient potentially having intentional overdose. On arrival in the ER, the patient was alert and oriented, CBC within normal limits, BMP shows an JEY with creatinine of 5.9 with baseline of 1.36 (10/21/2023), mild troponin elevation of 0.072 in setting of JEY.  Patient's right elbow x-ray is negative, chest x-ray is negative for acute findings, renal ultrasound is limited but does not show any evidence of hydronephrosis or acute process. Psychiatry evaluation on admission with pt put on PEC for concerns this could have been intentional, but pt has been adamant that this is not the case. Psychiatry reconsulted. Due to jey pt followed by nephrology. Renal function improved with fluids and nephrology cleared pt for discharge with outpatient nephrology referral sent. Pt feeling better and after discussion with psychiatry PEC rescinded per psychiatry recommendations. Discussed at length need for drug cessation and patient endorsed understanding.  Patient requesting discharge home and overall doing a lot better.  Discharge plan and return precautions given to patient at bedside who endorsed understanding and all questions answered prior to discharge home.     Goals of Care Treatment Preferences:  Code Status: Full Code      Consults:   Consults (From admission, onward)          Status Ordering Provider     Inpatient consult to Telemedicine - Psyc  Once        Provider:  (Not yet assigned)    Completed JENNIFER LAL III     Inpatient consult to Nephrology  Once        Provider:  Nicole Hernandez MD    Acknowledged MANISH CHÁVEZ            No new Assessment & Plan notes have been filed under this hospital service since the last note was generated.  Service: Hospital Medicine    Final Active Diagnoses:    Diagnosis Date Noted POA    PRINCIPAL PROBLEM:  Syncope and collapse [R55] 03/02/2024 Yes    JEY (acute kidney injury) [N17.9] 03/02/2024 Yes    At  risk for self harm [R45.89] 03/02/2024 Not Applicable    COPD (chronic obstructive pulmonary disease) [J44.9] 03/02/2024 Yes    Hypertension, essential [I10] 03/02/2024 Yes      Problems Resolved During this Admission:       Discharged Condition: fair    Disposition: Home or Self Care    Follow Up:   Follow-up Information       Bernardo Duran MD Follow up.    Specialty: Nephrology  Why: Doctor's office will call with appointment date and time  Contact information:  [address] [place] [address] [address]  [place] [phone]  [phone]               [facility] -. Schedule an appointment as soon as possible for a visit in 1 week(s).    Why: for Hospital Follow up  Contact information:  [address]  [place] [id number] [phone]               Suicide hotline Follow up.    Contact information:  IN CASE OF SUICIDAL THINKING, call the National Suicide Hotline Number: 988     988 Suicide & Crisis Lifeline: 988 , 9-532-403-TALK (6716)  https://"[employer].org             narc[employer]. Call.    Why: Find a meeting near you  Contact information:  [phone]  or  [phone]             [facility]. Call.    Why: For addition assistance  Contact information:  [address] [place] [address]  Hours:   Open 24 hours  Phone: [phone]                         Patient Instructions:      Ambulatory referral/consult to Nephrology   Standing Status: Future   Referral Priority: Routine Referral Type: Consultation   Referral Reason: Specialty Services Required   Referred to Provider: BERNARDO DURAN Requested Specialty: Nephrology   Number of Visits Requested: 1     Ambulatory referral/consult to [facility]   Standing Status: Future   Referral Priority: Urgent Referral Type: Consultation   Referral Reason: Specialty Services Required   Number of Visits Requested: 1     Diet renal     Notify your health care provider if you experience any of  the following:  increased confusion or weakness     Notify your health care provider if you experience any of the following:  persistent dizziness, light-headedness, or visual disturbances     Notify your health care provider if you experience any of the following:  worsening rash     Notify your health care provider if you experience any of the following:  severe persistent headache     Notify your health care provider if you experience any of the following:  difficulty breathing or increased cough     Notify your health care provider if you experience any of the following:  redness, tenderness, or signs of infection (pain, swelling, redness, odor or green/yellow discharge around incision site)     Notify your health care provider if you experience any of the following:  severe uncontrolled pain     Notify your health care provider if you experience any of the following:  persistent nausea and vomiting or diarrhea     Notify your health care provider if you experience any of the following:  temperature >100.4     Activity as tolerated       Significant Diagnostic Studies: Labs: All labs within the past 24 hours have been reviewed    Pending Diagnostic Studies:       None           Medications:  Reconciled Home Medications:      Medication List        START taking these medications      allopurinoL 200 mg Tab  Take 200 mg by mouth once daily.            CONTINUE taking these medications      amLODIPine 10 MG tablet  Commonly known as: NORVASC  Take 1 tablet (10 mg total) by mouth once daily.     blood pressure monitor Kit  1 kit by Misc.(Non-Drug; Combo Route) route once daily. Use daily to check blood pressure     carvediloL 25 MG tablet  Commonly known as: COREG  Take 1 tablet (25 mg total) by mouth 2 (two) times daily.     hydrALAZINE 100 MG tablet  Commonly known as: APRESOLINE  Take 1 tablet (100 mg total) by mouth every 8 (eight) hours.            STOP taking these medications      losartan 100 MG  tablet  Commonly known as: COZAAR              Indwelling Lines/Drains at time of discharge:   Lines/Drains/Airways       Drain  Duration             Female External Urinary Catheter w/ Suction 03/04/24 0000 <1 day                    Time spent on the discharge of patient: 45 minutes         Dax Cordova III, MD  Department of Hospital Medicine  [facility]

## 2024-03-04 NOTE — PLAN OF CARE
Problem: Adult Inpatient Plan of Care  Goal: Plan of Care Review  Flowsheets (Taken 3/4/2024 0825)  Plan of Care Reviewed With: patient  Goal: Absence of Hospital-Acquired Illness or Injury  Intervention: Identify and Manage Fall Risk  Flowsheets (Taken 3/4/2024 0825)  Safety Promotion/Fall Prevention: Fall Risk reviewed with patient/family  Intervention: Prevent Skin Injury  Flowsheets (Taken 3/4/2024 0825)  Body Position: position changed independently  Intervention: Prevent and Manage VTE (Venous Thromboembolism) Risk  Flowsheets (Taken 3/4/2024 0825)  VTE Prevention/Management: ROM (active) performed  Range of Motion: active ROM (range of motion) encouraged  Goal: Optimal Comfort and Wellbeing  Intervention: Monitor Pain and Promote Comfort  Flowsheets (Taken 3/4/2024 0825)  Pain Management Interventions: pain management plan reviewed with patient/caregiver  Intervention: Provide Person-Centered Care  Flowsheets (Taken 3/4/2024 0825)  Trust Relationship/Rapport:   care explained   choices provided   questions answered   questions encouraged   reassurance provided   thoughts/feelings acknowledged     Problem: Fluid and Electrolyte Imbalance (Acute Kidney Injury/Impairment)  Goal: Fluid and Electrolyte Balance  Intervention: Monitor and Manage Fluid and Electrolyte Balance  Flowsheets (Taken 3/4/2024 0825)  Fluid/Electrolyte Management: intravenous fluid replacement initiated     Problem: Renal Function Impairment (Acute Kidney Injury/Impairment)  Goal: Effective Renal Function  Intervention: Monitor and Support Renal Function  Flowsheets (Taken 3/4/2024 0825)  Medication Review/Management: medications reviewed     Problem: Skin Injury Risk Increased  Goal: Skin Health and Integrity  Intervention: Optimize Skin Protection  Flowsheets (Taken 3/4/2024 0825)  Head of Bed (HOB) Positioning: HOB at 30-45 degrees

## 2024-03-04 NOTE — NURSING
Pt refused w/c transport and left unit walking with her daughter at her side,she refused staff escort.

## 2024-03-04 NOTE — NURSING
Per handoff received from SHELBY Duval. Patient care assumed. Patients overall condition assessed and patient appears to be in NAD with no complaints of pain. 18g LAC is clean, dry, and intact. Sitter and patient instructed to inform the nurse if anything is needed. Patient stable and is continually being monitored.

## 2024-03-04 NOTE — NURSING
[facility]  Nurses Note -- 4 Eyes      3/4/2024       Skin assessed on: Q Shift      [x] No Pressure Injuries Present    [x]Prevention Measures Documented    [] Yes LDA  for Pressure Injury Previously documented     [] Yes New Pressure Injury Discovered   [] LDA for New Pressure Injury Added      Attending RN:  Chantel San LPN     Second RN:  CHARLES Shirley

## 2024-03-04 NOTE — NURSING
[facility]  Nurses Note -- 4 Eyes      3/3/2024       Skin assessed on: Q Shift      [x] No Pressure Injuries Present    [x]Prevention Measures Documented    [] Yes LDA  for Pressure Injury Previously documented     [] Yes New Pressure Injury Discovered   [] LDA for New Pressure Injury Added    Scabs to left side of face and bridge of nose from previous fall.      Attending RN:  Fern Farnsworth RN     Second RN:  Mukund Flanagan LPN

## 2024-03-04 NOTE — PLAN OF CARE
Case Management Final Discharge Note      Discharge Disposition: home    New DME ordered / company name: n/a    Relevant SDOH / Transition of Care Barriers:  none    Primary Caretaker and contact information: Renetta Manuel (Daughter) [phone]     Scheduled followup appointment:  office to call patient     Referrals placed: per MD     Transportation: private vehicle    Patient and family educated on discharge services and updated on DC plan. Bedside RN notified, patient clear to discharge from Case Management Perspective.        03/04/24 1307   Final Note   Assessment Type Final Discharge Note   Anticipated Discharge Disposition Home   Hospital Resources/Appts/Education Provided Appointment suggestion unavailable  (Patient must self schedule at [facility] / Dr Lynn's office to call patient with scheduled date and time)   Post-Acute Status   Post-Acute Authorization Other   Other Status No Post-Acute Service Needs   Discharge Delays None known at this time

## 2024-03-07 NOTE — ASSESSMENT & PLAN NOTE
Chronic, controlled. Latest blood pressure and vitals reviewed-     Temp:  [97.8 °F (36.6 °C)-97.9 °F (36.6 °C)]   Pulse:  []   Resp:  [12-28]   BP: (109-143)/(58-84)   SpO2:  [96 %-100 %] .   Home meds for hypertension were reviewed and noted below.   Hypertension Medications               amLODIPine (NORVASC) 10 MG tablet Take 1 tablet by mouth once daily.    carvediloL (COREG) 25 MG tablet Take 1 tablet by mouth 2 (two) times daily with meals.            While in the hospital, will manage blood pressure as follows; Continue home antihypertensive regimen    Will utilize p.r.n. blood pressure medication only if patient's blood pressure greater than 180/110 and she develops symptoms such as worsening chest pain or shortness of breath.   Moderate Variability

## 2024-03-27 ENCOUNTER — HOSPITAL ENCOUNTER (INPATIENT)
Facility: HOSPITAL | Age: 54
LOS: 2 days | Discharge: HOME | DRG: 190 | End: 2024-03-29
Attending: EMERGENCY MEDICINE | Admitting: HOSPITALIST
Payer: MEDICAID

## 2024-03-27 DIAGNOSIS — J12.3 HUMAN METAPNEUMOVIRUS PNEUMONIA: ICD-10-CM

## 2024-03-27 DIAGNOSIS — R06.02 SHORTNESS OF BREATH: ICD-10-CM

## 2024-03-27 DIAGNOSIS — J06.9 UPPER RESPIRATORY TRACT INFECTION, UNSPECIFIED TYPE: ICD-10-CM

## 2024-03-27 DIAGNOSIS — J44.1 COPD EXACERBATION: Primary | ICD-10-CM

## 2024-03-27 LAB
ADENOVIRUS: NOT DETECTED
ALBUMIN SERPL BCP-MCNC: 3.4 G/DL (ref 3.5–5.2)
ALLENS TEST: ABNORMAL
ALP SERPL-CCNC: 94 U/L (ref 55–135)
ALT SERPL W/O P-5'-P-CCNC: 18 U/L (ref 10–44)
ANION GAP SERPL CALC-SCNC: 12 MMOL/L (ref 8–16)
ANION GAP SERPL CALC-SCNC: 9 MMOL/L (ref 8–16)
AST SERPL-CCNC: 20 U/L (ref 10–40)
BASOPHILS # BLD AUTO: 0.01 K/UL (ref 0–0.2)
BASOPHILS # BLD AUTO: 0.03 K/UL (ref 0–0.2)
BASOPHILS NFR BLD: 0.2 % (ref 0–1.9)
BASOPHILS NFR BLD: 0.5 % (ref 0–1.9)
BILIRUB SERPL-MCNC: 0.6 MG/DL (ref 0.1–1)
BNP SERPL-MCNC: 180 PG/ML (ref 0–99)
BORDETELLA PARAPERTUSSIS (IS1001): NOT DETECTED
BORDETELLA PERTUSSIS (PTXP): NOT DETECTED
BUN SERPL-MCNC: 26 MG/DL (ref 6–20)
BUN SERPL-MCNC: 27 MG/DL (ref 6–20)
CALCIUM SERPL-MCNC: 10 MG/DL (ref 8.7–10.5)
CALCIUM SERPL-MCNC: 9.8 MG/DL (ref 8.7–10.5)
CHLAMYDIA PNEUMONIAE: NOT DETECTED
CHLORIDE SERPL-SCNC: 104 MMOL/L (ref 95–110)
CHLORIDE SERPL-SCNC: 106 MMOL/L (ref 95–110)
CO2 SERPL-SCNC: 26 MMOL/L (ref 23–29)
CO2 SERPL-SCNC: 27 MMOL/L (ref 23–29)
CORONAVIRUS 229E, COMMON COLD VIRUS: NOT DETECTED
CORONAVIRUS HKU1, COMMON COLD VIRUS: NOT DETECTED
CORONAVIRUS NL63, COMMON COLD VIRUS: NOT DETECTED
CORONAVIRUS OC43, COMMON COLD VIRUS: NOT DETECTED
CREAT SERPL-MCNC: 1.8 MG/DL (ref 0.5–1.4)
CREAT SERPL-MCNC: 2.1 MG/DL (ref 0.5–1.4)
CTP QC/QA: YES
CTP QC/QA: YES
DELSYS: ABNORMAL
DIFFERENTIAL METHOD BLD: ABNORMAL
DIFFERENTIAL METHOD BLD: NORMAL
EOSINOPHIL # BLD AUTO: 0 K/UL (ref 0–0.5)
EOSINOPHIL # BLD AUTO: 0.2 K/UL (ref 0–0.5)
EOSINOPHIL NFR BLD: 0 % (ref 0–8)
EOSINOPHIL NFR BLD: 3.4 % (ref 0–8)
ERYTHROCYTE [DISTWIDTH] IN BLOOD BY AUTOMATED COUNT: 13.1 % (ref 11.5–14.5)
ERYTHROCYTE [DISTWIDTH] IN BLOOD BY AUTOMATED COUNT: 13.1 % (ref 11.5–14.5)
EST. GFR  (NO RACE VARIABLE): 28 ML/MIN/1.73 M^2
EST. GFR  (NO RACE VARIABLE): 34 ML/MIN/1.73 M^2
FIO2: 21
FLUBV RNA NPH QL NAA+NON-PROBE: NOT DETECTED
GLUCOSE SERPL-MCNC: 124 MG/DL (ref 70–110)
GLUCOSE SERPL-MCNC: 153 MG/DL (ref 70–110)
HCO3 UR-SCNC: 25.6 MMOL/L (ref 24–28)
HCT VFR BLD AUTO: 39.6 % (ref 37–48.5)
HCT VFR BLD AUTO: 40.3 % (ref 37–48.5)
HGB BLD-MCNC: 12.8 G/DL (ref 12–16)
HGB BLD-MCNC: 13.2 G/DL (ref 12–16)
HPIV1 RNA NPH QL NAA+NON-PROBE: NOT DETECTED
HPIV2 RNA NPH QL NAA+NON-PROBE: NOT DETECTED
HPIV3 RNA NPH QL NAA+NON-PROBE: NOT DETECTED
HPIV4 RNA NPH QL NAA+NON-PROBE: NOT DETECTED
HUMAN METAPNEUMOVIRUS: DETECTED
IMM GRANULOCYTES # BLD AUTO: 0.03 K/UL (ref 0–0.04)
IMM GRANULOCYTES # BLD AUTO: 0.03 K/UL (ref 0–0.04)
IMM GRANULOCYTES NFR BLD AUTO: 0.5 % (ref 0–0.5)
IMM GRANULOCYTES NFR BLD AUTO: 0.5 % (ref 0–0.5)
INFLUENZA A (SUBTYPES H1,H1-2009,H3): NOT DETECTED
LYMPHOCYTES # BLD AUTO: 0.9 K/UL (ref 1–4.8)
LYMPHOCYTES # BLD AUTO: 1.6 K/UL (ref 1–4.8)
LYMPHOCYTES NFR BLD: 14.5 % (ref 18–48)
LYMPHOCYTES NFR BLD: 26.7 % (ref 18–48)
MAGNESIUM SERPL-MCNC: 1.4 MG/DL (ref 1.6–2.6)
MCH RBC QN AUTO: 29.7 PG (ref 27–31)
MCH RBC QN AUTO: 29.8 PG (ref 27–31)
MCHC RBC AUTO-ENTMCNC: 32.3 G/DL (ref 32–36)
MCHC RBC AUTO-ENTMCNC: 32.8 G/DL (ref 32–36)
MCV RBC AUTO: 91 FL (ref 82–98)
MCV RBC AUTO: 92 FL (ref 82–98)
MODE: ABNORMAL
MONOCYTES # BLD AUTO: 0.1 K/UL (ref 0.3–1)
MONOCYTES # BLD AUTO: 0.8 K/UL (ref 0.3–1)
MONOCYTES NFR BLD: 13.2 % (ref 4–15)
MONOCYTES NFR BLD: 2.2 % (ref 4–15)
MYCOPLASMA PNEUMONIAE: NOT DETECTED
NEUTROPHILS # BLD AUTO: 3.4 K/UL (ref 1.8–7.7)
NEUTROPHILS # BLD AUTO: 4.9 K/UL (ref 1.8–7.7)
NEUTROPHILS NFR BLD: 55.7 % (ref 38–73)
NEUTROPHILS NFR BLD: 82.6 % (ref 38–73)
NRBC BLD-RTO: 0 /100 WBC
NRBC BLD-RTO: 0 /100 WBC
PCO2 BLDA: 28.9 MMHG (ref 35–45)
PH SMN: 7.56 [PH] (ref 7.35–7.45)
PHOSPHATE SERPL-MCNC: 1.6 MG/DL (ref 2.7–4.5)
PLATELET # BLD AUTO: 253 K/UL (ref 150–450)
PLATELET # BLD AUTO: 290 K/UL (ref 150–450)
PMV BLD AUTO: 9.2 FL (ref 9.2–12.9)
PMV BLD AUTO: 9.5 FL (ref 9.2–12.9)
PO2 BLDA: 66 MMHG (ref 80–100)
POC BE: 4 MMOL/L
POC MOLECULAR INFLUENZA A AGN: NEGATIVE
POC MOLECULAR INFLUENZA B AGN: NEGATIVE
POC SATURATED O2: 96 % (ref 95–100)
POC TCO2: 27 MMOL/L (ref 23–27)
POTASSIUM SERPL-SCNC: 3.5 MMOL/L (ref 3.5–5.1)
POTASSIUM SERPL-SCNC: 3.5 MMOL/L (ref 3.5–5.1)
PROT SERPL-MCNC: 8.6 G/DL (ref 6–8.4)
RBC # BLD AUTO: 4.29 M/UL (ref 4–5.4)
RBC # BLD AUTO: 4.45 M/UL (ref 4–5.4)
RESPIRATORY INFECTION PANEL SOURCE: ABNORMAL
RSV RNA NPH QL NAA+NON-PROBE: NOT DETECTED
RV+EV RNA NPH QL NAA+NON-PROBE: NOT DETECTED
SAMPLE: ABNORMAL
SARS-COV-2 RDRP RESP QL NAA+PROBE: NEGATIVE
SARS-COV-2 RNA RESP QL NAA+PROBE: NOT DETECTED
SITE: ABNORMAL
SODIUM SERPL-SCNC: 142 MMOL/L (ref 136–145)
SODIUM SERPL-SCNC: 142 MMOL/L (ref 136–145)
TROPONIN I SERPL DL<=0.01 NG/ML-MCNC: 0.02 NG/ML (ref 0–0.03)
WBC # BLD AUTO: 5.94 K/UL (ref 3.9–12.7)
WBC # BLD AUTO: 6.14 K/UL (ref 3.9–12.7)

## 2024-03-27 PROCEDURE — 99291 CRITICAL CARE FIRST HOUR: CPT | Mod: 25

## 2024-03-27 PROCEDURE — 25000242 PHARM REV CODE 250 ALT 637 W/ HCPCS: Performed by: EMERGENCY MEDICINE

## 2024-03-27 PROCEDURE — 93010 ELECTROCARDIOGRAM REPORT: CPT | Mod: ,,, | Performed by: INTERNAL MEDICINE

## 2024-03-27 PROCEDURE — 21400001 HC TELEMETRY ROOM

## 2024-03-27 PROCEDURE — 87635 SARS-COV-2 COVID-19 AMP PRB: CPT | Performed by: EMERGENCY MEDICINE

## 2024-03-27 PROCEDURE — 94761 N-INVAS EAR/PLS OXIMETRY MLT: CPT

## 2024-03-27 PROCEDURE — 25000003 PHARM REV CODE 250

## 2024-03-27 PROCEDURE — 82803 BLOOD GASES ANY COMBINATION: CPT

## 2024-03-27 PROCEDURE — 83880 ASSAY OF NATRIURETIC PEPTIDE: CPT | Performed by: EMERGENCY MEDICINE

## 2024-03-27 PROCEDURE — 63600175 PHARM REV CODE 636 W HCPCS: Performed by: NURSE PRACTITIONER

## 2024-03-27 PROCEDURE — G0378 HOSPITAL OBSERVATION PER HR: HCPCS

## 2024-03-27 PROCEDURE — 96365 THER/PROPH/DIAG IV INF INIT: CPT

## 2024-03-27 PROCEDURE — 63700000 PHARM REV CODE 250 ALT 637 W/O HCPCS

## 2024-03-27 PROCEDURE — 63600175 PHARM REV CODE 636 W HCPCS

## 2024-03-27 PROCEDURE — 25000242 PHARM REV CODE 250 ALT 637 W/ HCPCS

## 2024-03-27 PROCEDURE — 87502 INFLUENZA DNA AMP PROBE: CPT

## 2024-03-27 PROCEDURE — 80053 COMPREHEN METABOLIC PANEL: CPT | Performed by: EMERGENCY MEDICINE

## 2024-03-27 PROCEDURE — 94644 CONT INHLJ TX 1ST HOUR: CPT

## 2024-03-27 PROCEDURE — 63600175 PHARM REV CODE 636 W HCPCS: Performed by: EMERGENCY MEDICINE

## 2024-03-27 PROCEDURE — 36600 WITHDRAWAL OF ARTERIAL BLOOD: CPT

## 2024-03-27 PROCEDURE — 80048 BASIC METABOLIC PNL TOTAL CA: CPT | Mod: XB

## 2024-03-27 PROCEDURE — 96375 TX/PRO/DX INJ NEW DRUG ADDON: CPT

## 2024-03-27 PROCEDURE — 94640 AIRWAY INHALATION TREATMENT: CPT | Mod: XB

## 2024-03-27 PROCEDURE — 84100 ASSAY OF PHOSPHORUS: CPT

## 2024-03-27 PROCEDURE — 85025 COMPLETE CBC W/AUTO DIFF WBC: CPT | Performed by: EMERGENCY MEDICINE

## 2024-03-27 PROCEDURE — 84484 ASSAY OF TROPONIN QUANT: CPT | Performed by: EMERGENCY MEDICINE

## 2024-03-27 PROCEDURE — 99900035 HC TECH TIME PER 15 MIN (STAT)

## 2024-03-27 PROCEDURE — 85025 COMPLETE CBC W/AUTO DIFF WBC: CPT | Mod: 91

## 2024-03-27 PROCEDURE — 96376 TX/PRO/DX INJ SAME DRUG ADON: CPT

## 2024-03-27 PROCEDURE — 93005 ELECTROCARDIOGRAM TRACING: CPT

## 2024-03-27 PROCEDURE — 87798 DETECT AGENT NOS DNA AMP: CPT

## 2024-03-27 PROCEDURE — 83735 ASSAY OF MAGNESIUM: CPT

## 2024-03-27 RX ORDER — AZITHROMYCIN 250 MG/1
500 TABLET, FILM COATED ORAL DAILY
Status: DISCONTINUED | OUTPATIENT
Start: 2024-03-28 | End: 2024-03-29

## 2024-03-27 RX ORDER — ALBUTEROL SULFATE 90 UG/1
1-2 AEROSOL, METERED RESPIRATORY (INHALATION) EVERY 6 HOURS PRN
Qty: 8 G | Refills: 0 | Status: SHIPPED | OUTPATIENT
Start: 2024-03-27 | End: 2024-03-29

## 2024-03-27 RX ORDER — GABAPENTIN 600 MG/1
600 TABLET ORAL DAILY
COMMUNITY

## 2024-03-27 RX ORDER — ALBUTEROL SULFATE 2.5 MG/.5ML
5 SOLUTION RESPIRATORY (INHALATION)
Status: COMPLETED | OUTPATIENT
Start: 2024-03-27 | End: 2024-03-27

## 2024-03-27 RX ORDER — IPRATROPIUM BROMIDE 0.5 MG/2.5ML
0.5 SOLUTION RESPIRATORY (INHALATION) ONCE
Status: COMPLETED | OUTPATIENT
Start: 2024-03-27 | End: 2024-03-27

## 2024-03-27 RX ORDER — ESCITALOPRAM OXALATE 10 MG/1
10 TABLET ORAL DAILY
Status: DISCONTINUED | OUTPATIENT
Start: 2024-03-27 | End: 2024-03-29 | Stop reason: HOSPADM

## 2024-03-27 RX ORDER — HYDRALAZINE HYDROCHLORIDE 20 MG/ML
10 INJECTION INTRAMUSCULAR; INTRAVENOUS EVERY 6 HOURS PRN
Status: DISCONTINUED | OUTPATIENT
Start: 2024-03-27 | End: 2024-03-29 | Stop reason: HOSPADM

## 2024-03-27 RX ORDER — IPRATROPIUM BROMIDE AND ALBUTEROL SULFATE 2.5; .5 MG/3ML; MG/3ML
3 SOLUTION RESPIRATORY (INHALATION) EVERY 4 HOURS PRN
Status: DISCONTINUED | OUTPATIENT
Start: 2024-03-27 | End: 2024-03-29 | Stop reason: HOSPADM

## 2024-03-27 RX ORDER — PREDNISONE 20 MG/1
40 TABLET ORAL DAILY
Status: DISCONTINUED | OUTPATIENT
Start: 2024-03-27 | End: 2024-03-27

## 2024-03-27 RX ORDER — PREDNISONE 20 MG/1
40 TABLET ORAL DAILY
Qty: 10 TABLET | Refills: 0 | Status: SHIPPED | OUTPATIENT
Start: 2024-03-27 | End: 2024-03-29

## 2024-03-27 RX ORDER — ACETAMINOPHEN 325 MG/1
650 TABLET ORAL EVERY 4 HOURS PRN
Status: DISCONTINUED | OUTPATIENT
Start: 2024-03-27 | End: 2024-03-29 | Stop reason: HOSPADM

## 2024-03-27 RX ORDER — IPRATROPIUM BROMIDE 0.5 MG/2.5ML
1 SOLUTION RESPIRATORY (INHALATION)
Status: COMPLETED | OUTPATIENT
Start: 2024-03-27 | End: 2024-03-27

## 2024-03-27 RX ORDER — AMOXICILLIN 250 MG
1 CAPSULE ORAL DAILY PRN
Status: DISCONTINUED | OUTPATIENT
Start: 2024-03-27 | End: 2024-03-29 | Stop reason: HOSPADM

## 2024-03-27 RX ORDER — VALSARTAN 80 MG/1
160 TABLET ORAL DAILY
Status: DISCONTINUED | OUTPATIENT
Start: 2024-03-27 | End: 2024-03-29 | Stop reason: HOSPADM

## 2024-03-27 RX ORDER — CARVEDILOL 12.5 MG/1
25 TABLET ORAL 2 TIMES DAILY WITH MEALS
Status: DISCONTINUED | OUTPATIENT
Start: 2024-03-27 | End: 2024-03-29 | Stop reason: HOSPADM

## 2024-03-27 RX ORDER — ATORVASTATIN CALCIUM 40 MG/1
40 TABLET, FILM COATED ORAL DAILY
Status: DISCONTINUED | OUTPATIENT
Start: 2024-03-27 | End: 2024-03-29 | Stop reason: HOSPADM

## 2024-03-27 RX ORDER — IPRATROPIUM BROMIDE AND ALBUTEROL SULFATE 2.5; .5 MG/3ML; MG/3ML
3 SOLUTION RESPIRATORY (INHALATION)
Status: DISCONTINUED | OUTPATIENT
Start: 2024-03-27 | End: 2024-03-29 | Stop reason: HOSPADM

## 2024-03-27 RX ORDER — TALC
6 POWDER (GRAM) TOPICAL NIGHTLY PRN
Status: DISCONTINUED | OUTPATIENT
Start: 2024-03-27 | End: 2024-03-29 | Stop reason: HOSPADM

## 2024-03-27 RX ORDER — ALUMINUM HYDROXIDE, MAGNESIUM HYDROXIDE, AND SIMETHICONE 1200; 120; 1200 MG/30ML; MG/30ML; MG/30ML
30 SUSPENSION ORAL 4 TIMES DAILY PRN
Status: DISCONTINUED | OUTPATIENT
Start: 2024-03-27 | End: 2024-03-29 | Stop reason: HOSPADM

## 2024-03-27 RX ORDER — AZITHROMYCIN 250 MG/1
500 TABLET, FILM COATED ORAL EVERY 24 HOURS
Status: DISCONTINUED | OUTPATIENT
Start: 2024-03-27 | End: 2024-03-27

## 2024-03-27 RX ORDER — HYDRALAZINE HYDROCHLORIDE 20 MG/ML
10 INJECTION INTRAMUSCULAR; INTRAVENOUS
Status: COMPLETED | OUTPATIENT
Start: 2024-03-27 | End: 2024-03-27

## 2024-03-27 RX ORDER — AZITHROMYCIN 250 MG/1
250 TABLET, FILM COATED ORAL DAILY
Qty: 6 TABLET | Refills: 0 | Status: ON HOLD | OUTPATIENT
Start: 2024-03-27 | End: 2024-05-21 | Stop reason: HOSPADM

## 2024-03-27 RX ORDER — ESCITALOPRAM OXALATE 10 MG/1
1 TABLET ORAL DAILY
COMMUNITY
Start: 2024-01-11 | End: 2024-04-10

## 2024-03-27 RX ORDER — METHYLPREDNISOLONE SOD SUCC 125 MG
40 VIAL (EA) INJECTION
Status: DISCONTINUED | OUTPATIENT
Start: 2024-03-27 | End: 2024-03-28

## 2024-03-27 RX ORDER — ASPIRIN 81 MG/1
81 TABLET ORAL DAILY
Status: DISCONTINUED | OUTPATIENT
Start: 2024-03-27 | End: 2024-03-29 | Stop reason: HOSPADM

## 2024-03-27 RX ORDER — BUDESONIDE AND FORMOTEROL FUMARATE DIHYDRATE 160; 4.5 UG/1; UG/1
2 AEROSOL RESPIRATORY (INHALATION) DAILY
Status: ON HOLD | COMMUNITY
End: 2024-05-22

## 2024-03-27 RX ORDER — HYDROCODONE BITARTRATE AND ACETAMINOPHEN 10; 325 MG/1; MG/1
1 TABLET ORAL EVERY 4 HOURS PRN
COMMUNITY
Start: 2024-02-23

## 2024-03-27 RX ORDER — HYDROCHLOROTHIAZIDE 25 MG/1
25 TABLET ORAL DAILY
COMMUNITY
Start: 2024-01-11

## 2024-03-27 RX ORDER — AMLODIPINE BESYLATE 5 MG/1
10 TABLET ORAL DAILY
Status: DISCONTINUED | OUTPATIENT
Start: 2024-03-27 | End: 2024-03-29 | Stop reason: HOSPADM

## 2024-03-27 RX ORDER — IPRATROPIUM BROMIDE AND ALBUTEROL SULFATE 2.5; .5 MG/3ML; MG/3ML
3 SOLUTION RESPIRATORY (INHALATION)
Status: COMPLETED | OUTPATIENT
Start: 2024-03-27 | End: 2024-03-27

## 2024-03-27 RX ORDER — SODIUM CHLORIDE 0.9 % (FLUSH) 0.9 %
3 SYRINGE (ML) INJECTION
Status: DISCONTINUED | OUTPATIENT
Start: 2024-03-27 | End: 2024-03-29 | Stop reason: HOSPADM

## 2024-03-27 RX ORDER — ALBUTEROL SULFATE 2.5 MG/.5ML
15 SOLUTION RESPIRATORY (INHALATION)
Status: COMPLETED | OUTPATIENT
Start: 2024-03-27 | End: 2024-03-27

## 2024-03-27 RX ORDER — HYDROCHLOROTHIAZIDE 25 MG/1
25 TABLET ORAL DAILY
Status: DISCONTINUED | OUTPATIENT
Start: 2024-03-27 | End: 2024-03-28

## 2024-03-27 RX ORDER — VALSARTAN 160 MG/1
160 TABLET ORAL DAILY
Status: ON HOLD | COMMUNITY
Start: 2024-01-11 | End: 2024-05-22

## 2024-03-27 RX ORDER — ONDANSETRON HYDROCHLORIDE 2 MG/ML
4 INJECTION, SOLUTION INTRAVENOUS EVERY 8 HOURS PRN
Status: DISCONTINUED | OUTPATIENT
Start: 2024-03-27 | End: 2024-03-29 | Stop reason: HOSPADM

## 2024-03-27 RX ORDER — ASPIRIN 81 MG/1
1 TABLET ORAL DAILY
Status: ON HOLD | COMMUNITY
Start: 2023-05-18 | End: 2024-05-22

## 2024-03-27 RX ADMIN — IPRATROPIUM BROMIDE 1 MG: 0.5 SOLUTION RESPIRATORY (INHALATION) at 12:03

## 2024-03-27 RX ADMIN — IPRATROPIUM BROMIDE AND ALBUTEROL SULFATE 3 ML: 2.5; .5 SOLUTION RESPIRATORY (INHALATION) at 11:03

## 2024-03-27 RX ADMIN — IPRATROPIUM BROMIDE 0.5 MG: 0.5 SOLUTION RESPIRATORY (INHALATION) at 05:03

## 2024-03-27 RX ADMIN — ATORVASTATIN CALCIUM 40 MG: 40 TABLET, FILM COATED ORAL at 07:03

## 2024-03-27 RX ADMIN — IPRATROPIUM BROMIDE AND ALBUTEROL SULFATE 3 ML: 2.5; .5 SOLUTION RESPIRATORY (INHALATION) at 03:03

## 2024-03-27 RX ADMIN — VALSARTAN 160 MG: 80 TABLET, FILM COATED ORAL at 10:03

## 2024-03-27 RX ADMIN — ESCITALOPRAM OXALATE 10 MG: 10 TABLET ORAL at 10:03

## 2024-03-27 RX ADMIN — IPRATROPIUM BROMIDE AND ALBUTEROL SULFATE 3 ML: 2.5; .5 SOLUTION RESPIRATORY (INHALATION) at 07:03

## 2024-03-27 RX ADMIN — AMLODIPINE BESYLATE 10 MG: 5 TABLET ORAL at 10:03

## 2024-03-27 RX ADMIN — CARVEDILOL 25 MG: 12.5 TABLET, FILM COATED ORAL at 05:03

## 2024-03-27 RX ADMIN — ALBUTEROL SULFATE 5 MG: 2.5 SOLUTION RESPIRATORY (INHALATION) at 05:03

## 2024-03-27 RX ADMIN — CARVEDILOL 25 MG: 12.5 TABLET, FILM COATED ORAL at 07:03

## 2024-03-27 RX ADMIN — METHYLPREDNISOLONE SODIUM SUCCINATE 40 MG: 125 INJECTION, POWDER, FOR SOLUTION INTRAMUSCULAR; INTRAVENOUS at 01:03

## 2024-03-27 RX ADMIN — ALBUTEROL SULFATE 15 MG: 2.5 SOLUTION RESPIRATORY (INHALATION) at 12:03

## 2024-03-27 RX ADMIN — PREDNISONE 40 MG: 20 TABLET ORAL at 07:03

## 2024-03-27 RX ADMIN — AZITHROMYCIN DIHYDRATE 500 MG: 250 TABLET ORAL at 07:03

## 2024-03-27 RX ADMIN — HYDRALAZINE HYDROCHLORIDE 10 MG: 20 INJECTION INTRAMUSCULAR; INTRAVENOUS at 03:03

## 2024-03-27 RX ADMIN — HYDROCHLOROTHIAZIDE 25 MG: 25 TABLET ORAL at 10:03

## 2024-03-27 RX ADMIN — HYDRALAZINE HYDROCHLORIDE 10 MG: 20 INJECTION, SOLUTION INTRAMUSCULAR; INTRAVENOUS at 03:03

## 2024-03-27 RX ADMIN — CEFTRIAXONE 1 G: 1 INJECTION, POWDER, FOR SOLUTION INTRAMUSCULAR; INTRAVENOUS at 06:03

## 2024-03-27 RX ADMIN — IPRATROPIUM BROMIDE AND ALBUTEROL SULFATE 3 ML: 2.5; .5 SOLUTION RESPIRATORY (INHALATION) at 08:03

## 2024-03-27 RX ADMIN — HYDRALAZINE HYDROCHLORIDE 10 MG: 20 INJECTION, SOLUTION INTRAMUSCULAR; INTRAVENOUS at 05:03

## 2024-03-27 RX ADMIN — HYDRALAZINE HYDROCHLORIDE 10 MG: 20 INJECTION INTRAMUSCULAR; INTRAVENOUS at 04:03

## 2024-03-27 RX ADMIN — ASPIRIN 81 MG: 81 TABLET, COATED ORAL at 07:03

## 2024-03-27 NOTE — ASSESSMENT & PLAN NOTE
Patient's COPD is with exacerbation noted by continued dyspnea currently.  Patient is currently on COPD Pathway. Continue scheduled inhalers Steroids, Antibiotics, and Supplemental oxygen and monitor respiratory status closely.    - Prednisone 40 mg daily  - DuoNebs Q4H wake  - Empiric therapy with Ceftriaxone and Azithromycin  - Respiratory panel pending  - Supplemental O2 PRN

## 2024-03-27 NOTE — ED TRIAGE NOTES
Pt presents to ER via EMS with complaints of SOB that woke her out of he slee. Pt states she has a hx of COPD. Pt grunting with her breathing. Pt denies any other issues at this time.

## 2024-03-27 NOTE — ADMISSIONCARE
AdmissionCare    Guideline: COPD - OBS, Observation    Based on the indications selected for the patient, the bed status of Observation was determined to be MET    The following indications were selected as present at the time of evaluation of the patient:      - New or worsened (eg, from baseline) signs or symptoms of COPD (eg, dyspnea, Tachypnea) with inadequate response to initial emergency department treatment    AdmissionCare documentation entered by: SARA Narayan    [facility], 27th edition, Copyright © 2023 [facility] [employer], [facility] All Rights Reserved.  [id number]:45:31-05:00

## 2024-03-27 NOTE — DISCHARGE INSTRUCTIONS
Take steroid (prednisone) taper as ordered and until completed  While on steroids take pantoprazole for GI protection  Take Azithromycin pack to reduce inflammation as instructed  Use albuterol as needed   Take high dose mucinex, ordered  Take it easy the next few days, try not to transmit to others  Go to lung doctor (pulmonologist)   Go to Physical therapist for arm/wrist after infection clears    Thank you for trusting [facility] and me with your care.  We are honored that you entrusted us with your healthcare needs. Your satisfaction is very important to us and we hope you have been very pleased with your experience at [facility]. After your discharge you may receive a survey asking you to rate your hospital experience- Please help us by completing this survey. We hope that you have received the very best care possible during your hospitalization at [facility], as your satisfaction is our top priority.    Let me know if there is anything more I can do!!              Torito Blas MD        Medical Director        Section Head of         Board-Certified IM Attending      PATIENT GENERAL DISCHARGE INFORMATION   Things that YOU are responsible for to Manage Your Care At Home:  1. Getting your prescriptions filled.  2. Taking you medications as directed. (DO NOT MISS ANY DOSES!)  3. Going to your follow-up doctor appointments.                 *This is important because it allows the doctor to monitor your progress and make changes.      If you are unable to make your follow up appointments, please call the number listed and reschedule this appointment.   After discharge, if you need assistance, you can call [facility] for 24/7 assistance at [phone]  If you are experience any signs or symptoms, Call your doctor or Call 911 and come to your nearest Emergency Room.    You should receive a call from [facility] within 48-72 hours to help manage  your care after discharge.   Please try to make sure that you answer your phone for this important phone call.

## 2024-03-27 NOTE — SUBJECTIVE & OBJECTIVE
History reviewed. No pertinent past medical history.    History reviewed. No pertinent surgical history.    Review of patient's allergies indicates:  No Known Allergies    No current facility-administered medications on file prior to encounter.     Current Outpatient Medications on File Prior to Encounter   Medication Sig    aspirin (ECOTRIN) 81 MG EC tablet Take 1 tablet by mouth once daily.    EScitalopram oxalate (LEXAPRO) 10 MG tablet Take 1 tablet by mouth once daily.    hydroCHLOROthiazide (HYDRODIURIL) 25 MG tablet Take 25 mg by mouth once daily.    valsartan (DIOVAN) 160 MG tablet Take 160 mg by mouth once daily.    albuterol (PROVENTIL HFA) 90 mcg/actuation inhaler Inhale 2 puffs into the lungs every 6 (six) hours as needed for Wheezing. Rescue    amLODIPine (NORVASC) 10 MG tablet Take 1 tablet by mouth once daily.    atorvastatin (LIPITOR) 40 MG tablet Take 40 mg by mouth once daily.    carvediloL (COREG) 25 MG tablet Take 1 tablet by mouth 2 (two) times daily with meals.    doxycycline (VIBRAMYCIN) 100 MG Cap     gabapentin (NEURONTIN) 600 MG tablet Take 600 mg by mouth once daily.    HYDROcodone-acetaminophen (NORCO) 5-325 mg per tablet Take 1 tablet by mouth every 6 (six) hours as needed for Pain.    ondansetron (ZOFRAN-ODT) 4 MG TbDL Take 1 tablet (4 mg total) by mouth every 8 (eight) hours as needed.    QUEtiapine (SEROQUEL) 50 MG tablet Take 1 tablet (50 mg total) by mouth every evening.    [DISCONTINUED] DULoxetine (CYMBALTA) 60 MG capsule Take 1 capsule by mouth once daily.    [DISCONTINUED] methylPREDNISolone (MEDROL DOSEPACK) 4 mg tablet use as directed     Family History    None       Tobacco Use    Smoking status: Not on file    Smokeless tobacco: Not on file   Substance and Sexual Activity    Alcohol use: Not on file    Drug use: Not on file    Sexual activity: Not on file     Review of Systems   Constitutional:  Positive for chills and fatigue. Negative for fever.   HENT:  Positive for  rhinorrhea and sore throat.    Respiratory:  Positive for cough (green sputum), shortness of breath and wheezing.    Cardiovascular:  Negative for chest pain.   Gastrointestinal:  Positive for diarrhea (resolved). Negative for abdominal pain, nausea and vomiting.   Genitourinary:  Negative for dysuria, frequency, hematuria and urgency.   Neurological:  Positive for dizziness and light-headedness. Negative for syncope.     Objective:     Vital Signs (Most Recent):  Temp: 97.7 °F (36.5 °C) (03/27/24 0505)  Pulse: 87 (03/27/24 0531)  Resp: (!) 24 (03/27/24 0531)  BP: (!) 189/90 (03/27/24 0531)  SpO2: 100 % (03/27/24 0531) Vital Signs (24h Range):  Temp:  [97.7 °F (36.5 °C)] 97.7 °F (36.5 °C)  Pulse:  [67-93] 87  Resp:  [18-25] 24  SpO2:  [97 %-100 %] 100 %  BP: (181-216)/() 189/90     Weight: 76.2 kg (168 lb)  Body mass index is 27.12 kg/m².     Physical Exam  Vitals reviewed.   Constitutional:       General: She is awake. She is not in acute distress.     Appearance: She is not diaphoretic.   Cardiovascular:      Rate and Rhythm: Normal rate.      Heart sounds: Normal heart sounds. No murmur heard.     No friction rub. No gallop.   Pulmonary:      Effort: Pulmonary effort is normal. No accessory muscle usage or respiratory distress.      Breath sounds: Wheezing (diffuse bilateral) present. No rhonchi or rales.   Abdominal:      General: Abdomen is flat. Bowel sounds are normal.      Palpations: Abdomen is soft.      Tenderness: There is no abdominal tenderness. There is no guarding or rebound.   Musculoskeletal:      Right lower leg: No edema.      Left lower leg: No edema.   Skin:     General: Skin is warm and dry.   Neurological:      Mental Status: She is alert and oriented to person, place, and time.   Psychiatric:         Behavior: Behavior is cooperative.                Significant Labs: All pertinent labs within the past 24 hours have been reviewed.  CBC:   Recent Labs   Lab 03/27/24  0042   WBC 6.14    HGB 12.8   HCT 39.6        CMP:   Recent Labs   Lab 03/27/24  0042      K 3.5      CO2 27   *   BUN 26*   CREATININE 2.1*   CALCIUM 9.8   PROT 8.6*   ALBUMIN 3.4*   BILITOT 0.6   ALKPHOS 94   AST 20   ALT 18   ANIONGAP 9     Cardiac Markers:   Recent Labs   Lab 03/27/24  0042   *     Troponin:   Recent Labs   Lab 03/27/24  0042   TROPONINI 0.019       Significant Imaging:   Imaging Results              X-Ray Chest 1 View (Final result)  Result time 03/27/24 01:55:16      Final result by Dax Arrington MD (03/27/24 01:55:16)                   Impression:      No acute abnormality.      Electronically signed by: Dax Arrington  Date:    03/27/2024  Time:    01:55               Narrative:    EXAMINATION:  XR CHEST 1 VIEW    CLINICAL HISTORY:  shortness of breath;    TECHNIQUE:  Single frontal view of the chest was performed.    COMPARISON:  02/19/2024    FINDINGS:  The lungs are clear, with stable appearance of pulmonary vasculature and no pleural effusion or pneumothorax.    The cardiac silhouette is stable in size. The hilar and mediastinal contours are unremarkable.    Bones are intact.

## 2024-03-27 NOTE — ED PROVIDER NOTES
Encounter Date: 3/27/2024    SCRIBE #1 NOTE: I, Librado Dominguez, am scribing for, and in the presence of,  Billy Hoffmann MD. I have scribed the following portions of the note - Other sections scribed: HPI, ROS, PE.   SCRIBE #2 NOTE: I, Janes Shepard, am scribing for, and in the presence of,  Billy Hoffmann MD. I have scribed the remaining portions of the note not scribed by Scribe #1.     History     Chief Complaint   Patient presents with    Shortness of Breath     Here via [facility] with c/o SOB, Hx COPD, given 16 mg decadron IM and duoneb en route     Jacqueline Manuel is a 53 y.o. female, with a PMHx of COPD, HTN, CKD stage 3, and CVA, who presents to the ED via EMS with dyspnea beginning yesterday morning. History provided by independent historian, EMS, reports initial call for dyspnea, noting they administered 16 mg decadron IM and duoneb en route, as well as placing the patient on CPAP with SpO2 of 100%. They also report initial BP of 188/85. Patient reports waking up yesterday morning with complaints of dyspnea, noting it has exacerbated throughout the day. Pt additionally reports associated chest pain, nausea, a productive cough and a decrease in appetite. She endorses compliance with her daily medications and denies missed dosages. No other medications taken PTA. No exacerbating or alleviating factors. Denies a fever, chills, diarrhea, abdominal pain, or other associated symptoms.    The history is provided by the EMS personnel and the patient. No  was used.     Review of patient's allergies indicates:  No Known Allergies  History reviewed. No pertinent past medical history.  History reviewed. No pertinent surgical history.  History reviewed. No pertinent family history.     Review of Systems   Constitutional:  Negative for chills and fever.   HENT:  Negative for sore throat.    Respiratory:  Positive for cough (productive), chest tightness and shortness of breath.    Cardiovascular:  Positive  for chest pain.   Gastrointestinal:  Positive for nausea. Negative for abdominal pain, diarrhea and vomiting.   Genitourinary:  Negative for dysuria.   Musculoskeletal:  Negative for back pain.   Skin:  Negative for rash.   Neurological:  Negative for weakness.   Psychiatric/Behavioral:  Negative for agitation and confusion.        Physical Exam     Initial Vitals   BP Pulse Resp Temp SpO2   03/27/24 0030 03/27/24 0030 03/27/24 0030 03/27/24 0505 03/27/24 0030   (!) 207/123 93 18 97.7 °F (36.5 °C) 100 %      MAP       --                Physical Exam    Nursing note and vitals reviewed.  Constitutional: She appears well-developed and well-nourished. She does not appear ill. No distress.   HENT:   Head: Normocephalic.   Eyes: Conjunctivae and EOM are normal.   Neck:   Normal range of motion.  Cardiovascular:  Regular rhythm and normal heart sounds.   Tachycardia present.         No murmur heard.  Pulmonary/Chest: She has wheezes (bilateral, expiratory).   Speaking in full sentences.    Abdominal: Abdomen is soft. There is no abdominal tenderness.   Musculoskeletal:         General: No edema.      Cervical back: Normal range of motion.      Right lower leg: Normal. No edema.      Left lower leg: Normal. No edema.     Neurological: She is alert. GCS eye subscore is 4. GCS verbal subscore is 5. GCS motor subscore is 6.   Skin: Skin is warm.         ED Course   Critical Care    Date/Time: 3/27/2024 6:47 AM    Performed by: Billy Hoffmann MD  Authorized by: Billy Hoffmann MD  Direct patient critical care time: 15 minutes  Ordering / reviewing critical care time: 10 minutes  Documentation critical care time: 10 minutes  Total critical care time (exclusive of procedural time) : 35 minutes  Critical care time was exclusive of separately billable procedures and treating other patients and teaching time.  Critical care was necessary to treat or prevent imminent or life-threatening deterioration of the following  conditions: respiratory failure.  Critical care was time spent personally by me on the following activities: blood draw for specimens, development of treatment plan with patient or surrogate, obtaining history from patient or surrogate, ordering and performing treatments and interventions, ordering and review of laboratory studies, ordering and review of radiographic studies, pulse oximetry, re-evaluation of patient's condition, examination of patient and evaluation of patient's response to treatment.  Comments: COPD exacerbation requiring multiple bronchodilator treatments.        Labs Reviewed   COMPREHENSIVE METABOLIC PANEL - Abnormal; Notable for the following components:       Result Value    Glucose 124 (*)     BUN 26 (*)     Creatinine 2.1 (*)     Total Protein 8.6 (*)     Albumin 3.4 (*)     eGFR 28 (*)     All other components within normal limits   B-TYPE NATRIURETIC PEPTIDE - Abnormal; Notable for the following components:     (*)     All other components within normal limits   CBC W/ AUTO DIFFERENTIAL - Abnormal; Notable for the following components:    Lymph # 0.9 (*)     Mono # 0.1 (*)     Gran % 82.6 (*)     Lymph % 14.5 (*)     Mono % 2.2 (*)     All other components within normal limits    Narrative:     Collection has been rescheduled by GES1 at 03/27/2024 05:17 Reason:   Patient unavailable. With provider   ISTAT PROCEDURE - Abnormal; Notable for the following components:    POC PH 7.556 (*)     POC PCO2 28.9 (*)     POC PO2 66 (*)     POC BE 4 (*)     All other components within normal limits   RESPIRATORY INFECTION PANEL (PCR), NASOPHARYNGEAL    Narrative:     For all other respiratory sources, order [id number] -  Respiratory Viral Panel by PCR   CBC W/ AUTO DIFFERENTIAL   TROPONIN I   BASIC METABOLIC PANEL    Narrative:     Collection has been rescheduled by GES1 at 03/27/2024 05:17 Reason:   Patient unavailable. With provider   MAGNESIUM    Narrative:     Collection has been rescheduled by  GES1 at 03/27/2024 05:17 Reason:   Patient unavailable. With provider   PHOSPHORUS    Narrative:     Collection has been rescheduled by GES1 at 03/27/2024 05:17 Reason:   Patient unavailable. With provider   SARS-COV-2 RDRP GENE   POCT INFLUENZA A/B MOLECULAR          Imaging Results              X-Ray Chest 1 View (Final result)  Result time 03/27/24 01:55:16      Final result by Dax Arrington MD (03/27/24 01:55:16)                   Impression:      No acute abnormality.      Electronically signed by: Dax Arrington  Date:    03/27/2024  Time:    01:55               Narrative:    EXAMINATION:  XR CHEST 1 VIEW    CLINICAL HISTORY:  shortness of breath;    TECHNIQUE:  Single frontal view of the chest was performed.    COMPARISON:  02/19/2024    FINDINGS:  The lungs are clear, with stable appearance of pulmonary vasculature and no pleural effusion or pneumothorax.    The cardiac silhouette is stable in size. The hilar and mediastinal contours are unremarkable.    Bones are intact.                                       Medications   sodium chloride 0.9% flush 3 mL (has no administration in time range)   predniSONE tablet 40 mg (has no administration in time range)   albuterol-ipratropium 2.5 mg-0.5 mg/3 mL nebulizer solution 3 mL (has no administration in time range)   cefTRIAXone (Rocephin) 1 g in dextrose 5 % in water (D5W) 100 mL IVPB (MB+) (1 g Intravenous New Bag 3/27/24 0623)     And   azithromycin tablet 500 mg (has no administration in time range)   melatonin tablet 6 mg (has no administration in time range)   ondansetron injection 4 mg (has no administration in time range)   senna-docusate 8.6-50 mg per tablet 1 tablet (has no administration in time range)   acetaminophen tablet 650 mg (has no administration in time range)   aluminum-magnesium hydroxide-simethicone 200-200-20 mg/5 mL suspension 30 mL (has no administration in time range)   amLODIPine tablet 10 mg (has no administration in time range)    aspirin EC tablet 81 mg (has no administration in time range)   atorvastatin tablet 40 mg (has no administration in time range)   carvediloL tablet 25 mg (has no administration in time range)   EScitalopram oxalate tablet 10 mg (has no administration in time range)   hydroCHLOROthiazide tablet 25 mg (has no administration in time range)   valsartan tablet 160 mg (has no administration in time range)   albuterol sulfate nebulizer solution 15 mg (15 mg Nebulization Given 3/27/24 0053)   ipratropium 0.02 % nebulizer solution 1 mg (1 mg Nebulization Given 3/27/24 0053)   albuterol-ipratropium 2.5 mg-0.5 mg/3 mL nebulizer solution 3 mL (3 mLs Nebulization Given 3/27/24 0303)   hydrALAZINE injection 10 mg (10 mg Intravenous Given 3/27/24 0353)   albuterol sulfate nebulizer solution 5 mg (5 mg Nebulization Given 3/27/24 0531)   ipratropium 0.02 % nebulizer solution 0.5 mg (0.5 mg Nebulization Given 3/27/24 0531)   hydrALAZINE injection 10 mg (10 mg Intravenous Given 3/27/24 0457)     Medical Decision Making  53-year-old female history of COPD presenting with dyspnea.  Recent URI symptoms of cough, nasal congestion and rhinorrhea.  Prior to arrival the patient received 16 mg of Decadron by EMS.  Patient was transported on CPAP.  On arrival the patient was transitioned to room air with no hypoxia.  Patient continued have diffuse expiratory wheeze.  Patient received an hour albuterol with some improvement however continued bilateral expiratory wheeze.  Patient did appear more comfortable.  Given multiple dilator treatments with continued expiratory wheeze and increased work of breathing patient was kept for observation for COPD exacerbation.      The patient did arrived hypertensive.  There appears to be no pulmonary edema.  The patient does not appear to be fluid overload on physical exam.    Differential diagnosis includes URI, lower respiratory tract infection, COPD exacerbation, pulmonary edema    Amount and/or  Complexity of Data Reviewed  Independent Historian: EMS     Details: See HPI.   Labs: ordered. Decision-making details documented in ED Course.  Radiology: ordered.  ECG/medicine tests:  Decision-making details documented in ED Course.    Risk  Prescription drug management.            Scribe Attestation:   Scribe #1: I performed the above scribed service and the documentation accurately describes the services I performed. I attest to the accuracy of the note.  Scribe #2: I performed the above scribed service and the documentation accurately describes the services I performed. I attest to the accuracy of the note.        ED Course as of 03/27/24 0648   Wed Mar 27, 2024   0133 BNP(!): 180 [JM]   0134 EKG 12-lead  Time 1:  10 a.m.     Rate 79, sinus, regular rhythm, left axis deviation.   QRS 94 QTC of 463.  No ST elevation or depression no T-wave inversion no hyperacute T-waves.      Normal sinus rhythm with PACs. [JM]   0141 Creatinine(!): 2.1  BUN creatinine appeared improved from baseline. [JM]   0215 SARS-CoV-2 RNA, Amplification, Qual: Negative [JM]   0215 POC Molecular Influenza A Ag: Negative [JM]   0218 Patient is breathing better at this time.  She states she feels overall better.  Mild expiratory wheeze noted right lung.  Otherwise left lung clear. [JM]   0320 The patient continues to have diffuse expiratory wheeze. [JM]      ED Course User Index  [JM] Billy Hoffmann MD                         I, Billy Hoffmann, personally performed the services described in this documentation. All medical record entries made by the scribe were at my direction and in my presence. I have reviewed the chart and agree that the record reflects my personal performance and is accurate and complete.     Clinical Impression:  Final diagnoses:  [R06.02] Shortness of breath  [J44.1] COPD exacerbation (Primary)  [J06.9] Upper respiratory tract infection, unspecified type          ED Disposition Condition    Observation                  Billy Hoffmann MD  03/27/24 0648

## 2024-03-27 NOTE — HPI
Ms. Manuel is a 53 yr old female with a hx of COPD, HTN, CKD stage 3, and CVA who presented to the ED via EMS with a chief complaint of shortness of breath that began yesterday morning while the patient was sleeping and it woke her up from her sleep.  She says throughout the day her symptoms progressively worsened.  She states that she could not even eat due to the shortness of breath.  She says that she noticed herself audibly wheezing.  She tried her inhalers at home which did not relieve her symptoms.  She states that recently one of her grandchildren has been sick with URI symptoms and she was around them yesterday.  She also endorses fatigue, occasional chills, runny nose, sore throat, productive cough with green sputum, diarrhea that has now resolved, lightheadedness, and dizziness.  She lives at home with her daughter, smokes no more than half a pack of cigarettes a day, occasionally drinks alcohol, and smokes cocaine about once every 2 weeks.  She denies fever, nausea, vomiting, urinary symptoms, CP, abdominal pain, and syncope.    En route to ED, patient was given Decadron 16 mg IM and DuoNeb and placed pt on CPAP with SpO2 100%. Upon arrival to ED, patient afebrile, HR of 93, RR of 18, BP of 207/123, satting 100% on CPAP.  Workup in the ED included CBC, CMP, BNP, troponin, COVID test, influenza test, CXR.  Workup revealed BUN of 26, creatinine of 2.1, GFR of 28, BNP of 180.  CXR showed no acute abnormality.   Patient was given breathing treatments and hydralazine 10 mg IV x2 while in the ED. case discussed with Billy Hoffmann MD and patient will be placed under observation for COPD exacerbation.

## 2024-03-27 NOTE — ASSESSMENT & PLAN NOTE
Creatine stable for now. BMP reviewed- noted Estimated Creatinine Clearance: 33.1 mL/min (A) (based on SCr of 2.1 mg/dL (H)). according to latest data. Based on current GFR, CKD stage is stage 4 - GFR 15-29.  Monitor UOP and serial BMP and adjust therapy as needed. Renally dose meds. Avoid nephrotoxic medications and procedures.

## 2024-03-27 NOTE — ASSESSMENT & PLAN NOTE
Chronic, uncontrolled. Latest blood pressure and vitals reviewed-     Temp:  [97.7 °F (36.5 °C)]   Pulse:  [67-93]   Resp:  [18-25]   BP: (181-216)/()   SpO2:  [97 %-100 %] .   Home meds for hypertension were reviewed and noted below.   Hypertension Medications               hydroCHLOROthiazide (HYDRODIURIL) 25 MG tablet Take 25 mg by mouth once daily.    valsartan (DIOVAN) 160 MG tablet Take 160 mg by mouth once daily.    amLODIPine (NORVASC) 10 MG tablet Take 1 tablet by mouth once daily.    carvediloL (COREG) 25 MG tablet Take 1 tablet by mouth 2 (two) times daily with meals.            While in the hospital, will manage blood pressure as follows; Continue home antihypertensive regimen    Will utilize p.r.n. blood pressure medication only if patient's blood pressure greater than 180/110 and she develops symptoms such as worsening chest pain or shortness of breath.

## 2024-03-27 NOTE — CARE UPDATE
53 year old admitted for copd excerebration     Patient was given Decadron 16 mg IM and DuoNeb and placed pt on CPAP with SpO2 100%. Upon arrival to ED, patient afebrile, HR of 93, RR of 18, BP of 207/123, satting 100% on CPAP. Workup in the ED included CBC, CMP, BNP, troponin, COVID test, influenza test, CXR. Workup revealed BUN of 26, creatinine of 2.1, GFR of 28, BNP of 180. CXR showed no acute abnormality.  Patient was given breathing treatments and hydralazine 10 mg IV x2 while in the ED. case discussed with Billy Hoffmann MD and patient will be placed under observation for COPD exacerbation.       Patient seen and examined.   Reviewed H&P, labs, vital signs.   Continue current medical regimen with a few changes    Patient lungs sound are coarse with rhonchi   Currently on RA     Switched prednisone to IV solumedrol  Added hydralazine prn   Added azithromycin for 4 doses

## 2024-03-28 PROBLEM — E87.8 ABNORMAL BLOOD ELECTROLYTE LEVEL: Status: ACTIVE | Noted: 2024-03-28

## 2024-03-28 PROBLEM — J12.3 HUMAN METAPNEUMOVIRUS PNEUMONIA: Status: ACTIVE | Noted: 2024-03-28

## 2024-03-28 LAB
ANION GAP SERPL CALC-SCNC: 14 MMOL/L (ref 8–16)
BASOPHILS # BLD AUTO: 0.02 K/UL (ref 0–0.2)
BASOPHILS NFR BLD: 0.2 % (ref 0–1.9)
BUN SERPL-MCNC: 32 MG/DL (ref 6–20)
CALCIUM SERPL-MCNC: 10.1 MG/DL (ref 8.7–10.5)
CHLORIDE SERPL-SCNC: 106 MMOL/L (ref 95–110)
CO2 SERPL-SCNC: 22 MMOL/L (ref 23–29)
CREAT SERPL-MCNC: 1.8 MG/DL (ref 0.5–1.4)
DIFFERENTIAL METHOD BLD: ABNORMAL
EOSINOPHIL # BLD AUTO: 0 K/UL (ref 0–0.5)
EOSINOPHIL NFR BLD: 0.1 % (ref 0–8)
ERYTHROCYTE [DISTWIDTH] IN BLOOD BY AUTOMATED COUNT: 13.1 % (ref 11.5–14.5)
EST. GFR  (NO RACE VARIABLE): 34 ML/MIN/1.73 M^2
GLUCOSE SERPL-MCNC: 131 MG/DL (ref 70–110)
HCT VFR BLD AUTO: 42.4 % (ref 37–48.5)
HGB BLD-MCNC: 14.6 G/DL (ref 12–16)
IMM GRANULOCYTES # BLD AUTO: 0.05 K/UL (ref 0–0.04)
IMM GRANULOCYTES NFR BLD AUTO: 0.4 % (ref 0–0.5)
LYMPHOCYTES # BLD AUTO: 1.3 K/UL (ref 1–4.8)
LYMPHOCYTES NFR BLD: 10.2 % (ref 18–48)
MAGNESIUM SERPL-MCNC: 1.5 MG/DL (ref 1.6–2.6)
MCH RBC QN AUTO: 31.3 PG (ref 27–31)
MCHC RBC AUTO-ENTMCNC: 34.4 G/DL (ref 32–36)
MCV RBC AUTO: 91 FL (ref 82–98)
MONOCYTES # BLD AUTO: 0.3 K/UL (ref 0.3–1)
MONOCYTES NFR BLD: 2.6 % (ref 4–15)
NEUTROPHILS # BLD AUTO: 11 K/UL (ref 1.8–7.7)
NEUTROPHILS NFR BLD: 86.5 % (ref 38–73)
NRBC BLD-RTO: 0 /100 WBC
OHS QRS DURATION: 94 MS
OHS QTC CALCULATION: 463 MS
PHOSPHATE SERPL-MCNC: 2 MG/DL (ref 2.7–4.5)
PLATELET # BLD AUTO: 320 K/UL (ref 150–450)
PMV BLD AUTO: 9.3 FL (ref 9.2–12.9)
POTASSIUM SERPL-SCNC: 3 MMOL/L (ref 3.5–5.1)
POTASSIUM SERPL-SCNC: 3.3 MMOL/L (ref 3.5–5.1)
RBC # BLD AUTO: 4.67 M/UL (ref 4–5.4)
SODIUM SERPL-SCNC: 142 MMOL/L (ref 136–145)
WBC # BLD AUTO: 12.75 K/UL (ref 3.9–12.7)

## 2024-03-28 PROCEDURE — 94761 N-INVAS EAR/PLS OXIMETRY MLT: CPT

## 2024-03-28 PROCEDURE — 83735 ASSAY OF MAGNESIUM: CPT

## 2024-03-28 PROCEDURE — 85025 COMPLETE CBC W/AUTO DIFF WBC: CPT

## 2024-03-28 PROCEDURE — 21400001 HC TELEMETRY ROOM

## 2024-03-28 PROCEDURE — 99900035 HC TECH TIME PER 15 MIN (STAT)

## 2024-03-28 PROCEDURE — 27000207 HC ISOLATION

## 2024-03-28 PROCEDURE — 84132 ASSAY OF SERUM POTASSIUM: CPT | Performed by: NURSE PRACTITIONER

## 2024-03-28 PROCEDURE — 63600175 PHARM REV CODE 636 W HCPCS: Performed by: NURSE PRACTITIONER

## 2024-03-28 PROCEDURE — 94640 AIRWAY INHALATION TREATMENT: CPT

## 2024-03-28 PROCEDURE — 36415 COLL VENOUS BLD VENIPUNCTURE: CPT | Mod: XB | Performed by: NURSE PRACTITIONER

## 2024-03-28 PROCEDURE — 80048 BASIC METABOLIC PNL TOTAL CA: CPT

## 2024-03-28 PROCEDURE — 63600175 PHARM REV CODE 636 W HCPCS

## 2024-03-28 PROCEDURE — 84100 ASSAY OF PHOSPHORUS: CPT

## 2024-03-28 PROCEDURE — 63700000 PHARM REV CODE 250 ALT 637 W/O HCPCS: Performed by: NURSE PRACTITIONER

## 2024-03-28 PROCEDURE — 25000003 PHARM REV CODE 250

## 2024-03-28 PROCEDURE — 36415 COLL VENOUS BLD VENIPUNCTURE: CPT

## 2024-03-28 PROCEDURE — 25000003 PHARM REV CODE 250: Performed by: NURSE PRACTITIONER

## 2024-03-28 PROCEDURE — 25000242 PHARM REV CODE 250 ALT 637 W/ HCPCS

## 2024-03-28 RX ORDER — MAGNESIUM SULFATE 1 G/100ML
1 INJECTION INTRAVENOUS ONCE
Status: COMPLETED | OUTPATIENT
Start: 2024-03-28 | End: 2024-03-28

## 2024-03-28 RX ORDER — POTASSIUM CHLORIDE 20 MEQ/1
40 TABLET, EXTENDED RELEASE ORAL ONCE
Status: COMPLETED | OUTPATIENT
Start: 2024-03-28 | End: 2024-03-28

## 2024-03-28 RX ORDER — GUAIFENESIN 100 MG/5ML
200 SOLUTION ORAL EVERY 4 HOURS PRN
Status: DISCONTINUED | OUTPATIENT
Start: 2024-03-28 | End: 2024-03-29 | Stop reason: HOSPADM

## 2024-03-28 RX ORDER — POTASSIUM CHLORIDE 20 MEQ/1
40 TABLET, EXTENDED RELEASE ORAL ONCE
Status: DISCONTINUED | OUTPATIENT
Start: 2024-03-28 | End: 2024-03-28

## 2024-03-28 RX ADMIN — HYDRALAZINE HYDROCHLORIDE 10 MG: 20 INJECTION, SOLUTION INTRAMUSCULAR; INTRAVENOUS at 04:03

## 2024-03-28 RX ADMIN — ESCITALOPRAM OXALATE 10 MG: 10 TABLET ORAL at 08:03

## 2024-03-28 RX ADMIN — CARVEDILOL 25 MG: 12.5 TABLET, FILM COATED ORAL at 04:03

## 2024-03-28 RX ADMIN — IPRATROPIUM BROMIDE AND ALBUTEROL SULFATE 3 ML: 2.5; .5 SOLUTION RESPIRATORY (INHALATION) at 11:03

## 2024-03-28 RX ADMIN — CEFTRIAXONE 1 G: 1 INJECTION, POWDER, FOR SOLUTION INTRAMUSCULAR; INTRAVENOUS at 05:03

## 2024-03-28 RX ADMIN — MAGNESIUM SULFATE 1 G: 1 INJECTION INTRAVENOUS at 09:03

## 2024-03-28 RX ADMIN — ASPIRIN 81 MG: 81 TABLET, COATED ORAL at 08:03

## 2024-03-28 RX ADMIN — CARVEDILOL 25 MG: 12.5 TABLET, FILM COATED ORAL at 08:03

## 2024-03-28 RX ADMIN — POTASSIUM CHLORIDE 40 MEQ: 1500 TABLET, EXTENDED RELEASE ORAL at 12:03

## 2024-03-28 RX ADMIN — AMLODIPINE BESYLATE 10 MG: 5 TABLET ORAL at 08:03

## 2024-03-28 RX ADMIN — AZITHROMYCIN DIHYDRATE 500 MG: 250 TABLET ORAL at 08:03

## 2024-03-28 RX ADMIN — VALSARTAN 160 MG: 80 TABLET, FILM COATED ORAL at 08:03

## 2024-03-28 RX ADMIN — METHYLPREDNISOLONE SODIUM SUCCINATE 40 MG: 125 INJECTION, POWDER, FOR SOLUTION INTRAMUSCULAR; INTRAVENOUS at 02:03

## 2024-03-28 RX ADMIN — IPRATROPIUM BROMIDE AND ALBUTEROL SULFATE 3 ML: 2.5; .5 SOLUTION RESPIRATORY (INHALATION) at 08:03

## 2024-03-28 RX ADMIN — IPRATROPIUM BROMIDE AND ALBUTEROL SULFATE 3 ML: 2.5; .5 SOLUTION RESPIRATORY (INHALATION) at 03:03

## 2024-03-28 RX ADMIN — HYDROCHLOROTHIAZIDE 25 MG: 25 TABLET ORAL at 08:03

## 2024-03-28 RX ADMIN — ATORVASTATIN CALCIUM 40 MG: 40 TABLET, FILM COATED ORAL at 08:03

## 2024-03-28 RX ADMIN — METHYLPREDNISOLONE SODIUM SUCCINATE 40 MG: 125 INJECTION, POWDER, FOR SOLUTION INTRAMUSCULAR; INTRAVENOUS at 12:03

## 2024-03-28 RX ADMIN — IPRATROPIUM BROMIDE AND ALBUTEROL SULFATE 3 ML: 2.5; .5 SOLUTION RESPIRATORY (INHALATION) at 09:03

## 2024-03-28 RX ADMIN — POTASSIUM CHLORIDE 40 MEQ: 1500 TABLET, EXTENDED RELEASE ORAL at 09:03

## 2024-03-28 NOTE — PROGRESS NOTES
[facility] Medicine  Progress Note    Patient Name: Jacqueline Maneul  MRN: [id number]  Patient Class: OP- Observation   Admission Date: 3/27/2024  Length of Stay: 0 days  Attending Physician: Clau Persaud, *  Primary Care Provider: Fan Ling (Inactive)        Subjective:     Principal Problem:COPD exacerbation        HPI:  Ms. Manuel is a 53 yr old female with a hx of COPD, HTN, CKD stage 3, and CVA who presented to the ED via EMS with a chief complaint of shortness of breath that began yesterday morning while the patient was sleeping and it woke her up from her sleep.  She says throughout the day her symptoms progressively worsened.  She states that she could not even eat due to the shortness of breath.  She says that she noticed herself audibly wheezing.  She tried her inhalers at home which did not relieve her symptoms.  She states that recently one of her grandchildren has been sick with URI symptoms and she was around them yesterday.  She also endorses fatigue, occasional chills, runny nose, sore throat, productive cough with green sputum, diarrhea that has now resolved, lightheadedness, and dizziness.  She lives at home with her daughter, smokes no more than half a pack of cigarettes a day, occasionally drinks alcohol, and smokes cocaine about once every 2 weeks.  She denies fever, nausea, vomiting, urinary symptoms, CP, abdominal pain, and syncope.    En route to ED, patient was given Decadron 16 mg IM and DuoNeb and placed pt on CPAP with SpO2 100%. Upon arrival to ED, patient afebrile, HR of 93, RR of 18, BP of 207/123, satting 100% on CPAP.  Workup in the ED included CBC, CMP, BNP, troponin, COVID test, influenza test, CXR.  Workup revealed BUN of 26, creatinine of 2.1, GFR of 28, BNP of 180.  CXR showed no acute abnormality.   Patient was given breathing treatments and hydralazine 10 mg IV x2 while in the ED. case discussed with Billy Hoffmann MD and patient will  be placed under observation for COPD exacerbation.    Overview/Hospital Course:  No notes on file    No new subjective & objective note has been filed under this hospital service since the last note was generated.      Assessment/Plan:      * COPD exacerbation  Patient's COPD is with exacerbation noted by continued dyspnea currently.  Patient is currently on COPD Pathway. Continue scheduled inhalers Steroids, Antibiotics, and Supplemental oxygen and monitor respiratory status closely.    -solumedrol IV   - DuoNebs Q4H wake  - Empiric therapy with Ceftriaxone and Azithromycin  - Respiratory panel see below  - Supplemental O2 PRN  -supportive care  -added Chest PT     Abnormal blood electrolyte level  -replete potassium and magnesium as needed       Human metapneumovirus pneumonia  -see above  -supportive care       Stage 3 chronic kidney disease  Creatine stable for now. BMP reviewed- noted Estimated Creatinine Clearance: 34.6 mL/min (A) (based on SCr of 1.8 mg/dL (H)). according to latest data. Based on current GFR, CKD stage is stage 4 - GFR 15-29.  Monitor UOP and serial BMP and adjust therapy as needed. Renally dose meds. Avoid nephrotoxic medications and procedures.    Essential hypertension  Hold HCTZ     Chronic, uncontrolled. Latest blood pressure and vitals reviewed-     Temp:  [97.5 °F (36.4 °C)-98.3 °F (36.8 °C)]   Pulse:  []   Resp:  [16-24]   BP: (136-222)/()   SpO2:  [93 %-100 %] .   Home meds for hypertension were reviewed and noted below.   Hypertension Medications               hydroCHLOROthiazide (HYDRODIURIL) 25 MG tablet Take 25 mg by mouth once daily.    valsartan (DIOVAN) 160 MG tablet Take 160 mg by mouth once daily.    amLODIPine (NORVASC) 10 MG tablet Take 1 tablet by mouth once daily.    carvediloL (COREG) 25 MG tablet Take 1 tablet by mouth 2 (two) times daily with meals.            While in the hospital, will manage blood pressure as follows; Continue home antihypertensive  regimen    Will utilize p.r.n. blood pressure medication only if patient's blood pressure greater than 180/110 and she develops symptoms such as worsening chest pain or shortness of breath.      VTE Risk Mitigation (From admission, onward)           Ordered     Place sequential compression device  Until discontinued         03/27/24 0454     IP VTE LOW RISK PATIENT  Once         03/27/24 0454                    Discharge Planning   CLAY:      Code Status: Full Code   Is the patient medically ready for discharge?:     Reason for patient still in hospital (select all that apply): {HMREASONPATIENTINHOSP:[id number]}                     Clint Pop NP  Department of Hospital Medicine   [facility] - [place] - Observation

## 2024-03-28 NOTE — ASSESSMENT & PLAN NOTE
Patient's COPD is with exacerbation noted by continued dyspnea currently.  Patient is currently on COPD Pathway. Continue scheduled inhalers Steroids, Antibiotics, and Supplemental oxygen and monitor respiratory status closely.    -solumedrol IV   - DuoNebs Q4H wake  - Empiric therapy with Ceftriaxone and Azithromycin  - Respiratory panel see below  - Supplemental O2 PRN  -supportive care  -added Chest PT

## 2024-03-28 NOTE — NURSING
[facility], [facility] - [place], [place]  Nurses Note -- 4 Eyes      3/28/2024       Skin assessed on: Q Shift      [x] No Pressure Injuries Present    []Prevention Measures Documented    [] Yes LDA  for Pressure Injury Previously documented     [] Yes New Pressure Injury Discovered   [] LDA for New Pressure Injury Added      Attending RN:  Brien Montes De Oca, RN     Second RN:  Tin

## 2024-03-28 NOTE — PLAN OF CARE
Problem: Adult Inpatient Plan of Care  Goal: Plan of Care Review  Outcome: Ongoing, Progressing     Problem: Adjustment to Illness COPD (Chronic Obstructive Pulmonary Disease)  Goal: Optimal Chronic Illness Coping  Outcome: Ongoing, Progressing     Problem: Infection  Goal: Absence of Infection Signs and Symptoms  Outcome: Ongoing, Progressing

## 2024-03-28 NOTE — NURSING
[facility], [facility] - [place]  Nurses Note -- 4 Eyes      3/27/2024       Skin assessed on: Q Shift      [x] No Pressure Injuries Present    [x]Prevention Measures Documented    [] Yes LDA  for Pressure Injury Previously documented     [] Yes New Pressure Injury Discovered   [] LDA for New Pressure Injury Added      Attending RN:  Tobias Patten RN     Second RN:  CHARLES Marquez

## 2024-03-28 NOTE — PROGRESS NOTES
[facility] Medicine  Progress Note    Patient Name: Jacqueline Manuel  MRN: [id number]  Patient Class: OP- Observation   Admission Date: 3/27/2024  Length of Stay: 0 days  Attending Physician: Clau Persaud, *  Primary Care Provider: Fan Ling (Inactive)        Subjective:     Principal Problem:COPD exacerbation        HPI:  Ms. Manuel is a 53 yr old female with a hx of COPD, HTN, CKD stage 3, and CVA who presented to the ED via EMS with a chief complaint of shortness of breath that began yesterday morning while the patient was sleeping and it woke her up from her sleep.  She says throughout the day her symptoms progressively worsened.  She states that she could not even eat due to the shortness of breath.  She says that she noticed herself audibly wheezing.  She tried her inhalers at home which did not relieve her symptoms.  She states that recently one of her grandchildren has been sick with URI symptoms and she was around them yesterday.  She also endorses fatigue, occasional chills, runny nose, sore throat, productive cough with green sputum, diarrhea that has now resolved, lightheadedness, and dizziness.  She lives at home with her daughter, smokes no more than half a pack of cigarettes a day, occasionally drinks alcohol, and smokes cocaine about once every 2 weeks.  She denies fever, nausea, vomiting, urinary symptoms, CP, abdominal pain, and syncope.    En route to ED, patient was given Decadron 16 mg IM and DuoNeb and placed pt on CPAP with SpO2 100%. Upon arrival to ED, patient afebrile, HR of 93, RR of 18, BP of 207/123, satting 100% on CPAP.  Workup in the ED included CBC, CMP, BNP, troponin, COVID test, influenza test, CXR.  Workup revealed BUN of 26, creatinine of 2.1, GFR of 28, BNP of 180.  CXR showed no acute abnormality.   Patient was given breathing treatments and hydralazine 10 mg IV x2 while in the ED. case discussed with Billy Hoffmann MD and patient will  be placed under observation for COPD exacerbation.    Overview/Hospital Course:  No notes on file    Interval History: Patient seen and examined. Breath sounds still coarse with rhonchi noted. Still on RA. Added chest PT. Continue supportive care. Replete electrolytes. Hold HCTZ.         Review of Systems   Constitutional:  Positive for chills and fatigue. Negative for fever.   HENT:  Positive for rhinorrhea and sore throat.    Respiratory:  Positive for cough (green sputum), shortness of breath and wheezing.    Cardiovascular:  Negative for chest pain.   Gastrointestinal:  Positive for diarrhea (resolved). Negative for abdominal pain, nausea and vomiting.   Genitourinary:  Negative for dysuria, frequency, hematuria and urgency.   Neurological:  Positive for dizziness and light-headedness. Negative for syncope.     Objective:     Vital Signs (Most Recent):  Temp: 98 °F (36.7 °C) (03/28/24 0752)  Pulse: (!) 117 (03/28/24 0818)  Resp: (!) 24 (03/28/24 0818)  BP: (!) 160/104 (03/28/24 0809)  SpO2: 95 % (03/28/24 0818) Vital Signs (24h Range):  Temp:  [97.5 °F (36.4 °C)-98.3 °F (36.8 °C)] 98 °F (36.7 °C)  Pulse:  [] 117  Resp:  [16-24] 24  SpO2:  [93 %-100 %] 95 %  BP: (136-222)/() 160/104     Weight: 66.7 kg (147 lb 0.8 oz)  Body mass index is 23.73 kg/m².    Intake/Output Summary (Last 24 hours) at 3/28/2024 0845  Last data filed at 3/27/2024 2329  Gross per 24 hour   Intake 240 ml   Output --   Net 240 ml         Physical Exam  Vitals reviewed.   Constitutional:       General: She is awake. She is not in acute distress.     Appearance: She is not diaphoretic.   Cardiovascular:      Rate and Rhythm: Normal rate.      Heart sounds: Normal heart sounds. No murmur heard.     No friction rub. No gallop.   Pulmonary:      Effort: No accessory muscle usage or respiratory distress.      Breath sounds: Wheezing (diffuse bilateral) and rhonchi present. No rales.      Comments: Coarse breath sounds. Tachypnea at  times   Abdominal:      General: Abdomen is flat. Bowel sounds are normal.      Palpations: Abdomen is soft.      Tenderness: There is no abdominal tenderness. There is no guarding or rebound.   Musculoskeletal:      Right lower leg: No edema.      Left lower leg: No edema.   Skin:     General: Skin is warm and dry.   Neurological:      Mental Status: She is alert and oriented to person, place, and time.   Psychiatric:         Behavior: Behavior is cooperative.             Significant Labs: All pertinent labs within the past 24 hours have been reviewed.    Significant Imaging: I have reviewed all pertinent imaging results/findings within the past 24 hours.    Assessment/Plan:      * COPD exacerbation  Patient's COPD is with exacerbation noted by continued dyspnea currently.  Patient is currently on COPD Pathway. Continue scheduled inhalers Steroids, Antibiotics, and Supplemental oxygen and monitor respiratory status closely.    -solumedrol IV   - DuoNebs Q4H wake  - Empiric therapy with Ceftriaxone and Azithromycin  - Respiratory panel see below  - Supplemental O2 PRN  -supportive care  -added Chest PT     Abnormal blood electrolyte level  -replete potassium and magnesium as needed       Human metapneumovirus pneumonia  -see above  -supportive care       Stage 3 chronic kidney disease  Creatine stable for now. BMP reviewed- noted Estimated Creatinine Clearance: 34.6 mL/min (A) (based on SCr of 1.8 mg/dL (H)). according to latest data. Based on current GFR, CKD stage is stage 4 - GFR 15-29.  Monitor UOP and serial BMP and adjust therapy as needed. Renally dose meds. Avoid nephrotoxic medications and procedures.    Essential hypertension  Hold HCTZ     Chronic, uncontrolled. Latest blood pressure and vitals reviewed-     Temp:  [97.5 °F (36.4 °C)-98.3 °F (36.8 °C)]   Pulse:  []   Resp:  [16-24]   BP: (136-222)/()   SpO2:  [93 %-100 %] .   Home meds for hypertension were reviewed and noted below.    Hypertension Medications               hydroCHLOROthiazide (HYDRODIURIL) 25 MG tablet Take 25 mg by mouth once daily.    valsartan (DIOVAN) 160 MG tablet Take 160 mg by mouth once daily.    amLODIPine (NORVASC) 10 MG tablet Take 1 tablet by mouth once daily.    carvediloL (COREG) 25 MG tablet Take 1 tablet by mouth 2 (two) times daily with meals.            While in the hospital, will manage blood pressure as follows; Continue home antihypertensive regimen    Will utilize p.r.n. blood pressure medication only if patient's blood pressure greater than 180/110 and she develops symptoms such as worsening chest pain or shortness of breath.      VTE Risk Mitigation (From admission, onward)           Ordered     Place sequential compression device  Until discontinued         03/27/24 0454     IP VTE LOW RISK PATIENT  Once         03/27/24 0454                    Discharge Planning   CLAY:      Code Status: Full Code   Is the patient medically ready for discharge?:     Reason for patient still in hospital (select all that apply): Patient trending condition                     Clint Pop NP  Department of Hospital Medicine   [facility] - Observation

## 2024-03-28 NOTE — SUBJECTIVE & OBJECTIVE
Interval History: Patient seen and examined. Breath sounds still coarse with rhonchi noted. Still on RA. Added chest PT. Continue supportive care. Replete electrolytes. Hold HCTZ.         Review of Systems   Constitutional:  Positive for chills and fatigue. Negative for fever.   HENT:  Positive for rhinorrhea and sore throat.    Respiratory:  Positive for cough (green sputum), shortness of breath and wheezing.    Cardiovascular:  Negative for chest pain.   Gastrointestinal:  Positive for diarrhea (resolved). Negative for abdominal pain, nausea and vomiting.   Genitourinary:  Negative for dysuria, frequency, hematuria and urgency.   Neurological:  Positive for dizziness and light-headedness. Negative for syncope.     Objective:     Vital Signs (Most Recent):  Temp: 98 °F (36.7 °C) (03/28/24 0752)  Pulse: (!) 117 (03/28/24 0818)  Resp: (!) 24 (03/28/24 0818)  BP: (!) 160/104 (03/28/24 0809)  SpO2: 95 % (03/28/24 0818) Vital Signs (24h Range):  Temp:  [97.5 °F (36.4 °C)-98.3 °F (36.8 °C)] 98 °F (36.7 °C)  Pulse:  [] 117  Resp:  [16-24] 24  SpO2:  [93 %-100 %] 95 %  BP: (136-222)/() 160/104     Weight: 66.7 kg (147 lb 0.8 oz)  Body mass index is 23.73 kg/m².    Intake/Output Summary (Last 24 hours) at 3/28/2024 0845  Last data filed at 3/27/2024 2329  Gross per 24 hour   Intake 240 ml   Output --   Net 240 ml         Physical Exam  Vitals reviewed.   Constitutional:       General: She is awake. She is not in acute distress.     Appearance: She is not diaphoretic.   Cardiovascular:      Rate and Rhythm: Normal rate.      Heart sounds: Normal heart sounds. No murmur heard.     No friction rub. No gallop.   Pulmonary:      Effort: No accessory muscle usage or respiratory distress.      Breath sounds: Wheezing (diffuse bilateral) and rhonchi present. No rales.      Comments: Coarse breath sounds. Tachypnea at times   Abdominal:      General: Abdomen is flat. Bowel sounds are normal.      Palpations: Abdomen is  soft.      Tenderness: There is no abdominal tenderness. There is no guarding or rebound.   Musculoskeletal:      Right lower leg: No edema.      Left lower leg: No edema.   Skin:     General: Skin is warm and dry.   Neurological:      Mental Status: She is alert and oriented to person, place, and time.   Psychiatric:         Behavior: Behavior is cooperative.             Significant Labs: All pertinent labs within the past 24 hours have been reviewed.    Significant Imaging: I have reviewed all pertinent imaging results/findings within the past 24 hours.

## 2024-03-28 NOTE — ASSESSMENT & PLAN NOTE
Creatine stable for now. BMP reviewed- noted Estimated Creatinine Clearance: 34.6 mL/min (A) (based on SCr of 1.8 mg/dL (H)). according to latest data. Based on current GFR, CKD stage is stage 4 - GFR 15-29.  Monitor UOP and serial BMP and adjust therapy as needed. Renally dose meds. Avoid nephrotoxic medications and procedures.

## 2024-03-28 NOTE — ASSESSMENT & PLAN NOTE
Hold HCTZ     Chronic, uncontrolled. Latest blood pressure and vitals reviewed-     Temp:  [97.5 °F (36.4 °C)-98.3 °F (36.8 °C)]   Pulse:  []   Resp:  [16-24]   BP: (136-222)/()   SpO2:  [93 %-100 %] .   Home meds for hypertension were reviewed and noted below.   Hypertension Medications               hydroCHLOROthiazide (HYDRODIURIL) 25 MG tablet Take 25 mg by mouth once daily.    valsartan (DIOVAN) 160 MG tablet Take 160 mg by mouth once daily.    amLODIPine (NORVASC) 10 MG tablet Take 1 tablet by mouth once daily.    carvediloL (COREG) 25 MG tablet Take 1 tablet by mouth 2 (two) times daily with meals.            While in the hospital, will manage blood pressure as follows; Continue home antihypertensive regimen    Will utilize p.r.n. blood pressure medication only if patient's blood pressure greater than 180/110 and she develops symptoms such as worsening chest pain or shortness of breath.

## 2024-03-28 NOTE — NURSING
[facility] MEWS TRIGGER FOLLOW UP       MEWS Monitoring, Score is: 4  Indication for review: HR    Pt wheezing, received breathing treatment.    Bedside Nurse, Brien contacted, no concerns verbalized at this time, instructed to call [phone] for further concerns or assistance..

## 2024-03-29 VITALS
TEMPERATURE: 98 F | BODY MASS INDEX: 23.64 KG/M2 | HEIGHT: 66 IN | HEART RATE: 88 BPM | WEIGHT: 147.06 LBS | SYSTOLIC BLOOD PRESSURE: 144 MMHG | OXYGEN SATURATION: 99 % | RESPIRATION RATE: 20 BRPM | DIASTOLIC BLOOD PRESSURE: 99 MMHG

## 2024-03-29 LAB
ANION GAP SERPL CALC-SCNC: 13 MMOL/L (ref 8–16)
BASOPHILS # BLD AUTO: 0.03 K/UL (ref 0–0.2)
BASOPHILS NFR BLD: 0.2 % (ref 0–1.9)
BUN SERPL-MCNC: 43 MG/DL (ref 6–20)
CALCIUM SERPL-MCNC: 10.2 MG/DL (ref 8.7–10.5)
CHLORIDE SERPL-SCNC: 106 MMOL/L (ref 95–110)
CO2 SERPL-SCNC: 22 MMOL/L (ref 23–29)
CREAT SERPL-MCNC: 2 MG/DL (ref 0.5–1.4)
DIFFERENTIAL METHOD BLD: ABNORMAL
EOSINOPHIL # BLD AUTO: 0 K/UL (ref 0–0.5)
EOSINOPHIL NFR BLD: 0 % (ref 0–8)
ERYTHROCYTE [DISTWIDTH] IN BLOOD BY AUTOMATED COUNT: 13.2 % (ref 11.5–14.5)
EST. GFR  (NO RACE VARIABLE): 30 ML/MIN/1.73 M^2
GLUCOSE SERPL-MCNC: 110 MG/DL (ref 70–110)
HCT VFR BLD AUTO: 40.9 % (ref 37–48.5)
HGB BLD-MCNC: 13.2 G/DL (ref 12–16)
IMM GRANULOCYTES # BLD AUTO: 0.09 K/UL (ref 0–0.04)
IMM GRANULOCYTES NFR BLD AUTO: 0.5 % (ref 0–0.5)
LYMPHOCYTES # BLD AUTO: 1.7 K/UL (ref 1–4.8)
LYMPHOCYTES NFR BLD: 9.2 % (ref 18–48)
MAGNESIUM SERPL-MCNC: 1.9 MG/DL (ref 1.6–2.6)
MCH RBC QN AUTO: 29 PG (ref 27–31)
MCHC RBC AUTO-ENTMCNC: 32.3 G/DL (ref 32–36)
MCV RBC AUTO: 90 FL (ref 82–98)
MONOCYTES # BLD AUTO: 0.3 K/UL (ref 0.3–1)
MONOCYTES NFR BLD: 1.4 % (ref 4–15)
NEUTROPHILS # BLD AUTO: 16 K/UL (ref 1.8–7.7)
NEUTROPHILS NFR BLD: 88.7 % (ref 38–73)
NRBC BLD-RTO: 0 /100 WBC
PHOSPHATE SERPL-MCNC: 2.8 MG/DL (ref 2.7–4.5)
PLATELET # BLD AUTO: 293 K/UL (ref 150–450)
PMV BLD AUTO: 10.1 FL (ref 9.2–12.9)
POTASSIUM SERPL-SCNC: 3.9 MMOL/L (ref 3.5–5.1)
RBC # BLD AUTO: 4.55 M/UL (ref 4–5.4)
SODIUM SERPL-SCNC: 141 MMOL/L (ref 136–145)
WBC # BLD AUTO: 18.08 K/UL (ref 3.9–12.7)

## 2024-03-29 PROCEDURE — 25000242 PHARM REV CODE 250 ALT 637 W/ HCPCS

## 2024-03-29 PROCEDURE — 94760 N-INVAS EAR/PLS OXIMETRY 1: CPT

## 2024-03-29 PROCEDURE — 94640 AIRWAY INHALATION TREATMENT: CPT

## 2024-03-29 PROCEDURE — 63700000 PHARM REV CODE 250 ALT 637 W/O HCPCS: Performed by: NURSE PRACTITIONER

## 2024-03-29 PROCEDURE — 94664 DEMO&/EVAL PT USE INHALER: CPT

## 2024-03-29 PROCEDURE — 63600175 PHARM REV CODE 636 W HCPCS: Performed by: NURSE PRACTITIONER

## 2024-03-29 PROCEDURE — 25000003 PHARM REV CODE 250

## 2024-03-29 PROCEDURE — 63600175 PHARM REV CODE 636 W HCPCS

## 2024-03-29 PROCEDURE — 25000242 PHARM REV CODE 250 ALT 637 W/ HCPCS: Performed by: NURSE PRACTITIONER

## 2024-03-29 PROCEDURE — 99900035 HC TECH TIME PER 15 MIN (STAT)

## 2024-03-29 RX ORDER — PREDNISONE 20 MG/1
TABLET ORAL
Qty: 10 TABLET | Refills: 0 | Status: SHIPPED | OUTPATIENT
Start: 2024-03-29 | End: 2024-04-23

## 2024-03-29 RX ORDER — IPRATROPIUM BROMIDE AND ALBUTEROL SULFATE 2.5; .5 MG/3ML; MG/3ML
3 SOLUTION RESPIRATORY (INHALATION) EVERY 6 HOURS PRN
Qty: 75 ML | Refills: 3 | Status: ON HOLD | OUTPATIENT
Start: 2024-03-29 | End: 2024-05-22

## 2024-03-29 RX ORDER — GUAIFENESIN 1200 MG/1
1200 TABLET, EXTENDED RELEASE ORAL 2 TIMES DAILY
Qty: 20 TABLET | Refills: 0 | Status: SHIPPED | OUTPATIENT
Start: 2024-03-29 | End: 2024-04-05

## 2024-03-29 RX ORDER — PANTOPRAZOLE SODIUM 40 MG/1
40 TABLET, DELAYED RELEASE ORAL DAILY
Qty: 25 TABLET | Refills: 0 | Status: ON HOLD | OUTPATIENT
Start: 2024-03-29 | End: 2024-05-22

## 2024-03-29 RX ORDER — ALBUTEROL SULFATE 90 UG/1
1-2 AEROSOL, METERED RESPIRATORY (INHALATION) EVERY 6 HOURS PRN
Qty: 8 G | Refills: 3 | Status: ON HOLD | OUTPATIENT
Start: 2024-03-29 | End: 2024-05-22

## 2024-03-29 RX ADMIN — VALSARTAN 160 MG: 80 TABLET, FILM COATED ORAL at 08:03

## 2024-03-29 RX ADMIN — AMLODIPINE BESYLATE 10 MG: 5 TABLET ORAL at 08:03

## 2024-03-29 RX ADMIN — ESCITALOPRAM OXALATE 10 MG: 10 TABLET ORAL at 08:03

## 2024-03-29 RX ADMIN — METHYLPREDNISOLONE SODIUM SUCCINATE 60 MG: 40 INJECTION, POWDER, FOR SOLUTION INTRAMUSCULAR; INTRAVENOUS at 01:03

## 2024-03-29 RX ADMIN — ATORVASTATIN CALCIUM 40 MG: 40 TABLET, FILM COATED ORAL at 08:03

## 2024-03-29 RX ADMIN — IPRATROPIUM BROMIDE AND ALBUTEROL SULFATE 3 ML: .5; 3 SOLUTION RESPIRATORY (INHALATION) at 05:03

## 2024-03-29 RX ADMIN — HYDRALAZINE HYDROCHLORIDE 10 MG: 20 INJECTION, SOLUTION INTRAMUSCULAR; INTRAVENOUS at 05:03

## 2024-03-29 RX ADMIN — IPRATROPIUM BROMIDE AND ALBUTEROL SULFATE 3 ML: 2.5; .5 SOLUTION RESPIRATORY (INHALATION) at 11:03

## 2024-03-29 RX ADMIN — METHYLPREDNISOLONE SODIUM SUCCINATE 60 MG: 40 INJECTION, POWDER, FOR SOLUTION INTRAMUSCULAR; INTRAVENOUS at 12:03

## 2024-03-29 RX ADMIN — ASPIRIN 81 MG: 81 TABLET, COATED ORAL at 08:03

## 2024-03-29 RX ADMIN — IPRATROPIUM BROMIDE AND ALBUTEROL SULFATE 3 ML: 2.5; .5 SOLUTION RESPIRATORY (INHALATION) at 03:03

## 2024-03-29 RX ADMIN — IPRATROPIUM BROMIDE AND ALBUTEROL SULFATE 3 ML: 2.5; .5 SOLUTION RESPIRATORY (INHALATION) at 05:03

## 2024-03-29 RX ADMIN — AZITHROMYCIN DIHYDRATE 500 MG: 250 TABLET ORAL at 08:03

## 2024-03-29 RX ADMIN — CARVEDILOL 25 MG: 12.5 TABLET, FILM COATED ORAL at 08:03

## 2024-03-29 RX ADMIN — CEFTRIAXONE 1 G: 1 INJECTION, POWDER, FOR SOLUTION INTRAMUSCULAR; INTRAVENOUS at 05:03

## 2024-03-29 NOTE — PLAN OF CARE
Problem: Adult Inpatient Plan of Care  Goal: Plan of Care Review  Outcome: Met  Goal: Patient-Specific Goal (Individualized)  Outcome: Met  Goal: Absence of Hospital-Acquired Illness or Injury  Outcome: Met  Goal: Optimal Comfort and Wellbeing  Outcome: Met  Goal: Readiness for Transition of Care  Outcome: Met     Problem: Adjustment to Illness COPD (Chronic Obstructive Pulmonary Disease)  Goal: Optimal Chronic Illness Coping  Outcome: Met     Problem: Functional Ability Impaired COPD (Chronic Obstructive Pulmonary Disease)  Goal: Optimal Level of Functional Rooks  Outcome: Met     Problem: Infection COPD (Chronic Obstructive Pulmonary Disease)  Goal: Absence of Infection Signs and Symptoms  Outcome: Met     Problem: Oral Intake Inadequate COPD (Chronic Obstructive Pulmonary Disease)  Goal: Improved Nutrition Intake  Outcome: Met     Problem: Respiratory Compromise COPD (Chronic Obstructive Pulmonary Disease)  Goal: Effective Oxygenation and Ventilation  Outcome: Met     Problem: Skin Injury Risk Increased  Goal: Skin Health and Integrity  Outcome: Met     Problem: Infection  Goal: Absence of Infection Signs and Symptoms  Outcome: Met

## 2024-03-29 NOTE — PROGRESS NOTES
[facility] Medicine  Progress Note    Patient Name: Jacqueline Manuel  MRN: [id number]  Patient Class: IP- Inpatient   Admission Date: 3/27/2024  Length of Stay: 1 days  Attending Physician: Torito Blas MD  Primary Care Provider: Fan Ling (Inactive)        Subjective:     Principal Problem:COPD exacerbation        HPI:  Ms. Manuel is a 53 yr old female with a hx of COPD, HTN, CKD stage 3, and CVA who presented to the ED via EMS with a chief complaint of shortness of breath that began yesterday morning while the patient was sleeping and it woke her up from her sleep.  She says throughout the day her symptoms progressively worsened.  She states that she could not even eat due to the shortness of breath.  She says that she noticed herself audibly wheezing.  She tried her inhalers at home which did not relieve her symptoms.  She states that recently one of her grandchildren has been sick with URI symptoms and she was around them yesterday.  She also endorses fatigue, occasional chills, runny nose, sore throat, productive cough with green sputum, diarrhea that has now resolved, lightheadedness, and dizziness.  She lives at home with her daughter, smokes no more than half a pack of cigarettes a day, occasionally drinks alcohol, and smokes cocaine about once every 2 weeks.  She denies fever, nausea, vomiting, urinary symptoms, CP, abdominal pain, and syncope.    En route to ED, patient was given Decadron 16 mg IM and DuoNeb and placed pt on CPAP with SpO2 100%. Upon arrival to ED, patient afebrile, HR of 93, RR of 18, BP of 207/123, satting 100% on CPAP.  Workup in the ED included CBC, CMP, BNP, troponin, COVID test, influenza test, CXR.  Workup revealed BUN of 26, creatinine of 2.1, GFR of 28, BNP of 180.  CXR showed no acute abnormality.   Patient was given breathing treatments and hydralazine 10 mg IV x2 while in the ED. case discussed with Billy Hoffmann MD and patient will be placed  under observation for COPD exacerbation.    Overview/Hospital Course:  53 year old admitted for copd excerebration. Patient was given Decadron 16 mg IM and DuoNeb and placed pt on CPAP with SpO2 100%. Upon arrival to ED, patient afebrile, HR of 93, RR of 18, BP of 207/123, satting 100% on CPAP. Workup in the ED included CBC, CMP, BNP, troponin, COVID test, influenza test, CXR. Workup revealed BUN of 26, creatinine of 2.1, GFR of 28, BNP of 180. CXR showed no acute abnormality.  Patient was given breathing treatments and hydralazine 10 mg IV x2 while in the ED. Patient lungs sound are coarse with rhonchi. Currently on RA. Switched prednisone to IV solumedrol. Added hydralazine prn. Added azithromycin for 4 doses.    Patient positive for Human Metapneumovirus, will pull off antibiotics if procal negative.       Interval History:  NAEON.  No new issues.   SOBOE, Cough  All questions answered and updates on care given.       ROS:  General: Negative for fevers or chills.  Cardiac: Negative for chest pain or orthopnea   Pulmonary: Negative for wheezing.  GI: Negative for abdominal distention or pain     Vitals:    03/29/24 0548 03/29/24 0623 03/29/24 0705 03/29/24 0735   BP:  (!) 164/82  (!) 157/92   Pulse: 84 80 100 100   Resp: (!) 22   20   Temp:    97.8 °F (36.6 °C)   TempSrc:       SpO2:    98%   Weight:       Height:              Body mass index is 23.73 kg/m².      PHYSICAL EXAM:  GENERAL APPEARANCE: alert and cooperative  HEENT:     HEAD: NC/AT     EYES: PERRL, EOMI.  Vision is grossly intact.  CARDIAC: There is no peripheral edema, cyanosis or pallor.   LUNGS: Crackles b/l  ABDOMEN: Non-distended. No guarding.  MSK: No joint erythema or tenderness.   EXTREMITIES: No significant deformity or joint abnormality. No edema.   NEUROLOGICAL: CN II-XII grossly intact.   SKIN: Skin normal color, texture and turgor with no lesions or eruptions.  PSYCHIATRIC: Oriented. No tangential speech. No Hyperactive  features.        Recent Results (from the past 24 hour(s))   Potassium    Collection Time: 03/28/24 12:31 PM   Result Value Ref Range    Potassium 3.3 (L) 3.5 - 5.1 mmol/L   Basic metabolic panel    Collection Time: 03/29/24  3:59 AM   Result Value Ref Range    Sodium 141 136 - 145 mmol/L    Potassium 3.9 3.5 - 5.1 mmol/L    Chloride 106 95 - 110 mmol/L    CO2 22 (L) 23 - 29 mmol/L    Glucose 110 70 - 110 mg/dL    BUN 43 (H) 6 - 20 mg/dL    Creatinine 2.0 (H) 0.5 - 1.4 mg/dL    Calcium 10.2 8.7 - 10.5 mg/dL    Anion Gap 13 8 - 16 mmol/L    eGFR 30 (A) >60 mL/min/1.73 m^2   Magnesium    Collection Time: 03/29/24  3:59 AM   Result Value Ref Range    Magnesium 1.9 1.6 - 2.6 mg/dL   Phosphorus    Collection Time: 03/29/24  3:59 AM   Result Value Ref Range    Phosphorus 2.8 2.7 - 4.5 mg/dL   CBC auto differential    Collection Time: 03/29/24  3:59 AM   Result Value Ref Range    WBC 18.08 (H) 3.90 - 12.70 K/uL    RBC 4.55 4.00 - 5.40 M/uL    Hemoglobin 13.2 12.0 - 16.0 g/dL    Hematocrit 40.9 37.0 - 48.5 %    MCV 90 82 - 98 fL    MCH 29.0 27.0 - 31.0 pg    MCHC 32.3 32.0 - 36.0 g/dL    RDW 13.2 11.5 - 14.5 %    Platelets 293 150 - 450 K/uL    MPV 10.1 9.2 - 12.9 fL    Immature Granulocytes 0.5 0.0 - 0.5 %    Gran # (ANC) 16.0 (H) 1.8 - 7.7 K/uL    Immature Grans (Abs) 0.09 (H) 0.00 - 0.04 K/uL    Lymph # 1.7 1.0 - 4.8 K/uL    Mono # 0.3 0.3 - 1.0 K/uL    Eos # 0.0 0.0 - 0.5 K/uL    Baso # 0.03 0.00 - 0.20 K/uL    nRBC 0 0 /100 WBC    Gran % 88.7 (H) 38.0 - 73.0 %    Lymph % 9.2 (L) 18.0 - 48.0 %    Mono % 1.4 (L) 4.0 - 15.0 %    Eosinophil % 0.0 0.0 - 8.0 %    Basophil % 0.2 0.0 - 1.9 %    Differential Method Automated        Microbiology Results (last 7 days)       Procedure Component Value Units Date/Time    Respiratory Infection Panel (PCR), Nasopharyngeal [[id number]]  (Abnormal) Collected: 03/27/24 0535    Order Status: Completed Specimen: Nasopharyngeal Swab Updated: 03/27/24 1603     Respiratory Infection Panel  Source NP Swab     Adenovirus Not Detected     Coronavirus 229E, Common Cold Virus Not Detected     Coronavirus HKU1, Common Cold Virus Not Detected     Coronavirus NL63, Common Cold Virus Not Detected     Coronavirus OC43, Common Cold Virus Not Detected     Comment: The Coronavirus strains detected in this test cause the common cold.  These strains are not the COVID-19 (novel Coronavirus)strain   associated with the respiratory disease outbreak.          SARS-CoV2 (COVID-19) Qualitative PCR Not Detected     Human Metapneumovirus Detected     Human Rhinovirus/Enterovirus Not Detected     Influenza A (subtypes H1, H1-2009,H3) Not Detected     Influenza B Not Detected     Parainfluenza Virus 1 Not Detected     Parainfluenza Virus 2 Not Detected     Parainfluenza Virus 3 Not Detected     Parainfluenza Virus 4 Not Detected     Respiratory Syncytial Virus Not Detected     Bordetella Parapertussis (WP7245) Not Detected     Bordetella pertussis (ptxP) Not Detected     Chlamydia pneumoniae Not Detected     Mycoplasma pneumoniae Not Detected    Narrative:      For all other respiratory sources, order [id number] -  Respiratory Viral Panel by PCR             Imaging Results              X-Ray Chest 1 View (Final result)  Result time 03/27/24 01:55:16      Final result by Dax Arrington MD (03/27/24 01:55:16)                   Impression:      No acute abnormality.      Electronically signed by: Dax Arrington  Date:    03/27/2024  Time:    01:55               Narrative:    EXAMINATION:  XR CHEST 1 VIEW    CLINICAL HISTORY:  shortness of breath;    TECHNIQUE:  Single frontal view of the chest was performed.    COMPARISON:  02/19/2024    FINDINGS:  The lungs are clear, with stable appearance of pulmonary vasculature and no pleural effusion or pneumothorax.    The cardiac silhouette is stable in size. The hilar and mediastinal contours are unremarkable.    Bones are intact.                                              Assessment/Plan:      * COPD exacerbation  Patient's COPD is with exacerbation noted by continued dyspnea currently.  Patient is currently on COPD Pathway. Continue scheduled inhalers Steroids, Antibiotics, and Supplemental oxygen and monitor respiratory status closely.    -solumedrol IV   - DuoNebs Q4H wake  - Empiric therapy with Ceftriaxone and Azithromycin  - Respiratory panel see below  - Supplemental O2 PRN  -supportive care  -added Chest PT     Abnormal blood electrolyte level  -replete potassium and magnesium as needed       Human metapneumovirus pneumonia  -see above  -supportive care       Stage 3 chronic kidney disease  Creatine stable for now. BMP reviewed- noted Estimated Creatinine Clearance: 34.6 mL/min (A) (based on SCr of 1.8 mg/dL (H)). according to latest data. Based on current GFR, CKD stage is stage 4 - GFR 15-29.  Monitor UOP and serial BMP and adjust therapy as needed. Renally dose meds. Avoid nephrotoxic medications and procedures.    Essential hypertension  Hold HCTZ     Chronic, uncontrolled. Latest blood pressure and vitals reviewed-     Temp:  [97.5 °F (36.4 °C)-98.3 °F (36.8 °C)]   Pulse:  []   Resp:  [16-24]   BP: (136-222)/()   SpO2:  [93 %-100 %] .   Home meds for hypertension were reviewed and noted below.   Hypertension Medications               hydroCHLOROthiazide (HYDRODIURIL) 25 MG tablet Take 25 mg by mouth once daily.    valsartan (DIOVAN) 160 MG tablet Take 160 mg by mouth once daily.    amLODIPine (NORVASC) 10 MG tablet Take 1 tablet by mouth once daily.    carvediloL (COREG) 25 MG tablet Take 1 tablet by mouth 2 (two) times daily with meals.            While in the hospital, will manage blood pressure as follows; Continue home antihypertensive regimen    Will utilize p.r.n. blood pressure medication only if patient's blood pressure greater than 180/110 and she develops symptoms such as worsening chest pain or shortness of breath.      VTE Risk Mitigation  (From admission, onward)           Ordered     Place sequential compression device  Until discontinued         03/27/24 0454     IP VTE LOW RISK PATIENT  Once         03/27/24 0454                    Discharge Planning   CLAY:      Code Status: Full Code   Is the patient medically ready for discharge?:     Reason for patient still in hospital (select all that apply): Patient trending condition and Treatment                     Torito Blas MD  Department of Hospital Medicine   [facility] - Observation

## 2024-03-29 NOTE — NURSING
Walk Test Completed       Pt room air sats while at rest: 97%    Pt room air sats with activity: 97%

## 2024-03-29 NOTE — PLAN OF CARE
Nebulizer to be delivered to patient's home where she will be able to pay the copay to [facility].  .Case Management Final Discharge Note      Discharge Disposition: home    New DME ordered / company name: [facility] , nebulizer to home    Primary Caretaker and contact information:     Linda Manuel (Daughter)  [phone] (Mobile)       Scheduled followup appointment: made    Referrals placed: sent    Transportation: family    Patient and family educated on discharge services and updated on DC plan. Brien/Bedside RN notified, patient clear to discharge from Case Management Perspective.      03/29/24 1718   Final Note   Assessment Type Final Discharge Note   Anticipated Discharge Disposition Home   Hospital Resources/Appts/Education Provided Provided patient/caregiver with written discharge plan information   Post-Acute Status   Post-Acute Authorization HME   E Status Referrals Sent   Discharge Delays None known at this time

## 2024-03-29 NOTE — HOSPITAL COURSE
53 year old admitted for copd excerebration. Patient was given Decadron 16 mg IM and DuoNeb and placed pt on CPAP with SpO2 100%. Upon arrival to ED, patient afebrile, HR of 93, RR of 18, BP of 207/123, satting 100% on CPAP. Workup in the ED included CBC, CMP, BNP, troponin, COVID test, influenza test, CXR. Workup revealed BUN of 26, creatinine of 2.1, GFR of 28, BNP of 180. CXR showed no acute abnormality.  Patient was given breathing treatments and hydralazine 10 mg IV x2 while in the ED. Patient lungs sound are coarse with rhonchi. Currently on RA. Switched prednisone to IV solumedrol. Added hydralazine prn. Added azithromycin for 4 doses.    Patient positive for Human Metapneumovirus, will pull off antibiotics if procal negative.

## 2024-03-29 NOTE — PLAN OF CARE
Problem: Adult Inpatient Plan of Care  Goal: Plan of Care Review  Outcome: Ongoing, Progressing     Problem: Respiratory Compromise COPD (Chronic Obstructive Pulmonary Disease)  Goal: Effective Oxygenation and Ventilation  Outcome: Ongoing, Progressing     Problem: Infection  Goal: Absence of Infection Signs and Symptoms  Outcome: Ongoing, Progressing

## 2024-03-29 NOTE — NURSING
AVS provided and reviewed, pt verbalizes understanding of all instructions. Per CM, neb to be sent home. NADN or reported. Pt reports readiness for dc. Pt to be dc with family.

## 2024-03-29 NOTE — NURSING
[facility] [place]  Nurses Note -- 4 Eyes      3/29/2024       Skin assessed on: Q Shift      [x] No Pressure Injuries Present    [x]Prevention Measures Documented    [] Yes LDA  for Pressure Injury Previously documented     [] Yes New Pressure Injury Discovered   [] LDA for New Pressure Injury Added      Attending RN:  Tobias Patten RN     Second RN:  CHARLES Tesfaye

## 2024-03-29 NOTE — DISCHARGE SUMMARY
[facility] Hospital Medicine  Discharge Summary      Patient Name: Jacqueline Manuel  MRN: [id number]  [facility]: [id number]  Patient Class: IP- Inpatient  Admission Date: 3/27/2024  Hospital Length of Stay: 1 days  Discharge Date and Time:  03/29/2024 1:06 PM  Attending Physician: Torito Blas MD   Discharging Provider: Torito Blas MD  Primary Care Provider: Fan Ling (Inactive)    Primary Care Team: Networked reference to record PCT     HPI:   Ms. Manuel is a 53 yr old female with a hx of COPD, HTN, CKD stage 3, and CVA who presented to the ED via EMS with a chief complaint of shortness of breath that began yesterday morning while the patient was sleeping and it woke her up from her sleep.  She says throughout the day her symptoms progressively worsened.  She states that she could not even eat due to the shortness of breath.  She says that she noticed herself audibly wheezing.  She tried her inhalers at home which did not relieve her symptoms.  She states that recently one of her grandchildren has been sick with URI symptoms and she was around them yesterday.  She also endorses fatigue, occasional chills, runny nose, sore throat, productive cough with green sputum, diarrhea that has now resolved, lightheadedness, and dizziness.  She lives at home with her daughter, smokes no more than half a pack of cigarettes a day, occasionally drinks alcohol, and smokes cocaine about once every 2 weeks.  She denies fever, nausea, vomiting, urinary symptoms, CP, abdominal pain, and syncope.    En route to ED, patient was given Decadron 16 mg IM and DuoNeb and placed pt on CPAP with SpO2 100%. Upon arrival to ED, patient afebrile, HR of 93, RR of 18, BP of 207/123, satting 100% on CPAP.  Workup in the ED included CBC, CMP, BNP, troponin, COVID test, influenza test, CXR.  Workup revealed BUN of 26, creatinine of 2.1, GFR of 28, BNP of 180.  CXR showed no acute abnormality.   Patient was given breathing treatments  and hydralazine 10 mg IV x2 while in the ED. case discussed with Billy Hoffmann MD and patient will be placed under observation for COPD exacerbation.    * No surgery found *      Hospital Course:   53 year old admitted for copd excerebration. Patient was given Decadron 16 mg IM and DuoNeb and placed pt on CPAP with SpO2 100%. Upon arrival to ED, patient afebrile, HR of 93, RR of 18, BP of 207/123, satting 100% on CPAP. Workup in the ED included CBC, CMP, BNP, troponin, COVID test, influenza test, CXR. Workup revealed BUN of 26, creatinine of 2.1, GFR of 28, BNP of 180. CXR showed no acute abnormality.  Patient was given breathing treatments and hydralazine 10 mg IV x2 while in the ED. Patient lungs sound are coarse with rhonchi. Currently on RA. Switched prednisone to IV solumedrol. Added hydralazine prn. Added azithromycin for 4 doses.    Patient positive for Human Metapneumovirus, will pull off antibiotics if procal negative.        Take steroid (prednisone) taper as ordered and until completed  While on steroids take pantoprazole for GI protection  Take Azithromycin pack to reduce inflammation as instructed  Use albuterol as needed   Take high dose mucinex, ordered  Take it easy the next few days, try not to transmit to others  Go to lung doctor (pulmonologist)   Go to Physical therapist for arm/wrist after infection clears    Thank you for trusting [facility] and me with your care.  We are honored that you entrusted us with your healthcare needs. Your satisfaction is very important to us and we hope you have been very pleased with your experience at [facility]. After your discharge you may receive a survey asking you to rate your hospital experience- Please help us by completing this survey. We hope that you have received the very best care possible during your hospitalization at [facility], as your satisfaction is our top priority.    Let me know if there is anything more I  can do!!              Torito Blas MD        Medical Director        Section Head of         Board-Certified IM Attending    Goals of Care Treatment Preferences:  Code Status: Full Code      Consults:     No new Assessment & Plan notes have been filed under this hospital service since the last note was generated.  Service: Hospital Medicine    Final Active Diagnoses:    Diagnosis Date Noted POA    PRINCIPAL PROBLEM:  COPD exacerbation [J44.1] 05/22/2021 Yes    Human metapneumovirus pneumonia [J12.3] 03/28/2024 Yes    Abnormal blood electrolyte level [E87.8] 03/28/2024 Yes    Stage 3 chronic kidney disease [N18.30] 11/16/2023 Yes    Essential hypertension [I10] 07/11/2018 Yes      Problems Resolved During this Admission:       Discharged Condition: good    Disposition: home    Follow Up:   Follow-up Information       [facility]. Schedule an appointment as soon as possible for a visit in 3 days.    Why: Primary care  Contact information:  [address] [address] [phone]             [facility] - Emergency Dept .    Specialty: Emergency Medicine  Why: If symptoms worsen  Contact information:  [address] [facility] [place] [address] [phone]             Clinic, [facility] Follow up.    Specialties: Behavioral Health, Psychiatry, Psychology  Why: As needed:  Substance Use Treatment  Contact information:  [address] [facility] [id number] [phone]                           Patient Instructions:   No discharge procedures on file.    Significant Diagnostic Studies:     Recent Results (from the past 100 hour(s))   CBC Auto Differential    Collection Time: 03/27/24 12:42 AM   Result Value Ref Range    WBC 6.14 3.90 - 12.70 K/uL    RBC 4.29 4.00 - 5.40 M/uL    Hemoglobin 12.8 12.0 - 16.0 g/dL    Hematocrit 39.6 37.0 - 48.5 %    MCV 92 82 - 98 fL    MCH 29.8 27.0 - 31.0 pg     MCHC 32.3 32.0 - 36.0 g/dL    RDW 13.1 11.5 - 14.5 %    Platelets 253 150 - 450 K/uL    MPV 9.2 9.2 - 12.9 fL    Immature Granulocytes 0.5 0.0 - 0.5 %    Gran # (ANC) 3.4 1.8 - 7.7 K/uL    Immature Grans (Abs) 0.03 0.00 - 0.04 K/uL    Lymph # 1.6 1.0 - 4.8 K/uL    Mono # 0.8 0.3 - 1.0 K/uL    Eos # 0.2 0.0 - 0.5 K/uL    Baso # 0.03 0.00 - 0.20 K/uL    nRBC 0 0 /100 WBC    Gran % 55.7 38.0 - 73.0 %    Lymph % 26.7 18.0 - 48.0 %    Mono % 13.2 4.0 - 15.0 %    Eosinophil % 3.4 0.0 - 8.0 %    Basophil % 0.5 0.0 - 1.9 %    Differential Method Automated    Comprehensive Metabolic Panel    Collection Time: 03/27/24 12:42 AM   Result Value Ref Range    Sodium 142 136 - 145 mmol/L    Potassium 3.5 3.5 - 5.1 mmol/L    Chloride 106 95 - 110 mmol/L    CO2 27 23 - 29 mmol/L    Glucose 124 (H) 70 - 110 mg/dL    BUN 26 (H) 6 - 20 mg/dL    Creatinine 2.1 (H) 0.5 - 1.4 mg/dL    Calcium 9.8 8.7 - 10.5 mg/dL    Total Protein 8.6 (H) 6.0 - 8.4 g/dL    Albumin 3.4 (L) 3.5 - 5.2 g/dL    Total Bilirubin 0.6 0.1 - 1.0 mg/dL    Alkaline Phosphatase 94 55 - 135 U/L    AST 20 10 - 40 U/L    ALT 18 10 - 44 U/L    eGFR 28 (A) >60 mL/min/1.73 m^2    Anion Gap 9 8 - 16 mmol/L   Brain Natriuretic Peptide    Collection Time: 03/27/24 12:42 AM   Result Value Ref Range     (H) 0 - 99 pg/mL   Troponin I    Collection Time: 03/27/24 12:42 AM   Result Value Ref Range    Troponin I 0.019 0.000 - 0.026 ng/mL   EKG 12-lead    Collection Time: 03/27/24  1:10 AM   Result Value Ref Range    QRS Duration 94 ms    [facility] QTC Calculation 463 ms   POCT COVID-19 Rapid Screening    Collection Time: 03/27/24  1:19 AM   Result Value Ref Range    POC Rapid COVID Negative Negative     Acceptable Yes    POCT Influenza A/B Molecular    Collection Time: 03/27/24  1:19 AM   Result Value Ref Range    POC Molecular Influenza A Ag Negative Negative, Not Reported    POC Molecular Influenza B Ag Negative Negative, Not Reported     Acceptable  Yes    ISTAT PROCEDURE    Collection Time: 03/27/24  5:31 AM   Result Value Ref Range    POC PH 7.556 (H) 7.35 - 7.45    POC PCO2 28.9 (LL) 35 - 45 mmHg    POC PO2 66 (L) 80 - 100 mmHg    POC HCO3 25.6 24 - 28 mmol/L    POC BE 4 (H) -2 to 2 mmol/L    POC SATURATED O2 96 95 - 100 %    POC TCO2 27 23 - 27 mmol/L    Sample ARTERIAL     Site LR     Allens Test Pass     DelSys Room Air     Mode SPONT     FiO2 21    Respiratory Infection Panel (PCR), Nasopharyngeal    Collection Time: 03/27/24  5:35 AM    Specimen: Nasopharyngeal Swab   Result Value Ref Range    Respiratory Infection Panel Source NP Swab     Adenovirus Not Detected Not Detected    Coronavirus 229E, Common Cold Virus Not Detected Not Detected    Coronavirus HKU1, Common Cold Virus Not Detected Not Detected    Coronavirus NL63, Common Cold Virus Not Detected Not Detected    Coronavirus OC43, Common Cold Virus Not Detected Not Detected    SARS-CoV2 (COVID-19) Qualitative PCR Not Detected Not Detected    Human Metapneumovirus Detected (A) Not Detected    Human Rhinovirus/Enterovirus Not Detected Not Detected    Influenza A (subtypes H1, H1-2009,H3) Not Detected Not Detected    Influenza B Not Detected Not Detected    Parainfluenza Virus 1 Not Detected Not Detected    Parainfluenza Virus 2 Not Detected Not Detected    Parainfluenza Virus 3 Not Detected Not Detected    Parainfluenza Virus 4 Not Detected Not Detected    Respiratory Syncytial Virus Not Detected Not Detected    Bordetella Parapertussis (VB6837) Not Detected Not Detected    Bordetella pertussis (ptxP) Not Detected Not Detected    Chlamydia pneumoniae Not Detected Not Detected    Mycoplasma pneumoniae Not Detected Not Detected   Basic metabolic panel    Collection Time: 03/27/24  6:05 AM   Result Value Ref Range    Sodium 142 136 - 145 mmol/L    Potassium 3.5 3.5 - 5.1 mmol/L    Chloride 104 95 - 110 mmol/L    CO2 26 23 - 29 mmol/L    Glucose 153 (H) 70 - 110 mg/dL    BUN 27 (H) 6 - 20 mg/dL     Creatinine 1.8 (H) 0.5 - 1.4 mg/dL    Calcium 10.0 8.7 - 10.5 mg/dL    Anion Gap 12 8 - 16 mmol/L    eGFR 34 (A) >60 mL/min/1.73 m^2   Magnesium    Collection Time: 03/27/24  6:05 AM   Result Value Ref Range    Magnesium 1.4 (L) 1.6 - 2.6 mg/dL   Phosphorus    Collection Time: 03/27/24  6:05 AM   Result Value Ref Range    Phosphorus 1.6 (L) 2.7 - 4.5 mg/dL   CBC auto differential    Collection Time: 03/27/24  6:05 AM   Result Value Ref Range    WBC 5.94 3.90 - 12.70 K/uL    RBC 4.45 4.00 - 5.40 M/uL    Hemoglobin 13.2 12.0 - 16.0 g/dL    Hematocrit 40.3 37.0 - 48.5 %    MCV 91 82 - 98 fL    MCH 29.7 27.0 - 31.0 pg    MCHC 32.8 32.0 - 36.0 g/dL    RDW 13.1 11.5 - 14.5 %    Platelets 290 150 - 450 K/uL    MPV 9.5 9.2 - 12.9 fL    Immature Granulocytes 0.5 0.0 - 0.5 %    Gran # (ANC) 4.9 1.8 - 7.7 K/uL    Immature Grans (Abs) 0.03 0.00 - 0.04 K/uL    Lymph # 0.9 (L) 1.0 - 4.8 K/uL    Mono # 0.1 (L) 0.3 - 1.0 K/uL    Eos # 0.0 0.0 - 0.5 K/uL    Baso # 0.01 0.00 - 0.20 K/uL    nRBC 0 0 /100 WBC    Gran % 82.6 (H) 38.0 - 73.0 %    Lymph % 14.5 (L) 18.0 - 48.0 %    Mono % 2.2 (L) 4.0 - 15.0 %    Eosinophil % 0.0 0.0 - 8.0 %    Basophil % 0.2 0.0 - 1.9 %    Differential Method Automated    Basic metabolic panel    Collection Time: 03/28/24  4:07 AM   Result Value Ref Range    Sodium 142 136 - 145 mmol/L    Potassium 3.0 (L) 3.5 - 5.1 mmol/L    Chloride 106 95 - 110 mmol/L    CO2 22 (L) 23 - 29 mmol/L    Glucose 131 (H) 70 - 110 mg/dL    BUN 32 (H) 6 - 20 mg/dL    Creatinine 1.8 (H) 0.5 - 1.4 mg/dL    Calcium 10.1 8.7 - 10.5 mg/dL    Anion Gap 14 8 - 16 mmol/L    eGFR 34 (A) >60 mL/min/1.73 m^2   Magnesium    Collection Time: 03/28/24  4:07 AM   Result Value Ref Range    Magnesium 1.5 (L) 1.6 - 2.6 mg/dL   Phosphorus    Collection Time: 03/28/24  4:07 AM   Result Value Ref Range    Phosphorus 2.0 (L) 2.7 - 4.5 mg/dL   CBC auto differential    Collection Time: 03/28/24  4:07 AM   Result Value Ref Range    WBC 12.75 (H) 3.90 -  12.70 K/uL    RBC 4.67 4.00 - 5.40 M/uL    Hemoglobin 14.6 12.0 - 16.0 g/dL    Hematocrit 42.4 37.0 - 48.5 %    MCV 91 82 - 98 fL    MCH 31.3 (H) 27.0 - 31.0 pg    MCHC 34.4 32.0 - 36.0 g/dL    RDW 13.1 11.5 - 14.5 %    Platelets 320 150 - 450 K/uL    MPV 9.3 9.2 - 12.9 fL    Immature Granulocytes 0.4 0.0 - 0.5 %    Gran # (ANC) 11.0 (H) 1.8 - 7.7 K/uL    Immature Grans (Abs) 0.05 (H) 0.00 - 0.04 K/uL    Lymph # 1.3 1.0 - 4.8 K/uL    Mono # 0.3 0.3 - 1.0 K/uL    Eos # 0.0 0.0 - 0.5 K/uL    Baso # 0.02 0.00 - 0.20 K/uL    nRBC 0 0 /100 WBC    Gran % 86.5 (H) 38.0 - 73.0 %    Lymph % 10.2 (L) 18.0 - 48.0 %    Mono % 2.6 (L) 4.0 - 15.0 %    Eosinophil % 0.1 0.0 - 8.0 %    Basophil % 0.2 0.0 - 1.9 %    Differential Method Automated    Potassium    Collection Time: 03/28/24 12:31 PM   Result Value Ref Range    Potassium 3.3 (L) 3.5 - 5.1 mmol/L   Basic metabolic panel    Collection Time: 03/29/24  3:59 AM   Result Value Ref Range    Sodium 141 136 - 145 mmol/L    Potassium 3.9 3.5 - 5.1 mmol/L    Chloride 106 95 - 110 mmol/L    CO2 22 (L) 23 - 29 mmol/L    Glucose 110 70 - 110 mg/dL    BUN 43 (H) 6 - 20 mg/dL    Creatinine 2.0 (H) 0.5 - 1.4 mg/dL    Calcium 10.2 8.7 - 10.5 mg/dL    Anion Gap 13 8 - 16 mmol/L    eGFR 30 (A) >60 mL/min/1.73 m^2   Magnesium    Collection Time: 03/29/24  3:59 AM   Result Value Ref Range    Magnesium 1.9 1.6 - 2.6 mg/dL   Phosphorus    Collection Time: 03/29/24  3:59 AM   Result Value Ref Range    Phosphorus 2.8 2.7 - 4.5 mg/dL   CBC auto differential    Collection Time: 03/29/24  3:59 AM   Result Value Ref Range    WBC 18.08 (H) 3.90 - 12.70 K/uL    RBC 4.55 4.00 - 5.40 M/uL    Hemoglobin 13.2 12.0 - 16.0 g/dL    Hematocrit 40.9 37.0 - 48.5 %    MCV 90 82 - 98 fL    MCH 29.0 27.0 - 31.0 pg    MCHC 32.3 32.0 - 36.0 g/dL    RDW 13.2 11.5 - 14.5 %    Platelets 293 150 - 450 K/uL    MPV 10.1 9.2 - 12.9 fL    Immature Granulocytes 0.5 0.0 - 0.5 %    Gran # (ANC) 16.0 (H) 1.8 - 7.7 K/uL     Immature Grans (Abs) 0.09 (H) 0.00 - 0.04 K/uL    Lymph # 1.7 1.0 - 4.8 K/uL    Mono # 0.3 0.3 - 1.0 K/uL    Eos # 0.0 0.0 - 0.5 K/uL    Baso # 0.03 0.00 - 0.20 K/uL    nRBC 0 0 /100 WBC    Gran % 88.7 (H) 38.0 - 73.0 %    Lymph % 9.2 (L) 18.0 - 48.0 %    Mono % 1.4 (L) 4.0 - 15.0 %    Eosinophil % 0.0 0.0 - 8.0 %    Basophil % 0.2 0.0 - 1.9 %    Differential Method Automated        Microbiology Results (last 7 days)       Procedure Component Value Units Date/Time    Respiratory Infection Panel (PCR), Nasopharyngeal [[id number]]  (Abnormal) Collected: 03/27/24 0535    Order Status: Completed Specimen: Nasopharyngeal Swab Updated: 03/27/24 1603     Respiratory Infection Panel Source NP Swab     Adenovirus Not Detected     Coronavirus 229E, Common Cold Virus Not Detected     Coronavirus HKU1, Common Cold Virus Not Detected     Coronavirus NL63, Common Cold Virus Not Detected     Coronavirus OC43, Common Cold Virus Not Detected     Comment: The Coronavirus strains detected in this test cause the common cold.  These strains are not the COVID-19 (novel Coronavirus)strain   associated with the respiratory disease outbreak.          SARS-CoV2 (COVID-19) Qualitative PCR Not Detected     Human Metapneumovirus Detected     Human Rhinovirus/Enterovirus Not Detected     Influenza A (subtypes H1, H1-2009,H3) Not Detected     Influenza B Not Detected     Parainfluenza Virus 1 Not Detected     Parainfluenza Virus 2 Not Detected     Parainfluenza Virus 3 Not Detected     Parainfluenza Virus 4 Not Detected     Respiratory Syncytial Virus Not Detected     Bordetella Parapertussis (HK0259) Not Detected     Bordetella pertussis (ptxP) Not Detected     Chlamydia pneumoniae Not Detected     Mycoplasma pneumoniae Not Detected    Narrative:      For all other respiratory sources, order [id number] -  Respiratory Viral Panel by PCR            Imaging Results              X-Ray Chest 1 View (Final result)  Result time 03/27/24 01:55:16       Final result by Dax Arrington MD (03/27/24 01:55:16)                   Impression:      No acute abnormality.      Electronically signed by: Dax Arrington  Date:    03/27/2024  Time:    01:55               Narrative:    EXAMINATION:  XR CHEST 1 VIEW    CLINICAL HISTORY:  shortness of breath;    TECHNIQUE:  Single frontal view of the chest was performed.    COMPARISON:  02/19/2024    FINDINGS:  The lungs are clear, with stable appearance of pulmonary vasculature and no pleural effusion or pneumothorax.    The cardiac silhouette is stable in size. The hilar and mediastinal contours are unremarkable.    Bones are intact.                                          Pending Diagnostic Studies:       None           Medications:  Reconciled Home Medications:      Medication List        START taking these medications      azithromycin 250 MG tablet  Commonly known as: Z-ROX  Take 1 tablet (250 mg total) by mouth once daily. Take first 2 tablets together, then 1 every day until finished.     guaiFENesin 1,200 mg Ta12  Commonly known as: MUCINEX  Take 1,200 mg by mouth 2 (two) times daily. for 7 days     pantoprazole 40 MG tablet  Commonly known as: PROTONIX  Take 1 tablet (40 mg total) by mouth once daily. for 25 days     predniSONE 20 MG tablet  Commonly known as: DELTASONE  Take 3 tablets (60 mg total) by mouth 2 (two) times daily for 5 days, THEN 2 tablets (40 mg total) 2 (two) times daily for 5 days, THEN 1 tablet (20 mg total) 2 (two) times daily for 5 days, THEN 1 tablet (20 mg total) once daily for 5 days, THEN 0.5 tablets (10 mg total) once daily for 5 days.  Start taking on: March 29, 2024            CHANGE how you take these medications      * albuterol 90 mcg/actuation inhaler  Commonly known as: PROVENTIL HFA  Inhale 2 puffs into the lungs every 6 (six) hours as needed for Wheezing. Rescue  What changed: Another medication with the same name was added. Make sure you understand how and when to take each.      * albuterol 90 mcg/actuation inhaler  Commonly known as: PROVENTIL/VENTOLIN HFA  Inhale 1-2 puffs into the lungs every 6 (six) hours as needed for Wheezing. Rescue  What changed: You were already taking a medication with the same name, and this prescription was added. Make sure you understand how and when to take each.           * This list has 2 medication(s) that are the same as other medications prescribed for you. Read the directions carefully, and ask your doctor or other care provider to review them with you.                CONTINUE taking these medications      amLODIPine 10 MG tablet  Commonly known as: NORVASC  Take 1 tablet by mouth once daily.     aspirin 81 MG EC tablet  Commonly known as: ECOTRIN  Take 1 tablet by mouth once daily.     atorvastatin 40 MG tablet  Commonly known as: LIPITOR  Take 40 mg by mouth once daily.     carvediloL 25 MG tablet  Commonly known as: COREG  Take 1 tablet by mouth 2 (two) times daily with meals.     EScitalopram oxalate 10 MG tablet  Commonly known as: LEXAPRO  Take 1 tablet by mouth once daily.     gabapentin 600 MG tablet  Commonly known as: NEURONTIN  Take 600 mg by mouth once daily.     hydroCHLOROthiazide 25 MG tablet  Commonly known as: HYDRODIURIL  Take 25 mg by mouth once daily.     HYDROcodone-acetaminophen  mg per tablet  Commonly known as: NORCO  Take 1 tablet by mouth every 4 (four) hours as needed for Pain.     SYMBICORT 160-4.5 mcg/actuation Hfaa  Generic drug: budesonide-formoterol 160-4.5 mcg  Inhale 2 puffs into the lungs once daily.     valsartan 160 MG tablet  Commonly known as: DIOVAN  Take 160 mg by mouth once daily.            STOP taking these medications      doxycycline 100 MG Cap  Commonly known as: VIBRAMYCIN     methylPREDNISolone 4 mg tablet  Commonly known as: MEDROL DOSEPACK              Indwelling Lines/Drains at time of discharge:   Lines/Drains/Airways       None                   Time spent on the discharge of patient: 35  minutes         Torito Blas MD  Department of Hospital Medicine  [facility] - Observation

## 2024-05-20 ENCOUNTER — HOSPITAL ENCOUNTER (INPATIENT)
Facility: HOSPITAL | Age: 54
LOS: 2 days | Discharge: HOME | DRG: 304 | End: 2024-05-22
Attending: EMERGENCY MEDICINE | Admitting: INTERNAL MEDICINE
Payer: MEDICAID

## 2024-05-20 DIAGNOSIS — R79.89 ELEVATED BRAIN NATRIURETIC PEPTIDE (BNP) LEVEL: ICD-10-CM

## 2024-05-20 DIAGNOSIS — I16.1 HYPERTENSIVE EMERGENCY: ICD-10-CM

## 2024-05-20 DIAGNOSIS — R07.9 CHEST PAIN: ICD-10-CM

## 2024-05-20 DIAGNOSIS — R06.02 SHORTNESS OF BREATH: ICD-10-CM

## 2024-05-20 DIAGNOSIS — I16.0 HYPERTENSIVE URGENCY: Primary | ICD-10-CM

## 2024-05-20 LAB
ALBUMIN SERPL BCP-MCNC: 3.4 G/DL (ref 3.5–5.2)
ALP SERPL-CCNC: 85 U/L (ref 55–135)
ALT SERPL W/O P-5'-P-CCNC: 27 U/L (ref 10–44)
ANION GAP SERPL CALC-SCNC: 12 MMOL/L (ref 8–16)
ASCENDING AORTA: 3.62 CM
AST SERPL-CCNC: 48 U/L (ref 10–40)
AV INDEX (PROSTH): 0.7
AV MEAN GRADIENT: 5 MMHG
AV PEAK GRADIENT: 7 MMHG
AV VALVE AREA BY VELOCITY RATIO: 2.54 CM²
AV VALVE AREA: 2.47 CM²
AV VELOCITY RATIO: 0.72
BASOPHILS # BLD AUTO: 0.05 K/UL (ref 0–0.2)
BASOPHILS NFR BLD: 0.5 % (ref 0–1.9)
BILIRUB SERPL-MCNC: 0.3 MG/DL (ref 0.1–1)
BNP SERPL-MCNC: 129 PG/ML (ref 0–99)
BSA FOR ECHO PROCEDURE: 1.78 M2
BUN SERPL-MCNC: 17 MG/DL (ref 6–20)
CALCIUM SERPL-MCNC: 9.3 MG/DL (ref 8.7–10.5)
CHLORIDE SERPL-SCNC: 104 MMOL/L (ref 95–110)
CO2 SERPL-SCNC: 26 MMOL/L (ref 23–29)
CREAT SERPL-MCNC: 1.8 MG/DL (ref 0.5–1.4)
CV ECHO LV RWT: 0.46 CM
DIFFERENTIAL METHOD BLD: ABNORMAL
DOP CALC AO PEAK VEL: 1.32 M/S
DOP CALC AO VTI: 26.1 CM
DOP CALC LVOT AREA: 3.5 CM2
DOP CALC LVOT DIAMETER: 2.12 CM
DOP CALC LVOT PEAK VEL: 0.95 M/S
DOP CALC LVOT STROKE VOLUME: 64.56 CM3
DOP CALCLVOT PEAK VEL VTI: 18.3 CM
E/E' RATIO: 24 M/S
ECHO LV POSTERIOR WALL: 1.1 CM (ref 0.6–1.1)
EOSINOPHIL # BLD AUTO: 0.4 K/UL (ref 0–0.5)
EOSINOPHIL NFR BLD: 4.4 % (ref 0–8)
ERYTHROCYTE [DISTWIDTH] IN BLOOD BY AUTOMATED COUNT: 12.8 % (ref 11.5–14.5)
EST. GFR  (NO RACE VARIABLE): 33 ML/MIN/1.73 M^2
FRACTIONAL SHORTENING: 22 % (ref 28–44)
GLUCOSE SERPL-MCNC: 225 MG/DL (ref 70–110)
HCT VFR BLD AUTO: 35.9 % (ref 37–48.5)
HGB BLD-MCNC: 11.6 G/DL (ref 12–16)
IMM GRANULOCYTES # BLD AUTO: 0.02 K/UL (ref 0–0.04)
IMM GRANULOCYTES NFR BLD AUTO: 0.2 % (ref 0–0.5)
INTERVENTRICULAR SEPTUM: 1.09 CM (ref 0.6–1.1)
IVC DIAMETER: 1.21 CM
IVRT: 108.47 MSEC
LA MAJOR: 5.39 CM
LA MINOR: 5.16 CM
LA WIDTH: 4 CM
LEFT ATRIUM SIZE: 4.34 CM
LEFT ATRIUM VOLUME INDEX: 44.7 ML/M2
LEFT ATRIUM VOLUME: 77.8 CM3
LEFT INTERNAL DIMENSION IN SYSTOLE: 3.73 CM (ref 2.1–4)
LEFT VENTRICLE DIASTOLIC VOLUME INDEX: 60.9 ML/M2
LEFT VENTRICLE DIASTOLIC VOLUME: 105.97 ML
LEFT VENTRICLE MASS INDEX: 110 G/M2
LEFT VENTRICLE SYSTOLIC VOLUME INDEX: 34 ML/M2
LEFT VENTRICLE SYSTOLIC VOLUME: 59.16 ML
LEFT VENTRICULAR INTERNAL DIMENSION IN DIASTOLE: 4.77 CM (ref 3.5–6)
LEFT VENTRICULAR MASS: 190.81 G
LV LATERAL E/E' RATIO: 18 M/S
LV SEPTAL E/E' RATIO: 36 M/S
LVOT MG: 2.09 MMHG
LVOT MV: 0.68 CM/S
LYMPHOCYTES # BLD AUTO: 4.1 K/UL (ref 1–4.8)
LYMPHOCYTES NFR BLD: 44 % (ref 18–48)
MAGNESIUM SERPL-MCNC: 3.8 MG/DL (ref 1.6–2.6)
MCH RBC QN AUTO: 30.1 PG (ref 27–31)
MCHC RBC AUTO-ENTMCNC: 32.3 G/DL (ref 32–36)
MCV RBC AUTO: 93 FL (ref 82–98)
MONOCYTES # BLD AUTO: 0.5 K/UL (ref 0.3–1)
MONOCYTES NFR BLD: 5.6 % (ref 4–15)
MV PEAK E VEL: 0.72 M/S
NEUTROPHILS # BLD AUTO: 4.2 K/UL (ref 1.8–7.7)
NEUTROPHILS NFR BLD: 45.3 % (ref 38–73)
NRBC BLD-RTO: 0 /100 WBC
OHS CV RV/LV RATIO: 0.69 CM
OHS QRS DURATION: 96 MS
OHS QTC CALCULATION: 474 MS
PISA TR MAX VEL: 1.1 M/S
PLATELET # BLD AUTO: 238 K/UL (ref 150–450)
PMV BLD AUTO: 9.3 FL (ref 9.2–12.9)
POCT GLUCOSE: 150 MG/DL (ref 70–110)
POTASSIUM SERPL-SCNC: 3.1 MMOL/L (ref 3.5–5.1)
POTASSIUM SERPL-SCNC: 4.5 MMOL/L (ref 3.5–5.1)
PROT SERPL-MCNC: 7.5 G/DL (ref 6–8.4)
PV PEAK GRADIENT: 4 MMHG
PV PEAK VELOCITY: 0.98 M/S
RA MAJOR: 4.48 CM
RA PRESSURE ESTIMATED: 8 MMHG
RA WIDTH: 3.5 CM
RBC # BLD AUTO: 3.86 M/UL (ref 4–5.4)
RIGHT VENTRICULAR END-DIASTOLIC DIMENSION: 3.28 CM
RV TB RVSP: 9 MMHG
RV TISSUE DOPPLER FREE WALL SYSTOLIC VELOCITY 1 (APICAL 4 CHAMBER VIEW): 10.94 CM/S
SINUS: 3.04 CM
SODIUM SERPL-SCNC: 142 MMOL/L (ref 136–145)
STJ: 2.55 CM
TDI LATERAL: 0.04 M/S
TDI SEPTAL: 0.02 M/S
TDI: 0.03 M/S
TR MAX PG: 5 MMHG
TRICUSPID ANNULAR PLANE SYSTOLIC EXCURSION: 2.3 CM
TROPONIN I SERPL DL<=0.01 NG/ML-MCNC: 0.01 NG/ML (ref 0–0.03)
TV PEAK GRADIENT: 1 MMHG
TV REST PULMONARY ARTERY PRESSURE: 13 MMHG
WBC # BLD AUTO: 9.31 K/UL (ref 3.9–12.7)
Z-SCORE OF LEFT VENTRICULAR DIMENSION IN END DIASTOLE: -0.1
Z-SCORE OF LEFT VENTRICULAR DIMENSION IN END SYSTOLE: 1.77

## 2024-05-20 PROCEDURE — 25000242 PHARM REV CODE 250 ALT 637 W/ HCPCS: Performed by: STUDENT IN AN ORGANIZED HEALTH CARE EDUCATION/TRAINING PROGRAM

## 2024-05-20 PROCEDURE — 63600175 PHARM REV CODE 636 W HCPCS: Performed by: STUDENT IN AN ORGANIZED HEALTH CARE EDUCATION/TRAINING PROGRAM

## 2024-05-20 PROCEDURE — 25000242 PHARM REV CODE 250 ALT 637 W/ HCPCS: Performed by: EMERGENCY MEDICINE

## 2024-05-20 PROCEDURE — 94761 N-INVAS EAR/PLS OXIMETRY MLT: CPT

## 2024-05-20 PROCEDURE — 93010 ELECTROCARDIOGRAM REPORT: CPT | Mod: ,,, | Performed by: INTERNAL MEDICINE

## 2024-05-20 PROCEDURE — 25000003 PHARM REV CODE 250: Performed by: EMERGENCY MEDICINE

## 2024-05-20 PROCEDURE — 96374 THER/PROPH/DIAG INJ IV PUSH: CPT

## 2024-05-20 PROCEDURE — 83880 ASSAY OF NATRIURETIC PEPTIDE: CPT | Performed by: EMERGENCY MEDICINE

## 2024-05-20 PROCEDURE — 83735 ASSAY OF MAGNESIUM: CPT | Performed by: EMERGENCY MEDICINE

## 2024-05-20 PROCEDURE — 25000003 PHARM REV CODE 250: Performed by: STUDENT IN AN ORGANIZED HEALTH CARE EDUCATION/TRAINING PROGRAM

## 2024-05-20 PROCEDURE — 93005 ELECTROCARDIOGRAM TRACING: CPT

## 2024-05-20 PROCEDURE — 80053 COMPREHEN METABOLIC PANEL: CPT | Performed by: EMERGENCY MEDICINE

## 2024-05-20 PROCEDURE — 94640 AIRWAY INHALATION TREATMENT: CPT

## 2024-05-20 PROCEDURE — 63600175 PHARM REV CODE 636 W HCPCS: Performed by: EMERGENCY MEDICINE

## 2024-05-20 PROCEDURE — 94644 CONT INHLJ TX 1ST HOUR: CPT

## 2024-05-20 PROCEDURE — 21400001 HC TELEMETRY ROOM

## 2024-05-20 PROCEDURE — 99285 EMERGENCY DEPT VISIT HI MDM: CPT | Mod: 25

## 2024-05-20 PROCEDURE — 36415 COLL VENOUS BLD VENIPUNCTURE: CPT | Performed by: STUDENT IN AN ORGANIZED HEALTH CARE EDUCATION/TRAINING PROGRAM

## 2024-05-20 PROCEDURE — 84132 ASSAY OF SERUM POTASSIUM: CPT | Performed by: STUDENT IN AN ORGANIZED HEALTH CARE EDUCATION/TRAINING PROGRAM

## 2024-05-20 PROCEDURE — 84484 ASSAY OF TROPONIN QUANT: CPT | Performed by: EMERGENCY MEDICINE

## 2024-05-20 PROCEDURE — 85025 COMPLETE CBC W/AUTO DIFF WBC: CPT | Performed by: EMERGENCY MEDICINE

## 2024-05-20 RX ORDER — CARVEDILOL 12.5 MG/1
25 TABLET ORAL
Status: COMPLETED | OUTPATIENT
Start: 2024-05-20 | End: 2024-05-20

## 2024-05-20 RX ORDER — GABAPENTIN 300 MG/1
600 CAPSULE ORAL DAILY PRN
Status: DISCONTINUED | OUTPATIENT
Start: 2024-05-20 | End: 2024-05-22 | Stop reason: HOSPADM

## 2024-05-20 RX ORDER — IPRATROPIUM BROMIDE 0.5 MG/2.5ML
500 SOLUTION RESPIRATORY (INHALATION)
Status: COMPLETED | OUTPATIENT
Start: 2024-05-20 | End: 2024-05-20

## 2024-05-20 RX ORDER — IBUPROFEN 200 MG
16 TABLET ORAL
Status: DISCONTINUED | OUTPATIENT
Start: 2024-05-20 | End: 2024-05-22 | Stop reason: HOSPADM

## 2024-05-20 RX ORDER — AMLODIPINE BESYLATE 5 MG/1
10 TABLET ORAL
Status: COMPLETED | OUTPATIENT
Start: 2024-05-20 | End: 2024-05-20

## 2024-05-20 RX ORDER — SODIUM CHLORIDE 0.9 % (FLUSH) 0.9 %
10 SYRINGE (ML) INJECTION EVERY 12 HOURS PRN
Status: DISCONTINUED | OUTPATIENT
Start: 2024-05-20 | End: 2024-05-22 | Stop reason: HOSPADM

## 2024-05-20 RX ORDER — IPRATROPIUM BROMIDE AND ALBUTEROL SULFATE 2.5; .5 MG/3ML; MG/3ML
3 SOLUTION RESPIRATORY (INHALATION) EVERY 4 HOURS
Status: DISCONTINUED | OUTPATIENT
Start: 2024-05-20 | End: 2024-05-22 | Stop reason: HOSPADM

## 2024-05-20 RX ORDER — LANOLIN ALCOHOL/MO/W.PET/CERES
800 CREAM (GRAM) TOPICAL
Status: DISCONTINUED | OUTPATIENT
Start: 2024-05-20 | End: 2024-05-22 | Stop reason: HOSPADM

## 2024-05-20 RX ORDER — SIMETHICONE 80 MG
1 TABLET,CHEWABLE ORAL 4 TIMES DAILY PRN
Status: DISCONTINUED | OUTPATIENT
Start: 2024-05-20 | End: 2024-05-22 | Stop reason: HOSPADM

## 2024-05-20 RX ORDER — IPRATROPIUM BROMIDE AND ALBUTEROL SULFATE 2.5; .5 MG/3ML; MG/3ML
3 SOLUTION RESPIRATORY (INHALATION) EVERY 4 HOURS PRN
Status: DISCONTINUED | OUTPATIENT
Start: 2024-05-20 | End: 2024-05-22 | Stop reason: HOSPADM

## 2024-05-20 RX ORDER — PROCHLORPERAZINE EDISYLATE 5 MG/ML
5 INJECTION INTRAMUSCULAR; INTRAVENOUS EVERY 6 HOURS PRN
Status: DISCONTINUED | OUTPATIENT
Start: 2024-05-20 | End: 2024-05-22 | Stop reason: HOSPADM

## 2024-05-20 RX ORDER — MUPIROCIN 20 MG/G
OINTMENT TOPICAL 2 TIMES DAILY
Status: DISCONTINUED | OUTPATIENT
Start: 2024-05-20 | End: 2024-05-22 | Stop reason: HOSPADM

## 2024-05-20 RX ORDER — NICARDIPINE HYDROCHLORIDE 0.2 MG/ML
0-15 INJECTION INTRAVENOUS CONTINUOUS
Status: DISCONTINUED | OUTPATIENT
Start: 2024-05-20 | End: 2024-05-20

## 2024-05-20 RX ORDER — ASPIRIN 81 MG/1
81 TABLET ORAL DAILY
Status: DISCONTINUED | OUTPATIENT
Start: 2024-05-20 | End: 2024-05-22 | Stop reason: HOSPADM

## 2024-05-20 RX ORDER — ACETAMINOPHEN 325 MG/1
650 TABLET ORAL EVERY 4 HOURS PRN
Status: DISCONTINUED | OUTPATIENT
Start: 2024-05-20 | End: 2024-05-22 | Stop reason: HOSPADM

## 2024-05-20 RX ORDER — ALBUTEROL SULFATE 2.5 MG/.5ML
10 SOLUTION RESPIRATORY (INHALATION)
Status: COMPLETED | OUTPATIENT
Start: 2024-05-20 | End: 2024-05-20

## 2024-05-20 RX ORDER — BISACODYL 10 MG/1
10 SUPPOSITORY RECTAL DAILY PRN
Status: DISCONTINUED | OUTPATIENT
Start: 2024-05-20 | End: 2024-05-22 | Stop reason: HOSPADM

## 2024-05-20 RX ORDER — PREDNISONE 20 MG/1
40 TABLET ORAL DAILY
Status: DISCONTINUED | OUTPATIENT
Start: 2024-05-20 | End: 2024-05-22 | Stop reason: HOSPADM

## 2024-05-20 RX ORDER — PANTOPRAZOLE SODIUM 40 MG/1
40 TABLET, DELAYED RELEASE ORAL DAILY
Status: DISCONTINUED | OUTPATIENT
Start: 2024-05-20 | End: 2024-05-22 | Stop reason: HOSPADM

## 2024-05-20 RX ORDER — ONDANSETRON HYDROCHLORIDE 2 MG/ML
4 INJECTION, SOLUTION INTRAVENOUS EVERY 8 HOURS PRN
Status: DISCONTINUED | OUTPATIENT
Start: 2024-05-20 | End: 2024-05-22 | Stop reason: HOSPADM

## 2024-05-20 RX ORDER — ATORVASTATIN CALCIUM 40 MG/1
40 TABLET, FILM COATED ORAL DAILY
Status: DISCONTINUED | OUTPATIENT
Start: 2024-05-20 | End: 2024-05-22 | Stop reason: HOSPADM

## 2024-05-20 RX ORDER — TALC
6 POWDER (GRAM) TOPICAL NIGHTLY PRN
Status: DISCONTINUED | OUTPATIENT
Start: 2024-05-20 | End: 2024-05-22 | Stop reason: HOSPADM

## 2024-05-20 RX ORDER — ALUMINUM HYDROXIDE, MAGNESIUM HYDROXIDE, AND SIMETHICONE 1200; 120; 1200 MG/30ML; MG/30ML; MG/30ML
30 SUSPENSION ORAL 4 TIMES DAILY PRN
Status: DISCONTINUED | OUTPATIENT
Start: 2024-05-20 | End: 2024-05-22 | Stop reason: HOSPADM

## 2024-05-20 RX ORDER — SODIUM,POTASSIUM PHOSPHATES 280-250MG
2 POWDER IN PACKET (EA) ORAL
Status: DISCONTINUED | OUTPATIENT
Start: 2024-05-20 | End: 2024-05-22 | Stop reason: HOSPADM

## 2024-05-20 RX ORDER — ENOXAPARIN SODIUM 100 MG/ML
40 INJECTION SUBCUTANEOUS EVERY 24 HOURS
Status: DISCONTINUED | OUTPATIENT
Start: 2024-05-20 | End: 2024-05-22 | Stop reason: HOSPADM

## 2024-05-20 RX ORDER — VALSARTAN 80 MG/1
160 TABLET ORAL
Status: COMPLETED | OUTPATIENT
Start: 2024-05-20 | End: 2024-05-20

## 2024-05-20 RX ORDER — CARVEDILOL 12.5 MG/1
25 TABLET ORAL 2 TIMES DAILY WITH MEALS
Status: DISCONTINUED | OUTPATIENT
Start: 2024-05-20 | End: 2024-05-22 | Stop reason: HOSPADM

## 2024-05-20 RX ORDER — AMLODIPINE BESYLATE 5 MG/1
10 TABLET ORAL DAILY
Status: DISCONTINUED | OUTPATIENT
Start: 2024-05-20 | End: 2024-05-22 | Stop reason: HOSPADM

## 2024-05-20 RX ORDER — HYDRALAZINE HYDROCHLORIDE 20 MG/ML
10 INJECTION INTRAMUSCULAR; INTRAVENOUS
Status: COMPLETED | OUTPATIENT
Start: 2024-05-20 | End: 2024-05-20

## 2024-05-20 RX ORDER — NALOXONE HCL 0.4 MG/ML
0.02 VIAL (ML) INJECTION
Status: DISCONTINUED | OUTPATIENT
Start: 2024-05-20 | End: 2024-05-22 | Stop reason: HOSPADM

## 2024-05-20 RX ORDER — POLYETHYLENE GLYCOL 3350 17 G/17G
17 POWDER, FOR SOLUTION ORAL DAILY
Status: DISCONTINUED | OUTPATIENT
Start: 2024-05-20 | End: 2024-05-22 | Stop reason: HOSPADM

## 2024-05-20 RX ORDER — IBUPROFEN 200 MG
24 TABLET ORAL
Status: DISCONTINUED | OUTPATIENT
Start: 2024-05-20 | End: 2024-05-22 | Stop reason: HOSPADM

## 2024-05-20 RX ORDER — VALSARTAN 80 MG/1
160 TABLET ORAL DAILY
Status: DISCONTINUED | OUTPATIENT
Start: 2024-05-20 | End: 2024-05-22 | Stop reason: HOSPADM

## 2024-05-20 RX ORDER — SODIUM CHLORIDE 0.9 % (FLUSH) 0.9 %
3 SYRINGE (ML) INJECTION
Status: DISCONTINUED | OUTPATIENT
Start: 2024-05-20 | End: 2024-05-22 | Stop reason: HOSPADM

## 2024-05-20 RX ORDER — GLUCAGON 1 MG
1 KIT INJECTION
Status: DISCONTINUED | OUTPATIENT
Start: 2024-05-20 | End: 2024-05-22 | Stop reason: HOSPADM

## 2024-05-20 RX ORDER — ACETAMINOPHEN 325 MG/1
650 TABLET ORAL EVERY 8 HOURS PRN
Status: DISCONTINUED | OUTPATIENT
Start: 2024-05-20 | End: 2024-05-22 | Stop reason: HOSPADM

## 2024-05-20 RX ADMIN — ATORVASTATIN CALCIUM 40 MG: 40 TABLET, FILM COATED ORAL at 08:05

## 2024-05-20 RX ADMIN — GABAPENTIN 600 MG: 300 CAPSULE ORAL at 03:05

## 2024-05-20 RX ADMIN — Medication 6 MG: at 03:05

## 2024-05-20 RX ADMIN — MUPIROCIN: 20 OINTMENT TOPICAL at 09:05

## 2024-05-20 RX ADMIN — AMLODIPINE BESYLATE 10 MG: 5 TABLET ORAL at 12:05

## 2024-05-20 RX ADMIN — HYDRALAZINE HYDROCHLORIDE 10 MG: 20 INJECTION INTRAMUSCULAR; INTRAVENOUS at 01:05

## 2024-05-20 RX ADMIN — CARVEDILOL 25 MG: 12.5 TABLET, FILM COATED ORAL at 05:05

## 2024-05-20 RX ADMIN — PREDNISONE 40 MG: 20 TABLET ORAL at 08:05

## 2024-05-20 RX ADMIN — CARVEDILOL 25 MG: 12.5 TABLET, FILM COATED ORAL at 02:05

## 2024-05-20 RX ADMIN — PANTOPRAZOLE SODIUM 40 MG: 40 TABLET, DELAYED RELEASE ORAL at 08:05

## 2024-05-20 RX ADMIN — VALSARTAN 160 MG: 80 TABLET, FILM COATED ORAL at 08:05

## 2024-05-20 RX ADMIN — IPRATROPIUM BROMIDE AND ALBUTEROL SULFATE 3 ML: 2.5; .5 SOLUTION RESPIRATORY (INHALATION) at 11:05

## 2024-05-20 RX ADMIN — AMLODIPINE BESYLATE 10 MG: 5 TABLET ORAL at 08:05

## 2024-05-20 RX ADMIN — NICARDIPINE HYDROCHLORIDE 2.5 MG/HR: 0.2 INJECTION, SOLUTION INTRAVENOUS at 02:05

## 2024-05-20 RX ADMIN — ASPIRIN 81 MG: 81 TABLET, COATED ORAL at 08:05

## 2024-05-20 RX ADMIN — ALBUTEROL SULFATE 10 MG: 2.5 SOLUTION RESPIRATORY (INHALATION) at 12:05

## 2024-05-20 RX ADMIN — Medication 6 MG: at 09:05

## 2024-05-20 RX ADMIN — IPRATROPIUM BROMIDE 500 MCG: 0.5 SOLUTION RESPIRATORY (INHALATION) at 12:05

## 2024-05-20 RX ADMIN — IPRATROPIUM BROMIDE AND ALBUTEROL SULFATE 3 ML: 2.5; .5 SOLUTION RESPIRATORY (INHALATION) at 04:05

## 2024-05-20 RX ADMIN — ENOXAPARIN SODIUM 40 MG: 40 INJECTION SUBCUTANEOUS at 05:05

## 2024-05-20 RX ADMIN — IPRATROPIUM BROMIDE AND ALBUTEROL SULFATE 3 ML: 2.5; .5 SOLUTION RESPIRATORY (INHALATION) at 03:05

## 2024-05-20 RX ADMIN — POTASSIUM BICARBONATE 35 MEQ: 391 TABLET, EFFERVESCENT ORAL at 03:05

## 2024-05-20 RX ADMIN — MUPIROCIN: 20 OINTMENT TOPICAL at 10:05

## 2024-05-20 RX ADMIN — GABAPENTIN 600 MG: 300 CAPSULE ORAL at 09:05

## 2024-05-20 RX ADMIN — POTASSIUM BICARBONATE 35 MEQ: 391 TABLET, EFFERVESCENT ORAL at 06:05

## 2024-05-20 RX ADMIN — CARVEDILOL 25 MG: 12.5 TABLET, FILM COATED ORAL at 08:05

## 2024-05-20 RX ADMIN — IPRATROPIUM BROMIDE AND ALBUTEROL SULFATE 3 ML: 2.5; .5 SOLUTION RESPIRATORY (INHALATION) at 07:05

## 2024-05-20 RX ADMIN — VALSARTAN 160 MG: 80 TABLET, FILM COATED ORAL at 02:05

## 2024-05-20 RX ADMIN — IPRATROPIUM BROMIDE AND ALBUTEROL SULFATE 3 ML: 2.5; .5 SOLUTION RESPIRATORY (INHALATION) at 08:05

## 2024-05-20 NOTE — ASSESSMENT & PLAN NOTE
Patient has a current diagnosis of hypertensive urgency (without evidence of end organ damage) which is uncontrolled.  Latest blood pressure and vitals reviewed-   Temp:  [97.7 °F (36.5 °C)]   Pulse:  []   Resp:  [17-27]   BP: (200-247)/(100-114)   SpO2:  [96 %-100 %] .   Patient currently on IV antihypertensives.   Home meds for hypertension were reviewed and noted below.   Hypertension Medications               amLODIPine (NORVASC) 10 MG tablet Take 1 tablet by mouth once daily.    carvediloL (COREG) 25 MG tablet Take 1 tablet by mouth 2 (two) times daily with meals.    hydroCHLOROthiazide (HYDRODIURIL) 25 MG tablet Take 25 mg by mouth once daily.    valsartan (DIOVAN) 160 MG tablet Take 160 mg by mouth once daily.            Medication adjustment for hospital antihypertensives is as follows- Resume home medications  Wean nicardipine as tolerated     Will aim for controlled BP reduction by medications noted above. Monitor and mitigate end organ damage as indicated.

## 2024-05-20 NOTE — PLAN OF CARE
Pt remains in the ICU on RA.  NSR on the monitor.  Afebrile.  Aox4.  Purewick in place with good urine output noted.  Pt with no complaints or concerns at this time.  Plan of care reviewed.  No injuries, falls or skin breakdown occurred this shift.

## 2024-05-20 NOTE — PROVIDER TRANSFER
54-year-old female with past medical history of COPD, hypertension, CKD 3 who was admitted for hypertensive urgency secondary to noncompliance with medications and acute hypoxic respiratory failure secondary to COPD exacerbation.  Initiated on Cardene drip and admitted to ICU.  Off Cardene drip on 05/20/2024 with re-initiation of home oral meds.      Continue steroids/nebs.    Wean oxygen as tolerated.    Likely discharge in a.m..    Patient needs medication refills at discharge

## 2024-05-20 NOTE — ASSESSMENT & PLAN NOTE
"Chronic, {Blank single:46167::"controlled","uncontrolled"}. Latest blood pressure and vitals reviewed-     Temp:  [97.7 °F (36.5 °C)-98.1 °F (36.7 °C)]   Pulse:  []   Resp:  [17-42]   BP: (142-247)/()   SpO2:  [96 %-100 %] .   Home meds for hypertension were reviewed and noted below.   Hypertension Medications               amLODIPine (NORVASC) 10 MG tablet Take 1 tablet by mouth once daily.    carvediloL (COREG) 25 MG tablet Take 1 tablet by mouth 2 (two) times daily with meals.    hydroCHLOROthiazide (HYDRODIURIL) 25 MG tablet Take 25 mg by mouth once daily.    valsartan (DIOVAN) 160 MG tablet Take 160 mg by mouth once daily.            While in the hospital, will manage blood pressure as follows; {manage bp:00509}    Will utilize p.r.n. blood pressure medication only if patient's blood pressure greater than {bp thresholds:34625::"180/110"} and she develops symptoms such as worsening chest pain or shortness of breath.  "

## 2024-05-20 NOTE — HPI
This is a 53-year-old female with a past medical history of COPD, hypertension, CKD 3, who presents with shortness of breath.      Patient presented with shortness of breaths that started 2-3 days prior to presentation.  She endorses progressively worsening dyspnea, with a productive cough and chest congestion.  Patient reports running out of her inhalers and all her medications 1-2 weeks prior to presentation. Per EMS, the patient was tachypneic, with diminished breath sounds.  She was given epinephrine, magnesium sulfate, DuoNeb, Decadron, and was placed on CPAP prior to arrival.    In the ED, the patient was hypertensive (247/114, MAP: 164) and tachypneic.  Labs remarkable for an elevated creatinine (1.8-around baseline), hyperglycemia (225), elevated BNP (129).  Chest x-ray showed no acute cardiopulmonary process.  Patient was given DuoNeb x1, amlodipine 10 mg p.o., carvedilol 25 mg p.o., hydralazine 10 mg IV, valsartan 160 mg p.o., and was started on nicardipine drip.  She was admitted for further management.

## 2024-05-20 NOTE — ASSESSMENT & PLAN NOTE
Chronic, uncontrolled. Latest blood pressure and vitals reviewed-     Temp:  [97.7 °F (36.5 °C)]   Pulse:  []   Resp:  [17-27]   BP: (200-247)/(100-114)   SpO2:  [96 %-100 %] .   Home meds for hypertension were reviewed and noted below.   Hypertension Medications               amLODIPine (NORVASC) 10 MG tablet Take 1 tablet by mouth once daily.    carvediloL (COREG) 25 MG tablet Take 1 tablet by mouth 2 (two) times daily with meals.    hydroCHLOROthiazide (HYDRODIURIL) 25 MG tablet Take 25 mg by mouth once daily.    valsartan (DIOVAN) 160 MG tablet Take 160 mg by mouth once daily.            While in the hospital, will manage blood pressure as follows; Continue home antihypertensive regimen    Will utilize p.r.n. blood pressure medication only if patient's blood pressure greater than 180/110 and she develops symptoms such as worsening chest pain or shortness of breath.    See plan for HTN urgency

## 2024-05-20 NOTE — ED PROVIDER NOTES
Encounter Date: 5/20/2024    SCRIBE #1 NOTE: IBLAINE, paulie scribing for, and in the presence of,  Joelle Wasserman MD. I have scribed the following portions of the note - Other sections scribed: HPI, ROS, PE.       History     Chief Complaint   Patient presents with    Shortness of Breath     Patient arrives via EMS with SOB this evening. History of COPD and asthma. Has been out of medication for the past month - breathing treatments and HTN meds. EMS reports initial respiratory rate of 40 breaths per min and no auditory lung sounds. Given IM epi, duoneb, CPAP, mag, and dexmethasone PTA. Wheezing now heard and patient able to speak in full sentences now. Also hypertensive.     Jacqueline Manuel is a 54 y.o. female, with a PMHx of COPD, asthma, HTN, CKD, who presents to the ED via EMS with SOB which started earlier today. Patient reports she has not been able to take her breathing treatments for a couple of moths. She has not taken her antihypertensives for ~1 week. Medicine includes hydrochlorothiazide, amlodipine, and Lipitor. Per EMS, pt's initial respiratory rate was 40 breaths per min with no auditory lung sounds. She was given IM epi, duoneb, CPAP, mag, and dexmethasone PTA. No other exacerbating or alleviating factors. Denies nausea, vomiting, fever, or other associated symptoms.    The history is provided by the patient and the EMS personnel. No  was used.     Review of patient's allergies indicates:  No Known Allergies  History reviewed. No pertinent past medical history.  History reviewed. No pertinent surgical history.  No family history on file.  Social History     Tobacco Use    Smoking status: Every Day     Types: Cigarettes    Smokeless tobacco: Never   Substance Use Topics    Alcohol use: Yes     Comment: occasionally    Drug use: Yes     Types: Marijuana     Review of Systems   Constitutional:  Negative for fever.   HENT:  Negative for facial swelling and voice change.    Eyes:   Negative for pain.   Respiratory:  Positive for shortness of breath. Negative for choking.    Cardiovascular:  Negative for chest pain.   Gastrointestinal:  Negative for abdominal pain, nausea and vomiting.   Genitourinary:  Negative for dysuria and frequency.   Musculoskeletal:  Negative for back pain.   Skin:  Negative for rash.   Neurological:  Negative for weakness and numbness.   Psychiatric/Behavioral:  Negative for self-injury.        Physical Exam     Initial Vitals [05/20/24 0026]   BP Pulse Resp Temp SpO2   (!) 200/100 100 (!) 26 97.7 °F (36.5 °C) 98 %      MAP       --         Physical Exam    Nursing note and vitals reviewed.  Constitutional: She is not diaphoretic. No distress.   HENT:   Head: Normocephalic and atraumatic.   Protecting airway   Eyes: Conjunctivae and EOM are normal. No scleral icterus.   Neck: Neck supple. No tracheal deviation present.   Normal range of motion.  Cardiovascular:  Normal rate, regular rhythm and intact distal pulses.           Pulmonary/Chest: No stridor.   Speaking in 4-5 word sentences.  Tachypnea. Coarse breath sounds bilaterally.   Abdominal: Abdomen is soft. She exhibits no distension. There is no abdominal tenderness.   Musculoskeletal:         General: No tenderness or edema.      Cervical back: Normal range of motion and neck supple.      Comments: No lower extremity edema.     Neurological: She is alert. She has normal strength. No cranial nerve deficit or sensory deficit.   Skin: Skin is warm and dry.   Psychiatric: She has a normal mood and affect.         ED Course   Procedures  Labs Reviewed   CBC W/ AUTO DIFFERENTIAL - Abnormal; Notable for the following components:       Result Value    RBC 3.86 (*)     Hemoglobin 11.6 (*)     Hematocrit 35.9 (*)     All other components within normal limits   COMPREHENSIVE METABOLIC PANEL - Abnormal; Notable for the following components:    Potassium 3.1 (*)     Glucose 225 (*)     Creatinine 1.8 (*)     Albumin 3.4 (*)      AST 48 (*)     eGFR 33 (*)     All other components within normal limits   B-TYPE NATRIURETIC PEPTIDE - Abnormal; Notable for the following components:     (*)     All other components within normal limits   MAGNESIUM - Abnormal; Notable for the following components:    Magnesium 3.8 (*)     All other components within normal limits   TROPONIN I     EKG Readings: (Independently Interpreted)   Initial Reading: No STEMI. Rhythm: Normal Sinus Rhythm. Heart Rate: 89. Ectopy: No Ectopy. Conduction: Normal. ST Segments: Normal ST Segments. T Waves Flipped: I and AVL.       Imaging Results              X-Ray Chest AP Portable (Final result)  Result time 05/20/24 01:30:15      Final result by Ivonne Almeida MD (05/20/24 01:30:15)                   Impression:      No acute cardiopulmonary process identified.      Electronically signed by: Ivonne Almeida MD  Date:    05/20/2024  Time:    01:30               Narrative:    EXAMINATION:  XR CHEST AP PORTABLE    CLINICAL HISTORY:  SOB;    TECHNIQUE:  Single frontal view of the chest was performed.    COMPARISON:  03/27/2024.    FINDINGS:  Cardiac silhouette is normal in size.  Lungs are symmetrically expanded.  No evidence of focal consolidative process, pneumothorax, or significant pleural effusion.  No acute osseous abnormality identified.                                       Medications   amLODIPine tablet 10 mg (has no administration in time range)   aspirin EC tablet 81 mg (has no administration in time range)   atorvastatin tablet 40 mg (has no administration in time range)   carvediloL tablet 25 mg (has no administration in time range)   gabapentin capsule 600 mg (has no administration in time range)   pantoprazole EC tablet 40 mg (has no administration in time range)   valsartan tablet 160 mg (has no administration in time range)   sodium chloride 0.9% flush 10 mL (has no administration in time range)   albuterol-ipratropium 2.5 mg-0.5 mg/3 mL nebulizer  solution 3 mL (has no administration in time range)   melatonin tablet 6 mg (has no administration in time range)   ondansetron injection 4 mg (has no administration in time range)   prochlorperazine injection Soln 5 mg (has no administration in time range)   polyethylene glycol packet 17 g (has no administration in time range)   bisacodyL suppository 10 mg (has no administration in time range)   acetaminophen tablet 650 mg (has no administration in time range)   simethicone chewable tablet 80 mg (has no administration in time range)   aluminum-magnesium hydroxide-simethicone 200-200-20 mg/5 mL suspension 30 mL (has no administration in time range)   acetaminophen tablet 650 mg (has no administration in time range)   naloxone 0.4 mg/mL injection 0.02 mg (has no administration in time range)   potassium bicarbonate disintegrating tablet 50 mEq (has no administration in time range)   potassium bicarbonate disintegrating tablet 35 mEq (has no administration in time range)   potassium bicarbonate disintegrating tablet 60 mEq (has no administration in time range)   magnesium oxide tablet 800 mg (has no administration in time range)   magnesium oxide tablet 800 mg (has no administration in time range)   potassium, sodium phosphates 280-160-250 mg packet 2 packet (has no administration in time range)   potassium, sodium phosphates 280-160-250 mg packet 2 packet (has no administration in time range)   potassium, sodium phosphates 280-160-250 mg packet 2 packet (has no administration in time range)   glucose chewable tablet 16 g (has no administration in time range)   glucose chewable tablet 24 g (has no administration in time range)   glucagon (human recombinant) injection 1 mg (has no administration in time range)   sodium chloride 0.9% flush 3 mL (has no administration in time range)   predniSONE tablet 40 mg (has no administration in time range)   albuterol-ipratropium 2.5 mg-0.5 mg/3 mL nebulizer solution 3 mL (has no  administration in time range)   dextrose 10% bolus 125 mL 125 mL (has no administration in time range)   dextrose 10% bolus 250 mL 250 mL (has no administration in time range)   niCARdipine 40 mg/200 mL (0.2 mg/mL) infusion (7.5 mg/hr Intravenous Rate/Dose Change 5/20/24 0319)   albuterol sulfate nebulizer solution 10 mg (10 mg Nebulization Given 5/20/24 0032)   ipratropium 0.02 % nebulizer solution 500 mcg (500 mcg Nebulization Given 5/20/24 0032)   amLODIPine tablet 10 mg (10 mg Oral Given 5/20/24 0042)   hydrALAZINE injection 10 mg (10 mg Intravenous Given 5/20/24 0126)   carvediloL tablet 25 mg (25 mg Oral Given 5/20/24 0213)   valsartan tablet 160 mg (160 mg Oral Given 5/20/24 0213)     Medical Decision Making  Differential diagnosis include but are not limited to:   COPD exacerbation, asthma exacerbation, CHF, symptomatic HTN, fluid overload, ACS.     Patient arrives with CPAP in process.  She has wheezing on auscultation.  She is significantly hypertensive.  She has a GCS of 15.  She has full and symmetrical distal pulses.  CBC without leukocytosis or significant anemia.  Chemistry notable for mild hypokalemia,  renal insufficiency comparable to prior.  EKG is without STEMI.  Initial troponin within normal ranges.  BNP is minimally elevated at 129.  Chest x-ray does not have pulmonary edema or focal infiltrate.  Magnesium is elevated at 3.8 likely related to magnesium administration.  Patient given IV antihypertensives as well as her scheduled oral antihypertensives.  She remains hypertensive on reassessment.  She remains neurovascularly intact.  Patient started on Cardene infusion for malignant hypertension.  Patient to be admitted to ICU for further management.    Amount and/or Complexity of Data Reviewed  Labs: ordered.  Radiology: ordered.    Risk  Prescription drug management.  Decision regarding hospitalization.    Critical Care  Total time providing critical care: 60 minutes            Scribe  Attestation:   Scribe #1: I performed the above scribed service and the documentation accurately describes the services I performed. I attest to the accuracy of the note.                             I, Joelle Wasserman , personally performed the services described in this documentation.  All medical record entries made by the scribe were at my direction and in my presence.  I have reviewed the chart and agree that the record reflects my personal performance and is accurate and complete.    Clinical Impression:  Final diagnoses:  [R06.02] Shortness of breath  [I16.1] Hypertensive emergency          ED Disposition Condition    Admit                 Joelle Wasserman MD  05/20/24 1771

## 2024-05-20 NOTE — ADMISSIONCARE
AdmissionCare    Guideline: Hypertension - INPT, Inpatient    Based on the indications selected for the patient, the bed status of Inpatient was determined to be MET    The following indications were selected as present at the time of evaluation of the patient:      - Hypertensive emergency indicated by SBP greater than 180 mm Hg or DBP greater than 120 mm Hg with evidence of acute or worsening target organ damage, as indicated by 1 or more of the following:    - Other acute or worsening end organ damage due to severe hypertension    AdmissionCare documentation entered by: SARA Narayan    [facility], 27th edition, Copyright © 2023 [facility] [place] [employer], [facility] All Rights Reserved.  [id number]:28:13-05:00

## 2024-05-20 NOTE — PLAN OF CARE
Recommendations  Recommendation:   1. Continue cardiac diet   2. Consider adding diabetic diet restriction if sugars to remain high   3. Add Boost Glucose Control BID   4. Monitor weight and labs    Goals: Pt will consume 50-75% of meals by RD follow up    Nutrition Goal Status: new  Communication of RD Recs:  (POC)

## 2024-05-20 NOTE — SUBJECTIVE & OBJECTIVE
History reviewed. No pertinent past medical history.    History reviewed. No pertinent surgical history.    Review of patient's allergies indicates:  No Known Allergies    No current facility-administered medications on file prior to encounter.     Current Outpatient Medications on File Prior to Encounter   Medication Sig    amLODIPine (NORVASC) 10 MG tablet Take 1 tablet by mouth once daily.    atorvastatin (LIPITOR) 40 MG tablet Take 40 mg by mouth once daily.    carvediloL (COREG) 25 MG tablet Take 1 tablet by mouth 2 (two) times daily with meals.    hydroCHLOROthiazide (HYDRODIURIL) 25 MG tablet Take 25 mg by mouth once daily.    albuterol (PROVENTIL/VENTOLIN HFA) 90 mcg/actuation inhaler Inhale 1-2 puffs into the lungs every 6 (six) hours as needed for Wheezing or Shortness of Breath. Rescue    albuterol-ipratropium (DUO-NEB) 2.5 mg-0.5 mg/3 mL nebulizer solution Take 3 mLs by nebulization every 6 (six) hours as needed for Wheezing. Rescue    aspirin (ECOTRIN) 81 MG EC tablet Take 1 tablet by mouth once daily.    azithromycin (Z-ROX) 250 MG tablet Take 1 tablet (250 mg total) by mouth once daily. Take first 2 tablets together, then 1 every day until finished.    EScitalopram oxalate (LEXAPRO) 10 MG tablet Take 1 tablet by mouth once daily.    gabapentin (NEURONTIN) 600 MG tablet Take 600 mg by mouth once daily.    HYDROcodone-acetaminophen (NORCO)  mg per tablet Take 1 tablet by mouth every 4 (four) hours as needed for Pain.    pantoprazole (PROTONIX) 40 MG tablet Take 1 tablet (40 mg total) by mouth once daily. for 25 days    SYMBICORT 160-4.5 mcg/actuation HFAA Inhale 2 puffs into the lungs once daily.    valsartan (DIOVAN) 160 MG tablet Take 160 mg by mouth once daily.     Family History    None       Tobacco Use    Smoking status: Every Day     Types: Cigarettes    Smokeless tobacco: Never   Substance and Sexual Activity    Alcohol use: Yes     Comment: occasionally    Drug use: Yes     Types:  Marijuana    Sexual activity: Not on file     Review of Systems   Constitutional: Negative.    HENT: Negative.     Eyes: Negative.    Respiratory:  Positive for cough and shortness of breath.    Cardiovascular: Negative.    Gastrointestinal: Negative.    Endocrine: Negative.    Genitourinary: Negative.    Musculoskeletal: Negative.    Skin: Negative.    Allergic/Immunologic: Negative.    Neurological: Negative.    Psychiatric/Behavioral: Negative.       Objective:     Vital Signs (Most Recent):  Temp: 97.7 °F (36.5 °C) (05/20/24 0026)  Pulse: (!) 113 (05/20/24 0245)  Resp: 17 (05/20/24 0201)  BP: (!) 207/115 (05/20/24 0245)  SpO2: 100 % (05/20/24 0245) Vital Signs (24h Range):  Temp:  [97.7 °F (36.5 °C)] 97.7 °F (36.5 °C)  Pulse:  [] 113  Resp:  [17-27] 17  SpO2:  [96 %-100 %] 100 %  BP: (200-247)/(100-115) 207/115     Weight: 68 kg (150 lb)  Body mass index is 24.21 kg/m².     Physical Exam  Vitals and nursing note reviewed.   Constitutional:       General: She is not in acute distress.     Appearance: Normal appearance. She is not ill-appearing.   HENT:      Head: Normocephalic and atraumatic.      Nose: Nose normal.      Mouth/Throat:      Mouth: Mucous membranes are moist.   Eyes:      Extraocular Movements: Extraocular movements intact.   Cardiovascular:      Rate and Rhythm: Normal rate.      Pulses: Normal pulses.      Heart sounds: No murmur heard.  Pulmonary:      Effort: Pulmonary effort is normal. No respiratory distress.      Breath sounds: Wheezing present.   Abdominal:      General: Abdomen is flat.      Palpations: Abdomen is soft.      Tenderness: There is no abdominal tenderness.   Musculoskeletal:      Right lower leg: No edema.      Left lower leg: No edema.   Skin:     General: Skin is warm.      Capillary Refill: Capillary refill takes less than 2 seconds.   Neurological:      General: No focal deficit present.      Mental Status: She is alert.   Psychiatric:         Mood and Affect: Mood  normal.                Significant Labs: All pertinent labs within the past 24 hours have been reviewed.    Significant Imaging: I have reviewed all pertinent imaging results/findings within the past 24 hours.

## 2024-05-20 NOTE — PLAN OF CARE
"Case Management Assessment     PCP: Dr Pollard  Ph[facility]    Patient Arrived From: home  Existing Help at Home: Daughter, Renetta and "Eugene"    Barriers to Discharge: none    Discharge Plan:    A. Home with family   B. tbd      Patient discussed this am in ICU rounds.  Patient is presently in the ICU but will step down to tele when bed is available.  Patient stated she has 10 adult children and lives with her daughter, Renetta.  She sated that "Eugene" from [facility] comes to see her "now and then".  Ms Manuel stated that Eugene is not a nurse but does check her vital signs but does nothing else.  She stated that Eugene has been going to see her for several months but does not know who ordered the service or what kind of service it is.  She shared that she often needs assistance from her daughter to perform ADL's.  She uses medicaid transportation but if unable to schedule Eugene will drive her to her doctor's office.    CM called [facility] but was only able to receive limited info via phone.  It is an in home care service where the patient sees a provider who orders hours of assistance for the patient based on need and insurance.        05/20/24 1133   Discharge Assessment   Assessment Type Discharge Planning Assessment   Confirmed/corrected address, phone number and insurance Yes   Confirmed Demographics Correct on Facesheet   Source of Information patient   Communicated CLAY with patient/caregiver Yes   People in Home child(sandor), adult   Do you expect to return to your current living situation? Yes   Do you have help at home or someone to help you manage your care at home? Yes   Prior to hospitilization cognitive status: Alert/Oriented   Current cognitive status: Alert/Oriented   Walking or Climbing Stairs Difficulty yes   Walking or Climbing Stairs ambulation difficulty, requires equipment   Dressing/Bathing Difficulty no   Home Layout Able to live on 1st floor   Equipment Currently " Used at Home cane, straight   Readmission within 30 days? No   Patient currently being followed by outpatient case management? No   Do you currently have service(s) that help you manage your care at home? Yes   Name and Contact number of agency unknown   Is the pt/caregiver preference to resume services with current agency Yes   Do you take prescription medications? Yes   Do you have prescription coverage? Yes   Do you have any problems affording any of your prescribed medications? No   Is the patient taking medications as prescribed? yes   How do you get to doctors appointments? health plan transportation   Are you on dialysis? No   Do you take coumadin? No   Discharge Plan A Home with family   Discharge Plan B   (tbd)   DME Needed Upon Discharge  cane, straight   Discharge Plan discussed with: Patient   Transition of Care Barriers None

## 2024-05-20 NOTE — H&P
[facility] [place] [facility] [place]  [facility] Medicine  History & Physical    Patient Name: Jacqueline Manuel  MRN: [id number]  Patient Class: IP- Inpatient  Admission Date: 5/20/2024  Attending Physician: Audi Parry,*   Primary Care Provider: Fan Ling RN         Patient information was obtained from patient and ER records.     Subjective:     Principal Problem:Hypertensive urgency    Chief Complaint:   Chief Complaint   Patient presents with    Shortness of Breath     Patient arrives via EMS with SOB this evening. History of COPD and asthma. Has been out of medication for the past month - breathing treatments and HTN meds. EMS reports initial respiratory rate of 40 breaths per min and no auditory lung sounds. Given IM epi, duoneb, CPAP, mag, and dexmethasone PTA. Wheezing now heard and patient able to speak in full sentences now. Also hypertensive.        HPI: This is a 53-year-old female with a past medical history of COPD, hypertension, CKD 3, who presents with shortness of breath.      Patient presented with shortness of breaths that started 2-3 days prior to presentation.  She endorses progressively worsening dyspnea, with a productive cough and chest congestion.  Patient reports running out of her inhalers and all her medications 1-2 weeks prior to presentation. Per EMS, the patient was tachypneic, with diminished breath sounds.  She was given epinephrine, magnesium sulfate, DuoNeb, Decadron, and was placed on CPAP prior to arrival.    In the ED, the patient was hypertensive (247/114, MAP: 164) and tachypneic.  Labs remarkable for an elevated creatinine (1.8-around baseline), hyperglycemia (225), elevated BNP (129).  Chest x-ray showed no acute cardiopulmonary process.  Patient was given DuoNeb x1, amlodipine 10 mg p.o., carvedilol 25 mg p.o., hydralazine 10 mg IV, valsartan 160 mg p.o., and was started on nicardipine drip.  She was admitted for further management.    History reviewed. No  pertinent past medical history.    History reviewed. No pertinent surgical history.    Review of patient's allergies indicates:  No Known Allergies    No current facility-administered medications on file prior to encounter.     Current Outpatient Medications on File Prior to Encounter   Medication Sig    amLODIPine (NORVASC) 10 MG tablet Take 1 tablet by mouth once daily.    atorvastatin (LIPITOR) 40 MG tablet Take 40 mg by mouth once daily.    carvediloL (COREG) 25 MG tablet Take 1 tablet by mouth 2 (two) times daily with meals.    hydroCHLOROthiazide (HYDRODIURIL) 25 MG tablet Take 25 mg by mouth once daily.    albuterol (PROVENTIL/VENTOLIN HFA) 90 mcg/actuation inhaler Inhale 1-2 puffs into the lungs every 6 (six) hours as needed for Wheezing or Shortness of Breath. Rescue    albuterol-ipratropium (DUO-NEB) 2.5 mg-0.5 mg/3 mL nebulizer solution Take 3 mLs by nebulization every 6 (six) hours as needed for Wheezing. Rescue    aspirin (ECOTRIN) 81 MG EC tablet Take 1 tablet by mouth once daily.    azithromycin (Z-ROX) 250 MG tablet Take 1 tablet (250 mg total) by mouth once daily. Take first 2 tablets together, then 1 every day until finished.    EScitalopram oxalate (LEXAPRO) 10 MG tablet Take 1 tablet by mouth once daily.    gabapentin (NEURONTIN) 600 MG tablet Take 600 mg by mouth once daily.    HYDROcodone-acetaminophen (NORCO)  mg per tablet Take 1 tablet by mouth every 4 (four) hours as needed for Pain.    pantoprazole (PROTONIX) 40 MG tablet Take 1 tablet (40 mg total) by mouth once daily. for 25 days    SYMBICORT 160-4.5 mcg/actuation HFAA Inhale 2 puffs into the lungs once daily.    valsartan (DIOVAN) 160 MG tablet Take 160 mg by mouth once daily.     Family History    None       Tobacco Use    Smoking status: Every Day     Types: Cigarettes    Smokeless tobacco: Never   Substance and Sexual Activity    Alcohol use: Yes     Comment: occasionally    Drug use: Yes     Types: Marijuana    Sexual  activity: Not on file     Review of Systems   Constitutional: Negative.    HENT: Negative.     Eyes: Negative.    Respiratory:  Positive for cough and shortness of breath.    Cardiovascular: Negative.    Gastrointestinal: Negative.    Endocrine: Negative.    Genitourinary: Negative.    Musculoskeletal: Negative.    Skin: Negative.    Allergic/Immunologic: Negative.    Neurological: Negative.    Psychiatric/Behavioral: Negative.       Objective:     Vital Signs (Most Recent):  Temp: 97.7 °F (36.5 °C) (05/20/24 0026)  Pulse: (!) 113 (05/20/24 0245)  Resp: 17 (05/20/24 0201)  BP: (!) 207/115 (05/20/24 0245)  SpO2: 100 % (05/20/24 0245) Vital Signs (24h Range):  Temp:  [97.7 °F (36.5 °C)] 97.7 °F (36.5 °C)  Pulse:  [] 113  Resp:  [17-27] 17  SpO2:  [96 %-100 %] 100 %  BP: (200-247)/(100-115) 207/115     Weight: 68 kg (150 lb)  Body mass index is 24.21 kg/m².     Physical Exam  Vitals and nursing note reviewed.   Constitutional:       General: She is not in acute distress.     Appearance: Normal appearance. She is not ill-appearing.   HENT:      Head: Normocephalic and atraumatic.      Nose: Nose normal.      Mouth/Throat:      Mouth: Mucous membranes are moist.   Eyes:      Extraocular Movements: Extraocular movements intact.   Cardiovascular:      Rate and Rhythm: Normal rate.      Pulses: Normal pulses.      Heart sounds: No murmur heard.  Pulmonary:      Effort: Pulmonary effort is normal. No respiratory distress.      Breath sounds: Wheezing present.   Abdominal:      General: Abdomen is flat.      Palpations: Abdomen is soft.      Tenderness: There is no abdominal tenderness.   Musculoskeletal:      Right lower leg: No edema.      Left lower leg: No edema.   Skin:     General: Skin is warm.      Capillary Refill: Capillary refill takes less than 2 seconds.   Neurological:      General: No focal deficit present.      Mental Status: She is alert.   Psychiatric:         Mood and Affect: Mood normal.                 Significant Labs: All pertinent labs within the past 24 hours have been reviewed.    Significant Imaging: I have reviewed all pertinent imaging results/findings within the past 24 hours.  Assessment/Plan:     * Hypertensive urgency  Patient has a current diagnosis of hypertensive urgency (without evidence of end organ damage) which is uncontrolled.  Latest blood pressure and vitals reviewed-   Temp:  [97.7 °F (36.5 °C)]   Pulse:  []   Resp:  [17-27]   BP: (200-247)/(100-114)   SpO2:  [96 %-100 %] .   Patient currently on IV antihypertensives.   Home meds for hypertension were reviewed and noted below.   Hypertension Medications               amLODIPine (NORVASC) 10 MG tablet Take 1 tablet by mouth once daily.    carvediloL (COREG) 25 MG tablet Take 1 tablet by mouth 2 (two) times daily with meals.    hydroCHLOROthiazide (HYDRODIURIL) 25 MG tablet Take 25 mg by mouth once daily.    valsartan (DIOVAN) 160 MG tablet Take 160 mg by mouth once daily.            Medication adjustment for hospital antihypertensives is as follows- Resume home medications  Wean nicardipine as tolerated     Will aim for controlled BP reduction by medications noted above. Monitor and mitigate end organ damage as indicated.    Stage 3 chronic kidney disease  Creatine stable for now. BMP reviewed- noted Estimated Creatinine Clearance: 34.2 mL/min (A) (based on SCr of 1.8 mg/dL (H)). according to latest data. Based on current GFR, CKD stage is stage 3 - GFR 30-59.  Monitor UOP and serial BMP and adjust therapy as needed. Renally dose meds. Avoid nephrotoxic medications and procedures.    Essential hypertension  Chronic, uncontrolled. Latest blood pressure and vitals reviewed-     Temp:  [97.7 °F (36.5 °C)]   Pulse:  []   Resp:  [17-27]   BP: (200-247)/(100-114)   SpO2:  [96 %-100 %] .   Home meds for hypertension were reviewed and noted below.   Hypertension Medications               amLODIPine (NORVASC) 10 MG tablet Take 1 tablet  by mouth once daily.    carvediloL (COREG) 25 MG tablet Take 1 tablet by mouth 2 (two) times daily with meals.    hydroCHLOROthiazide (HYDRODIURIL) 25 MG tablet Take 25 mg by mouth once daily.    valsartan (DIOVAN) 160 MG tablet Take 160 mg by mouth once daily.            While in the hospital, will manage blood pressure as follows; Continue home antihypertensive regimen    Will utilize p.r.n. blood pressure medication only if patient's blood pressure greater than 180/110 and she develops symptoms such as worsening chest pain or shortness of breath.    See plan for HTN urgency     COPD exacerbation  Patient's COPD is with exacerbation noted by continued dyspnea currently.  Patient is currently on COPD Pathway. Continue scheduled inhalers Steroids and Supplemental oxygen and monitor respiratory status closely.       VTE Risk Mitigation (From admission, onward)           Ordered     IP VTE LOW RISK PATIENT  Once         05/20/24 0256     Place sequential compression device  Until discontinued         05/20/24 0256                         Critical care time spent on the evaluation and treatment of severe organ dysfunction, review of pertinent labs and imaging studies, discussions with consulting providers and discussions with patient/family: 35 minutes.        AdmissionCare    Guideline: Hypertension - INPT, Inpatient    Based on the indications selected for the patient, the bed status of Inpatient was determined to be MET    The following indications were selected as present at the time of evaluation of the patient:      - Hypertensive emergency indicated by SBP greater than 180 mm Hg or DBP greater than 120 mm Hg with evidence of acute or worsening target organ damage, as indicated by 1 or more of the following:    - Other acute or worsening end organ damage due to severe hypertension    AdmissionCare documentation entered by: SARA Narayan    [facility] [employer], 27th edition, Copyright © 2023 [facility] [employer], [facility] All Rights  Reserved.  [id number]:00    David Martínez MD  Department of Hospital Medicine  [facility] - Emergency Dept

## 2024-05-20 NOTE — CONSULTS
"  [facility] [place] - Intensive Care  Adult Nutrition  Consult Note    SUMMARY     Recommendations  Recommendation:   1. Continue cardiac diet   2. Consider adding diabetic diet restriction if sugars to remain high   3. Add Boost Glucose Control BID   4. Monitor weight and labs    Goals: Pt will consume 50-75% of meals by RD follow up    Nutrition Goal Status: new  Communication of RD Recs:  (POC)    Assessment and Plan  Nutrition Problem  Inadequate energy intake     Related to (etiology):   Poor Intake     Signs and Symptoms (as evidenced by):   Noted 26 lb weight loss in 6 months   No meal intake per chart      Interventions:  Collaboration with other providers   Commercial beverage     Nutrition Diagnosis Status:   New    Malnutrition Assessment  Weight Loss (Malnutrition):  (Noted 26 lb weight loss in 6 months (-15.3% weight loss))     Reason for Assessment  Reason For Assessment: identified at risk by screening criteria  Diagnosis: cardiac disease  Relevant Medical History: COPD, HTN, CKD3  Interdisciplinary Rounds: did not attend  General Information Comments: Pt is currently on a cardiac diet. [place]-18/skin intact. Pt presented with SOB, worsening dyspnea with worsening cough and chest congestion. Pt reports running out of her inhaler and all meds about 1-2 weeks prior to presentation per chart. Noted 26 lb weight loss in 6 months. Unable to conduct NFPE at this time. Pt asleep at time of visit.  Nutrition Discharge Planning: d/c on cardiac/diabetic diet    Nutrition Risk Screen  Nutrition Risk Screen: no indicators present    Nutrition/Diet History  Patient Reported Diet/Restrictions/Preferences: diabetic diet, heart healthy, renal  Food Preferences: MONO  Factors Affecting Nutritional Intake: None identified at this time    Nutrition Related Social Determinants of Health: SDOH: Unable to assess at this time.     Anthropometrics  Temp: 98.3 °F (36.8 °C)  Height Method: Estimated  Height: 5' 6" (167.6 cm)  Height " (inches): 66 in  Weight Method: Bed Scale  Weight: 65.1 kg (143 lb 8.3 oz)  Weight (lb): 143.52 lb  Ideal Body Weight (IBW), Female: 130 lb  % Ideal Body Weight, Female (lb): 110.4 %  BMI (Calculated): 23.2  BMI Grade: 18.5-24.9 - normal  Usual Body Weight (UBW), k.9 kg (23)  % Usual Body Weight: 84.83  % Weight Change From Usual Weight: -15.35 %     Lab/Procedures/Meds  Pertinent Labs Reviewed: reviewed  Pertinent Labs Comments: K 3.1, Creatinine 1.8, GFR 33, Glucose 225, magnesium 3.8, AST 48  Pertinent Medications Reviewed: reviewed  Pertinent Medications Comments: aspirin, atorvastatin, carvedilol, pantoprazole, prednicsone, valsartan    Estimated/Assessed Needs  Weight Used For Calorie Calculations: 65.1 kg (143 lb 8.3 oz)  Energy Calorie Requirements (kcal): 1953 kcals (30 kcals/kg)  Energy Need Method: Kcal/kg  Protein Requirements: 65g (1g/kg)  Weight Used For Protein Calculations: 65.1 kg (143 lb 8.3 oz)     Estimated Fluid Requirement Method: RDA Method  RDA Method (mL):   CHO Requirement: 240g    Nutrition Prescription Ordered  Current Diet Order: cardiac    Evaluation of Received Nutrient/Fluid Intake  I/O: 543/200  Energy Calories Required: not meeting needs  Protein Required: not meeting needs  Fluid Required: not meeting needs  Comments: LBM-24  Tolerance: not tolerating  % Intake of Estimated Energy Needs: Other: No meal intake  % Meal Intake: Other: No meal intake     Nutrition Risk  Level of Risk/Frequency of Follow-up:  (1x/weekly)     Monitor and Evaluation  Food and Nutrient Intake: energy intake, food and beverage intake  Food and Nutrient Adminstration: diet order  Anthropometric Measurements: weight, weight change, body mass index  Biochemical Data, Medical Tests and Procedures: electrolyte and renal panel, gastrointestinal profile, inflammatory profile, glucose/endocrine profile, lipid profile     Nutrition Follow-Up  RD Follow-up?: Yes

## 2024-05-20 NOTE — ASSESSMENT & PLAN NOTE
Creatine stable for now. BMP reviewed- noted Estimated Creatinine Clearance: 34.2 mL/min (A) (based on SCr of 1.8 mg/dL (H)). according to latest data. Based on current GFR, CKD stage is stage 3 - GFR 30-59.  Monitor UOP and serial BMP and adjust therapy as needed. Renally dose meds. Avoid nephrotoxic medications and procedures.

## 2024-05-20 NOTE — PLAN OF CARE
Pt remains free from falls and injuries. PIVs, ext cath remain. Cardene gtt off most of night. VSS. No c/o pain or distress. K replaced. Plan of care reviewed with pt.

## 2024-05-20 NOTE — NURSING
Pt received from ED, tx via stretcher by ED RN, on cardene gtt. Pt moved self over to low, locked bed, call bell in reach. Pt placed on continuous cardiac, NIBP, and SpO2 monitoring. K=3.1, replaced. VSS, cardene gtt titrated as ordered. Pt asking for sleeping pill, but otherwise in no acute distress. New orders noted.

## 2024-05-20 NOTE — EICU
EICU BRIEF INITIAL EVALUATION:       HISTORY:      54-year-old woman admitted for hypertensive urgency on nicardipine drip as well as COPD exacerbation.  One no clinical (  History of COPD, hypertension, CKD 3,  Metapneumovirus positive in March    DVT prophylaxis SCDs  GI prophylaxis PPI    /70, P 109, RR 14, O2 sat 97  Resting comfortably on OxyMask      CAMERA ASSESSMENT: Yes      TELEMETRY: Reviewed      NOTES: Reviewed     LABS: Reviewed      IMAGING: Personally reviewed.      DISCUSSED with bedside nurse        ASSESSMENT AND PLAN:     Agree with current treatment      I have reviewed the plan of care for the patient and agree with the plan of care unless noted otherwise above.      KAY Kitchen MD  eICU Attending  [phone]    This report has been created through the use of M-Modal dictation software. Typographical and content errors may occur with this process. While efforts are made to detect and correct such errors, in some cases errors will persist. For this reason, wording in this document should be considered in the proper context and not strictly verbatim.

## 2024-05-21 LAB
ANION GAP SERPL CALC-SCNC: 10 MMOL/L (ref 8–16)
BASOPHILS # BLD AUTO: 0.01 K/UL (ref 0–0.2)
BASOPHILS NFR BLD: 0.1 % (ref 0–1.9)
BUN SERPL-MCNC: 24 MG/DL (ref 6–20)
CALCIUM SERPL-MCNC: 11.2 MG/DL (ref 8.7–10.5)
CHLORIDE SERPL-SCNC: 103 MMOL/L (ref 95–110)
CO2 SERPL-SCNC: 29 MMOL/L (ref 23–29)
CREAT SERPL-MCNC: 1.7 MG/DL (ref 0.5–1.4)
DIFFERENTIAL METHOD BLD: ABNORMAL
EOSINOPHIL # BLD AUTO: 0 K/UL (ref 0–0.5)
EOSINOPHIL NFR BLD: 0 % (ref 0–8)
ERYTHROCYTE [DISTWIDTH] IN BLOOD BY AUTOMATED COUNT: 12.6 % (ref 11.5–14.5)
EST. GFR  (NO RACE VARIABLE): 36 ML/MIN/1.73 M^2
GLUCOSE SERPL-MCNC: 126 MG/DL (ref 70–110)
HCT VFR BLD AUTO: 40.8 % (ref 37–48.5)
HGB BLD-MCNC: 13 G/DL (ref 12–16)
IMM GRANULOCYTES # BLD AUTO: 0.1 K/UL (ref 0–0.04)
IMM GRANULOCYTES NFR BLD AUTO: 0.6 % (ref 0–0.5)
LYMPHOCYTES # BLD AUTO: 1.3 K/UL (ref 1–4.8)
LYMPHOCYTES NFR BLD: 7.6 % (ref 18–48)
MCH RBC QN AUTO: 29.6 PG (ref 27–31)
MCHC RBC AUTO-ENTMCNC: 31.9 G/DL (ref 32–36)
MCV RBC AUTO: 93 FL (ref 82–98)
MONOCYTES # BLD AUTO: 0.5 K/UL (ref 0.3–1)
MONOCYTES NFR BLD: 3 % (ref 4–15)
NEUTROPHILS # BLD AUTO: 15.6 K/UL (ref 1.8–7.7)
NEUTROPHILS NFR BLD: 88.7 % (ref 38–73)
NRBC BLD-RTO: 0 /100 WBC
PLATELET # BLD AUTO: 297 K/UL (ref 150–450)
PMV BLD AUTO: 9.4 FL (ref 9.2–12.9)
POTASSIUM SERPL-SCNC: 4.3 MMOL/L (ref 3.5–5.1)
RBC # BLD AUTO: 4.39 M/UL (ref 4–5.4)
SODIUM SERPL-SCNC: 142 MMOL/L (ref 136–145)
WBC # BLD AUTO: 17.59 K/UL (ref 3.9–12.7)

## 2024-05-21 PROCEDURE — 25000003 PHARM REV CODE 250: Performed by: STUDENT IN AN ORGANIZED HEALTH CARE EDUCATION/TRAINING PROGRAM

## 2024-05-21 PROCEDURE — 25000242 PHARM REV CODE 250 ALT 637 W/ HCPCS: Performed by: STUDENT IN AN ORGANIZED HEALTH CARE EDUCATION/TRAINING PROGRAM

## 2024-05-21 PROCEDURE — 63600175 PHARM REV CODE 636 W HCPCS: Performed by: STUDENT IN AN ORGANIZED HEALTH CARE EDUCATION/TRAINING PROGRAM

## 2024-05-21 PROCEDURE — 21400001 HC TELEMETRY ROOM

## 2024-05-21 PROCEDURE — 63600175 PHARM REV CODE 636 W HCPCS: Performed by: INTERNAL MEDICINE

## 2024-05-21 PROCEDURE — 94761 N-INVAS EAR/PLS OXIMETRY MLT: CPT

## 2024-05-21 PROCEDURE — 80048 BASIC METABOLIC PNL TOTAL CA: CPT | Performed by: STUDENT IN AN ORGANIZED HEALTH CARE EDUCATION/TRAINING PROGRAM

## 2024-05-21 PROCEDURE — 36415 COLL VENOUS BLD VENIPUNCTURE: CPT | Performed by: STUDENT IN AN ORGANIZED HEALTH CARE EDUCATION/TRAINING PROGRAM

## 2024-05-21 PROCEDURE — 94640 AIRWAY INHALATION TREATMENT: CPT

## 2024-05-21 PROCEDURE — 97161 PT EVAL LOW COMPLEX 20 MIN: CPT

## 2024-05-21 PROCEDURE — 85025 COMPLETE CBC W/AUTO DIFF WBC: CPT | Performed by: STUDENT IN AN ORGANIZED HEALTH CARE EDUCATION/TRAINING PROGRAM

## 2024-05-21 RX ORDER — HYDRALAZINE HYDROCHLORIDE 20 MG/ML
10 INJECTION INTRAMUSCULAR; INTRAVENOUS EVERY 6 HOURS PRN
Status: DISCONTINUED | OUTPATIENT
Start: 2024-05-21 | End: 2024-05-22 | Stop reason: HOSPADM

## 2024-05-21 RX ADMIN — ENOXAPARIN SODIUM 40 MG: 40 INJECTION SUBCUTANEOUS at 05:05

## 2024-05-21 RX ADMIN — AMLODIPINE BESYLATE 10 MG: 5 TABLET ORAL at 08:05

## 2024-05-21 RX ADMIN — IPRATROPIUM BROMIDE AND ALBUTEROL SULFATE 3 ML: 2.5; .5 SOLUTION RESPIRATORY (INHALATION) at 03:05

## 2024-05-21 RX ADMIN — IPRATROPIUM BROMIDE AND ALBUTEROL SULFATE 3 ML: 2.5; .5 SOLUTION RESPIRATORY (INHALATION) at 12:05

## 2024-05-21 RX ADMIN — VALSARTAN 160 MG: 80 TABLET, FILM COATED ORAL at 08:05

## 2024-05-21 RX ADMIN — PANTOPRAZOLE SODIUM 40 MG: 40 TABLET, DELAYED RELEASE ORAL at 08:05

## 2024-05-21 RX ADMIN — CARVEDILOL 25 MG: 12.5 TABLET, FILM COATED ORAL at 08:05

## 2024-05-21 RX ADMIN — CARVEDILOL 25 MG: 12.5 TABLET, FILM COATED ORAL at 05:05

## 2024-05-21 RX ADMIN — PREDNISONE 40 MG: 20 TABLET ORAL at 08:05

## 2024-05-21 RX ADMIN — GABAPENTIN 600 MG: 300 CAPSULE ORAL at 08:05

## 2024-05-21 RX ADMIN — MUPIROCIN: 20 OINTMENT TOPICAL at 08:05

## 2024-05-21 RX ADMIN — Medication 6 MG: at 08:05

## 2024-05-21 RX ADMIN — IPRATROPIUM BROMIDE AND ALBUTEROL SULFATE 3 ML: 2.5; .5 SOLUTION RESPIRATORY (INHALATION) at 08:05

## 2024-05-21 RX ADMIN — IPRATROPIUM BROMIDE AND ALBUTEROL SULFATE 3 ML: 2.5; .5 SOLUTION RESPIRATORY (INHALATION) at 07:05

## 2024-05-21 RX ADMIN — ACETAMINOPHEN 650 MG: 325 TABLET ORAL at 09:05

## 2024-05-21 RX ADMIN — ATORVASTATIN CALCIUM 40 MG: 40 TABLET, FILM COATED ORAL at 08:05

## 2024-05-21 RX ADMIN — HYDRALAZINE HYDROCHLORIDE 10 MG: 20 INJECTION INTRAMUSCULAR; INTRAVENOUS at 01:05

## 2024-05-21 RX ADMIN — ASPIRIN 81 MG: 81 TABLET, COATED ORAL at 08:05

## 2024-05-21 NOTE — NURSING
[facility] [place]  Nurses Note -- 4 Eyes      5/21/2024       Skin assessed on: Q Shift      [x] No Pressure Injuries Present    [x]Prevention Measures Documented    [] Yes LDA  for Pressure Injury Previously documented     [] Yes New Pressure Injury Discovered   [] LDA for New Pressure Injury Added      Attending RN:  Gila Oviedo RN     Second RN:  CHARLES Peña

## 2024-05-21 NOTE — NURSING
[facility], [facility] [place]  Nurses Note -- 4 Eyes      5/21/2024       Skin assessed on: Q Shift      [x] No Pressure Injuries Present    [x]Prevention Measures Documented    [] Yes LDA  for Pressure Injury Previously documented     [] Yes New Pressure Injury Discovered   [] LDA for New Pressure Injury Added      Attending RN:  Jillian Serra RN     Second RN:  CHARLES Uriostegui

## 2024-05-21 NOTE — ASSESSMENT & PLAN NOTE
Chronic, uncontrolled. Latest blood pressure and vitals reviewed-     Temp:  [97.1 °F (36.2 °C)-98.1 °F (36.7 °C)]   Pulse:  []   Resp:  [13-29]   BP: (118-193)/()   SpO2:  [93 %-100 %] .   Home meds for hypertension were reviewed and noted below.   Hypertension Medications               amLODIPine (NORVASC) 10 MG tablet Take 1 tablet by mouth once daily.    carvediloL (COREG) 25 MG tablet Take 1 tablet by mouth 2 (two) times daily with meals.    hydroCHLOROthiazide (HYDRODIURIL) 25 MG tablet Take 25 mg by mouth once daily.    valsartan (DIOVAN) 160 MG tablet Take 160 mg by mouth once daily.            While in the hospital, will manage blood pressure as follows; Continue home antihypertensive regimen    Will utilize p.r.n. blood pressure medication only if patient's blood pressure greater than 180/110 and she develops symptoms such as worsening chest pain or shortness of breath.    See plan for HTN urgency

## 2024-05-21 NOTE — ASSESSMENT & PLAN NOTE
Patient has a current diagnosis of hypertensive urgency (without evidence of end organ damage) which is uncontrolled.  Latest blood pressure and vitals reviewed-   Temp:  [97.1 °F (36.2 °C)-98.1 °F (36.7 °C)]   Pulse:  []   Resp:  [13-29]   BP: (118-193)/()   SpO2:  [93 %-100 %] .   Patient currently on IV antihypertensives.   Home meds for hypertension were reviewed and noted below.   Hypertension Medications               amLODIPine (NORVASC) 10 MG tablet Take 1 tablet by mouth once daily.    carvediloL (COREG) 25 MG tablet Take 1 tablet by mouth 2 (two) times daily with meals.    hydroCHLOROthiazide (HYDRODIURIL) 25 MG tablet Take 25 mg by mouth once daily.    valsartan (DIOVAN) 160 MG tablet Take 160 mg by mouth once daily.            Medication adjustment for hospital antihypertensives is as follows- Resume home medications  Wean nicardipine as tolerated     Will aim for controlled BP reduction by medications noted above. Monitor and mitigate end organ damage as indicated.

## 2024-05-21 NOTE — PLAN OF CARE
Problem: Physical Therapy  Goal: Physical Therapy Goal  Description: Goals to be met by: 24     Patient will increase functional independence with mobility by performin. Sit to stand transfer with Modified [place]  2. Gait  x 100 feet with Modified [place] using LRAD.   3. Lower extremity exercise program x10 reps per handout, with supervision    Outcome: Progressing   Initial PT evaluation performed today.  Pt could benefit from skilled PT services 2x/wk in order to maximize function prior to D/C.  RW and Low Intensity Therapy recommended at time of D/C.

## 2024-05-21 NOTE — PROGRESS NOTES
[facility] Medicine  Progress Note    Patient Name: Jacqueline Manuel  MRN: [id number]  Patient Class: IP- Inpatient   Admission Date: 5/20/2024  Length of Stay: 1 days  Attending Physician: Dax Cordova III, MD  Primary Care Provider: Fan Ling RN        Subjective:     Principal Problem:Hypertensive urgency        HPI:  This is a 53-year-old female with a past medical history of COPD, hypertension, CKD 3, who presents with shortness of breath.      Patient presented with shortness of breaths that started 2-3 days prior to presentation.  She endorses progressively worsening dyspnea, with a productive cough and chest congestion.  Patient reports running out of her inhalers and all her medications 1-2 weeks prior to presentation. Per EMS, the patient was tachypneic, with diminished breath sounds.  She was given epinephrine, magnesium sulfate, DuoNeb, Decadron, and was placed on CPAP prior to arrival.    In the ED, the patient was hypertensive (247/114, MAP: 164) and tachypneic.  Labs remarkable for an elevated creatinine (1.8-around baseline), hyperglycemia (225), elevated BNP (129).  Chest x-ray showed no acute cardiopulmonary process.  Patient was given DuoNeb x1, amlodipine 10 mg p.o., carvedilol 25 mg p.o., hydralazine 10 mg IV, valsartan 160 mg p.o., and was started on nicardipine drip.  She was admitted for further management.    Overview/Hospital Course:  No notes on file    Interval History: Pt states she is having ha currently, but denies weakness/numbness. SOB is improved otherwise getting back to her normal.    Review of Systems  Objective:     Vital Signs (Most Recent):  Temp: 97.9 °F (36.6 °C) (05/21/24 0800)  Pulse: 82 (05/21/24 1000)  Resp: 20 (05/21/24 1000)  BP: (!) 165/96 (05/21/24 1000)  SpO2: 98 % (05/21/24 1000) Vital Signs (24h Range):  Temp:  [97.1 °F (36.2 °C)-98.1 °F (36.7 °C)] 97.9 °F (36.6 °C)  Pulse:  [] 82  Resp:  [13-29] 20  SpO2:  [93 %-100 %]  98 %  BP: (118-193)/() 165/96     Weight: 65.1 kg (143 lb 8.3 oz)  Body mass index is 23.16 kg/m².    Intake/Output Summary (Last 24 hours) at 5/21/2024 1110  Last data filed at 5/21/2024 0600  Gross per 24 hour   Intake 250 ml   Output 1350 ml   Net -1100 ml         Physical Exam  Vitals reviewed.   Constitutional:       General: She is not in acute distress.     Appearance: She is ill-appearing (chornic).   HENT:      Mouth/Throat:      Mouth: Mucous membranes are moist.      Pharynx: Oropharynx is clear.   Eyes:      General: No scleral icterus.     Extraocular Movements: Extraocular movements intact.   Pulmonary:      Effort: Pulmonary effort is normal. No respiratory distress.   Abdominal:      Palpations: Abdomen is soft.      Tenderness: There is no abdominal tenderness.   Musculoskeletal:         General: No swelling or tenderness.   Skin:     General: Skin is warm and dry.   Neurological:      General: No focal deficit present.      Mental Status: She is alert and oriented to person, place, and time.   Psychiatric:         Mood and Affect: Mood normal.         Behavior: Behavior normal.             Significant Labs: All pertinent labs within the past 24 hours have been reviewed.    Significant Imaging: I have reviewed all pertinent imaging results/findings within the past 24 hours.    Assessment/Plan:      * Hypertensive urgency  Patient has a current diagnosis of hypertensive urgency (without evidence of end organ damage) which is uncontrolled.  Latest blood pressure and vitals reviewed-   Temp:  [97.1 °F (36.2 °C)-98.1 °F (36.7 °C)]   Pulse:  []   Resp:  [13-29]   BP: (118-193)/()   SpO2:  [93 %-100 %] .   Patient currently on IV antihypertensives.   Home meds for hypertension were reviewed and noted below.   Hypertension Medications               amLODIPine (NORVASC) 10 MG tablet Take 1 tablet by mouth once daily.    carvediloL (COREG) 25 MG tablet Take 1 tablet by mouth 2 (two) times  daily with meals.    hydroCHLOROthiazide (HYDRODIURIL) 25 MG tablet Take 25 mg by mouth once daily.    valsartan (DIOVAN) 160 MG tablet Take 160 mg by mouth once daily.            Medication adjustment for hospital antihypertensives is as follows- Resume home medications  Wean nicardipine as tolerated     Will aim for controlled BP reduction by medications noted above. Monitor and mitigate end organ damage as indicated.    Stage 3 chronic kidney disease  Creatine stable for now. BMP reviewed- noted Estimated Creatinine Clearance: 36.2 mL/min (A) (based on SCr of 1.7 mg/dL (H)). according to latest data. Based on current GFR, CKD stage is stage 3 - GFR 30-59.  Monitor UOP and serial BMP and adjust therapy as needed. Renally dose meds. Avoid nephrotoxic medications and procedures.    Essential hypertension  Chronic, uncontrolled. Latest blood pressure and vitals reviewed-     Temp:  [97.1 °F (36.2 °C)-98.1 °F (36.7 °C)]   Pulse:  []   Resp:  [13-29]   BP: (118-193)/()   SpO2:  [93 %-100 %] .   Home meds for hypertension were reviewed and noted below.   Hypertension Medications               amLODIPine (NORVASC) 10 MG tablet Take 1 tablet by mouth once daily.    carvediloL (COREG) 25 MG tablet Take 1 tablet by mouth 2 (two) times daily with meals.    hydroCHLOROthiazide (HYDRODIURIL) 25 MG tablet Take 25 mg by mouth once daily.    valsartan (DIOVAN) 160 MG tablet Take 160 mg by mouth once daily.            While in the hospital, will manage blood pressure as follows; Continue home antihypertensive regimen    Will utilize p.r.n. blood pressure medication only if patient's blood pressure greater than 180/110 and she develops symptoms such as worsening chest pain or shortness of breath.    See plan for HTN urgency     COPD exacerbation  Patient's COPD is with exacerbation noted by continued dyspnea currently.  Patient is currently on COPD Pathway. Continue scheduled inhalers Steroids and Supplemental oxygen  and monitor respiratory status closely.       VTE Risk Mitigation (From admission, onward)           Ordered     enoxaparin injection 40 mg  Every 24 hours         05/20/24 0856     IP VTE LOW RISK PATIENT  Once         05/20/24 0256     Place sequential compression device  Until discontinued         05/20/24 0256                    Discharge Planning   CLAY: 5/23/2024     Code Status: Full Code   Is the patient medically ready for discharge?:     Reason for patient still in hospital (select all that apply): Laboratory test and Treatment  Discharge Plan A: Home with family        The mobility limitation cannot be sufficiently resolved by the use of a cane. Patient's functional mobility deficit can be sufficiently resolved with the use of a Rolling Walker. Patient's mobility limitation significantly impairs their ability to participate in one of more activities of daily living. The use of a RW will significantly improve the patient's ability to participate in MRADLS and the patient will use it on regular basis in the home.         Dax Cordova III, MD  Department of Hospital Medicine   [facility] [place] - Intensive Care

## 2024-05-21 NOTE — PLAN OF CARE
Pt remains free from falls and injuries. PIV, ext cath remain. Hydralazine x1 for SBP>180. Otherwise, VSS, no acute issues overnight. Plan of care reviewed with pt.

## 2024-05-21 NOTE — ASSESSMENT & PLAN NOTE
Creatine stable for now. BMP reviewed- noted Estimated Creatinine Clearance: 36.2 mL/min (A) (based on SCr of 1.7 mg/dL (H)). according to latest data. Based on current GFR, CKD stage is stage 3 - GFR 30-59.  Monitor UOP and serial BMP and adjust therapy as needed. Renally dose meds. Avoid nephrotoxic medications and procedures.

## 2024-05-21 NOTE — SUBJECTIVE & OBJECTIVE
Interval History: Pt states she is having ha currently, but denies weakness/numbness. SOB is improved otherwise getting back to her normal.    Review of Systems  Objective:     Vital Signs (Most Recent):  Temp: 97.9 °F (36.6 °C) (05/21/24 0800)  Pulse: 82 (05/21/24 1000)  Resp: 20 (05/21/24 1000)  BP: (!) 165/96 (05/21/24 1000)  SpO2: 98 % (05/21/24 1000) Vital Signs (24h Range):  Temp:  [97.1 °F (36.2 °C)-98.1 °F (36.7 °C)] 97.9 °F (36.6 °C)  Pulse:  [] 82  Resp:  [13-29] 20  SpO2:  [93 %-100 %] 98 %  BP: (118-193)/() 165/96     Weight: 65.1 kg (143 lb 8.3 oz)  Body mass index is 23.16 kg/m².    Intake/Output Summary (Last 24 hours) at 5/21/2024 1110  Last data filed at 5/21/2024 0600  Gross per 24 hour   Intake 250 ml   Output 1350 ml   Net -1100 ml         Physical Exam  Vitals reviewed.   Constitutional:       General: She is not in acute distress.     Appearance: She is ill-appearing (chornic).   HENT:      Mouth/Throat:      Mouth: Mucous membranes are moist.      Pharynx: Oropharynx is clear.   Eyes:      General: No scleral icterus.     Extraocular Movements: Extraocular movements intact.   Pulmonary:      Effort: Pulmonary effort is normal. No respiratory distress.   Abdominal:      Palpations: Abdomen is soft.      Tenderness: There is no abdominal tenderness.   Musculoskeletal:         General: No swelling or tenderness.   Skin:     General: Skin is warm and dry.   Neurological:      General: No focal deficit present.      Mental Status: She is alert and oriented to person, place, and time.   Psychiatric:         Mood and Affect: Mood normal.         Behavior: Behavior normal.             Significant Labs: All pertinent labs within the past 24 hours have been reviewed.    Significant Imaging: I have reviewed all pertinent imaging results/findings within the past 24 hours.

## 2024-05-21 NOTE — NURSING TRANSFER
Nursing Transfer Note      5/21/2024   2:02 PM    Nurse giving handoff:CHARLES Uriostegui       Reason patient is being transferred: MD order    Transfer To: [id number]    Transfer via wheelchair    Transfer with cardiac monitoring    Transported by CHARLES Uriostegui      Transfer Vital Signs:  Blood Pressure:160/84  Heart Rate:84  O2:99  Temperature:97.9  Respirations:19    Telemetry: Box Number [id number]  Order for Tele Monitor? Yes    Additional Lines: n/a    4eyes on Skin: yes    Medicines sent: mupirocin     Any special needs or follow-up needed: n/a    Patient belongings transferred with patient: Yes    Chart send with patient: Yes    Notified: daughter    Patient reassessed at: 5/21/24  (date, time)  1  Upon arrival to floor: cardiac monitor applied, patient oriented to room, call bell in reach, and bed in lowest position

## 2024-05-21 NOTE — PT/OT/SLP EVAL
"Physical Therapy Evaluation    Patient Name:  Jacqueline Manuel   MRN:  [id number]    Recommendations:     Discharge Recommendations: Low Intensity Therapy   Discharge Equipment Recommendations: walker, rolling   Barriers to discharge:  None from PT standpoint    Assessment:     Jacqueline Manuel is a 54 y.o. female admitted with a medical diagnosis of Hypertensive urgency.  She presents with the following impairments/functional limitations: weakness, impaired endurance, impaired self care skills, decreased coordination, impaired functional mobility, gait instability, impaired balance, pain, decreased safety awareness, decreased lower extremity function .    Rehab Prognosis: Good; patient would benefit from acute skilled PT services to address these deficits and reach maximum level of function.    Recent Surgery: * No surgery found *      Plan:     During this hospitalization, patient to be seen 3 x/week to address the identified rehab impairments via gait training, therapeutic activities, therapeutic exercises and progress toward the following goals:    Plan of Care Expires:  05/28/24    Subjective     Chief Complaint: pain  Patient/Family Comments/goals: Pt agreeable to evaluation, pt states that "her children" have ruined her SPC   Pain/Comfort:  Pain Rating 1:  (not rated)  Location 1:  (R LE)  Pain Addressed 1: Reposition, Distraction, Cessation of Activity  Pain Addressed 2: Reposition, Distraction, Cessation of Activity, Nurse notified    Patients cultural, spiritual, Jain conflicts given the current situation: no    Living Environment:  Ptl violeta with her daughter in a H with 1STE  Prior to admission, patients level of function was Mod I with SPC for ambulation  Equipment used at home: cane, straight.  DME owned (not currently used): none.  Upon discharge, patient will have assistance from Children.    Objective:     Communicated with nsg prior to session.  Patient found HOB elevated with peripheral IV  " upon PT entry to room.    General Precautions: Standard, fall  Orthopedic Precautions:N/A   Braces: N/A  Respiratory Status: Room air    Exams:  Cognitive Exam:  Patient is oriented to Person, Place, Time, and Situation  Gross Motor Coordination:  WFL's  Postural Exam:  Patient presented with the following abnormalities:    -       Rounded shoulders  -       Forward head  Skin Integrity/Edema:      -       Skin integrity: Visible skin intact  -       Edema: None noted    RLE ROM: WFL  RLE Strength: WFL  LLE ROM: WFL  LLE Strength: WFL    Functional Mobility:  Bed Mobility:     Scooting: modified independence  Supine to Sit: modified independence  Sit to Supine: supervision  Transfers:     Sit to Stand:  stand by assistance with rolling walker  Gait: GAit training with RW and CGA/SBA and Vc for sequencing and distribution of weight through UE's to walker approx 12'  Balance: Fair+ sit, fair+/Fair stand      AM-PAC 6 CLICK MOBILITY  Total Score:20       Treatment & Education:  Eval only    Patient left HOB elevated with call button in reach.    GOALS:   Multidisciplinary Problems       Physical Therapy Goals          Problem: Physical Therapy    Goal Priority Disciplines Outcome Goal Variances Interventions   Physical Therapy Goal     PT, PT/OT Progressing     Description: Goals to be met by: 24     Patient will increase functional independence with mobility by performin. Sit to stand transfer with Modified [place]  2. Gait  x 100 feet with Modified [place] using LRAD.   3. Lower extremity exercise program x10 reps per handout, with supervision                         History:     History reviewed. No pertinent past medical history.    History reviewed. No pertinent surgical history.    Time Tracking:     PT Received On: 24  PT Start Time: 1438     PT Stop Time: 1452  PT Total Time (min): 14 min     Billable Minutes: Evaluation 14      2024

## 2024-05-21 NOTE — CARE UPDATE
Pt in the ICU on RA.  NSR on the monitor.  Afebrile.  Aox4. Purewick in place with good urine output. Transfer orders received.  Report called to the 4th floor.  Plan of care reviewed.  No injuries, falls or skin breakdown occurred this shift.

## 2024-05-21 NOTE — PLAN OF CARE
Problem: Adult Inpatient Plan of Care  Goal: Plan of Care Review  Outcome: Met  Goal: Patient-Specific Goal (Individualized)  Outcome: Met  Goal: Absence of Hospital-Acquired Illness or Injury  Outcome: Met  Goal: Optimal Comfort and Wellbeing  Outcome: Met  Goal: Readiness for Transition of Care  Outcome: Met     Problem: COPD (Chronic Obstructive Pulmonary Disease)  Goal: Optimal Chronic Illness Coping  Outcome: Met  Goal: Optimal Level of Functional [place]  Outcome: Met  Goal: Absence of Infection Signs and Symptoms  Outcome: Met  Goal: Improved Oral Intake  Outcome: Met  Goal: Effective Oxygenation and Ventilation  Outcome: Met     Problem: Skin Injury Risk Increased  Goal: Skin Health and Integrity  Outcome: Met     Problem: Fall Injury Risk  Goal: Absence of Fall and Fall-Related Injury  Outcome: Met

## 2024-05-22 VITALS
BODY MASS INDEX: 23.81 KG/M2 | HEIGHT: 66 IN | WEIGHT: 148.13 LBS | OXYGEN SATURATION: 95 % | RESPIRATION RATE: 18 BRPM | TEMPERATURE: 98 F | SYSTOLIC BLOOD PRESSURE: 154 MMHG | DIASTOLIC BLOOD PRESSURE: 89 MMHG | HEART RATE: 81 BPM

## 2024-05-22 LAB
ANION GAP SERPL CALC-SCNC: 12 MMOL/L (ref 8–16)
BUN SERPL-MCNC: 36 MG/DL (ref 6–20)
CALCIUM SERPL-MCNC: 10.5 MG/DL (ref 8.7–10.5)
CHLORIDE SERPL-SCNC: 104 MMOL/L (ref 95–110)
CO2 SERPL-SCNC: 24 MMOL/L (ref 23–29)
CREAT SERPL-MCNC: 2 MG/DL (ref 0.5–1.4)
EST. GFR  (NO RACE VARIABLE): 30 ML/MIN/1.73 M^2
GLUCOSE SERPL-MCNC: 144 MG/DL (ref 70–110)
POTASSIUM SERPL-SCNC: 4.1 MMOL/L (ref 3.5–5.1)
SODIUM SERPL-SCNC: 140 MMOL/L (ref 136–145)

## 2024-05-22 PROCEDURE — 25000242 PHARM REV CODE 250 ALT 637 W/ HCPCS: Performed by: STUDENT IN AN ORGANIZED HEALTH CARE EDUCATION/TRAINING PROGRAM

## 2024-05-22 PROCEDURE — 63600175 PHARM REV CODE 636 W HCPCS: Performed by: INTERNAL MEDICINE

## 2024-05-22 PROCEDURE — 25000003 PHARM REV CODE 250: Performed by: STUDENT IN AN ORGANIZED HEALTH CARE EDUCATION/TRAINING PROGRAM

## 2024-05-22 PROCEDURE — 63600175 PHARM REV CODE 636 W HCPCS: Performed by: STUDENT IN AN ORGANIZED HEALTH CARE EDUCATION/TRAINING PROGRAM

## 2024-05-22 PROCEDURE — 94761 N-INVAS EAR/PLS OXIMETRY MLT: CPT

## 2024-05-22 PROCEDURE — 36415 COLL VENOUS BLD VENIPUNCTURE: CPT | Performed by: STUDENT IN AN ORGANIZED HEALTH CARE EDUCATION/TRAINING PROGRAM

## 2024-05-22 PROCEDURE — 94640 AIRWAY INHALATION TREATMENT: CPT

## 2024-05-22 PROCEDURE — 97116 GAIT TRAINING THERAPY: CPT

## 2024-05-22 PROCEDURE — 99900031 HC PATIENT EDUCATION (STAT)

## 2024-05-22 PROCEDURE — 80048 BASIC METABOLIC PNL TOTAL CA: CPT | Performed by: STUDENT IN AN ORGANIZED HEALTH CARE EDUCATION/TRAINING PROGRAM

## 2024-05-22 RX ORDER — ASPIRIN 81 MG/1
81 TABLET ORAL DAILY
Qty: 30 TABLET | Refills: 11 | Status: SHIPPED | OUTPATIENT
Start: 2024-05-22 | End: 2025-05-22

## 2024-05-22 RX ORDER — CLONIDINE HYDROCHLORIDE 0.1 MG/1
0.1 TABLET ORAL 2 TIMES DAILY
Qty: 60 TABLET | Refills: 11 | Status: SHIPPED | OUTPATIENT
Start: 2024-05-22 | End: 2025-05-22

## 2024-05-22 RX ORDER — IPRATROPIUM BROMIDE AND ALBUTEROL SULFATE 2.5; .5 MG/3ML; MG/3ML
3 SOLUTION RESPIRATORY (INHALATION) EVERY 6 HOURS PRN
Qty: 90 ML | Refills: 3 | Status: SHIPPED | OUTPATIENT
Start: 2024-05-22 | End: 2025-05-22

## 2024-05-22 RX ORDER — PANTOPRAZOLE SODIUM 40 MG/1
40 TABLET, DELAYED RELEASE ORAL DAILY
Qty: 25 TABLET | Refills: 0 | Status: SHIPPED | OUTPATIENT
Start: 2024-05-22 | End: 2024-06-16

## 2024-05-22 RX ORDER — ALBUTEROL SULFATE 90 UG/1
1-2 AEROSOL, METERED RESPIRATORY (INHALATION) EVERY 6 HOURS PRN
Qty: 8.5 G | Refills: 3 | Status: SHIPPED | OUTPATIENT
Start: 2024-05-22 | End: 2025-05-22

## 2024-05-22 RX ORDER — CARVEDILOL 25 MG/1
25 TABLET ORAL 2 TIMES DAILY WITH MEALS
Qty: 180 TABLET | Refills: 3 | Status: SHIPPED | OUTPATIENT
Start: 2024-05-22 | End: 2025-05-22

## 2024-05-22 RX ORDER — HYDRALAZINE HYDROCHLORIDE 25 MG/1
25 TABLET, FILM COATED ORAL EVERY 8 HOURS
Qty: 90 TABLET | Refills: 11 | Status: SHIPPED | OUTPATIENT
Start: 2024-05-22 | End: 2025-05-22

## 2024-05-22 RX ORDER — PREDNISONE 20 MG/1
TABLET ORAL
Qty: 9 TABLET | Refills: 0 | Status: SHIPPED | OUTPATIENT
Start: 2024-05-22 | End: 2024-05-28

## 2024-05-22 RX ORDER — CLONIDINE HYDROCHLORIDE 0.1 MG/1
0.1 TABLET ORAL ONCE
Status: COMPLETED | OUTPATIENT
Start: 2024-05-22 | End: 2024-05-22

## 2024-05-22 RX ORDER — VALSARTAN 160 MG/1
160 TABLET ORAL DAILY
Qty: 90 TABLET | Refills: 11 | Status: SHIPPED | OUTPATIENT
Start: 2024-05-22

## 2024-05-22 RX ORDER — ACETAMINOPHEN 500 MG
1 TABLET ORAL 3 TIMES DAILY
Qty: 1 EACH | Refills: 0 | Status: SHIPPED | OUTPATIENT
Start: 2024-05-22

## 2024-05-22 RX ORDER — BUDESONIDE AND FORMOTEROL FUMARATE DIHYDRATE 160; 4.5 UG/1; UG/1
2 AEROSOL RESPIRATORY (INHALATION) DAILY
Qty: 10.2 G | Refills: 11 | Status: SHIPPED | OUTPATIENT
Start: 2024-05-22

## 2024-05-22 RX ORDER — HYDRALAZINE HYDROCHLORIDE 25 MG/1
25 TABLET, FILM COATED ORAL EVERY 8 HOURS
Status: DISCONTINUED | OUTPATIENT
Start: 2024-05-22 | End: 2024-05-22 | Stop reason: HOSPADM

## 2024-05-22 RX ORDER — ATORVASTATIN CALCIUM 40 MG/1
40 TABLET, FILM COATED ORAL DAILY
Qty: 30 TABLET | Refills: 11 | Status: SHIPPED | OUTPATIENT
Start: 2024-05-22

## 2024-05-22 RX ORDER — AMLODIPINE BESYLATE 10 MG/1
10 TABLET ORAL DAILY
Qty: 90 TABLET | Refills: 6 | Status: SHIPPED | OUTPATIENT
Start: 2024-05-22

## 2024-05-22 RX ADMIN — HYDRALAZINE HYDROCHLORIDE 25 MG: 25 TABLET ORAL at 02:05

## 2024-05-22 RX ADMIN — IPRATROPIUM BROMIDE AND ALBUTEROL SULFATE 3 ML: 2.5; .5 SOLUTION RESPIRATORY (INHALATION) at 12:05

## 2024-05-22 RX ADMIN — HYDRALAZINE HYDROCHLORIDE 10 MG: 20 INJECTION INTRAMUSCULAR; INTRAVENOUS at 12:05

## 2024-05-22 RX ADMIN — HYDRALAZINE HYDROCHLORIDE 25 MG: 25 TABLET ORAL at 08:05

## 2024-05-22 RX ADMIN — ATORVASTATIN CALCIUM 40 MG: 40 TABLET, FILM COATED ORAL at 08:05

## 2024-05-22 RX ADMIN — ASPIRIN 81 MG: 81 TABLET, COATED ORAL at 08:05

## 2024-05-22 RX ADMIN — PANTOPRAZOLE SODIUM 40 MG: 40 TABLET, DELAYED RELEASE ORAL at 08:05

## 2024-05-22 RX ADMIN — HYDRALAZINE HYDROCHLORIDE 10 MG: 20 INJECTION INTRAMUSCULAR; INTRAVENOUS at 07:05

## 2024-05-22 RX ADMIN — VALSARTAN 160 MG: 80 TABLET, FILM COATED ORAL at 08:05

## 2024-05-22 RX ADMIN — AMLODIPINE BESYLATE 10 MG: 5 TABLET ORAL at 08:05

## 2024-05-22 RX ADMIN — CLONIDINE HYDROCHLORIDE 0.1 MG: 0.1 TABLET ORAL at 11:05

## 2024-05-22 RX ADMIN — PREDNISONE 40 MG: 20 TABLET ORAL at 08:05

## 2024-05-22 RX ADMIN — IPRATROPIUM BROMIDE AND ALBUTEROL SULFATE 3 ML: 2.5; .5 SOLUTION RESPIRATORY (INHALATION) at 07:05

## 2024-05-22 RX ADMIN — CARVEDILOL 25 MG: 12.5 TABLET, FILM COATED ORAL at 08:05

## 2024-05-22 RX ADMIN — MUPIROCIN: 20 OINTMENT TOPICAL at 08:05

## 2024-05-22 NOTE — PLAN OF CARE
Problem: Fall Injury Risk  Goal: Absence of Fall and Fall-Related Injury  Outcome: Progressing  Intervention: Identify and Manage Contributors  Flowsheets (Taken 5/22/2024 1444)  Self-Care Promotion:   independence encouraged   BADL personal objects within reach  Medication Review/Management:   medications reviewed   provider consulted  Intervention: Promote Injury-Free Environment  Flowsheets (Taken 5/22/2024 1444)  Safety Promotion/Fall Prevention:   assistive device/personal item within reach   nonskid shoes/socks when out of bed   side rails raised x 2   instructed to call staff for mobility   medications reviewed   room near unit station     Problem: Hypertension Acute  Goal: Blood Pressure Within Desired Range  Outcome: Progressing  Intervention: Normalize Blood Pressure  Flowsheets (Taken 5/22/2024 1444)  Medication Review/Management:   medications reviewed   provider consulted

## 2024-05-22 NOTE — NURSING
[facility], [facility] [place]  Nurses Note -- 4 Eyes      5/22/2024       Skin assessed on: Q Shift      [x] No Pressure Injuries Present    []Prevention Measures Documented    [] Yes LDA  for Pressure Injury Previously documented     [] Yes New Pressure Injury Discovered   [] LDA for New Pressure Injury Added      Attending RN:  Stephanie Barthelemy, RN     Second RN:  Spencer Isaacs RN

## 2024-05-22 NOTE — PT/OT/SLP PROGRESS
Physical Therapy Treatment/Discharge summary    Patient Name:  Jacqueline Manuel   MRN:  [id number]    Recommendations:     Discharge Recommendations: Low Intensity Therapy  Discharge Equipment Recommendations: walker, rolling  Barriers to discharge:  None from PT standpoint    Assessment:     Jacqueline Manuel is a 54 y.o. female admitted with a medical diagnosis of Hypertensive urgency.  She presents with the following impairments/functional limitations: pain, gait instability .    Rehab Prognosis: Good; patient would benefit from HHPT services to address these deficits and reach maximum level of function.    Recent Surgery: * No surgery found *      Plan:     During this hospitalization, patient to be seen  (N/A, pt to be D/C'd) to address the identified rehab impairments via  (N/A) and progress toward the following goals:    Plan of Care Expires:  05/22/24    Subjective     Chief Complaint: pain  Patient/Family Comments/goals: to go home  Pain/Comfort:  Pain Rating 1:  (not rated, R LE)  Pain Addressed 1: Reposition, Distraction, Cessation of Activity      Objective:     Communicated with nsg prior to session.  Patient found HOB elevated with  (N/A) upon PT entry to room.     General Precautions: Standard, fall  Orthopedic Precautions: N/A  Braces: N/A  Respiratory Status: Room air     Functional Mobility:  Bed Mobility:     Scooting: modified independence  Supine to Sit: modified independence  Transfers:     Sit to Stand:  supervision with rolling walker  Gait: Gait training with RW and Supervision with VC for sequencing and distribution of weight through Ue's to walker to offload R LE approx 20'  Balance: Good-/fair+      AM-PAC 6 CLICK MOBILITY  Turning over in bed (including adjusting bedclothes, sheets and blankets)?: 4  Sitting down on and standing up from a chair with arms (e.g., wheelchair, bedside commode, etc.): 4  Moving from lying on back to sitting on the side of the bed?: 4  Moving to and from a bed to a  chair (including a wheelchair)?: 4  Need to walk in hospital room?: 4  Climbing 3-5 steps with a railing?: 3  Basic Mobility Total Score: 23       Treatment & Education:  Adjusted pt's RW to proper height and gait training as above.    Patient left sitting edge of bed with call button in reach and Tele DrJamilah present..    GOALS:   Multidisciplinary Problems       Physical Therapy Goals       Not on file                    Time Tracking:     PT Received On: 05/22/24  PT Start Time: 1435     PT Stop Time: 1443  PT Total Time (min): 8 min     Billable Minutes: Gait Training 8    Treatment Type: Treatment  PT/PTA: PT     Number of PTA visits since last PT visit: 0     05/22/2024

## 2024-05-22 NOTE — NURSING
[facility], [facility] [place]  Nurses Note -- 4 Eyes      5/21/2024       Skin assessed on: Q Shift      [x] No Pressure Injuries Present    []Prevention Measures Documented    [] Yes LDA  for Pressure Injury Previously documented     [] Yes New Pressure Injury Discovered   [] LDA for New Pressure Injury Added      Attending RN:  Spencer Isaacs RN     Second RN:  CHARLES Perales

## 2024-05-22 NOTE — PLAN OF CARE
Problem: Fall Injury Risk  Goal: Absence of Fall and Fall-Related Injury  Outcome: Progressing  Intervention: Identify and Manage Contributors  Flowsheets (Taken 5/22/2024 1694)  Medication Review/Management: medications reviewed     Problem: Hypertension Acute  Goal: Blood Pressure Within Desired Range  Outcome: Progressing  Intervention: Normalize Blood Pressure  Flowsheets (Taken 5/22/2024 3227)  Sensory Stimulation Regulation:   care clustered   quiet environment promoted  Medication Review/Management: medications reviewed     Problem: Infection  Goal: Absence of Infection Signs and Symptoms  Outcome: Progressing

## 2024-05-22 NOTE — NURSING
Patient discharged per doctor orders. IV removed and intact. Patient tolerated. Discharge instructions explained. AVS given to pt. Patient verbalized understanding. VSS afebrile. No c/o N/V, pain, or SOB. Rx provided. Patient left with belongings via wheelchair per transport.

## 2024-05-22 NOTE — PLAN OF CARE
Case Management Final Discharge Note    Discharge Disposition: Home    New DME ordered / company name: [facility]- rolling walker    Relevant SDOH / Transition of Care Barriers:  None    Primary Caretaker and contact information: Linda- daughter [phone]    Scheduled followup appointment: Pt will call to schedule an appointment with [facility] [place]    Referrals placed: Cardiology, pulmonology and nephrology-  faxed referrals to [facility].     Transportation: Pt's daughter will provide transportation home when discharged.    Patient and family educated on discharge services and updated on DC plan. Bedside RN notified, patient clear to discharge from Case Management Perspective.       05/22/24 1255   Final Note   Assessment Type Final Discharge Note   Anticipated Discharge Disposition Home   What phone number can be called within the next 1-3 days to see how you are doing after discharge? [phone]   Hospital Resources/Appts/Education Provided Appointments scheduled and added to AVS   Post-Acute Status   Discharge Delays None known at this time

## 2024-05-22 NOTE — NURSING
AVS virtually reviewed with patient in its entirety with emphasis on medications, follow-up appointments and reasons to return to the ED or contact the [facility] On [facility] Nurse Care Line. Patient also encouraged to utilize their patient portal. Ease and convenience of use reiterated. Education complete and patient voiced understanding. All questions answered. Discharge teaching complete.

## 2024-05-24 NOTE — DISCHARGE SUMMARY
[facility] Medicine  Discharge Summary      Patient Name: Jacqueline Manuel  MRN: [id number]  [facility]: [id number]  Patient Class: IP- Inpatient  Admission Date: 5/20/2024  Hospital Length of Stay: 2 days  Discharge Date and Time: 5/22/2024  3:24 PM  Attending Physician: No att. providers found   Discharging Provider: Dax Cordova III, MD  Primary Care Provider: No primary care provider on file.    Primary Care Team: Networked reference to record PCT     HPI:   This is a 53-year-old female with a past medical history of COPD, hypertension, CKD 3, who presents with shortness of breath.      Patient presented with shortness of breaths that started 2-3 days prior to presentation.  She endorses progressively worsening dyspnea, with a productive cough and chest congestion.  Patient reports running out of her inhalers and all her medications 1-2 weeks prior to presentation. Per EMS, the patient was tachypneic, with diminished breath sounds.  She was given epinephrine, magnesium sulfate, DuoNeb, Decadron, and was placed on CPAP prior to arrival.    In the ED, the patient was hypertensive (247/114, MAP: 164) and tachypneic.  Labs remarkable for an elevated creatinine (1.8-around baseline), hyperglycemia (225), elevated BNP (129).  Chest x-ray showed no acute cardiopulmonary process.  Patient was given DuoNeb x1, amlodipine 10 mg p.o., carvedilol 25 mg p.o., hydralazine 10 mg IV, valsartan 160 mg p.o., and was started on nicardipine drip.  She was admitted for further management.    * No surgery found *      Hospital Course:   54-year-old female with past medical history of COPD, hypertension, CKD 3 who was admitted for hypertensive urgency secondary to noncompliance with medications and acute hypoxic respiratory failure secondary to COPD exacerbation.  Initiated on Cardene drip and admitted to ICU.  Off Cardene drip on 05/20/2024 with re-initiation of home oral meds.  Patient blood pressure medications  adjusted with improved blood pressure.  Patient stating that she ran out of her home medications and if she gets prescription she would take them.  These were sent to the pharmacy along with continued steroid taper for discharge.  Patient states her breathing is much better and feels ready to go home.  Patient advised to follow up with her PCP within a week for re-evaluation and continued blood pressure monitoring.  Patient sent with prescriptions for blood pressure cuff to monitor 3 times a day at home and record numbers to bring to PCP.  Patient also sent with prescription for nebulizer with treatments.  Patient endorsed understanding of plan and all questions answered prior to discharge home.  Referral sent for Nephrology, Cardiology at discharge     Goals of Care Treatment Preferences:  Code Status: Full Code      Consults:   Consults (From admission, onward)          Status Ordering Provider     Inpatient consult to Registered Dietitian/Nutritionist  Once        Provider:  (Not yet assigned)    Completed ROXIE BAKER            No new Assessment & Plan notes have been filed under this hospital service since the last note was generated.  Service: Hospital Medicine    Final Active Diagnoses:    Diagnosis Date Noted POA    PRINCIPAL PROBLEM:  Hypertensive urgency [I16.0] 05/20/2024 Yes    Stage 3 chronic kidney disease [N18.30] 11/16/2023 Yes    COPD exacerbation [J44.1] 05/22/2021 Yes    Essential hypertension [I10] 07/11/2018 Yes      Problems Resolved During this Admission:       Discharged Condition: fair    Disposition: Home or Self Care    Follow Up:   Follow-up Information       [facility]. Call.    Why: To schedule appointment within 7 days [phone].  Contact information:  [address] [facility] [address]             Cardiology, [facility] -. Call.    Specialties: Cardiology, Cardiovascular Disease, Cardiac  "Rehabilitation, Cardiothoracic Surgery  Why: To schedule appointment on Friday. Referral faxed.  Contact information:  [address] [id number]  [phone]               [facility].    Specialties: Nephrology, Colon and Rectal Surgery, Urology  Why: To schedule appointment on Friday. Referral faxed.  Contact information:  [address] [address]  [phone]               [facility] - Follow up.    Specialty: Pulmonary Disease  Why: Call to schedule an appointment on Friday.Referral faxed.  Contact information:  [address] [id number]  [phone]                           Patient Instructions:      NEBULIZER KIT (SUPPLIES) FOR HOME USE     Order Specific Question Answer Comments   Height: 5' 6" (1.676 m)    Weight: 67.2 kg (148 lb 2.4 oz)    Does patient have medical equipment at home? cane, straight    Length of need (1-99 months): 99    Mask or Mouthpiece? Mouthpiece      NEBULIZER FOR HOME USE     Order Specific Question Answer Comments   Height: 5' 6" (1.676 m)    Weight: 67.2 kg (148 lb 2.4 oz)    Does patient have medical equipment at home? cane, straight    Length of need (1-99 months): 99      WALKER FOR HOME USE     Order Specific Question Answer Comments   Type of Walker: Adult (5'4"-6'6")    With wheels? Yes    Height: 5' 6" (1.676 m)    Weight: 67.2 kg (148 lb 2.4 oz)    Length of need (1-99 months): 99    Does patient have medical equipment at home? cane, straight    Please check all that apply: Patient's condition impairs ambulation.      Ambulatory referral/consult to Pulmonology   Standing Status: Future   Referral Priority: Routine Referral Type: Consultation   Referral Reason: Specialty Services Required   Requested Specialty: Pulmonary Disease   Number of Visits Requested: 1     Ambulatory referral/consult to Cardiology   Standing Status: Future   Referral Priority: Routine " Referral Type: Consultation   Referral Reason: Specialty Services Required   Requested Specialty: Cardiology   Number of Visits Requested: 1     Ambulatory referral/consult to Nephrology   Standing Status: Future   Referral Priority: Routine Referral Type: Consultation   Referral Reason: Specialty Services Required   Requested Specialty: Nephrology   Number of Visits Requested: 1     Ambulatory referral/consult to Physical/Occupational Therapy   Standing Status: Future   Referral Priority: Routine Referral Type: Physical Medicine   Referral Reason: Specialty Services Required   Number of Visits Requested: 1     Diet Cardiac     Notify your health care provider if you experience any of the following:  increased confusion or weakness     Notify your health care provider if you experience any of the following:  persistent dizziness, light-headedness, or visual disturbances     Notify your health care provider if you experience any of the following:  worsening rash     Notify your health care provider if you experience any of the following:  severe persistent headache     Notify your health care provider if you experience any of the following:  difficulty breathing or increased cough     Notify your health care provider if you experience any of the following:  redness, tenderness, or signs of infection (pain, swelling, redness, odor or green/yellow discharge around incision site)     Notify your health care provider if you experience any of the following:  severe uncontrolled pain     Notify your health care provider if you experience any of the following:  persistent nausea and vomiting or diarrhea     Notify your health care provider if you experience any of the following:  temperature >100.4     Activity as tolerated       Significant Diagnostic Studies: Labs: All labs within the past 24 hours have been reviewed    Pending Diagnostic Studies:       None           Medications:  Reconciled Home Medications:       Medication List        START taking these medications      blood pressure monitor Kit  1 each by Misc.(Non-Drug; Combo Route) route 3 (three) times daily.     cloNIDine 0.1 MG tablet  Commonly known as: CATAPRES  Take 1 tablet (0.1 mg total) by mouth 2 (two) times daily. Hold if systolic blood pressure (top number) is less than 130.     hydrALAZINE 25 MG tablet  Commonly known as: APRESOLINE  Take 1 tablet (25 mg total) by mouth every 8 (eight) hours.     predniSONE 20 MG tablet  Commonly known as: DELTASONE  Take 2 tablets (40 mg total) by mouth once daily for 3 days, THEN 1 tablet (20 mg total) once daily for 3 days.  Start taking on: May 22, 2024            CHANGE how you take these medications      albuterol 90 mcg/actuation inhaler  Commonly known as: PROVENTIL/VENTOLIN HFA  Inhale 1 to 2 puffs into the lungs every 6 (six) hours as needed for Wheezing or Shortness of Breath. Rescue  What changed: additional instructions     amLODIPine 10 MG tablet  Commonly known as: NORVASC  Take 1 tablet (10 mg total) by mouth once daily.  What changed: when to take this     aspirin 81 MG EC tablet  Commonly known as: ECOTRIN  Take 1 tablet (81 mg total) by mouth once daily.  What changed: when to take this     atorvastatin 40 MG tablet  Commonly known as: LIPITOR  Take 1 tablet (40 mg total) by mouth once daily.  What changed: when to take this     valsartan 160 MG tablet  Commonly known as: DIOVAN  Take 1 tablet (160 mg total) by mouth once daily.  What changed: when to take this            CONTINUE taking these medications      albuterol-ipratropium 2.5 mg-0.5 mg/3 mL nebulizer solution  Commonly known as: DUO-NEB  Take 3 mLs by nebulization every 6 (six) hours as needed for Wheezing. Rescue     carvediloL 25 MG tablet  Commonly known as: COREG  Take 1 tablet (25 mg total) by mouth 2 (two) times daily with meals.     EScitalopram oxalate 10 MG tablet  Commonly known as: LEXAPRO  Take 1 tablet by mouth once daily.      gabapentin 600 MG tablet  Commonly known as: NEURONTIN  Take 600 mg by mouth once daily.     hydroCHLOROthiazide 25 MG tablet  Commonly known as: HYDRODIURIL  Take 25 mg by mouth once daily.     HYDROcodone-acetaminophen  mg per tablet  Commonly known as: NORCO  Take 1 tablet by mouth every 4 (four) hours as needed for Pain.     pantoprazole 40 MG tablet  Commonly known as: PROTONIX  Take 1 tablet (40 mg total) by mouth once daily. for 25 days     SYMBICORT 160-4.5 mcg/actuation aa  Generic drug: budesonide-formoterol 160-4.5 mcg  Inhale 2 puffs into the lungs once daily.            STOP taking these medications      azithromycin 250 MG tablet  Commonly known as: Z-ROX              Indwelling Lines/Drains at time of discharge:   Lines/Drains/Airways       None                   Time spent on the discharge of patient: 45 minutes         Dax Cordova III, MD  Department of Hospital Medicine  [facility] Surg

## 2024-05-24 NOTE — HOSPITAL COURSE
54-year-old female with past medical history of COPD, hypertension, CKD 3 who was admitted for hypertensive urgency secondary to noncompliance with medications and acute hypoxic respiratory failure secondary to COPD exacerbation.  Initiated on Cardene drip and admitted to ICU.  Off Cardene drip on 05/20/2024 with re-initiation of home oral meds.  Patient blood pressure medications adjusted with improved blood pressure.  Patient stating that she ran out of her home medications and if she gets prescription she would take them.  These were sent to the pharmacy along with continued steroid taper for discharge.  Patient states her breathing is much better and feels ready to go home.  Patient advised to follow up with her PCP within a week for re-evaluation and continued blood pressure monitoring.  Patient sent with prescriptions for blood pressure cuff to monitor 3 times a day at home and record numbers to bring to PCP.  Patient also sent with prescription for nebulizer with treatments.  Patient endorsed understanding of plan and all questions answered prior to discharge home.  Referral sent for Nephrology, Cardiology at discharge

## 2024-05-28 RX ORDER — PREDNISONE 20 MG/1
TABLET ORAL
Qty: 9 TABLET | Refills: 0 | Status: CANCELLED | OUTPATIENT
Start: 2024-05-28 | End: 2024-06-02

## 2024-05-28 RX ORDER — PANTOPRAZOLE SODIUM 40 MG/1
40 TABLET, DELAYED RELEASE ORAL DAILY
Qty: 25 TABLET | Refills: 0 | Status: CANCELLED | OUTPATIENT
Start: 2024-05-28 | End: 2024-06-22

## 2024-06-03 ENCOUNTER — DOCUMENTATION ONLY (OUTPATIENT)
Dept: REHABILITATION | Facility: OTHER | Age: 54
End: 2024-06-03

## 2024-06-03 PROBLEM — N17.9 AKI (ACUTE KIDNEY INJURY): Status: RESOLVED | Noted: 2024-03-02 | Resolved: 2024-06-03

## 2024-06-03 NOTE — PROGRESS NOTES
Patient failed to appear for scheduled PT appointment today at 10:45 am without prior notification or cancellation.    Mame Garcia, PT, DPT  6/3/2024

## 2024-06-07 ENCOUNTER — HOSPITAL ENCOUNTER (EMERGENCY)
Facility: HOSPITAL | Age: 54
Discharge: HOME | End: 2024-06-08
Attending: EMERGENCY MEDICINE
Payer: MEDICAID

## 2024-06-07 DIAGNOSIS — R06.02 SHORTNESS OF BREATH: ICD-10-CM

## 2024-06-07 DIAGNOSIS — J44.1 COPD EXACERBATION: Primary | ICD-10-CM

## 2024-06-07 LAB
ALBUMIN SERPL BCP-MCNC: 3 G/DL (ref 3.5–5.2)
ALP SERPL-CCNC: 85 U/L (ref 55–135)
ALT SERPL W/O P-5'-P-CCNC: 9 U/L (ref 10–44)
ANION GAP SERPL CALC-SCNC: 10 MMOL/L (ref 8–16)
AST SERPL-CCNC: 11 U/L (ref 10–40)
BASOPHILS # BLD AUTO: 0.04 K/UL (ref 0–0.2)
BASOPHILS NFR BLD: 0.7 % (ref 0–1.9)
BILIRUB SERPL-MCNC: 0.2 MG/DL (ref 0.1–1)
BNP SERPL-MCNC: 123 PG/ML (ref 0–99)
BUN SERPL-MCNC: 23 MG/DL (ref 6–20)
CALCIUM SERPL-MCNC: 9.2 MG/DL (ref 8.7–10.5)
CHLORIDE SERPL-SCNC: 100 MMOL/L (ref 95–110)
CO2 SERPL-SCNC: 32 MMOL/L (ref 23–29)
CREAT SERPL-MCNC: 2.1 MG/DL (ref 0.5–1.4)
CTP QC/QA: YES
CTP QC/QA: YES
DIFFERENTIAL METHOD BLD: ABNORMAL
EOSINOPHIL # BLD AUTO: 0.3 K/UL (ref 0–0.5)
EOSINOPHIL NFR BLD: 4.6 % (ref 0–8)
ERYTHROCYTE [DISTWIDTH] IN BLOOD BY AUTOMATED COUNT: 12.5 % (ref 11.5–14.5)
EST. GFR  (NO RACE VARIABLE): 28 ML/MIN/1.73 M^2
GLUCOSE SERPL-MCNC: 106 MG/DL (ref 70–110)
HCT VFR BLD AUTO: 35.7 % (ref 37–48.5)
HGB BLD-MCNC: 11.6 G/DL (ref 12–16)
IMM GRANULOCYTES # BLD AUTO: 0.02 K/UL (ref 0–0.04)
IMM GRANULOCYTES NFR BLD AUTO: 0.3 % (ref 0–0.5)
LYMPHOCYTES # BLD AUTO: 0.9 K/UL (ref 1–4.8)
LYMPHOCYTES NFR BLD: 14.9 % (ref 18–48)
MCH RBC QN AUTO: 30.4 PG (ref 27–31)
MCHC RBC AUTO-ENTMCNC: 32.5 G/DL (ref 32–36)
MCV RBC AUTO: 94 FL (ref 82–98)
MONOCYTES # BLD AUTO: 0.2 K/UL (ref 0.3–1)
MONOCYTES NFR BLD: 3.6 % (ref 4–15)
NEUTROPHILS # BLD AUTO: 4.6 K/UL (ref 1.8–7.7)
NEUTROPHILS NFR BLD: 75.9 % (ref 38–73)
NRBC BLD-RTO: 0 /100 WBC
PLATELET # BLD AUTO: 207 K/UL (ref 150–450)
PMV BLD AUTO: 9.1 FL (ref 9.2–12.9)
POC MOLECULAR INFLUENZA A AGN: NEGATIVE
POC MOLECULAR INFLUENZA B AGN: NEGATIVE
POTASSIUM SERPL-SCNC: 4 MMOL/L (ref 3.5–5.1)
PROT SERPL-MCNC: 7.2 G/DL (ref 6–8.4)
RBC # BLD AUTO: 3.81 M/UL (ref 4–5.4)
SARS-COV-2 RDRP RESP QL NAA+PROBE: NEGATIVE
SODIUM SERPL-SCNC: 142 MMOL/L (ref 136–145)
TROPONIN I SERPL DL<=0.01 NG/ML-MCNC: 0.02 NG/ML (ref 0–0.03)
WBC # BLD AUTO: 6.09 K/UL (ref 3.9–12.7)

## 2024-06-07 PROCEDURE — 94640 AIRWAY INHALATION TREATMENT: CPT

## 2024-06-07 PROCEDURE — 25000242 PHARM REV CODE 250 ALT 637 W/ HCPCS: Performed by: EMERGENCY MEDICINE

## 2024-06-07 PROCEDURE — 99285 EMERGENCY DEPT VISIT HI MDM: CPT | Mod: 25

## 2024-06-07 PROCEDURE — 85025 COMPLETE CBC W/AUTO DIFF WBC: CPT | Performed by: EMERGENCY MEDICINE

## 2024-06-07 PROCEDURE — 93010 ELECTROCARDIOGRAM REPORT: CPT | Mod: ,,, | Performed by: INTERNAL MEDICINE

## 2024-06-07 PROCEDURE — 83880 ASSAY OF NATRIURETIC PEPTIDE: CPT | Performed by: EMERGENCY MEDICINE

## 2024-06-07 PROCEDURE — 87635 SARS-COV-2 COVID-19 AMP PRB: CPT | Performed by: EMERGENCY MEDICINE

## 2024-06-07 PROCEDURE — 94761 N-INVAS EAR/PLS OXIMETRY MLT: CPT

## 2024-06-07 PROCEDURE — 87502 INFLUENZA DNA AMP PROBE: CPT

## 2024-06-07 PROCEDURE — 80053 COMPREHEN METABOLIC PANEL: CPT | Performed by: EMERGENCY MEDICINE

## 2024-06-07 PROCEDURE — 93005 ELECTROCARDIOGRAM TRACING: CPT

## 2024-06-07 PROCEDURE — 84484 ASSAY OF TROPONIN QUANT: CPT | Performed by: EMERGENCY MEDICINE

## 2024-06-07 RX ORDER — PREDNISONE 20 MG/1
40 TABLET ORAL DAILY
Qty: 10 TABLET | Refills: 0 | Status: SHIPPED | OUTPATIENT
Start: 2024-06-07 | End: 2024-06-12

## 2024-06-07 RX ORDER — ALBUTEROL SULFATE 2.5 MG/.5ML
5 SOLUTION RESPIRATORY (INHALATION)
Status: COMPLETED | OUTPATIENT
Start: 2024-06-07 | End: 2024-06-07

## 2024-06-07 RX ADMIN — ALBUTEROL SULFATE 5 MG: 2.5 SOLUTION RESPIRATORY (INHALATION) at 10:06

## 2024-06-08 VITALS
HEART RATE: 75 BPM | TEMPERATURE: 98 F | WEIGHT: 147 LBS | SYSTOLIC BLOOD PRESSURE: 154 MMHG | HEIGHT: 68 IN | DIASTOLIC BLOOD PRESSURE: 92 MMHG | OXYGEN SATURATION: 98 % | RESPIRATION RATE: 15 BRPM | BODY MASS INDEX: 22.28 KG/M2

## 2024-06-08 NOTE — DISCHARGE INSTRUCTIONS
Recommend using your albuterol or Symbicort inhaler as needed for shortness of breath and wheezing.    Please follow up with the your primary care provider for re-evaluation within the next 2-3 days.    Seek medical care if symptoms worsening or concerns.

## 2024-06-08 NOTE — ED PROVIDER NOTES
Encounter Date: 6/7/2024    SCRIBE #1 NOTE: I, Rachael Li, am scribing for, and in the presence of,  Billy Hoffmann MD.       History     Chief Complaint   Patient presents with    COPD     Pt called for ems for copd flare up.  EMS gave duo neb, 16 mg decadron, 2 grams of Mag sulfate.       55 yo F w/ PMHx of DM, COPD, HTN presenting to the ED for 3 day hx of persistent wheezing and dyspnea. Has been using her home inhalers w/out relief. En route to the ED today EMS administered 16 mg decadron and 2 g mag sulfate, and 1 breathing Tx w/ improvement. No other exacerbating or alleviating factors. Denies cough, chest pain, congestion, rhinorrhea, other URI sx, or other associated symptoms.     The history is provided by the patient and the EMS personnel.     Review of patient's allergies indicates:  No Known Allergies  Past Medical History:   Diagnosis Date    COPD (chronic obstructive pulmonary disease)     Diabetes mellitus     Gout, unspecified     Hypertension      History reviewed. No pertinent surgical history.  No family history on file.  Social History     Tobacco Use    Smoking status: Every Day     Types: Cigarettes    Smokeless tobacco: Never   Substance Use Topics    Alcohol use: Yes     Comment: occasionally    Drug use: Yes     Types: Marijuana     Review of Systems   Constitutional:  Negative for fever.   HENT:  Negative for congestion and rhinorrhea.    Respiratory:  Positive for shortness of breath and wheezing. Negative for cough.    Cardiovascular:  Negative for chest pain.   Gastrointestinal:  Negative for nausea.       Physical Exam     Initial Vitals   BP Pulse Resp Temp SpO2   06/07/24 2102 06/07/24 2102 06/07/24 2105 06/07/24 2104 06/07/24 2102   (!) 159/91 67 16 98.1 °F (36.7 °C) (!) 93 %      MAP       --                Physical Exam    Nursing note and vitals reviewed.  Constitutional: She appears well-developed and well-nourished. She does not appear ill. No distress.   Looks comfortable     HENT:   Head: Normocephalic.   Eyes: Conjunctivae and EOM are normal.   Neck:   Normal range of motion.  Cardiovascular:  Normal rate, regular rhythm and normal heart sounds.           No murmur heard.  Pulmonary/Chest: No respiratory distress. She has wheezes.   Bl expiratory wheezing. Speaking in full sentences    Abdominal: Abdomen is soft. There is no abdominal tenderness.   Musculoskeletal:         General: No edema (BLE).      Cervical back: Normal range of motion.     Neurological: She is alert. GCS eye subscore is 4. GCS verbal subscore is 5. GCS motor subscore is 6.   Skin: Skin is warm.         ED Course   Procedures  Labs Reviewed   CBC W/ AUTO DIFFERENTIAL - Abnormal; Notable for the following components:       Result Value    RBC 3.81 (*)     Hemoglobin 11.6 (*)     Hematocrit 35.7 (*)     MPV 9.1 (*)     Lymph # 0.9 (*)     Mono # 0.2 (*)     Gran % 75.9 (*)     Lymph % 14.9 (*)     Mono % 3.6 (*)     All other components within normal limits   COMPREHENSIVE METABOLIC PANEL - Abnormal; Notable for the following components:    CO2 32 (*)     BUN 23 (*)     Creatinine 2.1 (*)     Albumin 3.0 (*)     ALT 9 (*)     eGFR 28 (*)     All other components within normal limits   B-TYPE NATRIURETIC PEPTIDE - Abnormal; Notable for the following components:     (*)     All other components within normal limits   TROPONIN I   SARS-COV-2 RDRP GENE   POCT INFLUENZA A/B MOLECULAR          Imaging Results              X-Ray Chest 1 View (Final result)  Result time 06/07/24 23:57:04      Final result by Ayleen Jarquin MD (06/07/24 23:57:04)                   Impression:      Unremarkable one view of the chest.      Electronically signed by: Ayleen Jarquin  Date:    06/07/2024  Time:    23:57               Narrative:    EXAMINATION:  CHEST ONE VIEW    CLINICAL HISTORY:  shortness of breath;    TECHNIQUE:  One view of the chest.    COMPARISON:  None.    FINDINGS:  The cardiac silhouette is within normal  limits.   There is no focal consolidation, pneumothorax, or pleural effusion.                                       Medications   albuterol sulfate nebulizer solution 5 mg (5 mg Nebulization Given 6/7/24 2217)     Medical Decision Making    54-year-old female presenting with dyspnea.  History of COPD.  Bilateral expiratory wheeze on arrival.  Patient had already received Decadron and bronchodilator treatment from EMS prior to arrival.  The patient did state her symptoms were improved.  No lower extremity edema.  No chest pain reported.  Afebrile without recent cough nasal congestion or sore throat suggest respiratory illness.  Chest x-ray shows no infiltrates.  Symptoms improved following subsequent bronchodilator treatment.  Patient does not appear to be in any respiratory distress.  No signs of respiratory infection on exam or results.      Differential diagnosis includes URI, lower respiratory tract infection, COPD exacerbation, pulmonary edema      ECG:  Please check workup area for ECG interpretation.      Amount and/or Complexity of Data Reviewed  Independent Historian: EMS     Details: See hpi   Labs: ordered. Decision-making details documented in ED Course.  Radiology: ordered. Decision-making details documented in ED Course.  ECG/medicine tests: ordered and independent interpretation performed. Decision-making details documented in ED Course.    Risk  Prescription drug management.            Scribe Attestation:   Scribe #1: I performed the above scribed service and the documentation accurately describes the services I performed. I attest to the accuracy of the note.        ED Course as of 06/08/24 0308   Fri Jun 07, 2024 2206 EKG 12-lead  Time 9:45 p.m.     Rate 68, sinus, regular rhythm, normal axis.   QRS 98 QTC of 444.  No ST elevation or depression.  T-wave inversion aVL.  No hyperacute T-waves.  No Q-waves present.      Normal sinus rhythm. [JM]   1962 Wheeze resolved.  Patient reports she  feels better at this time.  Labs discussed with the patient [JM]      ED Course User Index  [JM] Billy Hoffmann MD                       I, Billy Hoffmann, personally performed the services described in this documentation. All medical record entries made by the scribe were at my direction and in my presence. I have reviewed the chart and agree that the record reflects my personal performance and is accurate and complete.      Clinical Impression:  Final diagnoses:  [R06.02] Shortness of breath  [J44.1] COPD exacerbation (Primary)          ED Disposition Condition    Discharge Stable          ED Prescriptions       Medication Sig Dispense Start Date End Date Auth. Provider    predniSONE (DELTASONE) 20 MG tablet Take 2 tablets (40 mg total) by mouth once daily. for 5 days 10 tablet 6/7/2024 6/12/2024 Billy Hoffmann MD          Follow-up Information       Follow up With Specialties Details Why Contact Info    Dax Cordova MD Family Medicine   [address]  [facility] [address]  [phone]      [facility]  Schedule an appointment as soon as possible for a visit in 1 week Primary care [address] [address] [phone]    [facility] - Emergency Dept Emergency Medicine  If symptoms worsen [address] [facility] [place] [address] [phone]             Billy Hoffmann MD  06/08/24 3699

## 2024-06-10 LAB
OHS QRS DURATION: 98 MS
OHS QTC CALCULATION: 444 MS

## 2024-07-01 PROBLEM — J12.3 HUMAN METAPNEUMOVIRUS PNEUMONIA: Status: RESOLVED | Noted: 2024-03-28 | Resolved: 2024-07-01

## 2024-07-14 PROBLEM — F19.10 POLYSUBSTANCE ABUSE: Status: ACTIVE | Noted: 2024-07-14

## 2024-07-14 PROBLEM — I48.91 ATRIAL FIBRILLATION: Status: ACTIVE | Noted: 2024-07-14

## 2024-07-14 PROBLEM — D64.9 ANEMIA: Status: ACTIVE | Noted: 2024-07-14

## 2024-07-15 PROBLEM — J44.1 COPD EXACERBATION: Status: RESOLVED | Noted: 2021-05-22 | Resolved: 2024-07-15

## 2024-10-14 PROBLEM — N17.9 AKI (ACUTE KIDNEY INJURY): Status: RESOLVED | Noted: 2024-03-02 | Resolved: 2024-10-14

## 2024-10-19 ENCOUNTER — HOSPITAL ENCOUNTER (EMERGENCY)
Facility: HOSPITAL | Age: 54
Discharge: HOME | End: 2024-10-19
Attending: STUDENT IN AN ORGANIZED HEALTH CARE EDUCATION/TRAINING PROGRAM
Payer: MEDICAID

## 2024-10-19 VITALS
WEIGHT: 134.94 LBS | BODY MASS INDEX: 21.78 KG/M2 | RESPIRATION RATE: 20 BRPM | DIASTOLIC BLOOD PRESSURE: 99 MMHG | OXYGEN SATURATION: 100 % | SYSTOLIC BLOOD PRESSURE: 186 MMHG | HEART RATE: 89 BPM | TEMPERATURE: 99 F

## 2024-10-19 DIAGNOSIS — E87.6 HYPOKALEMIA: ICD-10-CM

## 2024-10-19 DIAGNOSIS — J44.1 COPD EXACERBATION: ICD-10-CM

## 2024-10-19 DIAGNOSIS — I10 UNCONTROLLED HYPERTENSION: ICD-10-CM

## 2024-10-19 DIAGNOSIS — E83.42 HYPOMAGNESEMIA: ICD-10-CM

## 2024-10-19 DIAGNOSIS — R06.02 SOB (SHORTNESS OF BREATH): Primary | ICD-10-CM

## 2024-10-19 LAB
ALBUMIN SERPL BCP-MCNC: 3.2 G/DL (ref 3.5–5.2)
ALP SERPL-CCNC: 87 U/L (ref 40–150)
ALT SERPL W/O P-5'-P-CCNC: 5 U/L (ref 10–44)
AMPHET+METHAMPHET UR QL: NEGATIVE
ANION GAP SERPL CALC-SCNC: 13 MMOL/L (ref 8–16)
AST SERPL-CCNC: 13 U/L (ref 10–40)
BARBITURATES UR QL SCN>200 NG/ML: NEGATIVE
BASOPHILS # BLD AUTO: 0.04 K/UL (ref 0–0.2)
BASOPHILS NFR BLD: 0.5 % (ref 0–1.9)
BENZODIAZ UR QL SCN>200 NG/ML: NEGATIVE
BILIRUB SERPL-MCNC: 0.4 MG/DL (ref 0.1–1)
BNP SERPL-MCNC: 194 PG/ML (ref 0–99)
BUN SERPL-MCNC: 12 MG/DL (ref 6–20)
BZE UR QL SCN: NEGATIVE
CALCIUM SERPL-MCNC: 9.1 MG/DL (ref 8.7–10.5)
CANNABINOIDS UR QL SCN: ABNORMAL
CHLORIDE SERPL-SCNC: 101 MMOL/L (ref 95–110)
CO2 SERPL-SCNC: 26 MMOL/L (ref 23–29)
CREAT SERPL-MCNC: 1.7 MG/DL (ref 0.5–1.4)
CREAT UR-MCNC: 38.2 MG/DL (ref 15–325)
DIFFERENTIAL METHOD BLD: ABNORMAL
EOSINOPHIL # BLD AUTO: 0.2 K/UL (ref 0–0.5)
EOSINOPHIL NFR BLD: 1.8 % (ref 0–8)
ERYTHROCYTE [DISTWIDTH] IN BLOOD BY AUTOMATED COUNT: 11.9 % (ref 11.5–14.5)
EST. GFR  (NO RACE VARIABLE): 36 ML/MIN/1.73 M^2
ETHANOL SERPL-MCNC: <10 MG/DL
GLUCOSE SERPL-MCNC: 106 MG/DL (ref 70–110)
HCT VFR BLD AUTO: 39.5 % (ref 37–48.5)
HGB BLD-MCNC: 13 G/DL (ref 12–16)
IMM GRANULOCYTES # BLD AUTO: 0.03 K/UL (ref 0–0.04)
IMM GRANULOCYTES NFR BLD AUTO: 0.3 % (ref 0–0.5)
LYMPHOCYTES # BLD AUTO: 3.1 K/UL (ref 1–4.8)
LYMPHOCYTES NFR BLD: 35.8 % (ref 18–48)
MAGNESIUM SERPL-MCNC: 1.5 MG/DL (ref 1.6–2.6)
MCH RBC QN AUTO: 30.3 PG (ref 27–31)
MCHC RBC AUTO-ENTMCNC: 32.9 G/DL (ref 32–36)
MCV RBC AUTO: 92 FL (ref 82–98)
METHADONE UR QL SCN>300 NG/ML: ABNORMAL
MONOCYTES # BLD AUTO: 0.6 K/UL (ref 0.3–1)
MONOCYTES NFR BLD: 7.1 % (ref 4–15)
NEUTROPHILS # BLD AUTO: 4.8 K/UL (ref 1.8–7.7)
NEUTROPHILS NFR BLD: 54.5 % (ref 38–73)
NRBC BLD-RTO: 0 /100 WBC
OPIATES UR QL SCN: NEGATIVE
PCP UR QL SCN>25 NG/ML: NEGATIVE
PLATELET # BLD AUTO: ABNORMAL K/UL (ref 150–450)
PLATELET BLD QL SMEAR: ABNORMAL
PMV BLD AUTO: ABNORMAL FL (ref 9.2–12.9)
POTASSIUM SERPL-SCNC: 3.3 MMOL/L (ref 3.5–5.1)
PROT SERPL-MCNC: 7.9 G/DL (ref 6–8.4)
RBC # BLD AUTO: 4.29 M/UL (ref 4–5.4)
SODIUM SERPL-SCNC: 140 MMOL/L (ref 136–145)
TOXICOLOGY INFORMATION: ABNORMAL
TROPONIN I SERPL DL<=0.01 NG/ML-MCNC: 0.01 NG/ML (ref 0–0.03)
WBC # BLD AUTO: 8.78 K/UL (ref 3.9–12.7)

## 2024-10-19 PROCEDURE — 93005 ELECTROCARDIOGRAM TRACING: CPT

## 2024-10-19 PROCEDURE — 83880 ASSAY OF NATRIURETIC PEPTIDE: CPT | Performed by: PHYSICIAN ASSISTANT

## 2024-10-19 PROCEDURE — 25000003 PHARM REV CODE 250: Performed by: PHYSICIAN ASSISTANT

## 2024-10-19 PROCEDURE — 99285 EMERGENCY DEPT VISIT HI MDM: CPT | Mod: 25

## 2024-10-19 PROCEDURE — 99900035 HC TECH TIME PER 15 MIN (STAT)

## 2024-10-19 PROCEDURE — 85025 COMPLETE CBC W/AUTO DIFF WBC: CPT | Performed by: PHYSICIAN ASSISTANT

## 2024-10-19 PROCEDURE — 96376 TX/PRO/DX INJ SAME DRUG ADON: CPT

## 2024-10-19 PROCEDURE — 80307 DRUG TEST PRSMV CHEM ANLYZR: CPT | Performed by: PHYSICIAN ASSISTANT

## 2024-10-19 PROCEDURE — 93010 ELECTROCARDIOGRAM REPORT: CPT | Mod: ,,, | Performed by: INTERNAL MEDICINE

## 2024-10-19 PROCEDURE — 83735 ASSAY OF MAGNESIUM: CPT | Performed by: PHYSICIAN ASSISTANT

## 2024-10-19 PROCEDURE — 84484 ASSAY OF TROPONIN QUANT: CPT | Performed by: STUDENT IN AN ORGANIZED HEALTH CARE EDUCATION/TRAINING PROGRAM

## 2024-10-19 PROCEDURE — 94799 UNLISTED PULMONARY SVC/PX: CPT

## 2024-10-19 PROCEDURE — 80053 COMPREHEN METABOLIC PANEL: CPT | Performed by: PHYSICIAN ASSISTANT

## 2024-10-19 PROCEDURE — 96365 THER/PROPH/DIAG IV INF INIT: CPT

## 2024-10-19 PROCEDURE — 94640 AIRWAY INHALATION TREATMENT: CPT

## 2024-10-19 PROCEDURE — 84484 ASSAY OF TROPONIN QUANT: CPT | Mod: 91 | Performed by: PHYSICIAN ASSISTANT

## 2024-10-19 PROCEDURE — 99406 BEHAV CHNG SMOKING 3-10 MIN: CPT

## 2024-10-19 PROCEDURE — 96375 TX/PRO/DX INJ NEW DRUG ADDON: CPT

## 2024-10-19 PROCEDURE — 25000242 PHARM REV CODE 250 ALT 637 W/ HCPCS: Performed by: PHYSICIAN ASSISTANT

## 2024-10-19 PROCEDURE — 82077 ASSAY SPEC XCP UR&BREATH IA: CPT | Performed by: PHYSICIAN ASSISTANT

## 2024-10-19 PROCEDURE — 63600175 PHARM REV CODE 636 W HCPCS: Performed by: PHYSICIAN ASSISTANT

## 2024-10-19 RX ORDER — PREDNISONE 20 MG/1
40 TABLET ORAL DAILY
Qty: 10 TABLET | Refills: 0 | Status: SHIPPED | OUTPATIENT
Start: 2024-10-19 | End: 2024-10-24

## 2024-10-19 RX ORDER — HYDRALAZINE HYDROCHLORIDE 20 MG/ML
10 INJECTION INTRAMUSCULAR; INTRAVENOUS
Status: COMPLETED | OUTPATIENT
Start: 2024-10-19 | End: 2024-10-19

## 2024-10-19 RX ORDER — AMLODIPINE BESYLATE 5 MG/1
10 TABLET ORAL
Status: COMPLETED | OUTPATIENT
Start: 2024-10-19 | End: 2024-10-19

## 2024-10-19 RX ORDER — BENZONATATE 100 MG/1
200 CAPSULE ORAL ONCE
Status: COMPLETED | OUTPATIENT
Start: 2024-10-19 | End: 2024-10-19

## 2024-10-19 RX ORDER — CARVEDILOL 12.5 MG/1
25 TABLET ORAL ONCE
Status: COMPLETED | OUTPATIENT
Start: 2024-10-19 | End: 2024-10-19

## 2024-10-19 RX ORDER — IPRATROPIUM BROMIDE AND ALBUTEROL SULFATE 2.5; .5 MG/3ML; MG/3ML
3 SOLUTION RESPIRATORY (INHALATION)
Status: COMPLETED | OUTPATIENT
Start: 2024-10-19 | End: 2024-10-19

## 2024-10-19 RX ORDER — VALSARTAN 80 MG/1
160 TABLET ORAL ONCE
Status: COMPLETED | OUTPATIENT
Start: 2024-10-19 | End: 2024-10-19

## 2024-10-19 RX ORDER — PREDNISONE 20 MG/1
60 TABLET ORAL
Status: COMPLETED | OUTPATIENT
Start: 2024-10-19 | End: 2024-10-19

## 2024-10-19 RX ORDER — MORPHINE SULFATE 4 MG/ML
4 INJECTION, SOLUTION INTRAMUSCULAR; INTRAVENOUS
Status: COMPLETED | OUTPATIENT
Start: 2024-10-19 | End: 2024-10-19

## 2024-10-19 RX ORDER — CLONIDINE HYDROCHLORIDE 0.1 MG/1
0.1 TABLET ORAL
Status: COMPLETED | OUTPATIENT
Start: 2024-10-19 | End: 2024-10-19

## 2024-10-19 RX ORDER — CLONIDINE HYDROCHLORIDE 0.1 MG/1
0.1 TABLET ORAL
Status: DISCONTINUED | OUTPATIENT
Start: 2024-10-19 | End: 2024-10-19

## 2024-10-19 RX ORDER — MAGNESIUM SULFATE HEPTAHYDRATE 40 MG/ML
2 INJECTION, SOLUTION INTRAVENOUS ONCE
Status: COMPLETED | OUTPATIENT
Start: 2024-10-19 | End: 2024-10-19

## 2024-10-19 RX ADMIN — MAGNESIUM SULFATE HEPTAHYDRATE 2 G: 40 INJECTION, SOLUTION INTRAVENOUS at 01:10

## 2024-10-19 RX ADMIN — CARVEDILOL 25 MG: 12.5 TABLET, FILM COATED ORAL at 12:10

## 2024-10-19 RX ADMIN — CLONIDINE HYDROCHLORIDE 0.1 MG: 0.1 TABLET ORAL at 01:10

## 2024-10-19 RX ADMIN — PREDNISONE 60 MG: 20 TABLET ORAL at 12:10

## 2024-10-19 RX ADMIN — HYDRALAZINE HYDROCHLORIDE 10 MG: 20 INJECTION INTRAMUSCULAR; INTRAVENOUS at 12:10

## 2024-10-19 RX ADMIN — AMLODIPINE BESYLATE 10 MG: 5 TABLET ORAL at 12:10

## 2024-10-19 RX ADMIN — VALSARTAN 160 MG: 80 TABLET, FILM COATED ORAL at 01:10

## 2024-10-19 RX ADMIN — HYDRALAZINE HYDROCHLORIDE 10 MG: 20 INJECTION INTRAMUSCULAR; INTRAVENOUS at 03:10

## 2024-10-19 RX ADMIN — MORPHINE SULFATE 4 MG: 4 INJECTION, SOLUTION INTRAMUSCULAR; INTRAVENOUS at 03:10

## 2024-10-19 RX ADMIN — IPRATROPIUM BROMIDE AND ALBUTEROL SULFATE 3 ML: 2.5; .5 SOLUTION RESPIRATORY (INHALATION) at 03:10

## 2024-10-19 RX ADMIN — BENZONATATE 200 MG: 100 CAPSULE ORAL at 12:10

## 2024-10-19 NOTE — ED PROVIDER NOTES
"Encounter Date: 10/19/2024       History     Chief Complaint   Patient presents with    Shortness of Breath     Came in via EMS reporting SOB with wheezing "since earlier". Hx of COPD and Asthma. Albuterol tx given in route via EMS.      54-year-old female smoker presents to ED via EMS with chief complaint acute onset shortness of breath.    Admits to shortness of breath, wheezing, cough, all beginning this evening while she was sitting at home watching television.  She admits to history of frequent similar symptoms, most recently last week.  Unclear if she used a nebulizer or rescue inhaler at home.  She was given neb treatment EN route by EMS with improvement of symptoms.  She denies any recent illness.  Denies any known sick contacts.  No nasal congestion or rhinorrhea.  No fever or chills.  She denies chest pain.  No palpitations.  No diaphoresis.  No nausea vomiting.  No syncope or near-syncope.  Denies personal history of ACS.  Denies history of CHF.  Denies any new leg swelling or calf pain.  No reported orthopnea.  Does admit to frequent episodes of paroxysmal nocturnal dyspnea, not a new symptom.     No exogenous estrogen.  No unilateral leg swelling or calf pain.  No history of VTE.    States she is mostly compliant with medications, but did not take her blood pressure medications today.  Takes baby ASA daily, states not currently on any anticoagulation.  Denies ETOH use today.  Denies cocaine or illicit substance use.  Does admit to cigarettes and marijuana use.    PMH:  COPD  HTN  Afib  CKD 3B  Hx CVA  Gout  History of polysubstance abuse  Insomnia  Chronic bilateral low back pain  Vitamin-D deficiency  Osteoarthritis    TTE 05/20/2024:   ·  Left Ventricle: The left ventricle is normal in size. Mildly increased wall thickness. There is mild concentric hypertrophy. There is low normal systolic function with a visually estimated ejection fraction of 50 - 55%. There is diastolic dysfunction but grade cannot " be determined.  ·  Right Ventricle: Normal right ventricular cavity size. Systolic function is normal.  ·  Aortic Valve: There is mild to moderate aortic regurgitation.  ·  Mitral Valve: There is mild regurgitation.      CTA chest 03/12/2021:   IMPRESSION:  Negative for pulmonary embolism. Peribronchial thickening which could reflect acute or chronic bronchitis in the appropriate clinical setting. Prominent hilar lymph nodes bilaterally. Moderately advanced emphysema. Aortic atherosclerosis.      Review of patient's allergies indicates:  No Known Allergies  Past Medical History:   Diagnosis Date    COPD (chronic obstructive pulmonary disease)     Diabetes mellitus     Gout, unspecified     Hypertension      History reviewed. No pertinent surgical history.  No family history on file.  Social History     Tobacco Use    Smoking status: Every Day     Types: Cigarettes    Smokeless tobacco: Never   Substance Use Topics    Alcohol use: Yes     Comment: occasionally    Drug use: Yes     Types: Marijuana     Review of Systems   Constitutional:  Negative for chills, diaphoresis and fever.   HENT:  Negative for congestion and rhinorrhea.    Respiratory:  Positive for cough, shortness of breath and wheezing.    Cardiovascular:  Negative for chest pain, palpitations and leg swelling.   Gastrointestinal:  Negative for nausea and vomiting.   Musculoskeletal:  Negative for myalgias, neck pain and neck stiffness.   Neurological:  Negative for syncope.       Physical Exam     Initial Vitals   BP Pulse Resp Temp SpO2   10/19/24 0002 10/19/24 0002 10/19/24 0002 10/19/24 0003 10/19/24 0002   (!) 207/129 96 18 98.7 °F (37.1 °C) 100 %      MAP       --                Physical Exam    Nursing note and vitals reviewed.  Constitutional: She appears well-developed and well-nourished. She is not diaphoretic. No distress.   Overall well-appearing, nontoxic.  Frequent nonproductive cough during exam.  Speaking in full sentences without pause or  difficulty.   HENT:   Head: Normocephalic and atraumatic.   Dry mucous membranes.  Nasal congestion, mouth breathing.   Neck: Neck supple.   Normal range of motion.  Cardiovascular:  Intact distal pulses.           Sinus tachycardia.  No significant pretibial edema.  No unilateral leg swelling or calf tenderness.  1+ DP bilaterally.   Pulmonary/Chest: No respiratory distress.   Decreased breath sounds to bilateral bases.  No hypoxia on room air. No tachypnea.  No accessory muscle use.    Bedside pulmonary ultrasound:  No frequent B lines   Musculoskeletal:         General: Normal range of motion.      Cervical back: Normal range of motion and neck supple.     Neurological: She is alert and oriented to person, place, and time. GCS score is 15. GCS eye subscore is 4. GCS verbal subscore is 5. GCS motor subscore is 6.   Psychiatric: Thought content normal.   Mildly anxious, restless         ED Course   Procedures  Labs Reviewed   CBC W/ AUTO DIFFERENTIAL - Abnormal       Result Value    WBC 8.78      RBC 4.29      Hemoglobin 13.0      Hematocrit 39.5      MCV 92      MCH 30.3      MCHC 32.9      RDW 11.9      Platelets SEE COMMENT      MPV SEE COMMENT      Immature Granulocytes 0.3      Gran # (ANC) 4.8      Immature Grans (Abs) 0.03      Lymph # 3.1      Mono # 0.6      Eos # 0.2      Baso # 0.04      nRBC 0      Gran % 54.5      Lymph % 35.8      Mono % 7.1      Eosinophil % 1.8      Basophil % 0.5      Platelet Estimate Clumped (*)     Differential Method Automated     COMPREHENSIVE METABOLIC PANEL - Abnormal    Sodium 140      Potassium 3.3 (*)     Chloride 101      CO2 26      Glucose 106      BUN 12      Creatinine 1.7 (*)     Calcium 9.1      Total Protein 7.9      Albumin 3.2 (*)     Total Bilirubin 0.4      Alkaline Phosphatase 87      AST 13      ALT 5 (*)     eGFR 36 (*)     Anion Gap 13     B-TYPE NATRIURETIC PEPTIDE - Abnormal     (*)    DRUG SCREEN PANEL, URINE EMERGENCY - Abnormal     Benzodiazepines Negative      Methadone metabolites Presumptive Positive (*)     Cocaine (Metab.) Negative      Opiate Scrn, Ur Negative      Barbiturate Screen, Ur Negative      Amphetamine Screen, Ur Negative      THC Presumptive Positive (*)     Phencyclidine Negative      Creatinine, Urine 38.2      Toxicology Information SEE COMMENT      Narrative:     Specimen Source->Urine   MAGNESIUM - Abnormal    Magnesium 1.5 (*)    TROPONIN I    Troponin I 0.007     ALCOHOL,MEDICAL (ETHANOL)    Alcohol, Serum <10     TROPONIN I    Troponin I 0.014     TROPONIN I    Troponin I 0.014       EKG Readings: (Independently Interpreted)   Sinus tachycardia, ventricular rate 97 beats per minute.  Normal MA, normal QT, normal QRS duration.  No right axis deviation.  No convincing ST elevation.  There is mild artifact.  Inverted T-wave aVL.  Inverted T-wave V2.  Questionable incomplete right bundle-branch block pattern.  Nonspecific ST segment changes without convincing acute ischemia.  Morphology, ST segments appears grossly similar to previous dated 07/15/2024, new LVH--but has had similar voltage on prior EKGs.        Imaging Results              X-Ray Chest 1 View (Final result)  Result time 10/19/24 01:12:20      Final result by Ivonne Almeida MD (10/19/24 01:12:20)                   Impression:      No acute cardiopulmonary process identified.      Electronically signed by: Ivonne Almeida MD  Date:    10/19/2024  Time:    01:12               Narrative:    EXAMINATION:  XR CHEST 1 VIEW    CLINICAL HISTORY:  Shortness of breath    TECHNIQUE:  Single frontal view of the chest was performed.    COMPARISON:  07/14/2024.    FINDINGS:  Cardiac silhouette is normal in size.  Lungs are symmetrically expanded.  No evidence of focal consolidative process, pneumothorax, or significant pleural effusion.  No acute osseous abnormality identified.                                    X-Rays:   Independently Interpreted Readings:   Chest  X-Ray: Personal interpretation:  Cardiomegaly, no convincing effusion, no obvious pneumothorax, no dense peripheral lobar consolidation--questionable increased bibasilar interstitial changes right-greater-than-left, but does appear similar to previous x-ray.     Medications   hydrALAZINE injection 10 mg (10 mg Intravenous Given 10/19/24 0030)   benzonatate capsule 200 mg (200 mg Oral Given 10/19/24 0041)   amLODIPine tablet 10 mg (10 mg Oral Given 10/19/24 0041)   carvediloL tablet 25 mg (25 mg Oral Given 10/19/24 0041)   predniSONE tablet 60 mg (60 mg Oral Given 10/19/24 0040)   magnesium sulfate 2g in water 50mL IVPB (premix) (0 g Intravenous Stopped 10/19/24 0220)   cloNIDine tablet 0.1 mg (0.1 mg Oral Given 10/19/24 0145)   valsartan tablet 160 mg (160 mg Oral Given 10/19/24 0145)   albuterol-ipratropium 2.5 mg-0.5 mg/3 mL nebulizer solution 3 mL (3 mLs Nebulization Given 10/19/24 0351)   hydrALAZINE injection 10 mg (10 mg Intravenous Given 10/19/24 0341)   morphine injection 4 mg (4 mg Intravenous Given 10/19/24 0342)     Medical Decision Making  Differential diagnosis: Medication noncompliance, polysubstance abuse, COPD exacerbation, ACS, PE, reactive airway, hypertensive emergency, pulmonary edema, community-acquired pneumonia    Amount and/or Complexity of Data Reviewed  External Data Reviewed: labs, radiology, ECG and notes.  Labs: ordered. Decision-making details documented in ED Course.  Radiology: ordered and independent interpretation performed. Decision-making details documented in ED Course.  ECG/medicine tests: ordered and independent interpretation performed. Decision-making details documented in ED Course.  Discussion of management or test interpretation with external provider(s): Was started on anticoagulation due to new onset AFib in July of this year, states she is not currently on any anticoagulation. Not in AFib, sinus tachycardia on initial presentation.  No reported chest pain.  No  palpitations.    Risk  Prescription drug management.               ED Course as of 10/19/24 0405   Sat Oct 19, 2024   0032 Initial .  Will try not to decrease to less than 116. [SM]   0056 Feels better following neb treatment given prior to arrival. [SM]   0115 eGFR(!): 36  Improved from previous, history CKD 3b--appears near baseline.  [SM]   0116 No B lines on bedside ultrasound, no pulmonary edema pattern on chest x-ray, no lower extremity edema, no history of CHF, no convincing evidence of volume overload on exam or imaging. [SM]   0226 Has had similarly elevated blood pressure on previous ED visits.  Shortness of breath improved prior to my evaluation with neb treatment given by EMS.  She has been on room air throughout entire ED stay without hypoxia or worsening shortness of breath.  No evidence of pulmonary edema on imaging.  No convincing evidence of end-organ damage.  Low suspicion for hypertensive emergency.  Suspect elevated BP likely related to medication noncompliance.  [SM]   0332 Serial troponins negative.  BP was improving, but prior to discharge continue with elevated BP.  On re-evaluation she states shortness of breath has returned, very faint expiratory wheeze on exam, ordered breathing treatment.  If improvement of breathing, will discharge with instructions to continue with JENIFER, take course of systemic corticosteroids, continue taking her prescribed home BP medications.  Advised follow-up with primary care provider for re-evaluation of continued uncontrolled blood pressure despite current medication regimen. [SM]   0404 Feels better following neb treatment; BP improved. D/c in stable condition.  []      ED Course User Index  [] Mike Stevenson, PAStefanoC                             Clinical Impression:  Final diagnoses:  [R06.02] SOB (shortness of breath) (Primary)  [J44.1] COPD exacerbation  [E83.42] Hypomagnesemia  [E87.6] Hypokalemia  [I10] Uncontrolled hypertension                      Mike Stevenson PA-C  10/19/24 0408

## 2024-10-19 NOTE — ED NOTES
"Pt arrive to the ED via EMS c/o sob and wheezing since earlier today, pt reports she has these episodes "can't breathe". Pt   "

## 2024-10-19 NOTE — DISCHARGE INSTRUCTIONS
Continue taking your daily blood pressure medications as previously prescribed.  Take the prednisone daily.  Take the potassium daily.  Contact your primary care provider for follow-up and re-evaluation of continued elevated blood pressure, to ensure resolution of current complaints.    Return to this ED if you develop chest pain, if shortness of breath returns or worsens, if you develop fever, if you feel lightheaded or feel as if you are going to pass out, if any other problems occur.

## 2024-10-20 LAB
OHS QRS DURATION: 92 MS
OHS QTC CALCULATION: 480 MS

## 2025-01-26 ENCOUNTER — HOSPITAL ENCOUNTER (EMERGENCY)
Facility: OTHER | Age: 55
Discharge: HOME | End: 2025-01-26
Attending: EMERGENCY MEDICINE
Payer: MEDICAID

## 2025-01-26 VITALS
SYSTOLIC BLOOD PRESSURE: 134 MMHG | TEMPERATURE: 98 F | HEART RATE: 82 BPM | HEIGHT: 69 IN | OXYGEN SATURATION: 96 % | DIASTOLIC BLOOD PRESSURE: 78 MMHG | WEIGHT: 160 LBS | BODY MASS INDEX: 23.7 KG/M2 | RESPIRATION RATE: 17 BRPM

## 2025-01-26 DIAGNOSIS — R06.02 SOB (SHORTNESS OF BREATH): ICD-10-CM

## 2025-01-26 DIAGNOSIS — J44.1 COPD EXACERBATION: Primary | ICD-10-CM

## 2025-01-26 LAB
ALBUMIN SERPL BCP-MCNC: 3.7 G/DL (ref 3.5–5.2)
ALP SERPL-CCNC: 90 U/L (ref 40–150)
ALT SERPL W/O P-5'-P-CCNC: 10 U/L (ref 10–44)
ANION GAP SERPL CALC-SCNC: 14 MMOL/L (ref 8–16)
AST SERPL-CCNC: 14 U/L (ref 10–40)
BASOPHILS # BLD AUTO: 0.04 K/UL (ref 0–0.2)
BASOPHILS NFR BLD: 0.6 % (ref 0–1.9)
BILIRUB SERPL-MCNC: 0.9 MG/DL (ref 0.1–1)
BNP SERPL-MCNC: 257 PG/ML (ref 0–99)
BUN SERPL-MCNC: 29 MG/DL (ref 6–20)
CALCIUM SERPL-MCNC: 10.2 MG/DL (ref 8.7–10.5)
CHLORIDE SERPL-SCNC: 104 MMOL/L (ref 95–110)
CO2 SERPL-SCNC: 17 MMOL/L (ref 23–29)
CREAT SERPL-MCNC: 2.3 MG/DL (ref 0.5–1.4)
DIFFERENTIAL METHOD BLD: ABNORMAL
EOSINOPHIL # BLD AUTO: 0 K/UL (ref 0–0.5)
EOSINOPHIL NFR BLD: 0.6 % (ref 0–8)
ERYTHROCYTE [DISTWIDTH] IN BLOOD BY AUTOMATED COUNT: 12.2 % (ref 11.5–14.5)
EST. GFR  (NO RACE VARIABLE): 25 ML/MIN/1.73 M^2
GLUCOSE SERPL-MCNC: 129 MG/DL (ref 70–110)
HCT VFR BLD AUTO: 45.5 % (ref 37–48.5)
HCV AB SERPL QL IA: NEGATIVE
HGB BLD-MCNC: 15.5 G/DL (ref 12–16)
HIV 1+2 AB+HIV1 P24 AG SERPL QL IA: NEGATIVE
IMM GRANULOCYTES # BLD AUTO: 0.02 K/UL (ref 0–0.04)
IMM GRANULOCYTES NFR BLD AUTO: 0.3 % (ref 0–0.5)
LYMPHOCYTES # BLD AUTO: 1.6 K/UL (ref 1–4.8)
LYMPHOCYTES NFR BLD: 23.9 % (ref 18–48)
MCH RBC QN AUTO: 30.2 PG (ref 27–31)
MCHC RBC AUTO-ENTMCNC: 34.1 G/DL (ref 32–36)
MCV RBC AUTO: 89 FL (ref 82–98)
MONOCYTES # BLD AUTO: 0.2 K/UL (ref 0.3–1)
MONOCYTES NFR BLD: 2.7 % (ref 4–15)
NEUTROPHILS # BLD AUTO: 4.8 K/UL (ref 1.8–7.7)
NEUTROPHILS NFR BLD: 71.9 % (ref 38–73)
NRBC BLD-RTO: 0 /100 WBC
PLATELET # BLD AUTO: 283 K/UL (ref 150–450)
PMV BLD AUTO: 9.3 FL (ref 9.2–12.9)
POCT GLUCOSE: 158 MG/DL (ref 70–110)
POTASSIUM SERPL-SCNC: 4.2 MMOL/L (ref 3.5–5.1)
PROT SERPL-MCNC: 9.2 G/DL (ref 6–8.4)
RBC # BLD AUTO: 5.13 M/UL (ref 4–5.4)
SODIUM SERPL-SCNC: 135 MMOL/L (ref 136–145)
TROPONIN I SERPL DL<=0.01 NG/ML-MCNC: <0.006 NG/ML (ref 0–0.03)
WBC # BLD AUTO: 6.61 K/UL (ref 3.9–12.7)

## 2025-01-26 PROCEDURE — 82962 GLUCOSE BLOOD TEST: CPT

## 2025-01-26 PROCEDURE — 99285 EMERGENCY DEPT VISIT HI MDM: CPT | Mod: 25

## 2025-01-26 PROCEDURE — 84484 ASSAY OF TROPONIN QUANT: CPT | Performed by: EMERGENCY MEDICINE

## 2025-01-26 PROCEDURE — 85025 COMPLETE CBC W/AUTO DIFF WBC: CPT | Performed by: EMERGENCY MEDICINE

## 2025-01-26 PROCEDURE — 86803 HEPATITIS C AB TEST: CPT | Performed by: NURSE PRACTITIONER

## 2025-01-26 PROCEDURE — 83880 ASSAY OF NATRIURETIC PEPTIDE: CPT | Performed by: EMERGENCY MEDICINE

## 2025-01-26 PROCEDURE — 80053 COMPREHEN METABOLIC PANEL: CPT | Performed by: EMERGENCY MEDICINE

## 2025-01-26 PROCEDURE — 93010 ELECTROCARDIOGRAM REPORT: CPT | Mod: ,,, | Performed by: INTERNAL MEDICINE

## 2025-01-26 PROCEDURE — 93005 ELECTROCARDIOGRAM TRACING: CPT

## 2025-01-26 PROCEDURE — 87389 HIV-1 AG W/HIV-1&-2 AB AG IA: CPT | Performed by: NURSE PRACTITIONER

## 2025-01-26 RX ORDER — PREDNISONE 20 MG/1
40 TABLET ORAL DAILY
Qty: 10 TABLET | Refills: 0 | Status: SHIPPED | OUTPATIENT
Start: 2025-01-26 | End: 2025-01-31

## 2025-01-26 RX ORDER — IPRATROPIUM BROMIDE AND ALBUTEROL SULFATE 2.5; .5 MG/3ML; MG/3ML
3 SOLUTION RESPIRATORY (INHALATION) EVERY 6 HOURS PRN
Qty: 90 ML | Refills: 1 | Status: SHIPPED | OUTPATIENT
Start: 2025-01-26 | End: 2025-02-25

## 2025-01-26 RX ORDER — ALBUTEROL SULFATE 90 UG/1
1-2 INHALANT RESPIRATORY (INHALATION) EVERY 6 HOURS PRN
Qty: 8.5 G | Refills: 1 | Status: SHIPPED | OUTPATIENT
Start: 2025-01-26 | End: 2025-02-25

## 2025-01-26 NOTE — ED PROVIDER NOTES
Encounter Date: 1/26/2025    SCRIBE #1 NOTE: I, Aaliyah Eddy, am scribing for, and in the presence of,  Grady Buchanan MD. I have scribed the following portions of the note - Other sections scribed: HPI, ROS.       History     Chief Complaint   Patient presents with    Shortness of Breath     Pt arrives to the ED today via EMS w/ complaint of SOB. Hx of COPD, dementia and an ischemic stroke. EMS administered a duo neb and 125 mg of IV solumedrol w/ reported relief.      This is a 54 y.o. female w/ Hx of COPD, HTN, DM, ischemic stroke, and dementia, who presents to the ED via EMS with complaint of shortness of breath. Per EMS she was 95 on room air but concerning SOB upon exertion. Noted that she had multiple hatfield and been intubated several times before. Pt states that she was SOB yesterday with episodes of emesis. She denies cough, fever, or current chest pain.     HPI is limited to a certain degree due to patients medical conditions.    Patient denies any other complaints at this time.        Shortness of Breath  Associated symptoms include vomiting. Pertinent negatives include no chest pain (no current chest pain) or fever.     Review of patient's allergies indicates:  No Known Allergies  Past Medical History:   Diagnosis Date    COPD (chronic obstructive pulmonary disease)     Diabetes mellitus     Gout, unspecified     Hypertension      History reviewed. No pertinent surgical history.  No family history on file.  Social History     Tobacco Use    Smoking status: Every Day     Types: Cigarettes    Smokeless tobacco: Never   Substance Use Topics    Alcohol use: Yes     Comment: occasionally    Drug use: Yes     Types: Marijuana     Review of Systems   Constitutional:  Negative for fever and unexpected weight change.   HENT:  Negative for nosebleeds.    Eyes:  Negative for pain.   Respiratory:  Positive for shortness of breath. Negative for apnea and cough.    Cardiovascular:  Negative for chest pain (no  current chest pain) and palpitations.   Gastrointestinal:  Positive for vomiting. Negative for constipation.   Genitourinary:  Negative for enuresis.   Skin:  Negative for pallor.   Hematological:  Does not bruise/bleed easily.   Psychiatric/Behavioral:  Positive for confusion. Negative for sleep disturbance.        Physical Exam     Initial Vitals [01/26/25 1539]   BP Pulse Resp Temp SpO2   (!) 140/88 75 20 97.7 °F (36.5 °C) 98 %      MAP       --         Physical Exam    Nursing note and vitals reviewed.  HENT:   Head: Atraumatic.   Eyes: Conjunctivae and EOM are normal.   Neck:   Normal range of motion.  Cardiovascular:      Exam reveals no gallop and no friction rub.       No murmur heard.  Pulmonary/Chest: Breath sounds normal. No respiratory distress.   Abdominal: Abdomen is soft. There is no abdominal tenderness.   Musculoskeletal:         General: No edema. Normal range of motion.      Cervical back: Normal range of motion.     Neurological: She is alert and oriented to person, place, and time.   Psychiatric: She has a normal mood and affect.         ED Course   Procedures  Labs Reviewed   COMPREHENSIVE METABOLIC PANEL - Abnormal       Result Value    Sodium 135 (*)     Potassium 4.2      Chloride 104      CO2 17 (*)     Glucose 129 (*)     BUN 29 (*)     Creatinine 2.3 (*)     Calcium 10.2      Total Protein 9.2 (*)     Albumin 3.7      Total Bilirubin 0.9      Alkaline Phosphatase 90      AST 14      ALT 10      eGFR 25 (*)     Anion Gap 14     CBC W/ AUTO DIFFERENTIAL - Abnormal    WBC 6.61      RBC 5.13      Hemoglobin 15.5      Hematocrit 45.5      MCV 89      MCH 30.2      MCHC 34.1      RDW 12.2      Platelets 283      MPV 9.3      Immature Granulocytes 0.3      Gran # (ANC) 4.8      Immature Grans (Abs) 0.02      Lymph # 1.6      Mono # 0.2 (*)     Eos # 0.0      Baso # 0.04      nRBC 0      Gran % 71.9      Lymph % 23.9      Mono % 2.7 (*)     Eosinophil % 0.6      Basophil % 0.6      Differential  Method Automated     B-TYPE NATRIURETIC PEPTIDE - Abnormal     (*)    POCT GLUCOSE - Abnormal    POCT Glucose 158 (*)    HEPATITIS C ANTIBODY    Hepatitis C Ab Negative      Narrative:     Release to patient->Immediate  Release to patient->Immediate   HIV 1 / 2 ANTIBODY    HIV 1/2 Ag/Ab Negative      Narrative:     Release to patient->Immediate  Release to patient->Immediate   TROPONIN I    Troponin I <0.006            Imaging Results              X-Ray Chest AP Portable (Final result)  Result time 01/26/25 17:23:49      Final result by Ivonne Almeida MD (01/26/25 17:23:49)                   Impression:      No acute cardiopulmonary process identified.      Electronically signed by: Ivonne Almeida MD  Date:    01/26/2025  Time:    17:23               Narrative:    EXAMINATION:  XR CHEST AP PORTABLE    CLINICAL HISTORY:  sob;    TECHNIQUE:  Single frontal view of the chest was performed.    COMPARISON:  October 2024.    FINDINGS:  Cardiac silhouette is normal in size.  Lungs are symmetrically expanded.  No evidence of focal consolidative process, pneumothorax, or significant pleural effusion.  No acute osseous abnormality identified.                                       Medications - No data to display  Medical Decision Making  54-year-old female with a history of COPD, stroke presents with shortness of breath since yesterday.  EMS gave Solu-Medrol and DuoNeb treatment with improvement in symptoms.  On my exam, patient is in no distress.  Lungs are clear.      Amount and/or Complexity of Data Reviewed  External Data Reviewed: notes.  Labs: ordered. Decision-making details documented in ED Course.  Radiology: ordered and independent interpretation performed. Decision-making details documented in ED Course.  ECG/medicine tests: ordered and independent interpretation performed. Decision-making details documented in ED Course.    Risk  Prescription drug management.            Scribe Attestation:   Scribe #1:  I performed the above scribed service and the documentation accurately describes the services I performed. I attest to the accuracy of the note.        ED Course as of 01/26/25 2031   Sun Jan 26, 2025 1911 Comprehensive Metabolic Panel(!) [SN]   1911 CBC Auto Differential(!) [SN]   2026 Troponin I: <0.006 [SN]   2026 BNP(!): 257 [SN]   2031 Suspect COPD.  Vitals stable in ED.  Plan for discharge home with pcp follow up [SN]      ED Course User Index  [SN] Grady Buchanan MD          Physician Attestation for Scribe: I, Grady Buchanan MD, reviewed documentation as scribed in my presence, which is both accurate and complete.                   Clinical Impression:  Final diagnoses:  [R06.02] SOB (shortness of breath)  [J44.1] COPD exacerbation (Primary)          ED Disposition Condition    Discharge Stable          ED Prescriptions       Medication Sig Dispense Start Date End Date Auth. Provider    predniSONE (DELTASONE) 20 MG tablet Take 2 tablets (40 mg total) by mouth once daily. for 5 days 10 tablet 1/26/2025 1/31/2025 Grady Buchanan MD    albuterol (PROVENTIL/VENTOLIN HFA) 90 mcg/actuation inhaler Inhale 1 to 2 puffs into the lungs every 6 (six) hours as needed for Wheezing or Shortness of Breath. Rescue 8.5 g 1/26/2025 2/25/2025 Grady Buchanan MD    albuterol-ipratropium (DUO-NEB) 2.5 mg-0.5 mg/3 mL nebulizer solution Take 3 mLs by nebulization every 6 (six) hours as needed for Wheezing. Rescue 90 mL 1/26/2025 2/25/2025 Grady Buchanan MD          Follow-up Information       Follow up With Specialties Details Why Contact Info    Sunny Pollard Jr., MD Hospitalist, Family Medicine Schedule an appointment as soon as possible for a visit in 3 days  [id number]  [address] [facility] [id number]  [phone]               Grady Buchanan MD  01/26/25 2032

## 2025-01-27 LAB
OHS QRS DURATION: 94 MS
OHS QTC CALCULATION: 495 MS

## 2025-01-27 NOTE — ED NOTES
Report given by CHARLES Bradshaw. Assumed care of this patient. Received patient A&Ox4. Denies any further needs and complaints at this time. Safety measures in place; side rails up x2. Call light within pt reach. Plan of care ongoing.

## 2025-02-13 ENCOUNTER — HOSPITAL ENCOUNTER (INPATIENT)
Facility: HOSPITAL | Age: 55
LOS: 1 days | Discharge: LEFT BEFORE DISCHARGE | DRG: 195 | End: 2025-02-13
Attending: STUDENT IN AN ORGANIZED HEALTH CARE EDUCATION/TRAINING PROGRAM | Admitting: EMERGENCY MEDICINE
Payer: MEDICAID

## 2025-02-13 VITALS
WEIGHT: 160 LBS | BODY MASS INDEX: 23.63 KG/M2 | TEMPERATURE: 98 F | SYSTOLIC BLOOD PRESSURE: 113 MMHG | HEART RATE: 80 BPM | OXYGEN SATURATION: 100 % | DIASTOLIC BLOOD PRESSURE: 67 MMHG | RESPIRATION RATE: 33 BRPM

## 2025-02-13 DIAGNOSIS — J10.1 INFLUENZA A: ICD-10-CM

## 2025-02-13 DIAGNOSIS — R09.02 HYPOXIA: ICD-10-CM

## 2025-02-13 DIAGNOSIS — R06.02 SOB (SHORTNESS OF BREATH): ICD-10-CM

## 2025-02-13 DIAGNOSIS — R07.9 CHEST PAIN: ICD-10-CM

## 2025-02-13 DIAGNOSIS — J44.1 COPD EXACERBATION: Primary | ICD-10-CM

## 2025-02-13 PROBLEM — F17.200 TOBACCO DEPENDENCY: Status: ACTIVE | Noted: 2025-02-13

## 2025-02-13 LAB
ALLENS TEST: ABNORMAL
BASOPHILS # BLD AUTO: 0.03 K/UL (ref 0–0.2)
BASOPHILS NFR BLD: 0.2 % (ref 0–1.9)
BNP SERPL-MCNC: 17 PG/ML (ref 0–99)
CTP QC/QA: YES
CTP QC/QA: YES
DELSYS: ABNORMAL
DIFFERENTIAL METHOD BLD: ABNORMAL
EOSINOPHIL # BLD AUTO: 0 K/UL (ref 0–0.5)
EOSINOPHIL NFR BLD: 0.2 % (ref 0–8)
ERYTHROCYTE [DISTWIDTH] IN BLOOD BY AUTOMATED COUNT: 13.1 % (ref 11.5–14.5)
ETHANOL SERPL-MCNC: <10 MG/DL
HCO3 UR-SCNC: 26.9 MMOL/L (ref 24–28)
HCT VFR BLD AUTO: 41 % (ref 37–48.5)
HGB BLD-MCNC: 13.3 G/DL (ref 12–16)
IMM GRANULOCYTES # BLD AUTO: 0.09 K/UL (ref 0–0.04)
IMM GRANULOCYTES NFR BLD AUTO: 0.7 % (ref 0–0.5)
LYMPHOCYTES # BLD AUTO: 2 K/UL (ref 1–4.8)
LYMPHOCYTES NFR BLD: 16 % (ref 18–48)
MCH RBC QN AUTO: 29.8 PG (ref 27–31)
MCHC RBC AUTO-ENTMCNC: 32.4 G/DL (ref 32–36)
MCV RBC AUTO: 92 FL (ref 82–98)
MODE: ABNORMAL
MONOCYTES # BLD AUTO: 1.7 K/UL (ref 0.3–1)
MONOCYTES NFR BLD: 13.8 % (ref 4–15)
NEUTROPHILS # BLD AUTO: 8.7 K/UL (ref 1.8–7.7)
NEUTROPHILS NFR BLD: 69.1 % (ref 38–73)
NRBC BLD-RTO: 0 /100 WBC
OHS QRS DURATION: 88 MS
OHS QTC CALCULATION: 436 MS
PCO2 BLDA: 47.5 MMHG (ref 35–45)
PH SMN: 7.36 [PH] (ref 7.35–7.45)
PLATELET # BLD AUTO: 151 K/UL (ref 150–450)
PMV BLD AUTO: 10 FL (ref 9.2–12.9)
PO2 BLDA: 46 MMHG (ref 40–60)
POC BE: 1 MMOL/L
POC MOLECULAR INFLUENZA A AGN: POSITIVE
POC MOLECULAR INFLUENZA B AGN: NEGATIVE
POC SATURATED O2: 79 % (ref 95–100)
POC TCO2: 28 MMOL/L (ref 24–29)
RBC # BLD AUTO: 4.46 M/UL (ref 4–5.4)
SAMPLE: ABNORMAL
SARS-COV-2 RDRP RESP QL NAA+PROBE: NEGATIVE
SITE: ABNORMAL
SP02: 98
TROPONIN I SERPL DL<=0.01 NG/ML-MCNC: 0.02 NG/ML (ref 0–0.03)
WBC # BLD AUTO: 12.56 K/UL (ref 3.9–12.7)

## 2025-02-13 PROCEDURE — 27000221 HC OXYGEN, UP TO 24 HOURS

## 2025-02-13 PROCEDURE — 85025 COMPLETE CBC W/AUTO DIFF WBC: CPT | Performed by: PHYSICIAN ASSISTANT

## 2025-02-13 PROCEDURE — 93005 ELECTROCARDIOGRAM TRACING: CPT

## 2025-02-13 PROCEDURE — 25000242 PHARM REV CODE 250 ALT 637 W/ HCPCS

## 2025-02-13 PROCEDURE — 25000242 PHARM REV CODE 250 ALT 637 W/ HCPCS: Performed by: EMERGENCY MEDICINE

## 2025-02-13 PROCEDURE — 25000242 PHARM REV CODE 250 ALT 637 W/ HCPCS: Performed by: PHYSICIAN ASSISTANT

## 2025-02-13 PROCEDURE — 94645 CONT INHLJ TX EACH ADDL HOUR: CPT

## 2025-02-13 PROCEDURE — 87635 SARS-COV-2 COVID-19 AMP PRB: CPT | Performed by: EMERGENCY MEDICINE

## 2025-02-13 PROCEDURE — 99900035 HC TECH TIME PER 15 MIN (STAT)

## 2025-02-13 PROCEDURE — 12000002 HC ACUTE/MED SURGE SEMI-PRIVATE ROOM

## 2025-02-13 PROCEDURE — 82803 BLOOD GASES ANY COMBINATION: CPT

## 2025-02-13 PROCEDURE — 25000003 PHARM REV CODE 250: Performed by: EMERGENCY MEDICINE

## 2025-02-13 PROCEDURE — 94761 N-INVAS EAR/PLS OXIMETRY MLT: CPT | Mod: XB

## 2025-02-13 PROCEDURE — 63600175 PHARM REV CODE 636 W HCPCS: Performed by: PHYSICIAN ASSISTANT

## 2025-02-13 PROCEDURE — 84484 ASSAY OF TROPONIN QUANT: CPT | Performed by: PHYSICIAN ASSISTANT

## 2025-02-13 PROCEDURE — 83880 ASSAY OF NATRIURETIC PEPTIDE: CPT | Performed by: PHYSICIAN ASSISTANT

## 2025-02-13 PROCEDURE — 25000242 PHARM REV CODE 250 ALT 637 W/ HCPCS: Performed by: HOSPITALIST

## 2025-02-13 PROCEDURE — 94640 AIRWAY INHALATION TREATMENT: CPT | Mod: XB

## 2025-02-13 PROCEDURE — 96372 THER/PROPH/DIAG INJ SC/IM: CPT | Performed by: PHYSICIAN ASSISTANT

## 2025-02-13 PROCEDURE — 82077 ASSAY SPEC XCP UR&BREATH IA: CPT | Performed by: PHYSICIAN ASSISTANT

## 2025-02-13 PROCEDURE — 94640 AIRWAY INHALATION TREATMENT: CPT

## 2025-02-13 PROCEDURE — 93010 ELECTROCARDIOGRAM REPORT: CPT | Mod: ,,, | Performed by: INTERNAL MEDICINE

## 2025-02-13 RX ORDER — ESCITALOPRAM OXALATE 10 MG/1
10 TABLET ORAL DAILY
Status: DISCONTINUED | OUTPATIENT
Start: 2025-02-14 | End: 2025-02-13 | Stop reason: HOSPADM

## 2025-02-13 RX ORDER — ATORVASTATIN CALCIUM 40 MG/1
40 TABLET, FILM COATED ORAL DAILY
Status: DISCONTINUED | OUTPATIENT
Start: 2025-02-13 | End: 2025-02-13 | Stop reason: HOSPADM

## 2025-02-13 RX ORDER — HALOPERIDOL 5 MG/ML
2.5 INJECTION INTRAMUSCULAR
Status: COMPLETED | OUTPATIENT
Start: 2025-02-13 | End: 2025-02-13

## 2025-02-13 RX ORDER — ACETAMINOPHEN 325 MG/1
650 TABLET ORAL EVERY 4 HOURS PRN
Status: DISCONTINUED | OUTPATIENT
Start: 2025-02-13 | End: 2025-02-13 | Stop reason: HOSPADM

## 2025-02-13 RX ORDER — ALUMINUM HYDROXIDE, MAGNESIUM HYDROXIDE, AND SIMETHICONE 1200; 120; 1200 MG/30ML; MG/30ML; MG/30ML
30 SUSPENSION ORAL 4 TIMES DAILY PRN
Status: DISCONTINUED | OUTPATIENT
Start: 2025-02-13 | End: 2025-02-13 | Stop reason: HOSPADM

## 2025-02-13 RX ORDER — METHYLPREDNISOLONE SOD SUCC 125 MG
125 VIAL (EA) INJECTION
Status: COMPLETED | OUTPATIENT
Start: 2025-02-13 | End: 2025-02-13

## 2025-02-13 RX ORDER — HALOPERIDOL 5 MG/ML
2.5 INJECTION INTRAMUSCULAR
Status: DISCONTINUED | OUTPATIENT
Start: 2025-02-13 | End: 2025-02-13

## 2025-02-13 RX ORDER — SODIUM CHLORIDE 0.9 % (FLUSH) 0.9 %
10 SYRINGE (ML) INJECTION EVERY 12 HOURS PRN
Status: DISCONTINUED | OUTPATIENT
Start: 2025-02-13 | End: 2025-02-13 | Stop reason: HOSPADM

## 2025-02-13 RX ORDER — ARFORMOTEROL TARTRATE 15 UG/2ML
15 SOLUTION RESPIRATORY (INHALATION) 2 TIMES DAILY
Status: DISCONTINUED | OUTPATIENT
Start: 2025-02-13 | End: 2025-02-13 | Stop reason: HOSPADM

## 2025-02-13 RX ORDER — FLUTICASONE FUROATE AND VILANTEROL 100; 25 UG/1; UG/1
1 POWDER RESPIRATORY (INHALATION) DAILY
Status: DISCONTINUED | OUTPATIENT
Start: 2025-02-13 | End: 2025-02-13

## 2025-02-13 RX ORDER — IBUPROFEN 200 MG
1 TABLET ORAL DAILY
Status: DISCONTINUED | OUTPATIENT
Start: 2025-02-13 | End: 2025-02-13 | Stop reason: HOSPADM

## 2025-02-13 RX ORDER — SODIUM,POTASSIUM PHOSPHATES 280-250MG
2 POWDER IN PACKET (EA) ORAL
Status: DISCONTINUED | OUTPATIENT
Start: 2025-02-13 | End: 2025-02-13 | Stop reason: HOSPADM

## 2025-02-13 RX ORDER — LANOLIN ALCOHOL/MO/W.PET/CERES
800 CREAM (GRAM) TOPICAL
Status: DISCONTINUED | OUTPATIENT
Start: 2025-02-13 | End: 2025-02-13 | Stop reason: HOSPADM

## 2025-02-13 RX ORDER — TALC
6 POWDER (GRAM) TOPICAL NIGHTLY PRN
Status: DISCONTINUED | OUTPATIENT
Start: 2025-02-13 | End: 2025-02-13 | Stop reason: HOSPADM

## 2025-02-13 RX ORDER — ALLOPURINOL 100 MG/1
200 TABLET ORAL DAILY
Status: DISCONTINUED | OUTPATIENT
Start: 2025-02-13 | End: 2025-02-13 | Stop reason: HOSPADM

## 2025-02-13 RX ORDER — ONDANSETRON HYDROCHLORIDE 2 MG/ML
8 INJECTION, SOLUTION INTRAVENOUS EVERY 6 HOURS PRN
Status: DISCONTINUED | OUTPATIENT
Start: 2025-02-13 | End: 2025-02-13 | Stop reason: HOSPADM

## 2025-02-13 RX ORDER — VALSARTAN 80 MG/1
160 TABLET ORAL DAILY
Status: DISCONTINUED | OUTPATIENT
Start: 2025-02-13 | End: 2025-02-13 | Stop reason: HOSPADM

## 2025-02-13 RX ORDER — ASPIRIN 81 MG/1
81 TABLET ORAL DAILY
Status: DISCONTINUED | OUTPATIENT
Start: 2025-02-13 | End: 2025-02-13 | Stop reason: HOSPADM

## 2025-02-13 RX ORDER — OSELTAMIVIR PHOSPHATE 75 MG/1
75 CAPSULE ORAL
Status: COMPLETED | OUTPATIENT
Start: 2025-02-13 | End: 2025-02-13

## 2025-02-13 RX ORDER — IPRATROPIUM BROMIDE AND ALBUTEROL SULFATE 2.5; .5 MG/3ML; MG/3ML
3 SOLUTION RESPIRATORY (INHALATION)
Status: DISCONTINUED | OUTPATIENT
Start: 2025-02-13 | End: 2025-02-13 | Stop reason: HOSPADM

## 2025-02-13 RX ORDER — DIPHENHYDRAMINE HYDROCHLORIDE 50 MG/ML
25 INJECTION INTRAMUSCULAR; INTRAVENOUS
Status: COMPLETED | OUTPATIENT
Start: 2025-02-13 | End: 2025-02-13

## 2025-02-13 RX ORDER — GABAPENTIN 300 MG/1
600 CAPSULE ORAL DAILY
Status: DISCONTINUED | OUTPATIENT
Start: 2025-02-13 | End: 2025-02-13 | Stop reason: HOSPADM

## 2025-02-13 RX ORDER — POLYETHYLENE GLYCOL 3350 17 G/17G
17 POWDER, FOR SOLUTION ORAL 2 TIMES DAILY PRN
Status: DISCONTINUED | OUTPATIENT
Start: 2025-02-13 | End: 2025-02-13 | Stop reason: HOSPADM

## 2025-02-13 RX ORDER — GUAIFENESIN AND DEXTROMETHORPHAN HYDROBROMIDE 10; 100 MG/5ML; MG/5ML
5 SYRUP ORAL EVERY 6 HOURS
Status: DISCONTINUED | OUTPATIENT
Start: 2025-02-13 | End: 2025-02-13 | Stop reason: HOSPADM

## 2025-02-13 RX ORDER — CLONIDINE HYDROCHLORIDE 0.1 MG/1
0.1 TABLET ORAL 2 TIMES DAILY
Status: DISCONTINUED | OUTPATIENT
Start: 2025-02-13 | End: 2025-02-13 | Stop reason: HOSPADM

## 2025-02-13 RX ORDER — PANTOPRAZOLE SODIUM 40 MG/1
40 TABLET, DELAYED RELEASE ORAL DAILY
Status: DISCONTINUED | OUTPATIENT
Start: 2025-02-13 | End: 2025-02-13 | Stop reason: HOSPADM

## 2025-02-13 RX ORDER — HYDROCODONE BITARTRATE AND ACETAMINOPHEN 10; 325 MG/1; MG/1
1 TABLET ORAL EVERY 4 HOURS PRN
Status: DISCONTINUED | OUTPATIENT
Start: 2025-02-13 | End: 2025-02-13 | Stop reason: HOSPADM

## 2025-02-13 RX ORDER — IPRATROPIUM BROMIDE AND ALBUTEROL SULFATE 2.5; .5 MG/3ML; MG/3ML
3 SOLUTION RESPIRATORY (INHALATION)
Status: COMPLETED | OUTPATIENT
Start: 2025-02-13 | End: 2025-02-13

## 2025-02-13 RX ORDER — OSELTAMIVIR PHOSPHATE 75 MG/1
75 CAPSULE ORAL 2 TIMES DAILY
Status: DISCONTINUED | OUTPATIENT
Start: 2025-02-13 | End: 2025-02-13 | Stop reason: HOSPADM

## 2025-02-13 RX ORDER — CARVEDILOL 12.5 MG/1
25 TABLET ORAL 2 TIMES DAILY WITH MEALS
Status: DISCONTINUED | OUTPATIENT
Start: 2025-02-13 | End: 2025-02-13 | Stop reason: HOSPADM

## 2025-02-13 RX ORDER — BUDESONIDE 0.5 MG/2ML
0.5 INHALANT ORAL EVERY 12 HOURS
Status: DISCONTINUED | OUTPATIENT
Start: 2025-02-13 | End: 2025-02-13 | Stop reason: HOSPADM

## 2025-02-13 RX ORDER — BENZONATATE 100 MG/1
200 CAPSULE ORAL 3 TIMES DAILY PRN
Status: DISCONTINUED | OUTPATIENT
Start: 2025-02-13 | End: 2025-02-13 | Stop reason: HOSPADM

## 2025-02-13 RX ORDER — ALBUTEROL SULFATE 2.5 MG/.5ML
10 SOLUTION RESPIRATORY (INHALATION) CONTINUOUS
Status: DISCONTINUED | OUTPATIENT
Start: 2025-02-13 | End: 2025-02-13

## 2025-02-13 RX ORDER — PROCHLORPERAZINE EDISYLATE 5 MG/ML
5 INJECTION INTRAMUSCULAR; INTRAVENOUS EVERY 6 HOURS PRN
Status: DISCONTINUED | OUTPATIENT
Start: 2025-02-13 | End: 2025-02-13 | Stop reason: HOSPADM

## 2025-02-13 RX ORDER — IPRATROPIUM BROMIDE AND ALBUTEROL SULFATE 2.5; .5 MG/3ML; MG/3ML
SOLUTION RESPIRATORY (INHALATION)
Status: COMPLETED
Start: 2025-02-13 | End: 2025-02-13

## 2025-02-13 RX ORDER — NALOXONE HCL 0.4 MG/ML
0.02 VIAL (ML) INJECTION
Status: DISCONTINUED | OUTPATIENT
Start: 2025-02-13 | End: 2025-02-13 | Stop reason: HOSPADM

## 2025-02-13 RX ADMIN — OSELTAMIVIR PHOSPHATE 75 MG: 75 CAPSULE ORAL at 07:02

## 2025-02-13 RX ADMIN — DIPHENHYDRAMINE HYDROCHLORIDE 25 MG: 50 INJECTION INTRAMUSCULAR; INTRAVENOUS at 06:02

## 2025-02-13 RX ADMIN — IPRATROPIUM BROMIDE AND ALBUTEROL SULFATE 3 ML: 2.5; .5 SOLUTION RESPIRATORY (INHALATION) at 05:02

## 2025-02-13 RX ADMIN — HALOPERIDOL LACTATE 2.5 MG: 5 INJECTION, SOLUTION INTRAMUSCULAR at 06:02

## 2025-02-13 RX ADMIN — SODIUM CHLORIDE 1000 ML: 9 INJECTION, SOLUTION INTRAVENOUS at 07:02

## 2025-02-13 RX ADMIN — ALBUTEROL SULFATE 10 MG: 2.5 SOLUTION RESPIRATORY (INHALATION) at 07:02

## 2025-02-13 RX ADMIN — METHYLPREDNISOLONE SODIUM SUCCINATE 125 MG: 125 INJECTION, POWDER, FOR SOLUTION INTRAMUSCULAR; INTRAVENOUS at 05:02

## 2025-02-13 RX ADMIN — IPRATROPIUM BROMIDE AND ALBUTEROL SULFATE 3 ML: 2.5; .5 SOLUTION RESPIRATORY (INHALATION) at 12:02

## 2025-02-13 NOTE — ASSESSMENT & PLAN NOTE
Hypoxia secondary to influenza with underlying COPD.  Placed on oxygen.  Treat influenza with Tamiflu and supportive care.  No wheezing on exam and not in COPD exacerbation.  Provide nebs while awake and continue home medications.  Treat underlying issues and hopefully wean off oxygen.

## 2025-02-13 NOTE — Clinical Note
Diagnosis: COPD exacerbation [151800]   Future Attending Provider: GEOVANNI LENZ [[id number]]   Requested Bed Type: Standard [1]   Reason for IP Medical Treatment  (Clinical interventions that can only be accomplished in the IP setting? ) :: Respiratory failure with hypoxemia   Plans for Post-Acute care--if anticipated (pick the single best option):: A. No post acute care anticipated at this time   Special Needs:: No Special Needs [1]

## 2025-02-13 NOTE — ASSESSMENT & PLAN NOTE
Patient's blood pressure range in the last 24 hours was: BP  Min: 85/53  Max: 152/77.The patient's inpatient anti-hypertensive regimen is listed below:  Current Antihypertensives  carvediloL tablet 25 mg, 2 times daily with meals, Oral  cloNIDine tablet 0.1 mg, 2 times daily, Oral  valsartan tablet 160 mg, Daily, Oral    Plan  - BP is controlled, no changes needed to their regimen

## 2025-02-13 NOTE — DISCHARGE SUMMARY
[facility] [place] Emergency Dept  [facility] Medicine  Discharge Summary      Patient Name: Jacqueline Manuel  MRN: [id number]  SHABBIR: [id number]  Patient Class: IP- Inpatient  Admission Date: 2/13/2025  Hospital Length of Stay: 0 days  Discharge Date and Time: No discharge date for patient encounter.  Attending Physician: Rayray Maldonado MD   Discharging Provider: Orlin Dacosta MD  Primary Care Provider: Sunny Pollard Jr., MD    Primary Care Team: Networked reference to record PCT     HPI:   54-year-old female brought to ED via EMS with chief complaint shortness of breath, hypoxia.  Per EMS report, patient with 3 day history of shortness of breath.  Initial SpO2 90%.  She has been using rescue inhalers at home without any improvement.  She was given 2 DuoNebs EN route with improvement of SpO2.  She was placed on 100% CPAP prior to ER arrival.  Patient was given Benadryl and Haldol earlier in ER.  She is currently very sleepy and unable to get thorough history.  She apparently complained of cough and shortness of breath over the past few days.  Stated fever, poor appetite and vomiting.  Tested positive for flu in ER.  She was hypoxic and admitted for further treatment.  No other complaints.    * No surgery found *      Hospital Course:   53 y/o female with COPD presented to SOB.  Found to have influenza and noted to be hypoxic.  Admitted for further treatment.  Started on Tamiflu.  Patient then decided that she did not want to be admitted.  Discussed risks of leaving AMA as patient still hypoxic.  She does not want to be admitted and left AMA.     Goals of Care Treatment Preferences:  Code Status: Full Code         Consults:     * Hypoxia  Hypoxia secondary to influenza with underlying COPD.  Placed on oxygen.  Treat influenza with Tamiflu and supportive care.  No wheezing on exam and not in COPD exacerbation.  Provide nebs while awake and continue home medications.  Treat underlying issues and hopefully wean off  oxygen.      Influenza A  Started on Tamiflu as above.      Tobacco dependency  Dangers of cigarette smoking were reviewed with patient in detail. Patient was Counseled for 3-10 minutes. Nicotine replacement options were discussed. Nicotine replacement was discussed- prescribed    COPD (chronic obstructive pulmonary disease)  Patient's COPD is controlled currently.  Patient is currently off COPD Pathway. Continue scheduled inhalers Supplemental oxygen and monitor respiratory status closely.     Essential hypertension  Patient's blood pressure range in the last 24 hours was: BP  Min: 85/53  Max: 152/77.The patient's inpatient anti-hypertensive regimen is listed below:  Current Antihypertensives  carvediloL tablet 25 mg, 2 times daily with meals, Oral  cloNIDine tablet 0.1 mg, 2 times daily, Oral  valsartan tablet 160 mg, Daily, Oral    Plan  - BP is controlled, no changes needed to their regimen      Final Active Diagnoses:    Diagnosis Date Noted POA    PRINCIPAL PROBLEM:  Hypoxia [R09.02] 02/13/2025 Yes    Tobacco dependency [F17.200] 02/13/2025 Yes    Influenza A [J10.1] 02/13/2025 Yes    COPD (chronic obstructive pulmonary disease) [J44.9] 03/02/2024 Yes    Essential hypertension [I10] 07/11/2018 Yes      Problems Resolved During this Admission:       Discharged Condition: against medical advice    Disposition:     Follow Up:    Patient Instructions:   No discharge procedures on file.    Significant Diagnostic Studies: N/A    Pending Diagnostic Studies:       None           Medications:  None    Indwelling Lines/Drains at time of discharge:   Lines/Drains/Airways       None                      Orlin Dacosta MD  Department of Hospital Medicine  [facility] - Emergency Dept

## 2025-02-13 NOTE — HOSPITAL COURSE
55 y/o female with COPD presented to SOB.  Found to have influenza and noted to be hypoxic.  Admitted for further treatment.  Started on Tamiflu.  Patient then decided that she did not want to be admitted.  Discussed risks of leaving AMA as patient still hypoxic.  She does not want to be admitted and left AMA.

## 2025-02-13 NOTE — SUBJECTIVE & OBJECTIVE
Past Medical History:   Diagnosis Date    COPD (chronic obstructive pulmonary disease)     Diabetes mellitus     Gout, unspecified     Hypertension        History reviewed. No pertinent surgical history.    Review of patient's allergies indicates:  No Known Allergies    No current facility-administered medications on file prior to encounter.     Current Outpatient Medications on File Prior to Encounter   Medication Sig    albuterol (PROVENTIL/VENTOLIN HFA) 90 mcg/actuation inhaler Inhale 1 to 2 puffs into the lungs every 6 (six) hours as needed for Wheezing or Shortness of Breath. Rescue    albuterol-ipratropium (DUO-NEB) 2.5 mg-0.5 mg/3 mL nebulizer solution Take 3 mLs by nebulization every 6 (six) hours as needed for Wheezing. Rescue    allopurinoL 200 mg Tab Take 200 mg by mouth once daily.    amLODIPine (NORVASC) 10 MG tablet Take 1 tablet (10 mg total) by mouth once daily.    aspirin (ECOTRIN) 81 MG EC tablet Take 1 tablet (81 mg total) by mouth once daily.    atorvastatin (LIPITOR) 40 MG tablet Take 1 tablet (40 mg total) by mouth once daily.    blood pressure monitor Kit 1 kit by Misc.(Non-Drug; Combo Route) route once daily. Use daily to check blood pressure    blood pressure monitor Kit 1 each by Misc.(Non-Drug; Combo Route) route 3 (three) times daily.    carvediloL (COREG) 25 MG tablet Take 1 tablet (25 mg total) by mouth 2 (two) times daily with meals.    cloNIDine (CATAPRES) 0.1 MG tablet Take 1 tablet (0.1 mg total) by mouth 2 (two) times daily. Hold if systolic blood pressure (top number) is less than 130.    EScitalopram oxalate (LEXAPRO) 10 MG tablet Take 1 tablet by mouth once daily.    gabapentin (NEURONTIN) 600 MG tablet Take 600 mg by mouth once daily.    hydrALAZINE (APRESOLINE) 25 MG tablet Take 1 tablet (25 mg total) by mouth every 8 (eight) hours.    hydroCHLOROthiazide (HYDRODIURIL) 25 MG tablet Take 25 mg by mouth once daily.    HYDROcodone-acetaminophen (NORCO)  mg per tablet Take 1  tablet by mouth every 4 (four) hours as needed for Pain.    pantoprazole (PROTONIX) 40 MG tablet Take 1 tablet (40 mg total) by mouth once daily. for 25 days    SYMBICORT 160-4.5 mcg/actuation HFAA Inhale 2 puffs into the lungs once daily.    valsartan (DIOVAN) 160 MG tablet Take 1 tablet (160 mg total) by mouth once daily.     Family History    None       Tobacco Use    Smoking status: Every Day     Types: Cigarettes    Smokeless tobacco: Never   Substance and Sexual Activity    Alcohol use: Yes     Comment: occasionally    Drug use: Yes     Types: Marijuana    Sexual activity: Not on file     Review of Systems   Constitutional:  Positive for fatigue and fever.   HENT:  Negative for ear discharge and ear pain.    Eyes:  Negative for discharge and itching.   Respiratory:  Positive for cough and shortness of breath.    Cardiovascular:  Negative for chest pain and palpitations.   Gastrointestinal:  Positive for vomiting. Negative for abdominal pain.   Endocrine: Negative for cold intolerance and heat intolerance.   Genitourinary:  Negative for difficulty urinating and dysuria.   Musculoskeletal:  Negative for neck pain and neck stiffness.   Skin:  Negative for rash and wound.   Neurological:  Negative for seizures and syncope.   Psychiatric/Behavioral:  Negative for agitation and hallucinations.      Objective:     Vital Signs (Most Recent):  Temp: 98.4 °F (36.9 °C) (02/13/25 0551)  Pulse: 80 (02/13/25 1300)  Resp: (!) 33 (02/13/25 1300)  BP: 113/67 (02/13/25 1233)  SpO2: 100 % (02/13/25 1245) Vital Signs (24h Range):  Temp:  [98.4 °F (36.9 °C)] 98.4 °F (36.9 °C)  Pulse:  [76-99] 80  Resp:  [13-43] 33  SpO2:  [85 %-100 %] 100 %  BP: ()/(53-80) 113/67     Weight: 72.6 kg (160 lb)  Body mass index is 23.63 kg/m².     Physical Exam  Constitutional:       Appearance: She is not toxic-appearing or diaphoretic.   HENT:      Head: Normocephalic and atraumatic.      Mouth/Throat:      Pharynx: Oropharynx is clear. No  "oropharyngeal exudate or posterior oropharyngeal erythema.   Cardiovascular:      Rate and Rhythm: Normal rate and regular rhythm.   Pulmonary:      Breath sounds: Normal breath sounds. No wheezing.   Abdominal:      General: Bowel sounds are normal.      Palpations: Abdomen is soft.   Musculoskeletal:         General: No deformity or signs of injury.   Skin:     General: Skin is warm and dry.   Neurological:      Mental Status: She is oriented to person, place, and time.      Cranial Nerves: No cranial nerve deficit.                Significant Labs: All pertinent labs within the past 24 hours have been reviewed.  BMP: No results for input(s): "GLU", "NA", "K", "CL", "CO2", "BUN", "CREATININE", "CALCIUM", "MG" in the last 48 hours.  CBC:   Recent Labs   Lab 02/13/25  0621   WBC 12.56   HGB 13.3   HCT 41.0          Significant Imaging: I have reviewed all pertinent imaging results/findings within the past 24 hours.  "

## 2025-02-13 NOTE — ED TRIAGE NOTES
Pt to ED from home via EMS with c/o due to shortness of breath x 3 days. Per EMS, pt was breathing 28-30 BMP, saturating at 90% on RA. Per pt's daughter, pt used her rescue inhaler with no relief. Pt placed on CPAP initially then was switched over to the duo neb, saturating at 100% with 14-17 BPM. Per pt's daughter, pt grunts while breathing on a regular basis.

## 2025-02-13 NOTE — H&P
[facility] Medicine  History & Physical    Patient Name: Jacqueline Manuel  MRN: [id number]  Patient Class: IP- Inpatient  Admission Date: 2/13/2025  Attending Physician: Rayray Maldonado MD   Primary Care Provider: Sunny Pollard Jr., MD         Patient information was obtained from ER records.     Subjective:     Principal Problem:Hypoxia    Chief Complaint:   Chief Complaint   Patient presents with    Shortness of Breath     BIB [facility] for SOB.         HPI: 54-year-old female brought to ED via EMS with chief complaint shortness of breath, hypoxia.  Per EMS report, patient with 3 day history of shortness of breath.  Initial SpO2 90%.  She has been using rescue inhalers at home without any improvement.  She was given 2 DuoNebs EN route with improvement of SpO2.  She was placed on 100% CPAP prior to ER arrival.  Patient was given Benadryl and Haldol earlier in ER.  She is currently very sleepy and unable to get thorough history.  She apparently complained of cough and shortness of breath over the past few days.  Stated fever, poor appetite and vomiting.  Tested positive for flu in ER.  She was hypoxic and admitted for further treatment.  No other complaints.    Past Medical History:   Diagnosis Date    COPD (chronic obstructive pulmonary disease)     Diabetes mellitus     Gout, unspecified     Hypertension        History reviewed. No pertinent surgical history.    Review of patient's allergies indicates:  No Known Allergies    No current facility-administered medications on file prior to encounter.     Current Outpatient Medications on File Prior to Encounter   Medication Sig    albuterol (PROVENTIL/VENTOLIN HFA) 90 mcg/actuation inhaler Inhale 1 to 2 puffs into the lungs every 6 (six) hours as needed for Wheezing or Shortness of Breath. Rescue    albuterol-ipratropium (DUO-NEB) 2.5 mg-0.5 mg/3 mL nebulizer solution Take 3 mLs by nebulization every 6 (six) hours as needed for Wheezing.  Rescue    allopurinoL 200 mg Tab Take 200 mg by mouth once daily.    amLODIPine (NORVASC) 10 MG tablet Take 1 tablet (10 mg total) by mouth once daily.    aspirin (ECOTRIN) 81 MG EC tablet Take 1 tablet (81 mg total) by mouth once daily.    atorvastatin (LIPITOR) 40 MG tablet Take 1 tablet (40 mg total) by mouth once daily.    blood pressure monitor Kit 1 kit by Misc.(Non-Drug; Combo Route) route once daily. Use daily to check blood pressure    blood pressure monitor Kit 1 each by Misc.(Non-Drug; Combo Route) route 3 (three) times daily.    carvediloL (COREG) 25 MG tablet Take 1 tablet (25 mg total) by mouth 2 (two) times daily with meals.    cloNIDine (CATAPRES) 0.1 MG tablet Take 1 tablet (0.1 mg total) by mouth 2 (two) times daily. Hold if systolic blood pressure (top number) is less than 130.    EScitalopram oxalate (LEXAPRO) 10 MG tablet Take 1 tablet by mouth once daily.    gabapentin (NEURONTIN) 600 MG tablet Take 600 mg by mouth once daily.    hydrALAZINE (APRESOLINE) 25 MG tablet Take 1 tablet (25 mg total) by mouth every 8 (eight) hours.    hydroCHLOROthiazide (HYDRODIURIL) 25 MG tablet Take 25 mg by mouth once daily.    HYDROcodone-acetaminophen (NORCO)  mg per tablet Take 1 tablet by mouth every 4 (four) hours as needed for Pain.    pantoprazole (PROTONIX) 40 MG tablet Take 1 tablet (40 mg total) by mouth once daily. for 25 days    SYMBICORT 160-4.5 mcg/actuation HFAA Inhale 2 puffs into the lungs once daily.    valsartan (DIOVAN) 160 MG tablet Take 1 tablet (160 mg total) by mouth once daily.     Family History    None       Tobacco Use    Smoking status: Every Day     Types: Cigarettes    Smokeless tobacco: Never   Substance and Sexual Activity    Alcohol use: Yes     Comment: occasionally    Drug use: Yes     Types: Marijuana    Sexual activity: Not on file     Review of Systems   Constitutional:  Positive for fatigue and fever.   HENT:  Negative for ear discharge and ear pain.    Eyes:   "Negative for discharge and itching.   Respiratory:  Positive for cough and shortness of breath.    Cardiovascular:  Negative for chest pain and palpitations.   Gastrointestinal:  Positive for vomiting. Negative for abdominal pain.   Endocrine: Negative for cold intolerance and heat intolerance.   Genitourinary:  Negative for difficulty urinating and dysuria.   Musculoskeletal:  Negative for neck pain and neck stiffness.   Skin:  Negative for rash and wound.   Neurological:  Negative for seizures and syncope.   Psychiatric/Behavioral:  Negative for agitation and hallucinations.      Objective:     Vital Signs (Most Recent):  Temp: 98.4 °F (36.9 °C) (02/13/25 0551)  Pulse: 80 (02/13/25 1300)  Resp: (!) 33 (02/13/25 1300)  BP: 113/67 (02/13/25 1233)  SpO2: 100 % (02/13/25 1245) Vital Signs (24h Range):  Temp:  [98.4 °F (36.9 °C)] 98.4 °F (36.9 °C)  Pulse:  [76-99] 80  Resp:  [13-43] 33  SpO2:  [85 %-100 %] 100 %  BP: ()/(53-80) 113/67     Weight: 72.6 kg (160 lb)  Body mass index is 23.63 kg/m².     Physical Exam  Constitutional:       Appearance: She is not toxic-appearing or diaphoretic.   HENT:      Head: Normocephalic and atraumatic.      Mouth/Throat:      Pharynx: Oropharynx is clear. No oropharyngeal exudate or posterior oropharyngeal erythema.   Cardiovascular:      Rate and Rhythm: Normal rate and regular rhythm.   Pulmonary:      Breath sounds: Normal breath sounds. No wheezing.   Abdominal:      General: Bowel sounds are normal.      Palpations: Abdomen is soft.   Musculoskeletal:         General: No deformity or signs of injury.   Skin:     General: Skin is warm and dry.   Neurological:      Mental Status: She is oriented to person, place, and time.      Cranial Nerves: No cranial nerve deficit.                Significant Labs: All pertinent labs within the past 24 hours have been reviewed.  BMP: No results for input(s): "GLU", "NA", "K", "CL", "CO2", "BUN", "CREATININE", "CALCIUM", "MG" in the last " 48 hours.  CBC:   Recent Labs   Lab 02/13/25  0621   WBC 12.56   HGB 13.3   HCT 41.0          Significant Imaging: I have reviewed all pertinent imaging results/findings within the past 24 hours.  Assessment/Plan:     * Hypoxia  Hypoxia secondary to influenza with underlying COPD.  Placed on oxygen.  Treat influenza with Tamiflu and supportive care.  No wheezing on exam and not in COPD exacerbation.  Provide nebs while awake and continue home medications.  Treat underlying issues and hopefully wean off oxygen.      Influenza A  Started on Tamiflu as above.      Tobacco dependency  Dangers of cigarette smoking were reviewed with patient in detail. Patient was Counseled for 3-10 minutes. Nicotine replacement options were discussed. Nicotine replacement was discussed- prescribed    COPD (chronic obstructive pulmonary disease)  Patient's COPD is controlled currently.  Patient is currently off COPD Pathway. Continue scheduled inhalers Supplemental oxygen and monitor respiratory status closely.     Essential hypertension  Patient's blood pressure range in the last 24 hours was: BP  Min: 85/53  Max: 152/77.The patient's inpatient anti-hypertensive regimen is listed below:  Current Antihypertensives  carvediloL tablet 25 mg, 2 times daily with meals, Oral  cloNIDine tablet 0.1 mg, 2 times daily, Oral  valsartan tablet 160 mg, Daily, Oral    Plan  - BP is controlled, no changes needed to their regimen      VTE Risk Mitigation (From admission, onward)           Ordered     IP VTE LOW RISK PATIENT  Once         02/13/25 1153     Place sequential compression device  Until discontinued         02/13/25 1153                                    Orlin Dacosta MD  Department of Hospital Medicine  [facility] - Emergency Dept

## 2025-02-13 NOTE — HPI
54-year-old female brought to ED via EMS with chief complaint shortness of breath, hypoxia.  Per EMS report, patient with 3 day history of shortness of breath.  Initial SpO2 90%.  She has been using rescue inhalers at home without any improvement.  She was given 2 DuoNebs EN route with improvement of SpO2.  She was placed on 100% CPAP prior to ER arrival.  Patient was given Benadryl and Haldol earlier in ER.  She is currently very sleepy and unable to get thorough history.  She apparently complained of cough and shortness of breath over the past few days.  Stated fever, poor appetite and vomiting.  Tested positive for flu in ER.  She was hypoxic and admitted for further treatment.  No other complaints.

## 2025-02-13 NOTE — ED NOTES
Pt and family remain insistent on leaving AMA. They all verbalized understanding of risks of leaving AMA. Pt signed ama form and left ER with family.

## 2025-02-13 NOTE — ED NOTES
Pt is awake and eating food provided by family. Pt states she would like to be discharged at this time. MD Dacosta notified via secure chat

## 2025-02-13 NOTE — ED PROVIDER NOTES
Encounter Date: 2/13/2025       History     Chief Complaint   Patient presents with    Shortness of Breath     BIB [facility] for SOB.      54-year-old female brought to ED via EMS with chief complaint shortness of breath, hypoxia.    Per EMS report, patient with 3 day history of shortness of breath.  Initial SpO2 90%.  She has been using rescue inhalers at home without any improvement.  She was given 2 DuoNebs EN route with improvement of SpO2.  She has been 100% on CPAP prior to ED arrival.     Patient admits to cough and shortness of breath over the past few days.  States mild fever.  She admits to poor appetite and intake over the past few days.  Admits to an episode of emesis yesterday.  No abdominal pain.  No urinary complaints.  Admits to grandchildren with URI symptoms.  Denies any other sick contacts.  Denies recent illness.  Has been using albuterol inhaler without improvement.    No exogenous estrogen.  No unilateral leg swelling or calf pain.  No history of VTE.    PMH:  COPD  HTN  Afib  CKD 3B  Hx CVA  Gout  History of polysubstance abuse  Insomnia  Chronic bilateral low back pain  Vitamin-D deficiency  Osteoarthritis      TTE 05/20/2024:   ·  Left Ventricle: The left ventricle is normal in size. Mildly increased wall thickness. There is mild concentric hypertrophy. There is low normal systolic function with a visually estimated ejection fraction of 50 - 55%. There is diastolic dysfunction but grade cannot be determined.  ·  Right Ventricle: Normal right ventricular cavity size. Systolic function is normal.  ·  Aortic Valve: There is mild to moderate aortic regurgitation.  ·  Mitral Valve: There is mild regurgitation.        Review of patient's allergies indicates:  No Known Allergies  Past Medical History:   Diagnosis Date    COPD (chronic obstructive pulmonary disease)     Diabetes mellitus     Gout, unspecified     Hypertension      History reviewed. No pertinent surgical history.  No family history  on file.  Social History     Tobacco Use    Smoking status: Every Day     Types: Cigarettes    Smokeless tobacco: Never   Substance Use Topics    Alcohol use: Yes     Comment: occasionally    Drug use: Yes     Types: Marijuana     Review of Systems   Constitutional:  Positive for appetite change and fever.   Respiratory:  Positive for cough, chest tightness and shortness of breath.    Cardiovascular:  Negative for leg swelling.   Gastrointestinal:  Positive for vomiting. Negative for abdominal pain.   Genitourinary:  Negative for dysuria.   Neurological:  Negative for syncope.       Physical Exam     Initial Vitals   BP Pulse Resp Temp SpO2   02/13/25 0551 02/13/25 0549 02/13/25 0549 02/13/25 0551 02/13/25 0549   136/80 97 (!) 23 98.4 °F (36.9 °C) 97 %      MAP       --                Physical Exam    Nursing note and vitals reviewed.  Constitutional: She appears well-developed and well-nourished. She is not diaphoretic. She appears distressed.   Uncomfortable, nontoxic.  Speaking in brief few word sentences.  Answering questions appropriately.   HENT:   Head: Normocephalic and atraumatic.   Neck: Neck supple.   Normal range of motion.  Cardiovascular:            Sinus tachycardia.  No pretibial edema.  No unilateral leg swelling or calf tenderness.   Pulmonary/Chest: She is in respiratory distress.   Decreased breath sounds throughout both lung zones.    Bedside ultrasound without B lines   Abdominal: Abdomen is soft. There is no abdominal tenderness.   Musculoskeletal:         General: Normal range of motion.      Cervical back: Normal range of motion and neck supple.     Neurological: She is alert and oriented to person, place, and time.   Skin: Skin is warm.   Psychiatric:   Anxious, restless         ED Course   Critical Care    Date/Time: 2/13/2025 10:02 AM    Performed by: Varinder Lynn Jr., MD  Authorized by: Varinder Lynn Jr., MD  Direct patient critical care time: 15 minutes  Additional history  critical care time: 10 minutes  Ordering / reviewing critical care time: 10 minutes  Documentation critical care time: 5 minutes  Consulting other physicians critical care time: 5 minutes  Total critical care time (exclusive of procedural time) : 45 minutes  Critical care was necessary to treat or prevent imminent or life-threatening deterioration of the following conditions: respiratory failure.  Critical care was time spent personally by me on the following activities: development of treatment plan with patient or surrogate, discussions with consultants, review of old charts, re-evaluation of patient's condition, pulse oximetry, ordering and review of radiographic studies, ordering and review of laboratory studies, ordering and performing treatments and interventions, examination of patient, interpretation of cardiac output measurements, evaluation of patient's response to treatment and obtaining history from patient or surrogate.        Labs Reviewed   CBC W/ AUTO DIFFERENTIAL - Abnormal       Result Value    WBC 12.56      RBC 4.46      Hemoglobin 13.3      Hematocrit 41.0      MCV 92      MCH 29.8      MCHC 32.4      RDW 13.1      Platelets 151      MPV 10.0      Immature Granulocytes 0.7 (*)     Gran # (ANC) 8.7 (*)     Immature Grans (Abs) 0.09 (*)     Lymph # 2.0      Mono # 1.7 (*)     Eos # 0.0      Baso # 0.03      nRBC 0      Gran % 69.1      Lymph % 16.0 (*)     Mono % 13.8      Eosinophil % 0.2      Basophil % 0.2      Differential Method Automated     POCT INFLUENZA A/B MOLECULAR - Abnormal    POC Molecular Influenza A Ag Positive (*)     POC Molecular Influenza B Ag Negative       Acceptable Yes     ISTAT PROCEDURE - Abnormal    POC PH 7.362      POC PCO2 47.5 (*)     POC PO2 46      POC HCO3 26.9      POC BE 1      POC SATURATED O2 79      POC TCO2 28      Sample VENOUS      Site Other      Allens Test N/A      DelSys Room Air      Mode SPONT      Sp02 98     TROPONIN I    Troponin I  "0.022     B-TYPE NATRIURETIC PEPTIDE    BNP 17     ALCOHOL,MEDICAL (ETHANOL)    Alcohol, Serum <10     DRUG SCREEN PANEL, URINE EMERGENCY   SARS-COV-2 RDRP GENE    POC Rapid COVID Negative       Acceptable Yes       EKG Readings: (Independently Interpreted)   Sinus tachycardia, ventricular rate 98 beats per minute.  Normal IL, normal QT, normal QRS duration.  No right axis deviation.  No convincing ST elevation.  Hyper peaked T-waves parents.  Inverted T-wave aVL, V1 V2.  T-waves appear more peaked, prominent compared to previous, otherwise EKG appears grossly similar previous.       Imaging Results              X-Ray Chest AP Portable (Final result)  Result time 02/13/25 07:46:58      Final result by Radames Cortes MD (02/13/25 07:46:58)                   Impression:      No acute cardiopulmonary finding identified on this single view.      Electronically signed by: Radames Cortes MD  Date:    02/13/2025  Time:    07:46               Narrative:    EXAMINATION:  XR CHEST AP PORTABLE    CLINICAL HISTORY:  Provided history is "Asthma;  ".    TECHNIQUE:  One view of the chest.    COMPARISON:  01/26/2025.    FINDINGS:  Cardiac wires overlie the chest.  Cardiac silhouette is not enlarged.  Mild pulmonary hyperinflation.  No focal consolidation.  No sizable pleural effusion.  No pneumothorax.                                    X-Rays:   Independently Interpreted Readings:   Chest X-Ray: Personal interpretation:  No significant cardiomegaly, no convincing effusion, no obvious pneumothorax, no convincing dense peripheral lobar consolidation, flattened diaphragms.   Other Readings:  Chest x-ray reveals no evidence of acute infiltrate, consolidation, pleural effusion, pulmonary edema, or pneumothorax.    Medications   albuterol sulfate nebulizer solution 10 mg (10 mg Nebulization New Bag 2/13/25 0745)   albuterol-ipratropium 2.5 mg-0.5 mg/3 mL nebulizer solution 3 mL (3 mLs Nebulization Given 2/13/25 " 9252)   methylPREDNISolone sodium succinate injection 125 mg (125 mg Intravenous Given 2/13/25 4832)   diphenhydrAMINE injection 25 mg (25 mg Intramuscular Given 2/13/25 4388)   haloperidol lactate injection 2.5 mg (2.5 mg Intravenous Given 2/13/25 0625)   sodium chloride 0.9% bolus 1,000 mL 1,000 mL (0 mLs Intravenous Stopped 2/13/25 4878)   oseltamivir capsule 75 mg (75 mg Oral Given 2/13/25 8255)     Medical Decision Making  Differential diagnosis:  COPD exacerbation, hypertensive emergency, ACS, PE, asthma exacerbation, medication noncompliance, community-acquired pneumonia.    6:51 AM this is Dr. Lynn.  I took over care of this patient at shift change.  This patient presents with likely COPD exacerbation.  Poor air movement upon arrival.  Coughing for the past 2 days.  Has been seen in the past for COPD exacerbation and hypertensive emergency.  No concern for hypertensive emergency today.  Chest x-ray clear.  No leukocytosis or anemia.  Normal platelet count.  Patient not on home O2.  Hypoxemic on RA after initial duoneb treatments.  Put on 3 L of oxygen with oxygen saturations are 97%.  Will order continuous albuterol nebulizing treatment.  Troponin within normal limits.  BNP within normal limits.  Influenza a positive.  COVID negative.  VBG with pH of 7.36 and CO2 47.  Ethanol undetectable.    When attempted to ambulate patient, oxygen saturations 84%.  When she returned to the room at rest oxygen saturation still in the high 80 percentile range.  She was placed on 2 L of nasal cannula oxygen when I have improvement.  Despite appropriate therapies in the emergency department, continues to experience hypoxemia.  Case discussed with hospital medicine who will admit the patient.    Amount and/or Complexity of Data Reviewed  External Data Reviewed: labs, radiology, ECG and notes.  Labs: ordered. Decision-making details documented in ED Course.  Radiology: ordered and independent interpretation performed.  Decision-making details documented in ED Course.  ECG/medicine tests: ordered and independent interpretation performed. Decision-making details documented in ED Course.  Discussion of management or test interpretation with external provider(s): Decreased breath sounds throughout both lung zones, SpO2 100% on DuoNeb on initial evaluation.  We will assess oxygenation, evaluate for improvement following DuoNeb treatments in the ED. obviously dyspneic, but mentating normally.  Normotensive.    Risk  Prescription drug management.               ED Course as of 02/13/25 1003   Thu Feb 13, 2025   0629 SpO2 (2-100% on RA currently. VBG reassuring. Will continue to monitor for increased O2 demand. Handed off to AM provider pending labs. [SM]   0643 On repeat exam, SpO2 88-90% on RA, 97% on 3L NC.  [SM]      ED Course User Index  [SM] Mike Stevenson, KELIN                           Clinical Impression:  Final diagnoses:  [R06.02] SOB (shortness of breath)  [J44.1] COPD exacerbation (Primary)  [R09.02] Hypoxia  [J10.1] Influenza Varinder Ballesteros Jr., MD  02/13/25 1003

## 2025-02-13 NOTE — ED NOTES
Assumed care for this patient. She is lethargic but awakes to voice. Pt has slurred speech and can only state her name and birthday. Pt received benadryl and haldol several hours ago. 100% 2L. Respirations even and unlabored. Family is at the bedside. Will continue to monitor

## 2025-02-15 ENCOUNTER — HOSPITAL ENCOUNTER (INPATIENT)
Facility: HOSPITAL | Age: 55
LOS: 3 days | Discharge: HOME | DRG: 193 | End: 2025-02-19
Attending: STUDENT IN AN ORGANIZED HEALTH CARE EDUCATION/TRAINING PROGRAM | Admitting: STUDENT IN AN ORGANIZED HEALTH CARE EDUCATION/TRAINING PROGRAM
Payer: MEDICAID

## 2025-02-15 DIAGNOSIS — R06.02 SOB (SHORTNESS OF BREATH): ICD-10-CM

## 2025-02-15 DIAGNOSIS — N18.31 STAGE 3A CHRONIC KIDNEY DISEASE: ICD-10-CM

## 2025-02-15 DIAGNOSIS — R09.02 HYPOXIA: ICD-10-CM

## 2025-02-15 DIAGNOSIS — J10.1 INFLUENZA A: Primary | ICD-10-CM

## 2025-02-15 DIAGNOSIS — J44.9 CHRONIC OBSTRUCTIVE PULMONARY DISEASE, UNSPECIFIED COPD TYPE: ICD-10-CM

## 2025-02-15 DIAGNOSIS — R07.9 CHEST PAIN: ICD-10-CM

## 2025-02-15 LAB
ALBUMIN SERPL BCP-MCNC: 2.7 G/DL (ref 3.5–5.2)
ALLENS TEST: ABNORMAL
ALP SERPL-CCNC: 85 U/L (ref 40–150)
ALT SERPL W/O P-5'-P-CCNC: 7 U/L (ref 10–44)
ANION GAP SERPL CALC-SCNC: 12 MMOL/L (ref 8–16)
AST SERPL-CCNC: 14 U/L (ref 10–40)
BASOPHILS # BLD AUTO: 0.01 K/UL (ref 0–0.2)
BASOPHILS NFR BLD: 0.1 % (ref 0–1.9)
BILIRUB SERPL-MCNC: 0.3 MG/DL (ref 0.1–1)
BNP SERPL-MCNC: 22 PG/ML (ref 0–99)
BUN SERPL-MCNC: 47 MG/DL (ref 6–20)
CALCIUM SERPL-MCNC: 8.8 MG/DL (ref 8.7–10.5)
CHLORIDE SERPL-SCNC: 109 MMOL/L (ref 95–110)
CO2 SERPL-SCNC: 20 MMOL/L (ref 23–29)
CREAT SERPL-MCNC: 3.5 MG/DL (ref 0.5–1.4)
CTP QC/QA: YES
CTP QC/QA: YES
DELSYS: ABNORMAL
DIFFERENTIAL METHOD BLD: NORMAL
EOSINOPHIL # BLD AUTO: 0 K/UL (ref 0–0.5)
EOSINOPHIL NFR BLD: 0.5 % (ref 0–8)
EP: 8
ERYTHROCYTE [DISTWIDTH] IN BLOOD BY AUTOMATED COUNT: 13.1 % (ref 11.5–14.5)
ERYTHROCYTE [SEDIMENTATION RATE] IN BLOOD BY WESTERGREN METHOD: 12 MM/H
EST. GFR  (NO RACE VARIABLE): 15 ML/MIN/1.73 M^2
FIO2: 25
GLUCOSE SERPL-MCNC: 74 MG/DL (ref 70–110)
HCO3 UR-SCNC: 26.1 MMOL/L (ref 24–28)
HCT VFR BLD AUTO: 39.4 % (ref 37–48.5)
HGB BLD-MCNC: 12.9 G/DL (ref 12–16)
IMM GRANULOCYTES # BLD AUTO: 0.02 K/UL (ref 0–0.04)
IMM GRANULOCYTES NFR BLD AUTO: 0.2 % (ref 0–0.5)
IP: 16
LYMPHOCYTES # BLD AUTO: 1.8 K/UL (ref 1–4.8)
LYMPHOCYTES NFR BLD: 20.6 % (ref 18–48)
MCH RBC QN AUTO: 30 PG (ref 27–31)
MCHC RBC AUTO-ENTMCNC: 32.7 G/DL (ref 32–36)
MCV RBC AUTO: 92 FL (ref 82–98)
MIN VOL: 8.4
MODE: ABNORMAL
MONOCYTES # BLD AUTO: 0.6 K/UL (ref 0.3–1)
MONOCYTES NFR BLD: 6.9 % (ref 4–15)
NEUTROPHILS # BLD AUTO: 6.3 K/UL (ref 1.8–7.7)
NEUTROPHILS NFR BLD: 71.7 % (ref 38–73)
NRBC BLD-RTO: 0 /100 WBC
PCO2 BLDA: 52 MMHG (ref 35–45)
PH SMN: 7.31 [PH] (ref 7.35–7.45)
PLATELET # BLD AUTO: 163 K/UL (ref 150–450)
PMV BLD AUTO: 9.6 FL (ref 9.2–12.9)
PO2 BLDA: 31 MMHG (ref 40–60)
POC BE: -1 MMOL/L
POC MOLECULAR INFLUENZA A AGN: POSITIVE
POC MOLECULAR INFLUENZA B AGN: NEGATIVE
POC SATURATED O2: 53 % (ref 95–100)
POC TCO2: 28 MMOL/L (ref 24–29)
POTASSIUM SERPL-SCNC: 4.2 MMOL/L (ref 3.5–5.1)
PROT SERPL-MCNC: 7.1 G/DL (ref 6–8.4)
RBC # BLD AUTO: 4.3 M/UL (ref 4–5.4)
SAMPLE: ABNORMAL
SARS-COV-2 RDRP RESP QL NAA+PROBE: NEGATIVE
SITE: ABNORMAL
SODIUM SERPL-SCNC: 141 MMOL/L (ref 136–145)
SP02: 94
SPONT RATE: 10
TROPONIN I SERPL DL<=0.01 NG/ML-MCNC: 0.01 NG/ML (ref 0–0.03)
WBC # BLD AUTO: 8.83 K/UL (ref 3.9–12.7)

## 2025-02-15 PROCEDURE — 5A09357 ASSISTANCE WITH RESPIRATORY VENTILATION, LESS THAN 24 CONSECUTIVE HOURS, CONTINUOUS POSITIVE AIRWAY PRESSURE: ICD-10-PCS | Performed by: HOSPITALIST

## 2025-02-15 PROCEDURE — 25000242 PHARM REV CODE 250 ALT 637 W/ HCPCS: Performed by: STUDENT IN AN ORGANIZED HEALTH CARE EDUCATION/TRAINING PROGRAM

## 2025-02-15 PROCEDURE — 80053 COMPREHEN METABOLIC PANEL: CPT | Performed by: STUDENT IN AN ORGANIZED HEALTH CARE EDUCATION/TRAINING PROGRAM

## 2025-02-15 PROCEDURE — 87635 SARS-COV-2 COVID-19 AMP PRB: CPT | Performed by: STUDENT IN AN ORGANIZED HEALTH CARE EDUCATION/TRAINING PROGRAM

## 2025-02-15 PROCEDURE — 99900035 HC TECH TIME PER 15 MIN (STAT)

## 2025-02-15 PROCEDURE — 85025 COMPLETE CBC W/AUTO DIFF WBC: CPT | Performed by: STUDENT IN AN ORGANIZED HEALTH CARE EDUCATION/TRAINING PROGRAM

## 2025-02-15 PROCEDURE — 94640 AIRWAY INHALATION TREATMENT: CPT

## 2025-02-15 PROCEDURE — 83880 ASSAY OF NATRIURETIC PEPTIDE: CPT | Performed by: STUDENT IN AN ORGANIZED HEALTH CARE EDUCATION/TRAINING PROGRAM

## 2025-02-15 PROCEDURE — 94660 CPAP INITIATION&MGMT: CPT

## 2025-02-15 PROCEDURE — 84484 ASSAY OF TROPONIN QUANT: CPT | Performed by: STUDENT IN AN ORGANIZED HEALTH CARE EDUCATION/TRAINING PROGRAM

## 2025-02-15 PROCEDURE — 94761 N-INVAS EAR/PLS OXIMETRY MLT: CPT | Mod: XB

## 2025-02-15 PROCEDURE — 93005 ELECTROCARDIOGRAM TRACING: CPT

## 2025-02-15 PROCEDURE — 87502 INFLUENZA DNA AMP PROBE: CPT

## 2025-02-15 PROCEDURE — 27000190 HC CPAP FULL FACE MASK W/VALVE

## 2025-02-15 PROCEDURE — 27000221 HC OXYGEN, UP TO 24 HOURS

## 2025-02-15 PROCEDURE — 94799 UNLISTED PULMONARY SVC/PX: CPT

## 2025-02-15 PROCEDURE — 96374 THER/PROPH/DIAG INJ IV PUSH: CPT

## 2025-02-15 PROCEDURE — 82803 BLOOD GASES ANY COMBINATION: CPT

## 2025-02-15 PROCEDURE — 63600175 PHARM REV CODE 636 W HCPCS: Mod: JZ,TB | Performed by: STUDENT IN AN ORGANIZED HEALTH CARE EDUCATION/TRAINING PROGRAM

## 2025-02-15 RX ORDER — IPRATROPIUM BROMIDE AND ALBUTEROL SULFATE 2.5; .5 MG/3ML; MG/3ML
9 SOLUTION RESPIRATORY (INHALATION) ONCE
Status: COMPLETED | OUTPATIENT
Start: 2025-02-15 | End: 2025-02-15

## 2025-02-15 RX ORDER — METHYLPREDNISOLONE SOD SUCC 125 MG
125 VIAL (EA) INJECTION
Status: COMPLETED | OUTPATIENT
Start: 2025-02-15 | End: 2025-02-15

## 2025-02-15 RX ORDER — IPRATROPIUM BROMIDE AND ALBUTEROL SULFATE 2.5; .5 MG/3ML; MG/3ML
3 SOLUTION RESPIRATORY (INHALATION)
Status: DISCONTINUED | OUTPATIENT
Start: 2025-02-15 | End: 2025-02-15

## 2025-02-15 RX ADMIN — IPRATROPIUM BROMIDE AND ALBUTEROL SULFATE 9 ML: 2.5; .5 SOLUTION RESPIRATORY (INHALATION) at 08:02

## 2025-02-15 RX ADMIN — METHYLPREDNISOLONE SODIUM SUCCINATE 125 MG: 125 INJECTION, POWDER, FOR SOLUTION INTRAMUSCULAR; INTRAVENOUS at 09:02

## 2025-02-16 PROBLEM — N18.9 ACUTE KIDNEY INJURY SUPERIMPOSED ON CHRONIC KIDNEY DISEASE: Status: ACTIVE | Noted: 2024-03-02

## 2025-02-16 PROBLEM — J96.01 ACUTE RESPIRATORY FAILURE WITH HYPOXIA: Status: ACTIVE | Noted: 2023-11-16

## 2025-02-16 LAB
ALLENS TEST: ABNORMAL
ANION GAP SERPL CALC-SCNC: 12 MMOL/L (ref 8–16)
BASOPHILS # BLD AUTO: 0.01 K/UL (ref 0–0.2)
BASOPHILS NFR BLD: 0.1 % (ref 0–1.9)
BUN SERPL-MCNC: 39 MG/DL (ref 6–20)
CALCIUM SERPL-MCNC: 9.1 MG/DL (ref 8.7–10.5)
CHLORIDE SERPL-SCNC: 106 MMOL/L (ref 95–110)
CO2 SERPL-SCNC: 22 MMOL/L (ref 23–29)
CREAT SERPL-MCNC: 2.8 MG/DL (ref 0.5–1.4)
DELSYS: ABNORMAL
DIFFERENTIAL METHOD BLD: ABNORMAL
EOSINOPHIL # BLD AUTO: 0 K/UL (ref 0–0.5)
EOSINOPHIL NFR BLD: 0 % (ref 0–8)
ERYTHROCYTE [DISTWIDTH] IN BLOOD BY AUTOMATED COUNT: 12.9 % (ref 11.5–14.5)
EST. GFR  (NO RACE VARIABLE): 20 ML/MIN/1.73 M^2
FIO2: 21
GLUCOSE SERPL-MCNC: 154 MG/DL (ref 70–110)
HCO3 UR-SCNC: 29 MMOL/L (ref 24–28)
HCT VFR BLD AUTO: 36.1 % (ref 37–48.5)
HGB BLD-MCNC: 11.9 G/DL (ref 12–16)
IMM GRANULOCYTES # BLD AUTO: 0.06 K/UL (ref 0–0.04)
IMM GRANULOCYTES NFR BLD AUTO: 0.9 % (ref 0–0.5)
LYMPHOCYTES # BLD AUTO: 1 K/UL (ref 1–4.8)
LYMPHOCYTES NFR BLD: 13.8 % (ref 18–48)
MAGNESIUM SERPL-MCNC: 1.8 MG/DL (ref 1.6–2.6)
MCH RBC QN AUTO: 29.9 PG (ref 27–31)
MCHC RBC AUTO-ENTMCNC: 33 G/DL (ref 32–36)
MCV RBC AUTO: 91 FL (ref 82–98)
MODE: ABNORMAL
MONOCYTES # BLD AUTO: 0.1 K/UL (ref 0.3–1)
MONOCYTES NFR BLD: 1.4 % (ref 4–15)
NEUTROPHILS # BLD AUTO: 5.8 K/UL (ref 1.8–7.7)
NEUTROPHILS NFR BLD: 83.8 % (ref 38–73)
NRBC BLD-RTO: 0 /100 WBC
PCO2 BLDA: 39.6 MMHG (ref 35–45)
PH SMN: 7.47 [PH] (ref 7.35–7.45)
PHOSPHATE SERPL-MCNC: 3 MG/DL (ref 2.7–4.5)
PLATELET # BLD AUTO: 160 K/UL (ref 150–450)
PMV BLD AUTO: 9.9 FL (ref 9.2–12.9)
PO2 BLDA: 65 MMHG (ref 80–100)
POC BE: 5 MMOL/L
POC SATURATED O2: 94 % (ref 95–100)
POC TCO2: 30 MMOL/L (ref 23–27)
POCT GLUCOSE: 140 MG/DL (ref 70–110)
POCT GLUCOSE: 147 MG/DL (ref 70–110)
POCT GLUCOSE: 150 MG/DL (ref 70–110)
POTASSIUM SERPL-SCNC: 4.5 MMOL/L (ref 3.5–5.1)
RBC # BLD AUTO: 3.98 M/UL (ref 4–5.4)
SAMPLE: ABNORMAL
SITE: ABNORMAL
SODIUM SERPL-SCNC: 140 MMOL/L (ref 136–145)
WBC # BLD AUTO: 6.97 K/UL (ref 3.9–12.7)

## 2025-02-16 PROCEDURE — 99285 EMERGENCY DEPT VISIT HI MDM: CPT | Mod: 25

## 2025-02-16 PROCEDURE — 83735 ASSAY OF MAGNESIUM: CPT | Performed by: STUDENT IN AN ORGANIZED HEALTH CARE EDUCATION/TRAINING PROGRAM

## 2025-02-16 PROCEDURE — 63600175 PHARM REV CODE 636 W HCPCS: Performed by: STUDENT IN AN ORGANIZED HEALTH CARE EDUCATION/TRAINING PROGRAM

## 2025-02-16 PROCEDURE — 94761 N-INVAS EAR/PLS OXIMETRY MLT: CPT

## 2025-02-16 PROCEDURE — 99900035 HC TECH TIME PER 15 MIN (STAT)

## 2025-02-16 PROCEDURE — 25000003 PHARM REV CODE 250: Performed by: STUDENT IN AN ORGANIZED HEALTH CARE EDUCATION/TRAINING PROGRAM

## 2025-02-16 PROCEDURE — 94640 AIRWAY INHALATION TREATMENT: CPT

## 2025-02-16 PROCEDURE — 36600 WITHDRAWAL OF ARTERIAL BLOOD: CPT

## 2025-02-16 PROCEDURE — 27000221 HC OXYGEN, UP TO 24 HOURS

## 2025-02-16 PROCEDURE — 63600175 PHARM REV CODE 636 W HCPCS: Mod: JZ,TB | Performed by: INTERNAL MEDICINE

## 2025-02-16 PROCEDURE — 85025 COMPLETE CBC W/AUTO DIFF WBC: CPT | Performed by: STUDENT IN AN ORGANIZED HEALTH CARE EDUCATION/TRAINING PROGRAM

## 2025-02-16 PROCEDURE — 25000242 PHARM REV CODE 250 ALT 637 W/ HCPCS: Performed by: INTERNAL MEDICINE

## 2025-02-16 PROCEDURE — 25000242 PHARM REV CODE 250 ALT 637 W/ HCPCS: Performed by: STUDENT IN AN ORGANIZED HEALTH CARE EDUCATION/TRAINING PROGRAM

## 2025-02-16 PROCEDURE — 94664 DEMO&/EVAL PT USE INHALER: CPT

## 2025-02-16 PROCEDURE — 80048 BASIC METABOLIC PNL TOTAL CA: CPT | Performed by: STUDENT IN AN ORGANIZED HEALTH CARE EDUCATION/TRAINING PROGRAM

## 2025-02-16 PROCEDURE — 94660 CPAP INITIATION&MGMT: CPT

## 2025-02-16 PROCEDURE — 99900031 HC PATIENT EDUCATION (STAT)

## 2025-02-16 PROCEDURE — 27000190 HC CPAP FULL FACE MASK W/VALVE

## 2025-02-16 PROCEDURE — 84100 ASSAY OF PHOSPHORUS: CPT | Performed by: STUDENT IN AN ORGANIZED HEALTH CARE EDUCATION/TRAINING PROGRAM

## 2025-02-16 PROCEDURE — 12000002 HC ACUTE/MED SURGE SEMI-PRIVATE ROOM

## 2025-02-16 PROCEDURE — 82803 BLOOD GASES ANY COMBINATION: CPT

## 2025-02-16 RX ORDER — IBUPROFEN 200 MG
16 TABLET ORAL
Status: DISCONTINUED | OUTPATIENT
Start: 2025-02-16 | End: 2025-02-19 | Stop reason: HOSPADM

## 2025-02-16 RX ORDER — ESCITALOPRAM OXALATE 10 MG/1
10 TABLET ORAL DAILY
Status: DISCONTINUED | OUTPATIENT
Start: 2025-02-16 | End: 2025-02-19 | Stop reason: HOSPADM

## 2025-02-16 RX ORDER — SODIUM CHLORIDE, SODIUM LACTATE, POTASSIUM CHLORIDE, CALCIUM CHLORIDE 600; 310; 30; 20 MG/100ML; MG/100ML; MG/100ML; MG/100ML
INJECTION, SOLUTION INTRAVENOUS CONTINUOUS
Status: ACTIVE | OUTPATIENT
Start: 2025-02-16 | End: 2025-02-16

## 2025-02-16 RX ORDER — BISACODYL 10 MG/1
10 SUPPOSITORY RECTAL DAILY PRN
Status: DISCONTINUED | OUTPATIENT
Start: 2025-02-16 | End: 2025-02-19 | Stop reason: HOSPADM

## 2025-02-16 RX ORDER — LEVETIRACETAM 500 MG/1
500 TABLET ORAL 2 TIMES DAILY
Status: ON HOLD | COMMUNITY
Start: 2025-01-12 | End: 2025-02-18 | Stop reason: HOSPADM

## 2025-02-16 RX ORDER — CLONIDINE HYDROCHLORIDE 0.1 MG/1
0.1 TABLET ORAL 2 TIMES DAILY PRN
Status: DISCONTINUED | OUTPATIENT
Start: 2025-02-16 | End: 2025-02-17

## 2025-02-16 RX ORDER — HYDRALAZINE HYDROCHLORIDE 25 MG/1
25 TABLET, FILM COATED ORAL EVERY 8 HOURS
Status: DISCONTINUED | OUTPATIENT
Start: 2025-02-16 | End: 2025-02-16

## 2025-02-16 RX ORDER — TALC
6 POWDER (GRAM) TOPICAL NIGHTLY PRN
Status: DISCONTINUED | OUTPATIENT
Start: 2025-02-16 | End: 2025-02-19 | Stop reason: HOSPADM

## 2025-02-16 RX ORDER — PANTOPRAZOLE SODIUM 40 MG/1
40 TABLET, DELAYED RELEASE ORAL DAILY
Status: DISCONTINUED | OUTPATIENT
Start: 2025-02-16 | End: 2025-02-19 | Stop reason: HOSPADM

## 2025-02-16 RX ORDER — ACETAMINOPHEN 325 MG/1
650 TABLET ORAL EVERY 8 HOURS PRN
Status: DISCONTINUED | OUTPATIENT
Start: 2025-02-16 | End: 2025-02-16

## 2025-02-16 RX ORDER — CEFTRIAXONE 1 G/1
1 INJECTION, POWDER, FOR SOLUTION INTRAMUSCULAR; INTRAVENOUS
Status: DISCONTINUED | OUTPATIENT
Start: 2025-02-16 | End: 2025-02-17

## 2025-02-16 RX ORDER — SODIUM,POTASSIUM PHOSPHATES 280-250MG
2 POWDER IN PACKET (EA) ORAL
Status: DISCONTINUED | OUTPATIENT
Start: 2025-02-16 | End: 2025-02-16

## 2025-02-16 RX ORDER — METHYLPREDNISOLONE SOD SUCC 125 MG
60 VIAL (EA) INJECTION
Status: DISCONTINUED | OUTPATIENT
Start: 2025-02-16 | End: 2025-02-18

## 2025-02-16 RX ORDER — HYDROCHLOROTHIAZIDE 25 MG/1
25 TABLET ORAL DAILY
Status: DISCONTINUED | OUTPATIENT
Start: 2025-02-16 | End: 2025-02-18

## 2025-02-16 RX ORDER — SODIUM CHLORIDE 0.9 % (FLUSH) 0.9 %
10 SYRINGE (ML) INJECTION EVERY 12 HOURS PRN
Status: DISCONTINUED | OUTPATIENT
Start: 2025-02-16 | End: 2025-02-19 | Stop reason: HOSPADM

## 2025-02-16 RX ORDER — ACETAMINOPHEN 325 MG/1
650 TABLET ORAL EVERY 4 HOURS PRN
Status: DISCONTINUED | OUTPATIENT
Start: 2025-02-16 | End: 2025-02-16

## 2025-02-16 RX ORDER — PREDNISONE 20 MG/1
40 TABLET ORAL DAILY
Status: DISCONTINUED | OUTPATIENT
Start: 2025-02-16 | End: 2025-02-16

## 2025-02-16 RX ORDER — INSULIN ASPART 100 [IU]/ML
0-5 INJECTION, SOLUTION INTRAVENOUS; SUBCUTANEOUS
Status: DISCONTINUED | OUTPATIENT
Start: 2025-02-16 | End: 2025-02-19 | Stop reason: HOSPADM

## 2025-02-16 RX ORDER — VALSARTAN 80 MG/1
160 TABLET ORAL DAILY
Status: DISCONTINUED | OUTPATIENT
Start: 2025-02-16 | End: 2025-02-16

## 2025-02-16 RX ORDER — HYDRALAZINE HYDROCHLORIDE 25 MG/1
75 TABLET, FILM COATED ORAL EVERY 8 HOURS
Status: DISCONTINUED | OUTPATIENT
Start: 2025-02-16 | End: 2025-02-17

## 2025-02-16 RX ORDER — APIXABAN 2.5 MG/1
2.5 TABLET, FILM COATED ORAL 2 TIMES DAILY
COMMUNITY

## 2025-02-16 RX ORDER — IPRATROPIUM BROMIDE AND ALBUTEROL SULFATE 2.5; .5 MG/3ML; MG/3ML
3 SOLUTION RESPIRATORY (INHALATION) EVERY 4 HOURS
Status: COMPLETED | OUTPATIENT
Start: 2025-02-16 | End: 2025-02-17

## 2025-02-16 RX ORDER — LANOLIN ALCOHOL/MO/W.PET/CERES
800 CREAM (GRAM) TOPICAL
Status: DISCONTINUED | OUTPATIENT
Start: 2025-02-16 | End: 2025-02-16

## 2025-02-16 RX ORDER — NALOXONE HCL 0.4 MG/ML
0.02 VIAL (ML) INJECTION
Status: DISCONTINUED | OUTPATIENT
Start: 2025-02-16 | End: 2025-02-19 | Stop reason: HOSPADM

## 2025-02-16 RX ORDER — OSELTAMIVIR PHOSPHATE 30 MG/1
30 CAPSULE ORAL DAILY
Status: DISCONTINUED | OUTPATIENT
Start: 2025-02-16 | End: 2025-02-19 | Stop reason: HOSPADM

## 2025-02-16 RX ORDER — ALUMINUM HYDROXIDE, MAGNESIUM HYDROXIDE, AND SIMETHICONE 1200; 120; 1200 MG/30ML; MG/30ML; MG/30ML
30 SUSPENSION ORAL 4 TIMES DAILY PRN
Status: DISCONTINUED | OUTPATIENT
Start: 2025-02-16 | End: 2025-02-19 | Stop reason: HOSPADM

## 2025-02-16 RX ORDER — SIMETHICONE 80 MG
1 TABLET,CHEWABLE ORAL 4 TIMES DAILY PRN
Status: DISCONTINUED | OUTPATIENT
Start: 2025-02-16 | End: 2025-02-19 | Stop reason: HOSPADM

## 2025-02-16 RX ORDER — IPRATROPIUM BROMIDE AND ALBUTEROL SULFATE 2.5; .5 MG/3ML; MG/3ML
3 SOLUTION RESPIRATORY (INHALATION) EVERY 6 HOURS
Status: DISCONTINUED | OUTPATIENT
Start: 2025-02-16 | End: 2025-02-16

## 2025-02-16 RX ORDER — ACETAMINOPHEN 325 MG/1
650 TABLET ORAL EVERY 4 HOURS PRN
Status: DISCONTINUED | OUTPATIENT
Start: 2025-02-16 | End: 2025-02-19 | Stop reason: HOSPADM

## 2025-02-16 RX ORDER — MAGNESIUM SULFATE 1 G/100ML
1 INJECTION INTRAVENOUS ONCE
Status: COMPLETED | OUTPATIENT
Start: 2025-02-17 | End: 2025-02-17

## 2025-02-16 RX ORDER — HEPARIN SODIUM 5000 [USP'U]/ML
5000 INJECTION, SOLUTION INTRAVENOUS; SUBCUTANEOUS EVERY 8 HOURS
Status: DISCONTINUED | OUTPATIENT
Start: 2025-02-16 | End: 2025-02-16

## 2025-02-16 RX ORDER — IBUPROFEN 200 MG
24 TABLET ORAL
Status: DISCONTINUED | OUTPATIENT
Start: 2025-02-16 | End: 2025-02-19 | Stop reason: HOSPADM

## 2025-02-16 RX ORDER — GLUCAGON 1 MG
1 KIT INJECTION
Status: DISCONTINUED | OUTPATIENT
Start: 2025-02-16 | End: 2025-02-19 | Stop reason: HOSPADM

## 2025-02-16 RX ORDER — ONDANSETRON HYDROCHLORIDE 2 MG/ML
4 INJECTION, SOLUTION INTRAVENOUS EVERY 8 HOURS PRN
Status: DISCONTINUED | OUTPATIENT
Start: 2025-02-16 | End: 2025-02-19 | Stop reason: HOSPADM

## 2025-02-16 RX ORDER — IPRATROPIUM BROMIDE AND ALBUTEROL SULFATE 2.5; .5 MG/3ML; MG/3ML
3 SOLUTION RESPIRATORY (INHALATION) EVERY 4 HOURS PRN
Status: DISCONTINUED | OUTPATIENT
Start: 2025-02-16 | End: 2025-02-19 | Stop reason: HOSPADM

## 2025-02-16 RX ORDER — ASPIRIN 81 MG/1
81 TABLET ORAL DAILY
Status: DISCONTINUED | OUTPATIENT
Start: 2025-02-16 | End: 2025-02-19 | Stop reason: HOSPADM

## 2025-02-16 RX ORDER — ATORVASTATIN CALCIUM 40 MG/1
40 TABLET, FILM COATED ORAL DAILY
Status: DISCONTINUED | OUTPATIENT
Start: 2025-02-16 | End: 2025-02-19 | Stop reason: HOSPADM

## 2025-02-16 RX ORDER — AMOXICILLIN 250 MG
1 CAPSULE ORAL DAILY PRN
Status: DISCONTINUED | OUTPATIENT
Start: 2025-02-16 | End: 2025-02-19 | Stop reason: HOSPADM

## 2025-02-16 RX ORDER — CARVEDILOL 12.5 MG/1
25 TABLET ORAL 2 TIMES DAILY WITH MEALS
Status: DISCONTINUED | OUTPATIENT
Start: 2025-02-16 | End: 2025-02-19 | Stop reason: HOSPADM

## 2025-02-16 RX ORDER — IPRATROPIUM BROMIDE AND ALBUTEROL SULFATE 2.5; .5 MG/3ML; MG/3ML
3 SOLUTION RESPIRATORY (INHALATION) EVERY 6 HOURS PRN
Status: DISCONTINUED | OUTPATIENT
Start: 2025-02-16 | End: 2025-02-16

## 2025-02-16 RX ORDER — SODIUM CHLORIDE 0.9 % (FLUSH) 0.9 %
3 SYRINGE (ML) INJECTION
Status: DISCONTINUED | OUTPATIENT
Start: 2025-02-16 | End: 2025-02-19 | Stop reason: HOSPADM

## 2025-02-16 RX ORDER — AMLODIPINE BESYLATE 5 MG/1
10 TABLET ORAL DAILY
Status: DISCONTINUED | OUTPATIENT
Start: 2025-02-16 | End: 2025-02-19 | Stop reason: HOSPADM

## 2025-02-16 RX ADMIN — IPRATROPIUM BROMIDE AND ALBUTEROL SULFATE 3 ML: 2.5; .5 SOLUTION RESPIRATORY (INHALATION) at 05:02

## 2025-02-16 RX ADMIN — HYDRALAZINE HYDROCHLORIDE 75 MG: 25 TABLET ORAL at 10:02

## 2025-02-16 RX ADMIN — ATORVASTATIN CALCIUM 40 MG: 40 TABLET, FILM COATED ORAL at 08:02

## 2025-02-16 RX ADMIN — CLONIDINE HYDROCHLORIDE 0.1 MG: 0.1 TABLET ORAL at 11:02

## 2025-02-16 RX ADMIN — IPRATROPIUM BROMIDE AND ALBUTEROL SULFATE 3 ML: 2.5; .5 SOLUTION RESPIRATORY (INHALATION) at 09:02

## 2025-02-16 RX ADMIN — CARVEDILOL 25 MG: 12.5 TABLET, FILM COATED ORAL at 08:02

## 2025-02-16 RX ADMIN — HYDRALAZINE HYDROCHLORIDE 25 MG: 25 TABLET ORAL at 09:02

## 2025-02-16 RX ADMIN — SODIUM CHLORIDE, POTASSIUM CHLORIDE, SODIUM LACTATE AND CALCIUM CHLORIDE: 600; 310; 30; 20 INJECTION, SOLUTION INTRAVENOUS at 02:02

## 2025-02-16 RX ADMIN — HYDRALAZINE HYDROCHLORIDE 25 MG: 25 TABLET ORAL at 01:02

## 2025-02-16 RX ADMIN — OSELTAMIVIR PHOSPHATE 30 MG: 30 CAPSULE ORAL at 08:02

## 2025-02-16 RX ADMIN — IPRATROPIUM BROMIDE AND ALBUTEROL SULFATE 3 ML: 2.5; .5 SOLUTION RESPIRATORY (INHALATION) at 11:02

## 2025-02-16 RX ADMIN — HYDRALAZINE HYDROCHLORIDE 75 MG: 25 TABLET ORAL at 02:02

## 2025-02-16 RX ADMIN — PANTOPRAZOLE SODIUM 40 MG: 40 TABLET, DELAYED RELEASE ORAL at 08:02

## 2025-02-16 RX ADMIN — CARVEDILOL 25 MG: 12.5 TABLET, FILM COATED ORAL at 05:02

## 2025-02-16 RX ADMIN — IPRATROPIUM BROMIDE AND ALBUTEROL SULFATE 3 ML: 2.5; .5 SOLUTION RESPIRATORY (INHALATION) at 10:02

## 2025-02-16 RX ADMIN — APIXABAN 2.5 MG: 2.5 TABLET, FILM COATED ORAL at 08:02

## 2025-02-16 RX ADMIN — CLONIDINE HYDROCHLORIDE 0.1 MG: 0.1 TABLET ORAL at 03:02

## 2025-02-16 RX ADMIN — CLONIDINE HYDROCHLORIDE 0.1 MG: 0.1 TABLET ORAL at 08:02

## 2025-02-16 RX ADMIN — IPRATROPIUM BROMIDE AND ALBUTEROL SULFATE 3 ML: 2.5; .5 SOLUTION RESPIRATORY (INHALATION) at 07:02

## 2025-02-16 RX ADMIN — ESCITALOPRAM OXALATE 10 MG: 10 TABLET ORAL at 08:02

## 2025-02-16 RX ADMIN — AMLODIPINE BESYLATE 10 MG: 5 TABLET ORAL at 08:02

## 2025-02-16 RX ADMIN — IPRATROPIUM BROMIDE AND ALBUTEROL SULFATE 3 ML: 2.5; .5 SOLUTION RESPIRATORY (INHALATION) at 01:02

## 2025-02-16 RX ADMIN — HYDROCHLOROTHIAZIDE 25 MG: 25 TABLET ORAL at 08:02

## 2025-02-16 RX ADMIN — ASPIRIN 81 MG: 81 TABLET, COATED ORAL at 08:02

## 2025-02-16 RX ADMIN — PREDNISONE 40 MG: 20 TABLET ORAL at 08:02

## 2025-02-16 RX ADMIN — METHYLPREDNISOLONE SODIUM SUCCINATE 60 MG: 125 INJECTION, POWDER, FOR SOLUTION INTRAMUSCULAR; INTRAVENOUS at 11:02

## 2025-02-16 NOTE — HPI
This is a [age]-year-old female with a past medical history of COPD, hypertension, CKD 3, tobacco use who presents with shortness of breath.     Patient had a recent hospitalization (2/13-2/13) at [facility] for hypoxic respiratory failure in the setting of influenza a and COPD.  One, she did not wish to be admitted and was discharged in his medical advice.  She returns to the ED for persistent shortness of breath, productive cough w/ green sputum.     In the ED, the patient was tachycardic (120s > 100s), tachypneic (20), hypertensive (140s-170s/90s-100s), initially BiPAP, and weaned to 2 L O2.  Labs were remarkable for an elevated creatinine (3.5 - baseline around 1.7), positive influenza a, negative BNP (22), negative troponin (0.008).  VBG showed a pH of 7.3, pCO2 of 52.0.  Chest x-ray showed no acute cardiopulmonary process.  Patient was given Solu-Medrol 125 mg IV, DuoNeb x1.  She was admitted for further management.

## 2025-02-16 NOTE — ASSESSMENT & PLAN NOTE
JEY is likely due to pre-renal azotemia due to intravascular volume depletion. Baseline creatinine is  1.7 . Most recent creatinine and eGFR are listed below.  Recent Labs     02/15/25  2104   CREATININE 3.5*   EGFRNORACEVR 15*      Plan  - JEY is stable  - Avoid nephrotoxins and renally dose meds for GFR listed above  - Monitor urine output, serial BMP, and adjust therapy as needed  - IVF

## 2025-02-16 NOTE — ASSESSMENT & PLAN NOTE
Patient with Hypoxic Respiratory failure which is Acute.  she is not on home oxygen. Supplemental oxygen was provided and noted-      .   Signs/symptoms of respiratory failure include- increased work of breathing and respiratory distress. Contributing diagnoses includes - COPD and Influenza  Labs and images were reviewed. Patient Has not had a recent ABG. Will treat underlying causes and adjust management of respiratory failure as follows- Wean O2 as tolerated

## 2025-02-16 NOTE — ASSESSMENT & PLAN NOTE
Patient's blood pressure range in the last 24 hours was: BP  Min: 140/89  Max: 174/92.The patient's inpatient anti-hypertensive regimen is listed below:  Current Antihypertensives  hydroCHLOROthiazide tablet 25 mg, Daily, Oral  hydrALAZINE tablet 25 mg, Every 8 hours, Oral  carvediloL tablet 25 mg, 2 times daily with meals, Oral  cloNIDine tablet 0.1 mg, 2 times daily PRN, Oral  amLODIPine tablet 10 mg, Daily, Oral    Plan  - BP is controlled, no changes needed to their regimen

## 2025-02-16 NOTE — ED TRIAGE NOTES
Pt arrived via EMS d/t SOB. Per EMS pt as recently hospitalized for flu and SOB. Hx oc COPD and HF but is not on home O2. Denies CP, dizziness, N/V, or abd pain. HOB elevated, SR up x 2, call light within reach

## 2025-02-16 NOTE — ED PROVIDER NOTES
Encounter Date: 2/15/2025       History     Chief Complaint   Patient presents with    Shortness of Breath     Pt reported to the ED with a CC of SOB. PT reported taking at home albuterol with no relief.      HPI    54-year-old female with a past medical history of COPD, diabetes, gout, hypertension who was also a smoker who is presenting by EMS with shortness of breath that began yesterday.  Per EMS, patient was noted to be hypoxic on room air with a pulse ox of 80%.  She was placed on CPAP prior to ED arrival.  On ED arrival her pulse ox was 95% on CPAP.  Patient reports taking a breathing treatment at home prior to EMS arrival.  She did report some relief with this treatment.  No previous oxygen requirement.  In addition to patient being a smoker, she also smokes marijuana but denies other drug use or alcohol.  Denies other associated symptoms.  Patient does report sick contacts with her grandchildren being ill recently.       Of note, patient was recently seen at this facility with similar symptoms 3 days ago.  Workup at that time revealed that patient was influenza A positive.  She was also found to be hypoxic on room air with ambulation.  Given hypoxia in the setting of influenza and presenting symptoms, the patient was admitted to hospital medicine.  She was treated with multiple DuoNeb treatments as well as IV Solu-Medrol, Tamiflu.  Per hospital medicine documentation, the patient left AMA after being admitted despite being hypoxic.  She reported that she was discharged on antibiotics however no documentation in hospital medicine notes that she received antibiotics.    Review of patient's allergies indicates:  No Known Allergies  Past Medical History:   Diagnosis Date    COPD (chronic obstructive pulmonary disease)     Diabetes mellitus     Gout, unspecified     Hypertension      History reviewed. No pertinent surgical history.  No family history on file.  Social History[1]  Review of Systems    Constitutional:  Negative for chills and fever.   HENT:  Negative for congestion and drooling.    Eyes:  Negative for visual disturbance.   Respiratory:  Positive for shortness of breath.    Cardiovascular:  Negative for chest pain and leg swelling.   Gastrointestinal:  Negative for abdominal pain, nausea and vomiting.   Genitourinary:  Negative for decreased urine volume and dysuria.   Musculoskeletal:  Negative for back pain, neck pain and neck stiffness.   Skin:  Negative for rash and wound.   Neurological:  Negative for syncope, weakness and headaches.   Psychiatric/Behavioral:  Negative for confusion.        Physical Exam     Initial Vitals [02/15/25 2026]   BP Pulse Resp Temp SpO2   (!) 140/89 (!) 120 (!) 25 99 °F (37.2 °C) 95 %      MAP       --         Physical Exam    Nursing note and vitals reviewed.  Constitutional:  Non-toxic appearance. She appears ill. She appears distressed. Face mask in place.   Bipap present    HENT:   Head: Normocephalic and atraumatic.   Eyes: Conjunctivae are normal. No scleral icterus.   Neck: Neck supple. No JVD present.   Normal range of motion.  Cardiovascular:  Regular rhythm, normal heart sounds and intact distal pulses.   Tachycardia present.         Pulmonary/Chest: Accessory muscle usage present. No stridor. Tachypnea noted. She is in respiratory distress. She has decreased breath sounds (diffusely). She has wheezes (faint, expiratory wheezing in bilateral anterior lung fields).   Abdominal: Abdomen is soft. She exhibits no distension. There is no abdominal tenderness. There is no rebound and no guarding.   Musculoskeletal:         General: No tenderness or edema. Normal range of motion.      Cervical back: Normal range of motion and neck supple.     Neurological: She is alert and oriented to person, place, and time. She has normal strength. She displays no atrophy and no tremor. No cranial nerve deficit or sensory deficit. She exhibits normal muscle tone. She displays  no seizure activity.   Moves all extremities, follows all commands, no focal neurologic deficits.      Skin: Skin is warm and dry. No rash noted. No erythema.   Psychiatric: She has a normal mood and affect.         ED Course   Critical Care    Date/Time: 2/15/2025 11:29 PM    Performed by: Carlota Gray DO  Authorized by: Carlota Gray DO  Direct patient critical care time: 30 minutes  Additional history critical care time: 10 minutes  Ordering / reviewing critical care time: 10 minutes  Documentation critical care time: 10 minutes  Total critical care time (exclusive of procedural time) : 60 minutes  Critical care time was exclusive of separately billable procedures and treating other patients and teaching time.  Critical care was necessary to treat or prevent imminent or life-threatening deterioration of the following conditions: respiratory failure.  Critical care was time spent personally by me on the following activities: development of treatment plan with patient or surrogate, discussions with consultants, evaluation of patient's response to treatment, examination of patient, obtaining history from patient or surrogate, ordering and performing treatments and interventions, ordering and review of laboratory studies, ordering and review of radiographic studies, pulse oximetry, re-evaluation of patient's condition, review of old charts and ventilator management.        Labs Reviewed   COMPREHENSIVE METABOLIC PANEL - Abnormal       Result Value    Sodium 141      Potassium 4.2      Chloride 109      CO2 20 (*)     Glucose 74      BUN 47 (*)     Creatinine 3.5 (*)     Calcium 8.8      Total Protein 7.1      Albumin 2.7 (*)     Total Bilirubin 0.3      Alkaline Phosphatase 85      AST 14      ALT 7 (*)     eGFR 15 (*)     Anion Gap 12     POCT INFLUENZA A/B MOLECULAR - Abnormal    POC Molecular Influenza A Ag Positive (*)     POC Molecular Influenza B Ag Negative        Acceptable Yes     ISTAT PROCEDURE - Abnormal    POC PH 7.309 (*)     POC PCO2 52.0 (*)     POC PO2 31 (*)     POC HCO3 26.1      POC BE -1      POC SATURATED O2 53      POC TCO2 28      Rate 12      Sample VENOUS      Site Other      Allens Test N/A      DelSys CPAP/BiPAP      Mode BiPAP      FiO2 25      Spont Rate 10      Min Vol 8.4      Sp02 94      IP 16      EP 8     CBC W/ AUTO DIFFERENTIAL    WBC 8.83      RBC 4.30      Hemoglobin 12.9      Hematocrit 39.4      MCV 92      MCH 30.0      MCHC 32.7      RDW 13.1      Platelets 163      MPV 9.6      Immature Granulocytes 0.2      Gran # (ANC) 6.3      Immature Grans (Abs) 0.02      Lymph # 1.8      Mono # 0.6      Eos # 0.0      Baso # 0.01      nRBC 0      Gran % 71.7      Lymph % 20.6      Mono % 6.9      Eosinophil % 0.5      Basophil % 0.1      Differential Method Automated     TROPONIN I    Troponin I 0.008     B-TYPE NATRIURETIC PEPTIDE    BNP 22     SARS-COV-2 RDRP GENE    POC Rapid COVID Negative       Acceptable Yes     POCT URINE PREGNANCY     EKG Readings: (Independently Interpreted)   EKG obtained at 8:31 p.m. shows sinus tachycardia with a heart rate of 118 beats per minute, PACs, right atrial enlargement with left axis deviation.  No obvious acute ST segment changes.  Tachycardia appears new when compared to previous EKG from February 13th.  EKG otherwise grossly unchanged.       Imaging Results              X-Ray Chest AP Portable (Final result)  Result time 02/15/25 21:33:02      Final result by Ayleen Jarquin MD (02/15/25 21:33:02)                   Impression:      No acute intrathoracic abnormality detected.      Electronically signed by: Ayleen Jarquin  Date:    02/15/2025  Time:    21:33               Narrative:    EXAMINATION:  AP PORTABLE CHEST    CLINICAL HISTORY:  Shortness of breath;    TECHNIQUE:  AP portable chest radiograph was submitted.    COMPARISON:  02/13/2015    FINDINGS:  AP portable chest radiograph  demonstrates a cardiac silhouette within normal limits.  There is no focal consolidation, pneumothorax, or pleural effusion.                                       Medications   methylPREDNISolone sodium succinate injection 125 mg (125 mg Intravenous Given 2/15/25 2107)   albuterol-ipratropium 2.5 mg-0.5 mg/3 mL nebulizer solution 9 mL (9 mLs Nebulization Given 2/15/25 2057)     Medical Decision Making   MDM  This is an emergent evaluation of a 54-year-old female with a past medical history of COPD, diabetes, gout, hypertension who was also a smoker who is presenting by EMS with shortness of breath that began yesterday. Initial vitals in the ED [02/15/25 2026]  BP: (!) 140/89  Pulse: (!) 120  Resp: (!) 25  Temp: 99 °F (37.2 °C)  SpO2: 95 % .     Physical exam noted above. DDx includes but is not limited to COPD exacerbation, CHF, pneumonia, pleural effusion, ACS, COVID versus influenza versus other viral illness. Also considered but clinically less likely to be PE, sepsis, dissection. Will obtain labs and imaging including CBC, CMP, BNP, troponin, chest x-ray, EKG. Will also provide BiPAP, DuoNeb treatment, IV steroids and supplemental oxygen as needed. Will continue to monitor and frequently reassess pending results of labs, treatments and final disposition.    Patient is aware of plan and is amenable.     Carlota Gray D.O  EMERGENCY MEDICINE  9:43 PM 02/15/2025      UPDATE  Labs reveal a CMP with a BUN of 47, creatinine 3.5 which appears increased when compared to most recent labs from January when her BUN was 29 and creatinine was 2.3.  GFR also noted to be 15.  VBG reveals mild acidosis with a pH of 7.3 with a pCO2 of 52 and normal bicarb.  PCO2 increased when compared to labs from last week however overall decreasing when compared to other labs from last year.  Patient is still influenza A positive.  Remainder of labs unremarkable.  Chest x-ray shows no obvious abnormality.  EKG shows sinus tachycardia  while evidence of acute STEMI.  Patient was initially placed on BiPAP.  Pulse ox remained within normal limits while on BiPAP.  Heart rate improved.  Remainder vitals also reassuring.  Patient was treated with a DuoNeb treatment.  On reassessment her symptoms were improved.  Patient resting comfortably in the ED.  Will attempt to transition patient to nasal cannula.  Will also plan to admit patient to Hospital Medicine.  Case discussed with Hospital Medicine, Dr. Medina who will evaluate patient.     Carlota Gray D.O  EMERGENCY MEDICINE   10:48 PM 02/15/2025         This chart was completed using dictation software, as a result there may be some transcription errors     Amount and/or Complexity of Data Reviewed  Independent Historian: EMS  External Data Reviewed: labs, radiology, ECG and notes.  Labs: ordered. Decision-making details documented in ED Course.  Radiology: ordered and independent interpretation performed. Decision-making details documented in ED Course.  ECG/medicine tests: ordered and independent interpretation performed. Decision-making details documented in ED Course.    Risk  Prescription drug management.  Decision regarding hospitalization.                                      Clinical Impression:  Final diagnoses:  [R09.02] Hypoxia  [J10.1] Influenza A (Primary)          ED Disposition Condition    Admit Stable                    [1]   Social History  Tobacco Use    Smoking status: Every Day     Types: Cigarettes    Smokeless tobacco: Never   Substance Use Topics    Alcohol use: Yes     Comment: occasionally    Drug use: Yes     Types: Marijuana        Carlota Gray, DO  02/15/25 2709

## 2025-02-16 NOTE — SUBJECTIVE & OBJECTIVE
Past Medical History:   Diagnosis Date    COPD (chronic obstructive pulmonary disease)     Diabetes mellitus     Gout, unspecified     Hypertension        History reviewed. No pertinent surgical history.    Review of patient's allergies indicates:  No Known Allergies    No current facility-administered medications on file prior to encounter.     Current Outpatient Medications on File Prior to Encounter   Medication Sig    albuterol (PROVENTIL/VENTOLIN HFA) 90 mcg/actuation inhaler Inhale 1 to 2 puffs into the lungs every 6 (six) hours as needed for Wheezing or Shortness of Breath. Rescue    albuterol-ipratropium (DUO-NEB) 2.5 mg-0.5 mg/3 mL nebulizer solution Take 3 mLs by nebulization every 6 (six) hours as needed for Wheezing. Rescue    allopurinoL 200 mg Tab Take 200 mg by mouth once daily.    amLODIPine (NORVASC) 10 MG tablet Take 1 tablet (10 mg total) by mouth once daily.    aspirin (ECOTRIN) 81 MG EC tablet Take 1 tablet (81 mg total) by mouth once daily.    atorvastatin (LIPITOR) 40 MG tablet Take 1 tablet (40 mg total) by mouth once daily.    blood pressure monitor Kit 1 kit by Misc.(Non-Drug; Combo Route) route once daily. Use daily to check blood pressure    blood pressure monitor Kit 1 each by Misc.(Non-Drug; Combo Route) route 3 (three) times daily.    carvediloL (COREG) 25 MG tablet Take 1 tablet (25 mg total) by mouth 2 (two) times daily with meals.    cloNIDine (CATAPRES) 0.1 MG tablet Take 1 tablet (0.1 mg total) by mouth 2 (two) times daily. Hold if systolic blood pressure (top number) is less than 130.    EScitalopram oxalate (LEXAPRO) 10 MG tablet Take 1 tablet by mouth once daily.    gabapentin (NEURONTIN) 600 MG tablet Take 600 mg by mouth once daily.    hydrALAZINE (APRESOLINE) 25 MG tablet Take 1 tablet (25 mg total) by mouth every 8 (eight) hours.    hydroCHLOROthiazide (HYDRODIURIL) 25 MG tablet Take 25 mg by mouth once daily.    HYDROcodone-acetaminophen (NORCO)  mg per tablet Take 1  tablet by mouth every 4 (four) hours as needed for Pain.    pantoprazole (PROTONIX) 40 MG tablet Take 1 tablet (40 mg total) by mouth once daily. for 25 days    SYMBICORT 160-4.5 mcg/actuation HFAA Inhale 2 puffs into the lungs once daily.    valsartan (DIOVAN) 160 MG tablet Take 1 tablet (160 mg total) by mouth once daily.     Family History    None       Tobacco Use    Smoking status: Every Day     Types: Cigarettes    Smokeless tobacco: Never   Substance and Sexual Activity    Alcohol use: Yes     Comment: occasionally    Drug use: Yes     Types: Marijuana    Sexual activity: Not on file     Review of Systems   Respiratory:  Positive for cough and shortness of breath.    Cardiovascular: Negative.    Gastrointestinal: Negative.    Genitourinary: Negative.    Musculoskeletal: Negative.    Neurological: Negative.      Objective:     Vital Signs (Most Recent):  Temp: 99 °F (37.2 °C) (02/15/25 2026)  Pulse: 101 (02/16/25 0000)  Resp: (!) 39 (02/16/25 0000)  BP: (!) 174/92 (02/16/25 0000)  SpO2: (!) 94 % (02/16/25 0000) Vital Signs (24h Range):  Temp:  [99 °F (37.2 °C)] 99 °F (37.2 °C)  Pulse:  [101-120] 101  Resp:  [15-39] 39  SpO2:  [92 %-99 %] 94 %  BP: (140-174)/() 174/92     Weight: 68 kg (150 lb)  Body mass index is 22.15 kg/m².     Physical Exam  Vitals and nursing note reviewed.   Constitutional:       General: She is not in acute distress.     Appearance: She is not ill-appearing.   HENT:      Mouth/Throat:      Mouth: Mucous membranes are moist.   Cardiovascular:      Rate and Rhythm: Normal rate.   Pulmonary:      Effort: Pulmonary effort is normal.      Breath sounds: Wheezing present.   Abdominal:      General: Abdomen is flat.   Skin:     General: Skin is warm.   Neurological:      General: No focal deficit present.      Mental Status: She is alert.                Significant Labs: All pertinent labs within the past 24 hours have been reviewed.    Significant Imaging: I have reviewed all pertinent  imaging results/findings within the past 24 hours.

## 2025-02-16 NOTE — PLAN OF CARE
Case Management Assessment     PCP: Sunny Pollard  Pharmacy: [facility]     Patient Arrived From: home   Existing Help at Home: daughter    Barriers to Discharge: none    Discharge Plan:    A. Home with family   B. Home with family      SW completed initial assessment and discussed discharge planning with patient at her bedside. Patient lives with her daughter who is her main support. Patient's daughter will provide transportation for her to get home when discharge from the hospital.        02/16/25 1345   Discharge Assessment   Assessment Type Discharge Planning Assessment   Confirmed/corrected address, phone number and insurance Yes   Confirmed Demographics Correct on Facesheet   Source of Information patient   Communicated CLAY with patient/caregiver Date not available/Unable to determine   Reason For Admission Acute respiratory failure with hypoxia   People in Home child(sandor), adult   Do you expect to return to your current living situation? Yes   Do you have help at home or someone to help you manage your care at home? Yes   Who are your caregiver(s) and their phone number(s)? Linda Manuel (Daughter)  [phone] (Mobile)   Prior to hospitilization cognitive status: Alert/Oriented   Current cognitive status: Alert/Oriented   Walking or Climbing Stairs Difficulty yes   Walking or Climbing Stairs ambulation difficulty, requires equipment   Mobility Management cane   Dressing/Bathing Difficulty no   Equipment Currently Used at Home cane, straight   Readmission within 30 days? Yes   Patient currently being followed by outpatient case management? No   Do you currently have service(s) that help you manage your care at home? No   Do you take prescription medications? Yes   Do you have prescription coverage? No   Do you have any problems affording any of your prescribed medications? No   Is the patient taking medications as prescribed? yes   Who is going to help you get home at discharge?  Linda Manuel (Daughter)  [phone] (Mobile)   How do you get to doctors appointments? family or friend will provide   Are you on dialysis? No   Do you take coumadin? No   Discharge Plan A Home with family   Discharge Plan B Home with family   DME Needed Upon Discharge  none   Discharge Plan discussed with: Patient   Transition of Care Barriers None   OTHER   Name(s) of People in Home Linda Manuel (Daughter)  [phone] (Mobile)

## 2025-02-16 NOTE — ED NOTES
Patient in bed resting quietly, breathing even and unlabored, sats 96% on 2L per nasal canula, no signs of distress noted, will continue to observe.

## 2025-02-16 NOTE — CARE UPDATE
Patient seen and examined. H&P reviewed. Updates below.    Ms. Manuel is a 55 yo F who was admitted with acute respiratory failure due to influenza/COPD. Cxr does not show any evidence of pneumonia. She does appear to have COPD exacerbation and was started on nebulizers and tamiflu. Currently, she is feeling better and resting, no distress. Continue with current treatment plan. Of note, Cr 3.5 -- acute on chronic kidney disease. Baseline appears 1.6-2.3 over the last few months. Receiving fluids at this time due to dehydration. Will recheck labs in AM.     Rayray Maldonado MD  Department of Hospital Medicine  [facility]

## 2025-02-16 NOTE — H&P
[facility]  [facility] Medicine  History & Physical    Patient Name: Jacqueline Manuel  MRN: [id number]  Patient Class: IP- Inpatient  Admission Date: 2/15/2025  Attending Physician: Rayray Maldonado MD   Primary Care Provider: Sunny Pollard Jr., MD         Patient information was obtained from patient and ER records.     Subjective:     Principal Problem:Acute respiratory failure with hypoxia    Chief Complaint:   Chief Complaint   Patient presents with    Shortness of Breath     Pt reported to the ED with a CC of SOB. PT reported taking at home albuterol with no relief.         HPI: This is a [age]-year-old female with a past medical history of COPD, hypertension, CKD 3, tobacco use who presents with shortness of breath.     Patient had a recent hospitalization (2/13-2/13) at [facility] for hypoxic respiratory failure in the setting of influenza a and COPD.  One, she did not wish to be admitted and was discharged in his medical advice.  She returns to the ED for persistent shortness of breath, productive cough w/ green sputum.     In the ED, the patient was tachycardic (120s > 100s), tachypneic (20), hypertensive (140s-170s/90s-100s), initially BiPAP, and weaned to 2 L O2.  Labs were remarkable for an elevated creatinine (3.5 - baseline around 1.7), positive influenza a, negative BNP (22), negative troponin (0.008).  VBG showed a pH of 7.3, pCO2 of 52.0.  Chest x-ray showed no acute cardiopulmonary process.  Patient was given Solu-Medrol 125 mg IV, DuoNeb x1.  She was admitted for further management.    Past Medical History:   Diagnosis Date    COPD (chronic obstructive pulmonary disease)     Diabetes mellitus     Gout, unspecified     Hypertension        History reviewed. No pertinent surgical history.    Review of patient's allergies indicates:  No Known Allergies    No current facility-administered medications on file prior to encounter.     Current Outpatient Medications on File Prior to  Encounter   Medication Sig    albuterol (PROVENTIL/VENTOLIN HFA) 90 mcg/actuation inhaler Inhale 1 to 2 puffs into the lungs every 6 (six) hours as needed for Wheezing or Shortness of Breath. Rescue    albuterol-ipratropium (DUO-NEB) 2.5 mg-0.5 mg/3 mL nebulizer solution Take 3 mLs by nebulization every 6 (six) hours as needed for Wheezing. Rescue    allopurinoL 200 mg Tab Take 200 mg by mouth once daily.    amLODIPine (NORVASC) 10 MG tablet Take 1 tablet (10 mg total) by mouth once daily.    aspirin (ECOTRIN) 81 MG EC tablet Take 1 tablet (81 mg total) by mouth once daily.    atorvastatin (LIPITOR) 40 MG tablet Take 1 tablet (40 mg total) by mouth once daily.    blood pressure monitor Kit 1 kit by Misc.(Non-Drug; Combo Route) route once daily. Use daily to check blood pressure    blood pressure monitor Kit 1 each by Misc.(Non-Drug; Combo Route) route 3 (three) times daily.    carvediloL (COREG) 25 MG tablet Take 1 tablet (25 mg total) by mouth 2 (two) times daily with meals.    cloNIDine (CATAPRES) 0.1 MG tablet Take 1 tablet (0.1 mg total) by mouth 2 (two) times daily. Hold if systolic blood pressure (top number) is less than 130.    EScitalopram oxalate (LEXAPRO) 10 MG tablet Take 1 tablet by mouth once daily.    gabapentin (NEURONTIN) 600 MG tablet Take 600 mg by mouth once daily.    hydrALAZINE (APRESOLINE) 25 MG tablet Take 1 tablet (25 mg total) by mouth every 8 (eight) hours.    hydroCHLOROthiazide (HYDRODIURIL) 25 MG tablet Take 25 mg by mouth once daily.    HYDROcodone-acetaminophen (NORCO)  mg per tablet Take 1 tablet by mouth every 4 (four) hours as needed for Pain.    pantoprazole (PROTONIX) 40 MG tablet Take 1 tablet (40 mg total) by mouth once daily. for 25 days    SYMBICORT 160-4.5 mcg/actuation HFAA Inhale 2 puffs into the lungs once daily.    valsartan (DIOVAN) 160 MG tablet Take 1 tablet (160 mg total) by mouth once daily.     Family History    None       Tobacco Use    Smoking status: Every  Day     Types: Cigarettes    Smokeless tobacco: Never   Substance and Sexual Activity    Alcohol use: Yes     Comment: occasionally    Drug use: Yes     Types: Marijuana    Sexual activity: Not on file     Review of Systems   Respiratory:  Positive for cough and shortness of breath.    Cardiovascular: Negative.    Gastrointestinal: Negative.    Genitourinary: Negative.    Musculoskeletal: Negative.    Neurological: Negative.      Objective:     Vital Signs (Most Recent):  Temp: 99 °F (37.2 °C) (02/15/25 2026)  Pulse: 101 (02/16/25 0000)  Resp: (!) 39 (02/16/25 0000)  BP: (!) 174/92 (02/16/25 0000)  SpO2: (!) 94 % (02/16/25 0000) Vital Signs (24h Range):  Temp:  [99 °F (37.2 °C)] 99 °F (37.2 °C)  Pulse:  [101-120] 101  Resp:  [15-39] 39  SpO2:  [92 %-99 %] 94 %  BP: (140-174)/() 174/92     Weight: 68 kg (150 lb)  Body mass index is 22.15 kg/m².     Physical Exam  Vitals and nursing note reviewed.   Constitutional:       General: She is not in acute distress.     Appearance: She is not ill-appearing.   HENT:      Mouth/Throat:      Mouth: Mucous membranes are moist.   Cardiovascular:      Rate and Rhythm: Normal rate.   Pulmonary:      Effort: Pulmonary effort is normal.      Breath sounds: Wheezing present.   Abdominal:      General: Abdomen is flat.   Skin:     General: Skin is warm.   Neurological:      General: No focal deficit present.      Mental Status: She is alert.                Significant Labs: All pertinent labs within the past 24 hours have been reviewed.    Significant Imaging: I have reviewed all pertinent imaging results/findings within the past 24 hours.  Assessment/Plan:     * Acute respiratory failure with hypoxia  Patient with Hypoxic Respiratory failure which is Acute.  she is not on home oxygen. Supplemental oxygen was provided and noted-      .   Signs/symptoms of respiratory failure include- increased work of breathing and respiratory distress. Contributing diagnoses includes - COPD and  Influenza  Labs and images were reviewed. Patient Has not had a recent ABG. Will treat underlying causes and adjust management of respiratory failure as follows- Wean O2 as tolerated     Influenza A  Continue Tamiflu  Symptomatic control       Atrial fibrillation  Patient is currently in sinus rhythm. [id number] Score: 2. The patients heart rate in the last 24 hours is as follows:  Pulse  Min: 101  Max: 120     Antiarrhythmics       Anticoagulants  , 2 times daily, Oral  apixaban tablet 2.5 mg, 2 times daily, Oral    Plan  - Replete lytes with a goal of K>4, Mg >2  - Patient is anticoagulated, see medications listed above.  - Patient's afib is currently controlled          COPD (chronic obstructive pulmonary disease)  Patient's COPD is with exacerbation noted by continued dyspnea currently.  Patient is currently on COPD Pathway. Continue scheduled inhalers Steroids and Supplemental oxygen and monitor respiratory status closely.     Acute kidney injury superimposed on chronic kidney disease  JEY is likely due to pre-renal azotemia due to intravascular volume depletion. Baseline creatinine is  1.7 . Most recent creatinine and eGFR are listed below.  Recent Labs     02/15/25  2104   CREATININE 3.5*   EGFRNORACEVR 15*      Plan  - JEY is stable  - Avoid nephrotoxins and renally dose meds for GFR listed above  - Monitor urine output, serial BMP, and adjust therapy as needed  - IVF    History of stroke  Continue aspirin,statin       Essential hypertension  Patient's blood pressure range in the last 24 hours was: BP  Min: 140/89  Max: 174/92.The patient's inpatient anti-hypertensive regimen is listed below:  Current Antihypertensives  hydroCHLOROthiazide tablet 25 mg, Daily, Oral  hydrALAZINE tablet 25 mg, Every 8 hours, Oral  carvediloL tablet 25 mg, 2 times daily with meals, Oral  cloNIDine tablet 0.1 mg, 2 times daily PRN, Oral  amLODIPine tablet 10 mg, Daily, Oral    Plan  - BP is controlled, no changes needed to their  regimen      VTE Risk Mitigation (From admission, onward)           Ordered     apixaban tablet 2.5 mg  2 times daily         02/16/25 0035     IP VTE HIGH RISK PATIENT  Once         02/16/25 0007     Place sequential compression device  Until discontinued         02/16/25 0007                             David Martínez MD  Department of Hospital Medicine  [facility] - Emergency Dept

## 2025-02-16 NOTE — ASSESSMENT & PLAN NOTE
Patient is currently in sinus rhythm. [id number] Score: 2. The patients heart rate in the last 24 hours is as follows:  Pulse  Min: 101  Max: 120     Antiarrhythmics       Anticoagulants  , 2 times daily, Oral  apixaban tablet 2.5 mg, 2 times daily, Oral    Plan  - Replete lytes with a goal of K>4, Mg >2  - Patient is anticoagulated, see medications listed above.  - Patient's afib is currently controlled

## 2025-02-16 NOTE — ED NOTES
Pt noted to have increased WOB, HR in the 120s, sating 93% on 2L, and diaphoretic on - Mds made aware of findings. Pt bumped up to 4L of O2.

## 2025-02-17 LAB
ANION GAP SERPL CALC-SCNC: 11 MMOL/L (ref 8–16)
BASOPHILS # BLD AUTO: 0.01 K/UL (ref 0–0.2)
BASOPHILS NFR BLD: 0.1 % (ref 0–1.9)
BUN SERPL-MCNC: 38 MG/DL (ref 6–20)
CALCIUM SERPL-MCNC: 9.8 MG/DL (ref 8.7–10.5)
CHLORIDE SERPL-SCNC: 102 MMOL/L (ref 95–110)
CO2 SERPL-SCNC: 28 MMOL/L (ref 23–29)
CREAT SERPL-MCNC: 2.3 MG/DL (ref 0.5–1.4)
DIFFERENTIAL METHOD BLD: ABNORMAL
EOSINOPHIL # BLD AUTO: 0 K/UL (ref 0–0.5)
EOSINOPHIL NFR BLD: 0 % (ref 0–8)
ERYTHROCYTE [DISTWIDTH] IN BLOOD BY AUTOMATED COUNT: 12.7 % (ref 11.5–14.5)
EST. GFR  (NO RACE VARIABLE): 25 ML/MIN/1.73 M^2
GLUCOSE SERPL-MCNC: 148 MG/DL (ref 70–110)
HCT VFR BLD AUTO: 40.8 % (ref 37–48.5)
HGB BLD-MCNC: 13.2 G/DL (ref 12–16)
IMM GRANULOCYTES # BLD AUTO: 0.03 K/UL (ref 0–0.04)
IMM GRANULOCYTES NFR BLD AUTO: 0.4 % (ref 0–0.5)
LYMPHOCYTES # BLD AUTO: 1.5 K/UL (ref 1–4.8)
LYMPHOCYTES NFR BLD: 22.3 % (ref 18–48)
MAGNESIUM SERPL-MCNC: 2.5 MG/DL (ref 1.6–2.6)
MCH RBC QN AUTO: 29.2 PG (ref 27–31)
MCHC RBC AUTO-ENTMCNC: 32.4 G/DL (ref 32–36)
MCV RBC AUTO: 90 FL (ref 82–98)
MONOCYTES # BLD AUTO: 0.3 K/UL (ref 0.3–1)
MONOCYTES NFR BLD: 3.6 % (ref 4–15)
NEUTROPHILS # BLD AUTO: 5 K/UL (ref 1.8–7.7)
NEUTROPHILS NFR BLD: 73.6 % (ref 38–73)
NRBC BLD-RTO: 0 /100 WBC
PHOSPHATE SERPL-MCNC: 3 MG/DL (ref 2.7–4.5)
PLATELET # BLD AUTO: 181 K/UL (ref 150–450)
PMV BLD AUTO: 9.8 FL (ref 9.2–12.9)
POCT GLUCOSE: 125 MG/DL (ref 70–110)
POCT GLUCOSE: 150 MG/DL (ref 70–110)
POCT GLUCOSE: 204 MG/DL (ref 70–110)
POTASSIUM SERPL-SCNC: 3.7 MMOL/L (ref 3.5–5.1)
PROCALCITONIN SERPL IA-MCNC: 0.38 NG/ML
RBC # BLD AUTO: 4.52 M/UL (ref 4–5.4)
SODIUM SERPL-SCNC: 141 MMOL/L (ref 136–145)
WBC # BLD AUTO: 6.85 K/UL (ref 3.9–12.7)

## 2025-02-17 PROCEDURE — 94761 N-INVAS EAR/PLS OXIMETRY MLT: CPT

## 2025-02-17 PROCEDURE — 25000003 PHARM REV CODE 250

## 2025-02-17 PROCEDURE — 83735 ASSAY OF MAGNESIUM: CPT | Performed by: STUDENT IN AN ORGANIZED HEALTH CARE EDUCATION/TRAINING PROGRAM

## 2025-02-17 PROCEDURE — 25000003 PHARM REV CODE 250: Performed by: INTERNAL MEDICINE

## 2025-02-17 PROCEDURE — 85025 COMPLETE CBC W/AUTO DIFF WBC: CPT | Performed by: STUDENT IN AN ORGANIZED HEALTH CARE EDUCATION/TRAINING PROGRAM

## 2025-02-17 PROCEDURE — 94640 AIRWAY INHALATION TREATMENT: CPT

## 2025-02-17 PROCEDURE — 94664 DEMO&/EVAL PT USE INHALER: CPT

## 2025-02-17 PROCEDURE — 63600175 PHARM REV CODE 636 W HCPCS: Performed by: INTERNAL MEDICINE

## 2025-02-17 PROCEDURE — 11000001 HC ACUTE MED/SURG PRIVATE ROOM

## 2025-02-17 PROCEDURE — 84100 ASSAY OF PHOSPHORUS: CPT | Performed by: STUDENT IN AN ORGANIZED HEALTH CARE EDUCATION/TRAINING PROGRAM

## 2025-02-17 PROCEDURE — 94660 CPAP INITIATION&MGMT: CPT

## 2025-02-17 PROCEDURE — 80048 BASIC METABOLIC PNL TOTAL CA: CPT | Performed by: STUDENT IN AN ORGANIZED HEALTH CARE EDUCATION/TRAINING PROGRAM

## 2025-02-17 PROCEDURE — 84145 PROCALCITONIN (PCT): CPT | Performed by: SURGERY

## 2025-02-17 PROCEDURE — 27000221 HC OXYGEN, UP TO 24 HOURS

## 2025-02-17 PROCEDURE — 25000003 PHARM REV CODE 250: Performed by: STUDENT IN AN ORGANIZED HEALTH CARE EDUCATION/TRAINING PROGRAM

## 2025-02-17 PROCEDURE — 25000242 PHARM REV CODE 250 ALT 637 W/ HCPCS: Performed by: INTERNAL MEDICINE

## 2025-02-17 PROCEDURE — 36415 COLL VENOUS BLD VENIPUNCTURE: CPT | Performed by: SURGERY

## 2025-02-17 PROCEDURE — 99900035 HC TECH TIME PER 15 MIN (STAT)

## 2025-02-17 PROCEDURE — 27000207 HC ISOLATION

## 2025-02-17 PROCEDURE — 25000242 PHARM REV CODE 250 ALT 637 W/ HCPCS: Performed by: STUDENT IN AN ORGANIZED HEALTH CARE EDUCATION/TRAINING PROGRAM

## 2025-02-17 PROCEDURE — 94799 UNLISTED PULMONARY SVC/PX: CPT

## 2025-02-17 RX ORDER — CLONIDINE HYDROCHLORIDE 0.1 MG/1
0.1 TABLET ORAL 3 TIMES DAILY PRN
Status: DISCONTINUED | OUTPATIENT
Start: 2025-02-17 | End: 2025-02-18

## 2025-02-17 RX ORDER — HYDRALAZINE HYDROCHLORIDE 25 MG/1
100 TABLET, FILM COATED ORAL EVERY 8 HOURS
Status: DISCONTINUED | OUTPATIENT
Start: 2025-02-17 | End: 2025-02-19 | Stop reason: HOSPADM

## 2025-02-17 RX ORDER — CEFTRIAXONE 2 G/1
2 INJECTION, POWDER, FOR SOLUTION INTRAMUSCULAR; INTRAVENOUS
Status: DISCONTINUED | OUTPATIENT
Start: 2025-02-17 | End: 2025-02-19 | Stop reason: HOSPADM

## 2025-02-17 RX ADMIN — HYDRALAZINE HYDROCHLORIDE 100 MG: 25 TABLET ORAL at 09:02

## 2025-02-17 RX ADMIN — OSELTAMIVIR PHOSPHATE 30 MG: 30 CAPSULE ORAL at 08:02

## 2025-02-17 RX ADMIN — IPRATROPIUM BROMIDE AND ALBUTEROL SULFATE 3 ML: 2.5; .5 SOLUTION RESPIRATORY (INHALATION) at 04:02

## 2025-02-17 RX ADMIN — APIXABAN 2.5 MG: 2.5 TABLET, FILM COATED ORAL at 08:02

## 2025-02-17 RX ADMIN — Medication 6 MG: at 12:02

## 2025-02-17 RX ADMIN — ASPIRIN 81 MG: 81 TABLET, COATED ORAL at 08:02

## 2025-02-17 RX ADMIN — HYDRALAZINE HYDROCHLORIDE 75 MG: 25 TABLET ORAL at 06:02

## 2025-02-17 RX ADMIN — IPRATROPIUM BROMIDE AND ALBUTEROL SULFATE 3 ML: 2.5; .5 SOLUTION RESPIRATORY (INHALATION) at 08:02

## 2025-02-17 RX ADMIN — AMLODIPINE BESYLATE 10 MG: 5 TABLET ORAL at 08:02

## 2025-02-17 RX ADMIN — CEFTRIAXONE 1 G: 1 INJECTION, POWDER, FOR SOLUTION INTRAMUSCULAR; INTRAVENOUS at 12:02

## 2025-02-17 RX ADMIN — CARVEDILOL 25 MG: 12.5 TABLET, FILM COATED ORAL at 04:02

## 2025-02-17 RX ADMIN — CLONIDINE HYDROCHLORIDE 0.1 MG: 0.1 TABLET ORAL at 04:02

## 2025-02-17 RX ADMIN — PANTOPRAZOLE SODIUM 40 MG: 40 TABLET, DELAYED RELEASE ORAL at 08:02

## 2025-02-17 RX ADMIN — IPRATROPIUM BROMIDE AND ALBUTEROL SULFATE 3 ML: 2.5; .5 SOLUTION RESPIRATORY (INHALATION) at 12:02

## 2025-02-17 RX ADMIN — ATORVASTATIN CALCIUM 40 MG: 40 TABLET, FILM COATED ORAL at 08:02

## 2025-02-17 RX ADMIN — HYDROCHLOROTHIAZIDE 25 MG: 25 TABLET ORAL at 08:02

## 2025-02-17 RX ADMIN — CARVEDILOL 25 MG: 12.5 TABLET, FILM COATED ORAL at 08:02

## 2025-02-17 RX ADMIN — METHYLPREDNISOLONE SODIUM SUCCINATE 60 MG: 125 INJECTION, POWDER, FOR SOLUTION INTRAMUSCULAR; INTRAVENOUS at 11:02

## 2025-02-17 RX ADMIN — IPRATROPIUM BROMIDE AND ALBUTEROL SULFATE 3 ML: 2.5; .5 SOLUTION RESPIRATORY (INHALATION) at 09:02

## 2025-02-17 RX ADMIN — ESCITALOPRAM OXALATE 10 MG: 10 TABLET ORAL at 08:02

## 2025-02-17 RX ADMIN — IPRATROPIUM BROMIDE AND ALBUTEROL SULFATE 3 ML: 2.5; .5 SOLUTION RESPIRATORY (INHALATION) at 03:02

## 2025-02-17 RX ADMIN — HYDRALAZINE HYDROCHLORIDE 100 MG: 25 TABLET ORAL at 02:02

## 2025-02-17 RX ADMIN — Medication 6 MG: at 09:02

## 2025-02-17 RX ADMIN — APIXABAN 2.5 MG: 2.5 TABLET, FILM COATED ORAL at 09:02

## 2025-02-17 RX ADMIN — MAGNESIUM SULFATE IN DEXTROSE 1 G: 10 INJECTION, SOLUTION INTRAVENOUS at 12:02

## 2025-02-17 NOTE — EICU
EICU BRIEF INITIAL EVALUATION:    Code Status: Full Code     HISTORY:      54-year-old woman transferred to the ICU from the floor on continuous BiPAP for COPD exacerbation, possible pneumonia.  Patient is influenza a positive.    History of COPD, hypertension, CKD 3, active smoker, CVA, atrial fibrillation, gout.      DVT prophylaxis on apixaban  GI prophylaxis PPI     /91, P 96, R 29, O2 sat 99,   Resting comfortably on BiPAP 12/6, rate 12, 35%    Echo 05/2024 with low normal LV systolic function, EF 50-55.  Diastolic dysfunction but grade can not be determined, normal LV size and function, mild-to-moderate aortic regurg, mild mitral regurg      CAMERA ASSESSMENT: Yes      TELEMETRY: Reviewed      NOTES: Reviewed     LABS: Reviewed      IMAGING: Personally reviewed.      DISCUSSED with bedside nurse        ASSESSMENT AND PLAN:     Acute hypoxemic respiratory failure-COPD/flu-continue BiPAP, wean as tolerated.  Continue 8 antibiotics, Tamiflu, steroids, respiratory treatments      I have reviewed the plan of care for the patient and agree with the plan of care unless noted otherwise above.      KAY Kitchen MD  eICU Attending  [phone]    This report has been created through the use of [employer] dictation software. Typographical and content errors may occur with this process. While efforts are made to detect and correct such errors, in some cases errors will persist. For this reason, wording in this document should be considered in the proper context and not strictly verbatim.

## 2025-02-17 NOTE — SUBJECTIVE & OBJECTIVE
Interval History:  Moved to the ICU for worsening respiratory failure overnight.  Patient currently on room air.  Blood pressure control suboptimal.  Patient is stable for downgrade to telemetry    Review of Systems   Constitutional:  Negative for fatigue and fever.   Respiratory:  Positive for shortness of breath. Negative for cough.      Objective:     Vital Signs (Most Recent):  Temp: 97.8 °F (36.6 °C) (02/17/25 0701)  Pulse: 91 (02/17/25 1000)  Resp: (!) 29 (02/17/25 1000)  BP: (!) 158/129 (02/17/25 1000)  SpO2: 98 % (02/17/25 1000) Vital Signs (24h Range):  Temp:  [97.3 °F (36.3 °C)-98.5 °F (36.9 °C)] 97.8 °F (36.6 °C)  Pulse:  [] 91  Resp:  [14-51] 29  SpO2:  [92 %-100 %] 98 %  BP: (129-211)/() 158/129     Weight: 55.4 kg (122 lb 2.2 oz)  Body mass index is 18.04 kg/m².    Intake/Output Summary (Last 24 hours) at 2/17/2025 1207  Last data filed at 2/16/2025 1238  Gross per 24 hour   Intake 1000 ml   Output --   Net 1000 ml         Physical Exam  Constitutional:       General: She is not in acute distress.     Appearance: Normal appearance. She is not ill-appearing.   HENT:      Head: Normocephalic and atraumatic.   Pulmonary:      Effort: Pulmonary effort is normal. No respiratory distress.      Breath sounds: Normal breath sounds. No wheezing or rhonchi.   Abdominal:      General: Abdomen is flat. There is no distension.      Palpations: Abdomen is soft.   Neurological:      General: No focal deficit present.      Mental Status: She is alert and oriented to person, place, and time.             Significant Labs: CBC:   Recent Labs   Lab 02/15/25  2104 02/16/25  0903 02/17/25  0316   WBC 8.83 6.97 6.85   HGB 12.9 11.9* 13.2   HCT 39.4 36.1* 40.8    160 181     CMP:   Recent Labs   Lab 02/15/25  2104 02/16/25  0903 02/17/25  0316    140 141   K 4.2 4.5 3.7    106 102   CO2 20* 22* 28   GLU 74 154* 148*   BUN 47* 39* 38*   CREATININE 3.5* 2.8* 2.3*   CALCIUM 8.8 9.1 9.8   PROT 7.1   --   --    ALBUMIN 2.7*  --   --    BILITOT 0.3  --   --    ALKPHOS 85  --   --    AST 14  --   --    ALT 7*  --   --    ANIONGAP 12 12 11       Significant Imaging: I have reviewed all pertinent imaging results/findings within the past 24 hours.

## 2025-02-17 NOTE — PROGRESS NOTES
[facility]  [facility] Medicine  Progress Note    Patient Name: Jacqueline Manuel  MRN: [id number]  Patient Class: IP- Inpatient   Admission Date: 2/15/2025  Length of Stay: 1 days  Attending Physician: Marques Harris MD  Primary Care Provider: Sunny Pollard Jr., MD        Subjective     Principal Problem:Acute respiratory failure with hypoxia        HPI:  This is a [age]-year-old female with a past medical history of COPD, hypertension, CKD 3, tobacco use who presents with shortness of breath.     Patient had a recent hospitalization (2/13-2/13) at [facility] for hypoxic respiratory failure in the setting of influenza a and COPD.  One, she did not wish to be admitted and was discharged in his medical advice.  She returns to the ED for persistent shortness of breath, productive cough w/ green sputum.     In the ED, the patient was tachycardic (120s > 100s), tachypneic (20), hypertensive (140s-170s/90s-100s), initially BiPAP, and weaned to 2 L O2.  Labs were remarkable for an elevated creatinine (3.5 - baseline around 1.7), positive influenza a, negative BNP (22), negative troponin (0.008).  VBG showed a pH of 7.3, pCO2 of 52.0.  Chest x-ray showed no acute cardiopulmonary process.  Patient was given Solu-Medrol 125 mg IV, DuoNeb x1.  She was admitted for further management.    Overview/Hospital Course:  This is a [age]-year-old female with a past medical history of COPD, hypertension, CKD 3, tobacco use who presents with shortness of breath.  Patient was admitted for acute hypoxic respiratory failure with influenza A.  Patient was admitted to the floor; however overnight her sudden worsening supplementary oxygen requirement and was moved to the ICU for BiPAP.  Respiratory status improved and patient currently on room air.    Interval History:  Moved to the ICU for worsening respiratory failure overnight.  Patient currently on room air.  Blood pressure control suboptimal.  Patient is stable for  downgrade to telemetry    Review of Systems   Constitutional:  Negative for fatigue and fever.   Respiratory:  Positive for shortness of breath. Negative for cough.      Objective:     Vital Signs (Most Recent):  Temp: 97.8 °F (36.6 °C) (02/17/25 0701)  Pulse: 91 (02/17/25 1000)  Resp: (!) 29 (02/17/25 1000)  BP: (!) 158/129 (02/17/25 1000)  SpO2: 98 % (02/17/25 1000) Vital Signs (24h Range):  Temp:  [97.3 °F (36.3 °C)-98.5 °F (36.9 °C)] 97.8 °F (36.6 °C)  Pulse:  [] 91  Resp:  [14-51] 29  SpO2:  [92 %-100 %] 98 %  BP: (129-211)/() 158/129     Weight: 55.4 kg (122 lb 2.2 oz)  Body mass index is 18.04 kg/m².    Intake/Output Summary (Last 24 hours) at 2/17/2025 1207  Last data filed at 2/16/2025 1238  Gross per 24 hour   Intake 1000 ml   Output --   Net 1000 ml         Physical Exam  Constitutional:       General: She is not in acute distress.     Appearance: Normal appearance. She is not ill-appearing.   HENT:      Head: Normocephalic and atraumatic.   Pulmonary:      Effort: Pulmonary effort is normal. No respiratory distress.      Breath sounds: Normal breath sounds. No wheezing or rhonchi.   Abdominal:      General: Abdomen is flat. There is no distension.      Palpations: Abdomen is soft.   Neurological:      General: No focal deficit present.      Mental Status: She is alert and oriented to person, place, and time.             Significant Labs: CBC:   Recent Labs   Lab 02/15/25  2104 02/16/25  0903 02/17/25  0316   WBC 8.83 6.97 6.85   HGB 12.9 11.9* 13.2   HCT 39.4 36.1* 40.8    160 181     CMP:   Recent Labs   Lab 02/15/25  2104 02/16/25  0903 02/17/25  0316    140 141   K 4.2 4.5 3.7    106 102   CO2 20* 22* 28   GLU 74 154* 148*   BUN 47* 39* 38*   CREATININE 3.5* 2.8* 2.3*   CALCIUM 8.8 9.1 9.8   PROT 7.1  --   --    ALBUMIN 2.7*  --   --    BILITOT 0.3  --   --    ALKPHOS 85  --   --    AST 14  --   --    ALT 7*  --   --    ANIONGAP 12 12 11       Significant Imaging: I have  reviewed all pertinent imaging results/findings within the past 24 hours.    Assessment and Plan     * Acute respiratory failure with hypoxia  -currently resolved    Influenza A  Possible superadded bacterial pneumonia  CXR showed no acute abnormalities; however, procalcitonin elevated (?  Renal impairment vs infectious etiology  Continue renally dosed Tamiflu  Continue empiric antibiotics  Symptomatic control       Atrial fibrillation  Patient is currently in sinus rhythm. [id number] Score: 2. The patients heart rate in the last 24 hours is as follows:  Pulse  Min: 75  Max: 105     Antiarrhythmics       Anticoagulants  , 2 times daily, Oral  apixaban tablet 2.5 mg, 2 times daily, Oral    Plan  - Replete lytes with a goal of K>4, Mg >2  - Patient is anticoagulated, see medications listed above.  - Patient's afib is currently controlled          COPD (chronic obstructive pulmonary disease)  Patient's COPD is with exacerbation noted by continued dyspnea currently.  Patient is currently on COPD Pathway. Continue scheduled inhalers Steroids and Supplemental oxygen and monitor respiratory status closely.     Acute kidney injury superimposed on chronic kidney disease  JEY is likely due to pre-renal azotemia due to intravascular volume depletion. Baseline creatinine is  1.7 . Most recent creatinine and eGFR are listed below.  Recent Labs     02/15/25  2104 02/16/25  0903 02/17/25  0316   CREATININE 3.5* 2.8* 2.3*   EGFRNORACEVR 15* 20* 25*        Plan  - JEY is improving  - Avoid nephrotoxins and renally dose meds for GFR listed above  - Monitor urine output, serial BMP, and adjust therapy as needed  - IVF    History of stroke  Continue aspirin,statin       Essential hypertension  Patient's blood pressure range in the last 24 hours was: BP  Min: 129/86  Max: 211/104.The patient's inpatient anti-hypertensive regimen is listed below:  Current Antihypertensives  hydroCHLOROthiazide tablet 25 mg, Daily, Oral  carvediloL tablet  25 mg, 2 times daily with meals, Oral  amLODIPine tablet 10 mg, Daily, Oral  cloNIDine tablet 0.1 mg, 3 times daily PRN, Oral  hydrALAZINE tablet 100 mg, Every 8 hours, Oral    Plan  - BP is controlled, no changes needed to their regimen      VTE Risk Mitigation (From admission, onward)           Ordered     apixaban tablet 2.5 mg  2 times daily         02/16/25 0035     IP VTE HIGH RISK PATIENT  Once         02/16/25 0007     Place sequential compression device  Until discontinued         02/16/25 0007                    Discharge Planning   CLAY:      Code Status: Full Code   Medical Readiness for Discharge Date:   Discharge Plan A: Home with family            Critical care time spent on the evaluation and treatment of severe organ dysfunction, review of pertinent labs and imaging studies, discussions with consulting providers and discussions with patient/family: more than 35 minutes.            Marques Harris MD  Department of Hospital Medicine   [facility] - [place] - Intensive Care

## 2025-02-17 NOTE — ASSESSMENT & PLAN NOTE
Possible superadded bacterial pneumonia  CXR showed no acute abnormalities; however, procalcitonin elevated (?  Renal impairment vs infectious etiology  Continue renally dosed Tamiflu  Continue empiric antibiotics  Symptomatic control

## 2025-02-17 NOTE — ASSESSMENT & PLAN NOTE
Patient's blood pressure range in the last 24 hours was: BP  Min: 129/86  Max: 211/104.The patient's inpatient anti-hypertensive regimen is listed below:  Current Antihypertensives  hydroCHLOROthiazide tablet 25 mg, Daily, Oral  carvediloL tablet 25 mg, 2 times daily with meals, Oral  amLODIPine tablet 10 mg, Daily, Oral  cloNIDine tablet 0.1 mg, 3 times daily PRN, Oral  hydrALAZINE tablet 100 mg, Every 8 hours, Oral    Plan  - BP is controlled, no changes needed to their regimen

## 2025-02-17 NOTE — ASSESSMENT & PLAN NOTE
JEY is likely due to pre-renal azotemia due to intravascular volume depletion. Baseline creatinine is  1.7 . Most recent creatinine and eGFR are listed below.  Recent Labs     02/15/25  2104 02/16/25  0903 02/17/25  0316   CREATININE 3.5* 2.8* 2.3*   EGFRNORACEVR 15* 20* 25*        Plan  - JEY is improving  - Avoid nephrotoxins and renally dose meds for GFR listed above  - Monitor urine output, serial BMP, and adjust therapy as needed  - IVF

## 2025-02-17 NOTE — PROGRESS NOTES
HOSPITALIST ON CALL NOTE:    Contacted by the RN regarding increased WOB/Respiratory distress. Pt admitted on 2/15 with COPD exacerbation/FluA. Was initially requiring BIPAP and started on Steroids, nebs and Tamiflu (renally dosed for her renal function).  Was eventually weaned from BIPAP to NC; however, suddenly became very SOB. Give a round of Duonebs and ABG ordered.     Went to bedside and pt. With increases WOB noted (belly breathing and accessory muscles). Unable to complete a full sentence due to degree of SOB.   Poor air movement on exam with wheezes noted.     ABG on 2L NC: 7.47/39.6/65    CXR with no infiltrate to suggest PNA but pt. States increased sputum production green in color.     Will transfer to ICU for further tx on BIPAP. Cont. Tamifu but added Rocephin 1gm IV q24 hours. Space in duonebs to q4 hours scheduled for 12-24 hours and reassess.   Patient is a FULL CODE     Went to assess patient at bedside:    Vitals:    02/16/25 2200 02/16/25 2213 02/16/25 2252 02/16/25 2300   BP: (!) 189/99 (!) 172/81  (!) 168/93   BP Location:  Left arm     Pulse: 77  105 101   Resp: (!) 24 (!) 21 (!) 29 (!) 51   Temp:  98 °F (36.7 °C)     TempSrc:       SpO2: 97%  95% 97%   Weight:       Height:              Physical Exam  Vitals and nursing note reviewed.   Constitutional:       General: She is in acute distress.      Appearance: She is normal weight. She is ill-appearing and diaphoretic. She is not toxic-appearing.   HENT:      Head: Normocephalic and atraumatic.      Nose: No congestion.      Mouth/Throat:      Mouth: Mucous membranes are dry.      Pharynx: Oropharynx is clear. No oropharyngeal exudate or posterior oropharyngeal erythema.   Eyes:      General: No scleral icterus.     Extraocular Movements: Extraocular movements intact.      Pupils: Pupils are equal, round, and reactive to light.   Neck:      Vascular: No carotid bruit.   Cardiovascular:      Rate and Rhythm: Regular rhythm. Tachycardia present.       Pulses: Normal pulses.      Heart sounds: No murmur heard.     No friction rub. No gallop.   Pulmonary:      Effort: Respiratory distress present.      Breath sounds: Wheezing present. No rhonchi or rales.      Comments: Poor airway movement with noted use of belly breathing and accessory muscles. Pursed lip breathing   Abdominal:      General: There is no distension.      Palpations: Abdomen is soft.      Tenderness: There is no abdominal tenderness.   Musculoskeletal:         General: Normal range of motion.      Cervical back: Normal range of motion and neck supple.      Right lower leg: No edema.      Left lower leg: No edema.   Skin:     General: Skin is warm.      Coloration: Skin is not pale.   Neurological:      Mental Status: She is alert and oriented to person, place, and time.      Cranial Nerves: No cranial nerve deficit.      Coordination: Coordination normal.   Psychiatric:         Mood and Affect: Mood normal.         Behavior: Behavior normal.

## 2025-02-17 NOTE — ED NOTES
Patient's blood pressure is 211/104, pulse 94% on room air, Dr. Maldonado and Med 3 informed via secure chat.

## 2025-02-17 NOTE — PLAN OF CARE
Remains in ICU as tele boarder.  BiPap weaned to 2 L NC, NC weaned off. Remains on RA with sats 95-98%. Cardiac diet ordered, tolerating well.  Accu checks AC/HS, WDL.  HTN this shift, BP meds adjusted; PRN meds given to maintain SBP < 180.  Mildly effective.  Purewick in place with adequate UOP.  Free from fall/injury. POC reviewed with pt; expressed understanding.     Problem: Adult Inpatient Plan of Care  Goal: Plan of Care Review  Outcome: Progressing  Goal: Patient-Specific Goal (Individualized)  Outcome: Progressing  Goal: Absence of Hospital-Acquired Illness or Injury  Outcome: Progressing  Goal: Optimal Comfort and Wellbeing  Outcome: Progressing  Goal: Readiness for Transition of Care  Outcome: Progressing     Problem: Infection  Goal: Absence of Infection Signs and Symptoms  Outcome: Progressing     Problem: COPD (Chronic Obstructive Pulmonary Disease)  Goal: Optimal Chronic Illness Coping  Outcome: Progressing  Goal: Optimal Level of Functional [place]  Outcome: Progressing  Goal: Absence of Infection Signs and Symptoms  Outcome: Progressing  Goal: Improved Oral Intake  Outcome: Progressing  Goal: Effective Oxygenation and Ventilation  Outcome: Progressing     Problem: Acute Kidney Injury/Impairment  Goal: Fluid and Electrolyte Balance  Outcome: Progressing  Goal: Improved Oral Intake  Outcome: Progressing  Goal: Effective Renal Function  Outcome: Progressing

## 2025-02-17 NOTE — ASSESSMENT & PLAN NOTE
Patient is currently in sinus rhythm. [id number] Score: 2. The patients heart rate in the last 24 hours is as follows:  Pulse  Min: 75  Max: 105     Antiarrhythmics       Anticoagulants  , 2 times daily, Oral  apixaban tablet 2.5 mg, 2 times daily, Oral    Plan  - Replete lytes with a goal of K>4, Mg >2  - Patient is anticoagulated, see medications listed above.  - Patient's afib is currently controlled

## 2025-02-17 NOTE — HOSPITAL COURSE
Ms Jacqueline Manuel is a 54 y.o. woman with COPD no home O2, HTN, CKD3 who was admitted with acute hypoxic respiratory failure secondary to influenza A causing COPD exacerbation. Started tamiflu, nebs, steroids; however, she worsened and was moved to ICU for BiPAP. She improved, now on NC and weaning O2 as tolerated. She has resistant HTN; reviewed meds with her- she does not know what she takes at home, and most of her medications have not been filled for months. Stepped down to floor. PT, OT consulted. Weaned to room air at rest and with activity. She is stable for discharge to home. Now on amlodipine, coreg, hydralazine, valsartan for BP control. Follow up with PCP. Cr stable at CKD3 upon discharge. Follow up with Nephrology. Re-prescribed trelegy for COPD. Pulmonary referral placed. HH ordered.

## 2025-02-17 NOTE — ED NOTES
Pt appeared to have increased SOB when speaking and only able to speak in short sentences. Pt assigned to [facility] and Dr. Ag notified of pt's appearance to assess whether intervention is needed. Dr Ag assessed pt in ED room and ordered pt to be on bipap due to pt's appearance.

## 2025-02-17 NOTE — NURSING
Pt complaining shortness of breath. Spo2 of 94% on 2L NC.  Wheezes noted and tachypneic. Pt requested to have breathing treatment. Pt got breathing treatment at 1915. Dr Kwon notified. Okay to give next breathing treatment now. C-xray stat ordered. HOB elevated. Bed in low position. Encouraged to do breathing exercise.       Dr Medina at the bedside. ABG ordered.     Dr Reyes at the bedside. Bipap continuous placed as ordered. Breathing treatment given as ordered. Methylprednisolone given as ordered. HOB elevated. Care ongoing.

## 2025-02-18 PROBLEM — R55 SYNCOPE AND COLLAPSE: Status: RESOLVED | Noted: 2024-03-02 | Resolved: 2025-02-18

## 2025-02-18 PROBLEM — E87.8 ABNORMAL BLOOD ELECTROLYTE LEVEL: Status: RESOLVED | Noted: 2024-03-28 | Resolved: 2025-02-18

## 2025-02-18 PROBLEM — E87.6 HYPOKALEMIA: Status: RESOLVED | Noted: 2023-07-07 | Resolved: 2025-02-18

## 2025-02-18 PROBLEM — I16.0 HYPERTENSIVE URGENCY: Status: RESOLVED | Noted: 2024-05-20 | Resolved: 2025-02-18

## 2025-02-18 PROBLEM — E83.39 HYPOPHOSPHATEMIA: Status: RESOLVED | Noted: 2023-07-07 | Resolved: 2025-02-18

## 2025-02-18 PROBLEM — R09.02 HYPOXIA: Status: RESOLVED | Noted: 2025-02-13 | Resolved: 2025-02-18

## 2025-02-18 PROBLEM — D64.9 ANEMIA: Status: RESOLVED | Noted: 2024-07-14 | Resolved: 2025-02-18

## 2025-02-18 PROBLEM — I16.0 HYPERTENSIVE URGENCY: Status: RESOLVED | Noted: 2023-07-06 | Resolved: 2025-02-18

## 2025-02-18 PROBLEM — N28.9 RENAL INSUFFICIENCY: Status: RESOLVED | Noted: 2023-07-06 | Resolved: 2025-02-18

## 2025-02-18 PROBLEM — I48.0 PAROXYSMAL ATRIAL FIBRILLATION: Status: ACTIVE | Noted: 2024-07-14

## 2025-02-18 PROBLEM — N18.32 ACUTE RENAL FAILURE SUPERIMPOSED ON STAGE 3B CHRONIC KIDNEY DISEASE: Status: ACTIVE | Noted: 2024-03-02

## 2025-02-18 LAB
ANION GAP SERPL CALC-SCNC: 11 MMOL/L (ref 8–16)
BASOPHILS # BLD AUTO: 0.01 K/UL (ref 0–0.2)
BASOPHILS NFR BLD: 0.1 % (ref 0–1.9)
BUN SERPL-MCNC: 44 MG/DL (ref 6–20)
CALCIUM SERPL-MCNC: 9.8 MG/DL (ref 8.7–10.5)
CHLORIDE SERPL-SCNC: 100 MMOL/L (ref 95–110)
CO2 SERPL-SCNC: 29 MMOL/L (ref 23–29)
CREAT SERPL-MCNC: 2 MG/DL (ref 0.5–1.4)
DIFFERENTIAL METHOD BLD: ABNORMAL
EOSINOPHIL # BLD AUTO: 0 K/UL (ref 0–0.5)
EOSINOPHIL NFR BLD: 0 % (ref 0–8)
ERYTHROCYTE [DISTWIDTH] IN BLOOD BY AUTOMATED COUNT: 12.6 % (ref 11.5–14.5)
EST. GFR  (NO RACE VARIABLE): 30 ML/MIN/1.73 M^2
GLUCOSE SERPL-MCNC: 131 MG/DL (ref 70–110)
HCT VFR BLD AUTO: 42.7 % (ref 37–48.5)
HGB BLD-MCNC: 14.3 G/DL (ref 12–16)
IMM GRANULOCYTES # BLD AUTO: 0.07 K/UL (ref 0–0.04)
IMM GRANULOCYTES NFR BLD AUTO: 0.8 % (ref 0–0.5)
LYMPHOCYTES # BLD AUTO: 1.2 K/UL (ref 1–4.8)
LYMPHOCYTES NFR BLD: 13.6 % (ref 18–48)
MAGNESIUM SERPL-MCNC: 2.1 MG/DL (ref 1.6–2.6)
MCH RBC QN AUTO: 29.5 PG (ref 27–31)
MCHC RBC AUTO-ENTMCNC: 33.5 G/DL (ref 32–36)
MCV RBC AUTO: 88 FL (ref 82–98)
MONOCYTES # BLD AUTO: 0.3 K/UL (ref 0.3–1)
MONOCYTES NFR BLD: 2.8 % (ref 4–15)
NEUTROPHILS # BLD AUTO: 7.4 K/UL (ref 1.8–7.7)
NEUTROPHILS NFR BLD: 82.7 % (ref 38–73)
NRBC BLD-RTO: 0 /100 WBC
OHS QRS DURATION: 84 MS
OHS QTC CALCULATION: 445 MS
PHOSPHATE SERPL-MCNC: 3.3 MG/DL (ref 2.7–4.5)
PLATELET # BLD AUTO: 228 K/UL (ref 150–450)
PMV BLD AUTO: 9.4 FL (ref 9.2–12.9)
POCT GLUCOSE: 122 MG/DL (ref 70–110)
POCT GLUCOSE: 134 MG/DL (ref 70–110)
POCT GLUCOSE: 135 MG/DL (ref 70–110)
POCT GLUCOSE: 144 MG/DL (ref 70–110)
POTASSIUM SERPL-SCNC: 3.5 MMOL/L (ref 3.5–5.1)
RBC # BLD AUTO: 4.84 M/UL (ref 4–5.4)
SODIUM SERPL-SCNC: 140 MMOL/L (ref 136–145)
WBC # BLD AUTO: 8.9 K/UL (ref 3.9–12.7)

## 2025-02-18 PROCEDURE — 94761 N-INVAS EAR/PLS OXIMETRY MLT: CPT

## 2025-02-18 PROCEDURE — 84100 ASSAY OF PHOSPHORUS: CPT | Performed by: STUDENT IN AN ORGANIZED HEALTH CARE EDUCATION/TRAINING PROGRAM

## 2025-02-18 PROCEDURE — 97110 THERAPEUTIC EXERCISES: CPT

## 2025-02-18 PROCEDURE — 25000242 PHARM REV CODE 250 ALT 637 W/ HCPCS: Performed by: STUDENT IN AN ORGANIZED HEALTH CARE EDUCATION/TRAINING PROGRAM

## 2025-02-18 PROCEDURE — 87486 CHLMYD PNEUM DNA AMP PROBE: CPT | Performed by: HOSPITALIST

## 2025-02-18 PROCEDURE — 80048 BASIC METABOLIC PNL TOTAL CA: CPT | Performed by: STUDENT IN AN ORGANIZED HEALTH CARE EDUCATION/TRAINING PROGRAM

## 2025-02-18 PROCEDURE — 94640 AIRWAY INHALATION TREATMENT: CPT

## 2025-02-18 PROCEDURE — 85025 COMPLETE CBC W/AUTO DIFF WBC: CPT | Performed by: STUDENT IN AN ORGANIZED HEALTH CARE EDUCATION/TRAINING PROGRAM

## 2025-02-18 PROCEDURE — 25000003 PHARM REV CODE 250: Performed by: STUDENT IN AN ORGANIZED HEALTH CARE EDUCATION/TRAINING PROGRAM

## 2025-02-18 PROCEDURE — 99900035 HC TECH TIME PER 15 MIN (STAT)

## 2025-02-18 PROCEDURE — 63600175 PHARM REV CODE 636 W HCPCS

## 2025-02-18 PROCEDURE — 83735 ASSAY OF MAGNESIUM: CPT | Performed by: STUDENT IN AN ORGANIZED HEALTH CARE EDUCATION/TRAINING PROGRAM

## 2025-02-18 PROCEDURE — 25000003 PHARM REV CODE 250

## 2025-02-18 PROCEDURE — 97530 THERAPEUTIC ACTIVITIES: CPT

## 2025-02-18 PROCEDURE — 11000001 HC ACUTE MED/SURG PRIVATE ROOM

## 2025-02-18 PROCEDURE — 27000221 HC OXYGEN, UP TO 24 HOURS

## 2025-02-18 PROCEDURE — 94660 CPAP INITIATION&MGMT: CPT

## 2025-02-18 PROCEDURE — 63600175 PHARM REV CODE 636 W HCPCS: Mod: JZ,TB | Performed by: INTERNAL MEDICINE

## 2025-02-18 PROCEDURE — 36415 COLL VENOUS BLD VENIPUNCTURE: CPT | Performed by: STUDENT IN AN ORGANIZED HEALTH CARE EDUCATION/TRAINING PROGRAM

## 2025-02-18 PROCEDURE — 97165 OT EVAL LOW COMPLEX 30 MIN: CPT

## 2025-02-18 PROCEDURE — 94760 N-INVAS EAR/PLS OXIMETRY 1: CPT

## 2025-02-18 PROCEDURE — 25000003 PHARM REV CODE 250: Performed by: INTERNAL MEDICINE

## 2025-02-18 PROCEDURE — 27000207 HC ISOLATION

## 2025-02-18 PROCEDURE — 25000003 PHARM REV CODE 250: Performed by: HOSPITALIST

## 2025-02-18 RX ORDER — VALSARTAN 80 MG/1
160 TABLET ORAL 2 TIMES DAILY
Status: DISCONTINUED | OUTPATIENT
Start: 2025-02-18 | End: 2025-02-19 | Stop reason: HOSPADM

## 2025-02-18 RX ORDER — CLONIDINE HYDROCHLORIDE 0.1 MG/1
0.3 TABLET ORAL 3 TIMES DAILY PRN
Status: DISCONTINUED | OUTPATIENT
Start: 2025-02-18 | End: 2025-02-19 | Stop reason: HOSPADM

## 2025-02-18 RX ORDER — HYDRALAZINE HYDROCHLORIDE 25 MG/1
100 TABLET, FILM COATED ORAL 3 TIMES DAILY
Qty: 360 TABLET | Refills: 11 | Status: SHIPPED | OUTPATIENT
Start: 2025-02-18 | End: 2026-02-18

## 2025-02-18 RX ORDER — PREDNISONE 20 MG/1
40 TABLET ORAL DAILY
Status: DISCONTINUED | OUTPATIENT
Start: 2025-02-19 | End: 2025-02-19 | Stop reason: HOSPADM

## 2025-02-18 RX ORDER — VALSARTAN 320 MG/1
320 TABLET ORAL DAILY
Qty: 90 TABLET | Refills: 3 | Status: SHIPPED | OUTPATIENT
Start: 2025-02-18 | End: 2026-02-18

## 2025-02-18 RX ADMIN — APIXABAN 2.5 MG: 2.5 TABLET, FILM COATED ORAL at 08:02

## 2025-02-18 RX ADMIN — HYDRALAZINE HYDROCHLORIDE 100 MG: 25 TABLET ORAL at 08:02

## 2025-02-18 RX ADMIN — HYDRALAZINE HYDROCHLORIDE 100 MG: 25 TABLET ORAL at 05:02

## 2025-02-18 RX ADMIN — PANTOPRAZOLE SODIUM 40 MG: 40 TABLET, DELAYED RELEASE ORAL at 08:02

## 2025-02-18 RX ADMIN — CLONIDINE HYDROCHLORIDE 0.3 MG: 0.1 TABLET ORAL at 07:02

## 2025-02-18 RX ADMIN — METHYLPREDNISOLONE SODIUM SUCCINATE 60 MG: 125 INJECTION, POWDER, FOR SOLUTION INTRAMUSCULAR; INTRAVENOUS at 01:02

## 2025-02-18 RX ADMIN — HYDRALAZINE HYDROCHLORIDE 100 MG: 25 TABLET ORAL at 02:02

## 2025-02-18 RX ADMIN — IPRATROPIUM BROMIDE AND ALBUTEROL SULFATE 3 ML: 2.5; .5 SOLUTION RESPIRATORY (INHALATION) at 03:02

## 2025-02-18 RX ADMIN — ASPIRIN 81 MG: 81 TABLET, COATED ORAL at 08:02

## 2025-02-18 RX ADMIN — CARVEDILOL 25 MG: 12.5 TABLET, FILM COATED ORAL at 07:02

## 2025-02-18 RX ADMIN — Medication 6 MG: at 08:02

## 2025-02-18 RX ADMIN — AMLODIPINE BESYLATE 10 MG: 5 TABLET ORAL at 10:02

## 2025-02-18 RX ADMIN — OSELTAMIVIR PHOSPHATE 30 MG: 30 CAPSULE ORAL at 09:02

## 2025-02-18 RX ADMIN — CEFTRIAXONE 2 G: 2 INJECTION, POWDER, FOR SOLUTION INTRAMUSCULAR; INTRAVENOUS at 05:02

## 2025-02-18 RX ADMIN — CARVEDILOL 25 MG: 12.5 TABLET, FILM COATED ORAL at 05:02

## 2025-02-18 RX ADMIN — ATORVASTATIN CALCIUM 40 MG: 40 TABLET, FILM COATED ORAL at 08:02

## 2025-02-18 RX ADMIN — VALSARTAN 160 MG: 80 TABLET, FILM COATED ORAL at 08:02

## 2025-02-18 RX ADMIN — CLONIDINE HYDROCHLORIDE 0.3 MG: 0.1 TABLET ORAL at 08:02

## 2025-02-18 RX ADMIN — ESCITALOPRAM OXALATE 10 MG: 10 TABLET ORAL at 08:02

## 2025-02-18 RX ADMIN — IPRATROPIUM BROMIDE AND ALBUTEROL SULFATE 3 ML: 2.5; .5 SOLUTION RESPIRATORY (INHALATION) at 08:02

## 2025-02-18 RX ADMIN — VALSARTAN 160 MG: 80 TABLET, FILM COATED ORAL at 10:02

## 2025-02-18 NOTE — ASSESSMENT & PLAN NOTE
Patient admitted with Hypoxic which is Acute.  she is not on home oxygen. Signs/symptoms of respiratory failure include- respiratory distress present on admission.   - Labs and images were reviewed. Patient Has not has a recent ABG.   - Supplemental oxygen was provided and noted- Nasal Cannula 2 LPM   - Respiratory failure is due to- COPD and influenza    - treatment: treat COPD exacerbation and influenza  - wean to RA as tolerated

## 2025-02-18 NOTE — NURSING
[facility]  Nurses Note -- 4 Eyes      2/17/2025       Skin assessed on: Q Shift      [x] No Pressure Injuries Present    [x]Prevention Measures Documented    [] Yes LDA  for Pressure Injury Previously documented     [] Yes New Pressure Injury Discovered   [] LDA for New Pressure Injury Added      Attending RN:  CHARLES TOWNSEND       Second RN:  CHARLES BURTON

## 2025-02-18 NOTE — ASSESSMENT & PLAN NOTE
- admitted with influenza A  - started tamiflu  - procal elevated- also on antibiotics  - improving

## 2025-02-18 NOTE — PLAN OF CARE
[facility]      HOME HEALTH ORDERS  FACE TO FACE ENCOUNTER    Patient Name: Jacqueline Manuel  YOB: 1970    PCP: Sunny Pollard Jr., MD   PCP Address: [address] [facility]  PCP Phone Number: [phone]  PCP Fax: [phone]    Encounter Date: 2/15/25    Admit to Home Health    Diagnoses:  Active Hospital Problems    Diagnosis  POA    *Acute respiratory failure with hypoxia [J96.01]  Yes    Influenza A [J10.1]  Yes    Paroxysmal atrial fibrillation [I48.0]  Yes    COPD (chronic obstructive pulmonary disease) [J44.9]  Yes    Acute renal failure superimposed on stage 3b chronic kidney disease [N17.9, N18.32]  Yes    History of stroke [Z86.73]  Not Applicable    Essential hypertension [I10]  Yes      Resolved Hospital Problems   No resolved problems to display.       Follow Up Appointments:  No future appointments.    Allergies:Review of patient's allergies indicates:  No Known Allergies    Medications: Review discharge medications with patient and family and provide education.    Current Medications[1]     Medication List        START taking these medications      oseltamivir 30 MG capsule  Commonly known as: TAMIFLU  Take 1 capsule (30 mg total) by mouth once daily. for 1 dose  Start taking on: February 20, 2025     predniSONE 20 MG tablet  Commonly known as: DELTASONE  Take 2 tablets (40 mg total) by mouth once daily. for 2 days  Start taking on: February 20, 2025     TRELEGY ELLIPTA 100-62.5-25 mcg Dsdv  Generic drug: fluticasone-umeclidin-vilanter  Inhale 1 puff into the lungs once daily.            CHANGE how you take these medications      blood pressure monitor Kit  1 kit by Misc.(Non-Drug; Combo Route) route once daily. Use daily to check blood pressure  What changed: Another medication with the same name was removed. Continue taking this medication, and follow the directions you see here.     hydrALAZINE 25 MG tablet  Commonly known  as: APRESOLINE  Take 4 tablets (100 mg total) by mouth 3 (three) times daily.  What changed:   how much to take  when to take this     valsartan 320 MG tablet  Commonly known as: DIOVAN  Take 1 tablet (320 mg total) by mouth once daily.  What changed:   medication strength  how much to take            CONTINUE taking these medications      albuterol 90 mcg/actuation inhaler  Commonly known as: PROVENTIL/VENTOLIN HFA  Inhale 1 to 2 puffs into the lungs every 6 (six) hours as needed for Wheezing or Shortness of Breath. Rescue     albuterol-ipratropium 2.5 mg-0.5 mg/3 mL nebulizer solution  Commonly known as: DUO-NEB  Take 3 mLs by nebulization every 6 (six) hours as needed for Wheezing. Rescue     amLODIPine 10 MG tablet  Commonly known as: NORVASC  Take 1 tablet (10 mg total) by mouth once daily.     aspirin 81 MG EC tablet  Commonly known as: ECOTRIN  Take 1 tablet (81 mg total) by mouth once daily.     atorvastatin 40 MG tablet  Commonly known as: LIPITOR  Take 1 tablet (40 mg total) by mouth once daily.     carvediloL 25 MG tablet  Commonly known as: COREG  Take 1 tablet (25 mg total) by mouth 2 (two) times daily with meals.     ELIQUIS 2.5 mg Tab  Generic drug: apixaban  Take 2.5 mg by mouth 2 (two) times daily.            STOP taking these medications      allopurinoL 200 mg Tab     cloNIDine 0.1 MG tablet  Commonly known as: CATAPRES     EScitalopram oxalate 10 MG tablet  Commonly known as: LEXAPRO     gabapentin 600 MG tablet  Commonly known as: NEURONTIN     hydroCHLOROthiazide 25 MG tablet  Commonly known as: HYDRODIURIL     HYDROcodone-acetaminophen  mg per tablet  Commonly known as: NORCO     levETIRAcetam 500 MG Tab  Commonly known as: KEPPRA     pantoprazole 40 MG tablet  Commonly known as: PROTONIX     SYMBICORT 160-4.5 mcg/actuation Hfaa  Generic drug: budesonide-formoterol 160-4.5 mcg                I have seen and examined this patient within the last 30 days. My clinical findings that support  the need for the home health skilled services and home bound status are the following:no   Patient with medication mismanagement issues requiring home bound status as evidenced by  Unstable vital signs (blood pressure, heart rate), Poor understanding of medication regimen/dosage, and Poor adherence to medication regimen/dosage.     Diet:   renal diet    Consult:  PT to evaluate and treat  OT to evaluate and treat  No restrictions      Activities:   activity as tolerated    Nursing:   Agency to admit patient within 24 hours of hospital discharge unless specified on physician order or at patient request    SN to complete comprehensive assessment including routine vital signs. Instruct on disease process and s/s of complications to report to MD. Review/verify medication list sent home with the patient at time of discharge  and instruct patient/caregiver as needed. Frequency may be adjusted depending on start of care date.     Skilled nurse to perform up to 3 visits PRN for symptoms related to diagnosis    Notify MD if SBP > 160 or < 90; DBP > 90 or < 50; HR > 120 or < 50; Temp > 101; O2 < 88%    Ok to schedule additional visits based on staff availability and patient request on consecutive days within the home health episode.        I certify that this patient is confined to her home and needs intermittent skilled nursing care.    Lucero Mcnally MD  02/19/2025 11:13 AM            [1]   Current Facility-Administered Medications   Medication Dose Route Frequency Provider Last Rate Last Admin    acetaminophen tablet 650 mg  650 mg Oral Q4H PRN Zo Reyes MD        albuterol-ipratropium 2.5 mg-0.5 mg/3 mL nebulizer solution 3 mL  3 mL Nebulization Q4H PRN David Martínez MD   3 mL at 02/18/25 1503    aluminum-magnesium hydroxide-simethicone 200-200-20 mg/5 mL suspension 30 mL  30 mL Oral QID PRN David Martínez MD        amLODIPine tablet 10 mg  10 mg Oral Daily Zo Reyes MD   10 mg at 02/19/25 0812     apixaban tablet 2.5 mg  2.5 mg Oral BID David Martínez MD   2.5 mg at 02/19/25 0813    aspirin EC tablet 81 mg  81 mg Oral Daily David Martínez MD   81 mg at 02/19/25 0813    atorvastatin tablet 40 mg  40 mg Oral Daily David Martínez MD   40 mg at 02/19/25 0813    bisacodyL suppository 10 mg  10 mg Rectal Daily PRN David Martínez MD        carvediloL tablet 25 mg  25 mg Oral BID Zo Guzman MD   25 mg at 02/19/25 0812    cefTRIAXone injection 2 g  2 g Intravenous Q24H Marques Harris MD   2 g at 02/19/25 0433    cloNIDine tablet 0.3 mg  0.3 mg Oral TID PRN Lucero Mcnally MD   0.3 mg at 02/18/25 2013    EScitalopram oxalate tablet 10 mg  10 mg Oral Daily David Martínez MD   10 mg at 02/19/25 0813    glucagon (human recombinant) injection 1 mg  1 mg Intramuscular PRN David Martínez MD        glucose chewable tablet 16 g  16 g Oral PRN David Martínez MD        glucose chewable tablet 24 g  24 g Oral PRN David Martínez MD        hydrALAZINE tablet 100 mg  100 mg Oral Q8H Marques Harris MD   100 mg at 02/19/25 0533    insulin aspart U-100 pen 0-5 Units  0-5 Units Subcutaneous QID (AC + HS) David Lott MD        melatonin tablet 6 mg  6 mg Oral Nightly PRN David Martínez MD   6 mg at 02/18/25 2013    naloxone 0.4 mg/mL injection 0.02 mg  0.02 mg Intravenous PRN David Martínez MD        ondansetron injection 4 mg  4 mg Intravenous Q8H David Lott MD        oseltamivir capsule 30 mg  30 mg Oral Daily David Martínez MD   30 mg at 02/19/25 0813    pantoprazole EC tablet 40 mg  40 mg Oral Daily David Martínez MD   40 mg at 02/19/25 0813    predniSONE tablet 40 mg  40 mg Oral Daily Lucero Mcnally MD   40 mg at 02/19/25 0813    senna-docusate 8.6-50 mg per tablet 1 tablet  1 tablet Oral Daily PRN David Martínez MD        simethicone chewable tablet 80 mg  1 tablet Oral QID PRN David Martínez MD        sodium chloride 0.9% flush 10 mL  10 mL Intravenous Q12H PRN David Martínez MD        sodium chloride 0.9%  flush 3 mL  3 mL Intravenous PRN David Martínez MD        valsartan tablet 160 mg  160 mg Oral BID Lucero Mcnally MD   160 mg at 02/19/25 0812

## 2025-02-18 NOTE — PROGRESS NOTES
[facility]  [facility] Medicine  Progress Note    Patient Name: Jacqueline Manuel  MRN: [id number]  Patient Class: IP- Inpatient   Admission Date: 2/15/2025  Length of Stay: 2 days  Attending Physician: Lucero Mcnally MD  Primary Care Provider: Sunny Pollard Jr., MD        Subjective     Principal Problem:Acute respiratory failure with hypoxia        HPI:  This is a [age]-year-old female with a past medical history of COPD, hypertension, CKD 3, tobacco use who presents with shortness of breath.     Patient had a recent hospitalization (2/13-2/13) at [facility] for hypoxic respiratory failure in the setting of influenza a and COPD.  One, she did not wish to be admitted and was discharged in his medical advice.  She returns to the ED for persistent shortness of breath, productive cough w/ green sputum.     In the ED, the patient was tachycardic (120s > 100s), tachypneic (20), hypertensive (140s-170s/90s-100s), initially BiPAP, and weaned to 2 L O2.  Labs were remarkable for an elevated creatinine (3.5 - baseline around 1.7), positive influenza a, negative BNP (22), negative troponin (0.008).  VBG showed a pH of 7.3, pCO2 of 52.0.  Chest x-ray showed no acute cardiopulmonary process.  Patient was given Solu-Medrol 125 mg IV, DuoNeb x1.  She was admitted for further management.    Overview/Hospital Course:  Ms Jacqueline Manuel is a 54 y.o. woman with COPD no home O2, HTN, CKD3 who was admitted with acute hypoxic respiratory failure secondary to influenza A causing COPD exacerbation. Started tamiflu, nebs, steroids; however, she worsened and was moved to ICU for BiPAP. She improved, now on NC and weaning O2 as tolerated. She has resistant HTN; reviewed meds with her- she does not know what she takes at home, and most of her medications have not been filled for months. Stepped down to floor. PT, OT consulted.     Interval History: She is feeling much better today. No shortness of breath at rest. She  wants to move out of the ICU and discharge soon. She has no idea what medications she takes at home.     Review of Systems   Constitutional:  Negative for chills and fever.   Respiratory:  Negative for cough and shortness of breath.    Cardiovascular:  Negative for chest pain.   Gastrointestinal:  Negative for abdominal pain.   Psychiatric/Behavioral:  Negative for confusion.      Objective:     Vital Signs (Most Recent):  Temp: 97.8 °F (36.6 °C) (02/18/25 0906)  Pulse: 86 (02/18/25 0906)  Resp: 18 (02/18/25 0906)  BP: (!) 132/92 (02/18/25 1022)  SpO2: 98 % (02/18/25 0906) Vital Signs (24h Range):  Temp:  [97.5 °F (36.4 °C)-97.8 °F (36.6 °C)] 97.8 °F (36.6 °C)  Pulse:  [] 86  Resp:  [13-48] 18  SpO2:  [93 %-100 %] 98 %  BP: (110-204)/() 132/92     Weight: 55.4 kg (122 lb 2.2 oz)  Body mass index is 18.04 kg/m².  No intake or output data in the 24 hours ending 02/18/25 1102      Physical Exam  Vitals and nursing note reviewed.   Constitutional:       General: She is not in acute distress.     Appearance: She is not toxic-appearing.   HENT:      Head: Normocephalic and atraumatic.   Cardiovascular:      Rate and Rhythm: Normal rate and regular rhythm.   Pulmonary:      Effort: Pulmonary effort is normal.      Breath sounds: Wheezing present.      Comments: 2L  Abdominal:      General: Bowel sounds are normal.      Palpations: Abdomen is soft.   Musculoskeletal:      Right lower leg: No edema.      Left lower leg: No edema.   Skin:     General: Skin is warm and dry.   Neurological:      Mental Status: She is alert. Mental status is at baseline.             Significant Labs: All pertinent labs within the past 24 hours have been reviewed.    Significant Imaging: I have reviewed all pertinent imaging results/findings within the past 24 hours.    Assessment and Plan     * Acute respiratory failure with hypoxia  Patient admitted with Hypoxic which is Acute.  she is not on home oxygen. Signs/symptoms of  respiratory failure include- respiratory distress present on admission.   - Labs and images were reviewed. Patient Has not has a recent ABG.   - Supplemental oxygen was provided and noted- Nasal Cannula 2 LPM   - Respiratory failure is due to- COPD and influenza    - treatment: treat COPD exacerbation and influenza  - wean to RA as tolerated       Influenza A  - admitted with influenza A  - started tamiflu  - procal elevated- also on antibiotics  - improving       Paroxysmal atrial fibrillation  Patient is currently in sinus rhythm. [id number] Score: 2. The patients heart rate in the last 24 hours is as follows:  Pulse  Min: 86  Max: 105     Antiarrhythmics   - none  - coreg for rate control     Anticoagulants  , 2 times daily, Oral  apixaban tablet 2.5 mg, 2 times daily, Oral    Plan  - Replete lytes with a goal of K>4, Mg >2  - Patient is anticoagulated, see medications listed above.  - Patient's afib is currently controlled          COPD (chronic obstructive pulmonary disease)  Patient's COPD is with exacerbation noted by continued dyspnea.  Patient is currently on COPD Pathway. Continue scheduled inhalers Steroids and Supplemental oxygen and monitor respiratory status closely.   - change IV steroids to PO  - she is improving weaning O2  - needs walk testing prior to DC  - has nebulizer at home but no inhalers, previously prescribed trelegy but doesn't recognize the med.   - needs connection to smoking cessation and Pulm at DC    Acute renal failure superimposed on stage 3b chronic kidney disease  JEY is likely due to pre-renal azotemia due to intravascular volume depletion. Baseline creatinine is  1.7 . Most recent creatinine and eGFR are listed below.  Recent Labs     02/16/25  0903 02/17/25  0316 02/18/25  0414   CREATININE 2.8* 2.3* 2.0*   EGFRNORACEVR 20* 25* 30*        Plan  - JEY is improving with IVF  - suspect her chronically uncontrolled BP also plays a role  - Avoid nephrotoxins and renally dose meds  for GFR listed above  - Monitor urine output, serial BMP, and adjust therapy as needed  - needs connection to Nephrology     History of stroke  - Continue aspirin, statin       Essential hypertension  Patient's blood pressure range in the last 24 hours was: BP  Min: 110/72  Max: 204/113.The patient's inpatient anti-hypertensive regimen is listed below:  Current Antihypertensives  carvediloL tablet 25 mg, 2 times daily with meals, Oral  amLODIPine tablet 10 mg, Daily, Oral  hydrALAZINE tablet 100 mg, Every 8 hours, Oral  valsartan tablet 160 mg, 2 times daily, Oral  cloNIDine tablet 0.3 mg, 3 times daily PRN, Oral    Plan  - BP is uncontrolled, will adjust as follows:    - she has no idea what medications she takes at home. Most meds have not been filled for months  - will need to start from scratch. With CKD3, she should not be on HCTZ- stop that. If diuretic needed, needs lasix. She should be on ARB to prevent progression- started valsartan so as not to confuse cough for ACEi cough. Started amlodipine 10 daily. Continues hydralazine. Increased PRN clonidine to 0.3mg which is very effective in controlling BP for her but not a great Rx longterm if she is having trouble keeping her meds straight  - she needs close follow up with PCP vs Nephrology for BP control  - need to make sure she has a BP cuff upon discharge       VTE Risk Mitigation (From admission, onward)           Ordered     apixaban tablet 2.5 mg  2 times daily         02/16/25 0035     IP VTE HIGH RISK PATIENT  Once         02/16/25 0007     Place sequential compression device  Until discontinued         02/16/25 0007                    Discharge Planning   CLAY:      Code Status: Full Code   Medical Readiness for Discharge Date:   Discharge Plan A: Home with family                Lucero Mcnally MD  Department of Hospital Medicine   [facility] - Intensive Care

## 2025-02-18 NOTE — ASSESSMENT & PLAN NOTE
JEY is likely due to pre-renal azotemia due to intravascular volume depletion. Baseline creatinine is  1.7 . Most recent creatinine and eGFR are listed below.  Recent Labs     02/16/25  0903 02/17/25  0316 02/18/25  0414   CREATININE 2.8* 2.3* 2.0*   EGFRNORACEVR 20* 25* 30*        Plan  - JEY is improving with IVF  - suspect her chronically uncontrolled BP also plays a role  - Avoid nephrotoxins and renally dose meds for GFR listed above  - Monitor urine output, serial BMP, and adjust therapy as needed  - needs connection to Nephrology

## 2025-02-18 NOTE — PLAN OF CARE
Problem: Occupational Therapy  Goal: Occupational Therapy Goal  Description: Goals to be met by: 3/4/2025     Patient will increase functional independence with ADLs by performing:    LE Dressing with Modified [place].  Grooming while standing at sink with Modified [place].  Toileting from toilet with Modified [place] for hygiene and clothing management.   Step transfer with Modified [place] and stable SpO2.  Toilet transfer to toilet with Modified [place].    Outcome: Progressing     OT initial eval completed. Pt functional mobility with CGA/Min A with no AD. Recommend LIT once medically stable.

## 2025-02-18 NOTE — PLAN OF CARE
Remains in ICU with bed assigned to tele floor.  Pt with htn noted; MD aware and medications adjusted. BiPap at bedtime; pt weaned to room air, but c/o SOB; pt placed on 1 L NC.  Accu checks AC/HS; WDL.  Cardiac diet with no signs of discomfort. Up to BSC.  POC reviewed with pt; expressed understanding.     Problem: Adult Inpatient Plan of Care  Goal: Plan of Care Review  Outcome: Progressing  Goal: Patient-Specific Goal (Individualized)  Outcome: Progressing  Goal: Absence of Hospital-Acquired Illness or Injury  Outcome: Progressing  Goal: Optimal Comfort and Wellbeing  Outcome: Progressing  Goal: Readiness for Transition of Care  Outcome: Progressing     Problem: Infection  Goal: Absence of Infection Signs and Symptoms  Outcome: Progressing     Problem: COPD (Chronic Obstructive Pulmonary Disease)  Goal: Optimal Chronic Illness Coping  Outcome: Progressing  Goal: Optimal Level of Functional [place]  Outcome: Progressing  Goal: Absence of Infection Signs and Symptoms  Outcome: Progressing  Goal: Improved Oral Intake  Outcome: Progressing  Goal: Effective Oxygenation and Ventilation  Outcome: Progressing     Problem: Acute Kidney Injury/Impairment  Goal: Fluid and Electrolyte Balance  Outcome: Progressing  Goal: Improved Oral Intake  Outcome: Progressing  Goal: Effective Renal Function  Outcome: Progressing

## 2025-02-18 NOTE — ASSESSMENT & PLAN NOTE
Patient is currently in sinus rhythm. [id number] Score: 2. The patients heart rate in the last 24 hours is as follows:  Pulse  Min: 86  Max: 105     Antiarrhythmics   - none  - coreg for rate control     Anticoagulants  , 2 times daily, Oral  apixaban tablet 2.5 mg, 2 times daily, Oral    Plan  - Replete lytes with a goal of K>4, Mg >2  - Patient is anticoagulated, see medications listed above.  - Patient's afib is currently controlled

## 2025-02-18 NOTE — PT/OT/SLP EVAL
Occupational Therapy   Evaluation    Name: Jacqueline Manuel  MRN: [id number]  Admitting Diagnosis: Acute respiratory failure with hypoxia  Recent Surgery: * No surgery found *      Recommendations:     Discharge Recommendations: Low Intensity Therapy  Discharge Equipment Recommendations:  none  Barriers to discharge:  None    Assessment:     Jacqueline Manuel is a 54 y.o. female with a medical diagnosis of Acute respiratory failure with hypoxia.  She presents with The primary encounter diagnosis was Influenza A. Diagnoses of Hypoxia, Chest pain, and SOB (shortness of breath) were also pertinent to this visit. . Performance deficits affecting function: weakness, impaired endurance, impaired self care skills, impaired functional mobility, impaired cardiopulmonary response to activity, decreased safety awareness.      OT initial eval completed. Pt functional mobility with CGA/Min A with no AD. Recommend LIT once medically stable.    Rehab Prognosis: Good; patient would benefit from acute skilled OT services to address these deficits and reach maximum level of function.       Plan:     Patient to be seen  (2-3x/wk) to address the above listed problems via self-care/home management, therapeutic activities, therapeutic exercises  Plan of Care Expires: 03/04/25  Plan of Care Reviewed with: patient    Subjective     Chief Complaint: fatigue  Patient/Family Comments/goals: pt agreeable for eval and reported just returned to chair from [facility] [place]    Occupational Profile:  Living Environment: Lives with 2 dtrs in [facility], [email or web address], [facility]  Previous level of function: Mod I with SPC and RW; receives  services  Equipment Used at Home: cane, straight, walker, rolling  Assistance upon Discharge: 10 children    Pain/Comfort:  Pain Rating 1: 0/10  Pain Addressed 1: Reposition  Pain Rating Post-Intervention 1: 0/10    Objective:     Communicated with: RN prior to session.  Patient found up in chair with blood pressure cuff, telemetry, pulse ox  (continuous), peripheral IV, oxygen upon OT entry to room.    General Precautions: Standard, fall, droplet, respiratory  Orthopedic Precautions: N/A  Braces: N/A  Respiratory Status: Nasal cannula, flow 2 L/min, satting 85 (with motion detected) - 100%    Occupational Performance:    Bed Mobility:    Patient completed Scooting/Bridging with modified independence  Patient completed Sit to Supine with modified independence    Functional Mobility/Transfers:  Patient completed Sit <> Stand Transfer with contact guard assistance and minimum assistance  with  no assistive device   Patient completed Bed <> Chair Transfer using Step Transfer technique with contact guard assistance with hand-held assist  Functional Mobility: Pt performed 1st stand trial with CGA/HHA; 2nd with Min A. Vc/tcs on hand placement to push off from chair arms. Pt performed marching in place and then brief in room mobility x2 trials close to chair and bed with CGA/HHA. Vitals stable throughout with /92, and SpO2 as above. 1 seated rest break between standing trials for 30 sec recovery back to 97%; 1 standing rest break during chair > bed t/f with sats maintaining 90s. Demos and cueing on PLB    Activities of Daily Living:  Lower Body Dressing: supervision to fix socks and doff slippers seated    Cognitive/Visual Perceptual:  Cognitive/Psychosocial Skills:     -       Oriented to: Person, Place, Time, and Situation   -       Follows Commands/attention:Follows two-step commands  -       Communication: clear/fluent  -       Memory: No Deficits noted  -       Safety awareness/insight to disability: impaired   -       Mood/Affect/Coping skills/emotional control: Appropriate to situation and Cooperative  Visual/Perceptual:      -Intact  acuity      Physical Exam:  Balance:    -       Fair stand dynamic, fair+ stand static  Postural examination/scapula alignment:    -       No postural abnormalities identified  Dominant hand:    -       R  Upper  Extremity Range of Motion:     -       Right Upper Extremity: WNL  -       Left Upper Extremity: WNL  Upper Extremity Strength:    -       Right Upper Extremity: WNL  -       Left Upper Extremity: WNL; 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 Occupational profile developed via interview. Patient guided to transition eob for assessment. Initiated ADL / functional mobility training as above with instruction on safe t/f techniques, PLB, energy conservation strategies. Pt encouraged to sit up in chair, but pt reported fatigue and wish to get back to bed. Educated patient on importance of out of bed mobility, movement/repositioning throughout the day, and call button use. Answered questions within scope, and all needs met.     Patient left HOB elevated with all lines intact, call button in reach, and RN notified    GOALS:   Multidisciplinary Problems       Occupational Therapy Goals          Problem: Occupational Therapy    Goal Priority Disciplines Outcome Interventions   Occupational Therapy Goal     OT, PT/OT Progressing    Description: Goals to be met by: 3/4/2025     Patient will increase functional independence with ADLs by performing:    LE Dressing with Modified [place].  Grooming while standing at sink with Modified [place].  Toileting from toilet with Modified [place] for hygiene and clothing management.   Step transfer with Modified [place] and stable SpO2.  Toilet transfer to toilet with Modified [place].                           History:     Past Medical History:   Diagnosis Date    COPD (chronic obstructive pulmonary disease)     Diabetes mellitus     Gout, unspecified     Hypertension        History reviewed. No  pertinent surgical history.    Time Tracking:     OT Date of Treatment: 02/18/25  OT Start Time: 1051  OT Stop Time: 1107  OT Total Time (min): 16 min    Billable Minutes:Evaluation 8  Therapeutic Activity 8    2/18/2025

## 2025-02-18 NOTE — NURSING TRANSFER
Nursing Transfer Note      2/18/2025   5:50 PM    Nurse giving handoff:CHARLES Leigh  Nurse receiving handoff:CHARLES Van    Reason patient is being transferred: step down on level of care    Transfer To: 315    Transfer via wheelchair    Transfer with 2L to O2, cardiac monitoring    Transported by RN and transport    Transfer Vital Signs:  Blood Pressure:174/116  Heart Rate:96  O2:98  Temperature:97.4  Respirations:20    Telemetry: Box Number [id number]  Order for Tele Monitor? Yes    Additional Lines: Oxygen    Medicines sent: none    Any special needs or follow-up needed: none    Patient belongings transferred with patient: Yes    Chart send with patient: Yes    Notified: patient    Upon arrival to floor: cardiac monitor applied, patient oriented to room, call bell in reach, and bed in lowest position

## 2025-02-18 NOTE — ASSESSMENT & PLAN NOTE
Patient's blood pressure range in the last 24 hours was: BP  Min: 110/72  Max: 204/113.The patient's inpatient anti-hypertensive regimen is listed below:  Current Antihypertensives  carvediloL tablet 25 mg, 2 times daily with meals, Oral  amLODIPine tablet 10 mg, Daily, Oral  hydrALAZINE tablet 100 mg, Every 8 hours, Oral  valsartan tablet 160 mg, 2 times daily, Oral  cloNIDine tablet 0.3 mg, 3 times daily PRN, Oral    Plan  - BP is uncontrolled, will adjust as follows:    - she has no idea what medications she takes at home. Most meds have not been filled for months  - will need to start from scratch. With CKD3, she should not be on HCTZ- stop that. If diuretic needed, needs lasix. She should be on ARB to prevent progression- started valsartan so as not to confuse cough for ACEi cough. Started amlodipine 10 daily. Continues hydralazine. Increased PRN clonidine to 0.3mg which is very effective in controlling BP for her but not a great Rx longterm if she is having trouble keeping her meds straight  - she needs close follow up with PCP vs Nephrology for BP control  - need to make sure she has a BP cuff upon discharge

## 2025-02-18 NOTE — PLAN OF CARE
Pt remains in ICU awaiting for available bed on telemetry. Pt up to BSC for BM. BiPAP on throughout the night. Pt has congested cough. BiPAP removed per pt request x2; after approximately 30 mins, pt c/o SOB and BiPAP replaced.    Problem: Adult Inpatient Plan of Care  Goal: Plan of Care Review  Outcome: Progressing  Goal: Patient-Specific Goal (Individualized)  Outcome: Progressing  Goal: Absence of Hospital-Acquired Illness or Injury  Outcome: Progressing  Goal: Optimal Comfort and Wellbeing  Outcome: Progressing  Goal: Readiness for Transition of Care  Outcome: Progressing     Problem: Infection  Goal: Absence of Infection Signs and Symptoms  Outcome: Progressing     Problem: COPD (Chronic Obstructive Pulmonary Disease)  Goal: Optimal Chronic Illness Coping  Outcome: Progressing  Goal: Optimal Level of Functional [place]  Outcome: Progressing  Goal: Absence of Infection Signs and Symptoms  Outcome: Progressing  Goal: Improved Oral Intake  Outcome: Progressing  Goal: Effective Oxygenation and Ventilation  Outcome: Progressing     Problem: Acute Kidney Injury/Impairment  Goal: Fluid and Electrolyte Balance  Outcome: Progressing  Goal: Improved Oral Intake  Outcome: Progressing  Goal: Effective Renal Function  Outcome: Progressing

## 2025-02-18 NOTE — SUBJECTIVE & OBJECTIVE
Interval History: She is feeling much better today. No shortness of breath at rest. She wants to move out of the ICU and discharge soon. She has no idea what medications she takes at home.     Review of Systems   Constitutional:  Negative for chills and fever.   Respiratory:  Negative for cough and shortness of breath.    Cardiovascular:  Negative for chest pain.   Gastrointestinal:  Negative for abdominal pain.   Psychiatric/Behavioral:  Negative for confusion.      Objective:     Vital Signs (Most Recent):  Temp: 97.8 °F (36.6 °C) (02/18/25 0906)  Pulse: 86 (02/18/25 0906)  Resp: 18 (02/18/25 0906)  BP: (!) 132/92 (02/18/25 1022)  SpO2: 98 % (02/18/25 0906) Vital Signs (24h Range):  Temp:  [97.5 °F (36.4 °C)-97.8 °F (36.6 °C)] 97.8 °F (36.6 °C)  Pulse:  [] 86  Resp:  [13-48] 18  SpO2:  [93 %-100 %] 98 %  BP: (110-204)/() 132/92     Weight: 55.4 kg (122 lb 2.2 oz)  Body mass index is 18.04 kg/m².  No intake or output data in the 24 hours ending 02/18/25 1102      Physical Exam  Vitals and nursing note reviewed.   Constitutional:       General: She is not in acute distress.     Appearance: She is not toxic-appearing.   HENT:      Head: Normocephalic and atraumatic.   Cardiovascular:      Rate and Rhythm: Normal rate and regular rhythm.   Pulmonary:      Effort: Pulmonary effort is normal.      Breath sounds: Wheezing present.      Comments: 2L  Abdominal:      General: Bowel sounds are normal.      Palpations: Abdomen is soft.   Musculoskeletal:      Right lower leg: No edema.      Left lower leg: No edema.   Skin:     General: Skin is warm and dry.   Neurological:      Mental Status: She is alert. Mental status is at baseline.             Significant Labs: All pertinent labs within the past 24 hours have been reviewed.    Significant Imaging: I have reviewed all pertinent imaging results/findings within the past 24 hours.

## 2025-02-18 NOTE — ASSESSMENT & PLAN NOTE
Patient's COPD is with exacerbation noted by continued dyspnea.  Patient is currently on COPD Pathway. Continue scheduled inhalers Steroids and Supplemental oxygen and monitor respiratory status closely.   - change IV steroids to PO  - she is improving weaning O2  - needs walk testing prior to DC  - has nebulizer at home but no inhalers, previously prescribed trelegy but doesn't recognize the med.   - needs connection to smoking cessation and Pulm at DC

## 2025-02-18 NOTE — PT/OT/SLP EVAL
Physical Therapy Evaluation    Patient Name:  Jacqueline Manuel   MRN:  [id number]    Recommendations:     Discharge Recommendations: Low Intensity Therapy with caregiver support  Discharge Equipment Recommendations: none   Barriers to discharge: Inaccessible home and Pt currently required supplemental O2.     Assessment:     Jacqueline Manuel is a 54 y.o. female admitted with a medical diagnosis of Acute respiratory failure with hypoxia.  She presents with the following impairments/functional limitations: weakness, impaired endurance, impaired functional mobility, gait instability, impaired balance, decreased lower extremity function, decreased safety awareness, impaired cardiopulmonary response to activity.    Rehab Prognosis: Good; patient would benefit from acute skilled PT services to address these deficits and reach maximum level of function.    Recent Surgery: * No surgery found *      Plan:     During this hospitalization, patient to be seen 3 x/week to address the identified rehab impairments via gait training, therapeutic activities, therapeutic exercises and progress toward the following goals:    Plan of Care Expires:  03/04/25    Subjective     Chief Complaint: SOB  Patient/Family Comments/goals: Pt agreeable to therapy.   Pain/Comfort:  Pain Rating 1: 0/10        Living Environment:  Pt lives with 2 dtrs in a [facility] with ~10 ARTUR and B HR at entry.   Prior to admission, patients level of function was mod I with ambulation using mostly SPC.  Pt does not drive.  Pt mostly sedentary at home.  Equipment at home: cane, straight, walker, rolling.  Upon discharge, patient will have assistance from children.  Pt reported that she has 10 children.     Objective:     Communicated with ICU nurse Kassandra prior to session.  Patient found HOB elevated with blood pressure cuff, pulse ox (continuous), telemetry, peripheral IV, oxygen  upon PT entry to room.    General Precautions: Standard, fall, respiratory, droplet  Orthopedic  Precautions:N/A   Braces: N/A  Respiratory Status: Nasal cannula, flow 2 L/min    Exams:  Cognitive Exam:  Patient was able to follow 2 step commands.   Gross Motor Coordination:  WFL  Postural Exam:  Patient presented with the following abnormalities:    -       No postural abnormalities identified  Sensation:    -       Intact  light/touch BLE  Skin Integrity/Edema:      -       Skin integrity: Visible skin intact  -       Edema: None noted BLE  BLE ROM: WNL  BLE Strength: WNL; Pt reported h/o CVA with R sided weakness.     Functional Mobility:  Pt with decreased activities tolerance, required 2L O2 NC at this time.  PT will continue to monitor O2 with activities.    Bed Mobility:     Scooting: stand by assistance and contact guard assistance  Supine to Sit: stand by assistance and contact guard assistance  Transfers:     Sit to Stand: contact guard assistance and minimum assistance with no AD  Bed to Chair: contact guard assistance with  no AD  using  Step Transfer  Toilet Transfer: contact guard assistance with  no AD  using  Step Transfer; Pt able to ambulate to the bathroom with assistance and performed toileting with supervision.  Pt provided with bath wipes for hand hygiene after toileting, unable to ambulate from the ICU bathroom to sink 2* SOB without O2.    Gait: Pt ambulated ~10 ft x2 trials within ICU room/on droplet precautions, with CGA using no AD.  spO2 on RA ~87-92%, did improved with pursed lip breathing.  Pt placed back on 2L O2 NC, spO2 ~96%.  Pt with decreased step length and hemalatha.  Pt with decreased endurance, anxious with increased RR.    Balance: Pt with fair dynamic standing balance.      AM-PAC 6 CLICK MOBILITY  Total Score:19       Treatment & Education:  BLE seated therex x10 reps: hip flex, pillow squeezes, LAQ, and AP    Balance Training  Static Standing:  Patient performed static standing on level surface  using  no AD  with Contact Guard Assistance and minimal verbal cues for  standing tolerance.  Pt easily fatigued with standing activities.        Patient left up in chair reclined with all lines intact, call button in reach, and ICU nurse Lu notified.  Pt placed back on 2L O2 NC.  Tray table close by.     GOALS:   Multidisciplinary Problems       Physical Therapy Goals          Problem: Physical Therapy    Goal Priority Disciplines Outcome Interventions   Physical Therapy Goal     PT, PT/OT Progressing    Description: Goals to be met by: 3/3/25     Patient will increase functional independence with mobility by performin. Supine to sit with Modified [place]  2. Rolling to Left and Right with Modified [place]  3. Sit to stand transfer with Modified [place]  4. Bed to chair transfer with Modified [place]  5. Gait x150 feet with Modified [place] using SPC   6. Ascend/descend 10 steps with right Handrail Modified [place] using Single-point Cane   7. Lower extremity exercise program 2 sets x15 reps per handout, with independence                           History:     Past Medical History:   Diagnosis Date    COPD (chronic obstructive pulmonary disease)     Diabetes mellitus     Gout, unspecified     Hypertension        History reviewed. No pertinent surgical history.    Time Tracking:     PT Received On: 25  PT Start Time: 947     PT Stop Time: 4  PT Total Time (min): 27 min     Billable Minutes: Evaluation 17 min and Therapeutic Exercise 10 min      2025

## 2025-02-18 NOTE — PLAN OF CARE
Problem: Physical Therapy  Goal: Physical Therapy Goal  Description: Goals to be met by: 3/3/25     Patient will increase functional independence with mobility by performin. Supine to sit with Modified [place]  2. Rolling to Left and Right with Modified [place]  3. Sit to stand transfer with Modified [place]  4. Bed to chair transfer with Modified [place]  5. Gait x150 feet with Modified [place] using SPC   6. Ascend/descend 10 steps with right Handrail Modified [place] using Single-point Cane   7. Lower extremity exercise program 2 sets x15 reps per handout, with independence    Outcome: Progressing     Pt in ICU, OOB>chair with CGA using no AD and on 2L O2 NC.

## 2025-02-19 VITALS
RESPIRATION RATE: 18 BRPM | HEART RATE: 87 BPM | TEMPERATURE: 98 F | DIASTOLIC BLOOD PRESSURE: 90 MMHG | WEIGHT: 122.13 LBS | BODY MASS INDEX: 18.09 KG/M2 | OXYGEN SATURATION: 93 % | SYSTOLIC BLOOD PRESSURE: 152 MMHG | HEIGHT: 69 IN

## 2025-02-19 LAB
ADENOVIRUS: NOT DETECTED
ANION GAP SERPL CALC-SCNC: 11 MMOL/L (ref 8–16)
BASOPHILS # BLD AUTO: 0.01 K/UL (ref 0–0.2)
BASOPHILS NFR BLD: 0.1 % (ref 0–1.9)
BORDETELLA PARAPERTUSSIS (IS1001): NOT DETECTED
BORDETELLA PERTUSSIS (PTXP): NOT DETECTED
BUN SERPL-MCNC: 48 MG/DL (ref 6–20)
CALCIUM SERPL-MCNC: 9.4 MG/DL (ref 8.7–10.5)
CHLAMYDIA PNEUMONIAE: NOT DETECTED
CHLORIDE SERPL-SCNC: 100 MMOL/L (ref 95–110)
CO2 SERPL-SCNC: 29 MMOL/L (ref 23–29)
CORONAVIRUS 229E, COMMON COLD VIRUS: NOT DETECTED
CORONAVIRUS HKU1, COMMON COLD VIRUS: NOT DETECTED
CORONAVIRUS NL63, COMMON COLD VIRUS: NOT DETECTED
CORONAVIRUS OC43, COMMON COLD VIRUS: NOT DETECTED
CREAT SERPL-MCNC: 2.1 MG/DL (ref 0.5–1.4)
DIFFERENTIAL METHOD BLD: ABNORMAL
EOSINOPHIL # BLD AUTO: 0 K/UL (ref 0–0.5)
EOSINOPHIL NFR BLD: 0.1 % (ref 0–8)
ERYTHROCYTE [DISTWIDTH] IN BLOOD BY AUTOMATED COUNT: 12.7 % (ref 11.5–14.5)
EST. GFR  (NO RACE VARIABLE): 28 ML/MIN/1.73 M^2
FLUBV RNA NPH QL NAA+NON-PROBE: NOT DETECTED
GLUCOSE SERPL-MCNC: 100 MG/DL (ref 70–110)
HCT VFR BLD AUTO: 40.3 % (ref 37–48.5)
HGB BLD-MCNC: 13 G/DL (ref 12–16)
HPIV1 RNA NPH QL NAA+NON-PROBE: NOT DETECTED
HPIV2 RNA NPH QL NAA+NON-PROBE: NOT DETECTED
HPIV3 RNA NPH QL NAA+NON-PROBE: NOT DETECTED
HPIV4 RNA NPH QL NAA+NON-PROBE: NOT DETECTED
HUMAN METAPNEUMOVIRUS: NOT DETECTED
IMM GRANULOCYTES # BLD AUTO: 0.06 K/UL (ref 0–0.04)
IMM GRANULOCYTES NFR BLD AUTO: 0.7 % (ref 0–0.5)
INFLUENZA A (SUBTYPES H1,H1-2009,H3): NOT DETECTED
LYMPHOCYTES # BLD AUTO: 2.4 K/UL (ref 1–4.8)
LYMPHOCYTES NFR BLD: 27 % (ref 18–48)
MAGNESIUM SERPL-MCNC: 2.1 MG/DL (ref 1.6–2.6)
MCH RBC QN AUTO: 29.3 PG (ref 27–31)
MCHC RBC AUTO-ENTMCNC: 32.3 G/DL (ref 32–36)
MCV RBC AUTO: 91 FL (ref 82–98)
MONOCYTES # BLD AUTO: 0.7 K/UL (ref 0.3–1)
MONOCYTES NFR BLD: 8.1 % (ref 4–15)
MYCOPLASMA PNEUMONIAE: NOT DETECTED
NEUTROPHILS # BLD AUTO: 5.8 K/UL (ref 1.8–7.7)
NEUTROPHILS NFR BLD: 64 % (ref 38–73)
NRBC BLD-RTO: 0 /100 WBC
PHOSPHATE SERPL-MCNC: 3 MG/DL (ref 2.7–4.5)
PLATELET # BLD AUTO: 240 K/UL (ref 150–450)
PMV BLD AUTO: 9.6 FL (ref 9.2–12.9)
POCT GLUCOSE: 128 MG/DL (ref 70–110)
POTASSIUM SERPL-SCNC: 3.3 MMOL/L (ref 3.5–5.1)
RBC # BLD AUTO: 4.44 M/UL (ref 4–5.4)
RESPIRATORY INFECTION PANEL SOURCE: NORMAL
RSV RNA NPH QL NAA+NON-PROBE: NOT DETECTED
RV+EV RNA NPH QL NAA+NON-PROBE: NOT DETECTED
SARS-COV-2 RNA RESP QL NAA+PROBE: NOT DETECTED
SODIUM SERPL-SCNC: 140 MMOL/L (ref 136–145)
WBC # BLD AUTO: 9.04 K/UL (ref 3.9–12.7)

## 2025-02-19 PROCEDURE — 63600175 PHARM REV CODE 636 W HCPCS

## 2025-02-19 PROCEDURE — 25000003 PHARM REV CODE 250: Performed by: HOSPITALIST

## 2025-02-19 PROCEDURE — 25000003 PHARM REV CODE 250: Performed by: INTERNAL MEDICINE

## 2025-02-19 PROCEDURE — 94761 N-INVAS EAR/PLS OXIMETRY MLT: CPT

## 2025-02-19 PROCEDURE — 63600175 PHARM REV CODE 636 W HCPCS: Performed by: HOSPITALIST

## 2025-02-19 PROCEDURE — 99900035 HC TECH TIME PER 15 MIN (STAT)

## 2025-02-19 PROCEDURE — 83735 ASSAY OF MAGNESIUM: CPT | Performed by: STUDENT IN AN ORGANIZED HEALTH CARE EDUCATION/TRAINING PROGRAM

## 2025-02-19 PROCEDURE — 25000003 PHARM REV CODE 250: Performed by: STUDENT IN AN ORGANIZED HEALTH CARE EDUCATION/TRAINING PROGRAM

## 2025-02-19 PROCEDURE — 85025 COMPLETE CBC W/AUTO DIFF WBC: CPT | Performed by: STUDENT IN AN ORGANIZED HEALTH CARE EDUCATION/TRAINING PROGRAM

## 2025-02-19 PROCEDURE — 25000242 PHARM REV CODE 250 ALT 637 W/ HCPCS: Performed by: STUDENT IN AN ORGANIZED HEALTH CARE EDUCATION/TRAINING PROGRAM

## 2025-02-19 PROCEDURE — 25000003 PHARM REV CODE 250

## 2025-02-19 PROCEDURE — 80048 BASIC METABOLIC PNL TOTAL CA: CPT | Performed by: STUDENT IN AN ORGANIZED HEALTH CARE EDUCATION/TRAINING PROGRAM

## 2025-02-19 PROCEDURE — 97530 THERAPEUTIC ACTIVITIES: CPT | Mod: CQ

## 2025-02-19 PROCEDURE — 36415 COLL VENOUS BLD VENIPUNCTURE: CPT | Performed by: STUDENT IN AN ORGANIZED HEALTH CARE EDUCATION/TRAINING PROGRAM

## 2025-02-19 PROCEDURE — 94640 AIRWAY INHALATION TREATMENT: CPT

## 2025-02-19 PROCEDURE — 84100 ASSAY OF PHOSPHORUS: CPT | Performed by: STUDENT IN AN ORGANIZED HEALTH CARE EDUCATION/TRAINING PROGRAM

## 2025-02-19 RX ORDER — FLUTICASONE FUROATE, UMECLIDINIUM BROMIDE AND VILANTEROL TRIFENATATE 100; 62.5; 25 UG/1; UG/1; UG/1
1 POWDER RESPIRATORY (INHALATION) DAILY
Qty: 60 EACH | Refills: 5 | Status: SHIPPED | OUTPATIENT
Start: 2025-02-19

## 2025-02-19 RX ORDER — PREDNISONE 20 MG/1
40 TABLET ORAL DAILY
Qty: 4 TABLET | Refills: 0 | Status: SHIPPED | OUTPATIENT
Start: 2025-02-20 | End: 2025-02-22

## 2025-02-19 RX ORDER — OSELTAMIVIR PHOSPHATE 30 MG/1
30 CAPSULE ORAL DAILY
Qty: 1 CAPSULE | Refills: 0 | Status: SHIPPED | OUTPATIENT
Start: 2025-02-20 | End: 2025-02-21

## 2025-02-19 RX ADMIN — PREDNISONE 40 MG: 20 TABLET ORAL at 08:02

## 2025-02-19 RX ADMIN — OSELTAMIVIR PHOSPHATE 30 MG: 30 CAPSULE ORAL at 08:02

## 2025-02-19 RX ADMIN — APIXABAN 2.5 MG: 2.5 TABLET, FILM COATED ORAL at 08:02

## 2025-02-19 RX ADMIN — HYDRALAZINE HYDROCHLORIDE 100 MG: 25 TABLET ORAL at 05:02

## 2025-02-19 RX ADMIN — PANTOPRAZOLE SODIUM 40 MG: 40 TABLET, DELAYED RELEASE ORAL at 08:02

## 2025-02-19 RX ADMIN — ESCITALOPRAM OXALATE 10 MG: 10 TABLET ORAL at 08:02

## 2025-02-19 RX ADMIN — IPRATROPIUM BROMIDE AND ALBUTEROL SULFATE 3 ML: 2.5; .5 SOLUTION RESPIRATORY (INHALATION) at 04:02

## 2025-02-19 RX ADMIN — VALSARTAN 160 MG: 80 TABLET, FILM COATED ORAL at 08:02

## 2025-02-19 RX ADMIN — CEFTRIAXONE 2 G: 2 INJECTION, POWDER, FOR SOLUTION INTRAMUSCULAR; INTRAVENOUS at 04:02

## 2025-02-19 RX ADMIN — ASPIRIN 81 MG: 81 TABLET, COATED ORAL at 08:02

## 2025-02-19 RX ADMIN — CARVEDILOL 25 MG: 12.5 TABLET, FILM COATED ORAL at 08:02

## 2025-02-19 RX ADMIN — AMLODIPINE BESYLATE 10 MG: 5 TABLET ORAL at 08:02

## 2025-02-19 RX ADMIN — ATORVASTATIN CALCIUM 40 MG: 40 TABLET, FILM COATED ORAL at 08:02

## 2025-02-19 RX ADMIN — HYDRALAZINE HYDROCHLORIDE 100 MG: 25 TABLET ORAL at 02:02

## 2025-02-19 NOTE — PLAN OF CARE
Case Management Final Discharge Note    Discharge Disposition: [facility] / Home     New DME ordered / company name: Home     Relevant SDOH / Transition of Care Barriers:  None     Person available to provide assistance at home when needed and their contact information: Extended Emergency Contact Information  Primary Emergency Contact: Linda Manuel  Mobile Phone: [phone]  Relation: Daughter  Preferred language: English   needed? No  Secondary Emergency Contact: Renetta Manuel  Home Phone: [phone]  Mobile Phone: [phone]  Relation: Daughter  Preferred language: English   needed? No     Scheduled followup appointment: PCP- 02/27/25 and Cardiology- 03/05/25    Referrals placed: Nepgrology and Pulmonology - Office will contact patient. Referral faxed to office staff.    Transportation: Per patient, Patient's daughter will provide transportation home     Patient and family educated on discharge services and updated on DC plan. Bedside RN Minesh Sanabria notified, patient clear to discharge from Case Management Perspective.       02/19/25 1215   Final Note   Assessment Type Final Discharge Note   Anticipated Discharge Disposition Home-Health   What phone number can be called within the next 1-3 days to see how you are doing after discharge? [phone]   Hospital Resources/Appts/Education Provided Appointments scheduled and added to AVS   Post-Acute Status   Post-Acute Authorization Home Health   Home Health Status Set-up Complete/Auth obtained   Discharge Delays None known at this time

## 2025-02-19 NOTE — DISCHARGE SUMMARY
[facility] Medicine  Discharge Summary      Patient Name: Jacqueline Manuel  MRN: [id number]  [facility] ([place]): [id number]  Patient Class: IP- Inpatient  Admission Date: 2/15/2025  Hospital Length of Stay: 3 days  Discharge Date and Time:  02/19/2025 10:19 AM  Attending Physician: Lucero Mcnally MD   Discharging Provider: Lucero Mcnally MD  Primary Care Provider: Sunny Pollard Jr., MD    Primary Care Team: Networked reference to record PCT     HPI:   This is a [age]-year-old female with a past medical history of COPD, hypertension, CKD 3, tobacco use who presents with shortness of breath.     Patient had a recent hospitalization (2/13-2/13) at [facility] for hypoxic respiratory failure in the setting of influenza a and COPD.  One, she did not wish to be admitted and was discharged in his medical advice.  She returns to the ED for persistent shortness of breath, productive cough w/ green sputum.     In the ED, the patient was tachycardic (120s > 100s), tachypneic (20), hypertensive (140s-170s/90s-100s), initially BiPAP, and weaned to 2 L O2.  Labs were remarkable for an elevated creatinine (3.5 - baseline around 1.7), positive influenza a, negative BNP (22), negative troponin (0.008).  VBG showed a pH of 7.3, pCO2 of 52.0.  Chest x-ray showed no acute cardiopulmonary process.  Patient was given Solu-Medrol 125 mg IV, DuoNeb x1.  She was admitted for further management.    * No surgery found *      Hospital Course:   Ms Jacqueline Manuel is a 54 y.o. woman with COPD no home O2, HTN, CKD3 who was admitted with acute hypoxic respiratory failure secondary to influenza A causing COPD exacerbation. Started tamiflu, nebs, steroids; however, she worsened and was moved to ICU for BiPAP. She improved, now on NC and weaning O2 as tolerated. She has resistant HTN; reviewed meds with her- she does not know what she takes at home, and most of her medications have not been filled for months. Stepped down to  floor. PT, OT consulted. Weaned to room air at rest and with activity. She is stable for discharge to home. Now on amlodipine, coreg, hydralazine, valsartan for BP control. Follow up with PCP. Cr stable at CKD3 upon discharge. Follow up with Nephrology. Re-prescribed trelegy for COPD. Pulmonary referral placed. HH ordered.      Goals of Care Treatment Preferences:  Code Status: Full Code      SDOH Screening:  The patient declined to be screened for utility difficulties, food insecurity, transport difficulties, housing insecurity, and interpersonal safety, so no concerns could be identified this admission.     Consults:     * Acute respiratory failure with hypoxia  Patient admitted with Hypoxic which is Acute.  she is not on home oxygen. Signs/symptoms of respiratory failure include- respiratory distress present on admission.   - Labs and images were reviewed. Patient Has not has a recent ABG.   - Supplemental oxygen was provided and noted- Nasal Cannula 2 LPM   - Respiratory failure is due to- COPD and influenza    - treatment: treat COPD exacerbation and influenza  - wean to RA as tolerated       Influenza A  - admitted with influenza A  - started tamiflu  - procal elevated- also on antibiotics  - improving       Paroxysmal atrial fibrillation  Patient is currently in sinus rhythm. [id number] Score: 2. The patients heart rate in the last 24 hours is as follows:  Pulse  Min: 86  Max: 105     Antiarrhythmics   - none  - coreg for rate control     Anticoagulants  , 2 times daily, Oral  apixaban tablet 2.5 mg, 2 times daily, Oral    Plan  - Replete lytes with a goal of K>4, Mg >2  - Patient is anticoagulated, see medications listed above.  - Patient's afib is currently controlled          COPD (chronic obstructive pulmonary disease)  Patient's COPD is with exacerbation noted by continued dyspnea.  Patient is currently on COPD Pathway. Continue scheduled inhalers Steroids and Supplemental oxygen and monitor respiratory  status closely.   - change IV steroids to PO  - she is improving weaning O2  - needs walk testing prior to DC  - has nebulizer at home but no inhalers, previously prescribed trelegy but doesn't recognize the med.   - needs connection to smoking cessation and Pulm at DC    Acute renal failure superimposed on stage 3b chronic kidney disease  JEY is likely due to pre-renal azotemia due to intravascular volume depletion. Baseline creatinine is  1.7 . Most recent creatinine and eGFR are listed below.  Recent Labs     02/16/25  0903 02/17/25  0316 02/18/25  0414   CREATININE 2.8* 2.3* 2.0*   EGFRNORACEVR 20* 25* 30*        Plan  - JEY is improving with IVF  - suspect her chronically uncontrolled BP also plays a role  - Avoid nephrotoxins and renally dose meds for GFR listed above  - Monitor urine output, serial BMP, and adjust therapy as needed  - needs connection to Nephrology     History of stroke  - Continue aspirin, statin       Essential hypertension  Patient's blood pressure range in the last 24 hours was: BP  Min: 110/72  Max: 204/113.The patient's inpatient anti-hypertensive regimen is listed below:  Current Antihypertensives  carvediloL tablet 25 mg, 2 times daily with meals, Oral  amLODIPine tablet 10 mg, Daily, Oral  hydrALAZINE tablet 100 mg, Every 8 hours, Oral  valsartan tablet 160 mg, 2 times daily, Oral  cloNIDine tablet 0.3 mg, 3 times daily PRN, Oral    Plan  - BP is uncontrolled, will adjust as follows:    - she has no idea what medications she takes at home. Most meds have not been filled for months  - will need to start from scratch. With CKD3, she should not be on HCTZ- stop that. If diuretic needed, needs lasix. She should be on ARB to prevent progression- started valsartan so as not to confuse cough for ACEi cough. Started amlodipine 10 daily. Continues hydralazine. Increased PRN clonidine to 0.3mg which is very effective in controlling BP for her but not a great Rx longterm if she is having  trouble keeping her meds straight  - she needs close follow up with PCP vs Nephrology for BP control  - need to make sure she has a BP cuff upon discharge       Final Active Diagnoses:    Diagnosis Date Noted POA    PRINCIPAL PROBLEM:  Acute respiratory failure with hypoxia [J96.01] 11/16/2023 Yes    Influenza A [J10.1] 02/13/2025 Yes    Paroxysmal atrial fibrillation [I48.0] 07/14/2024 Yes    COPD (chronic obstructive pulmonary disease) [J44.9] 03/02/2024 Yes    Acute renal failure superimposed on stage 3b chronic kidney disease [N17.9, N18.32] 03/02/2024 Yes    History of stroke [Z86.73] 07/07/2023 Not Applicable    Essential hypertension [I10] 07/11/2018 Yes      Problems Resolved During this Admission:       Discharged Condition: good    Disposition: Home or Self Care    Follow Up:   Follow-up Information       [place] - Follow up.    Specialty: Home Health Services  Why: SOMEONE WILL CALL YOU TO SCHEDULE YOUR FIRST VISIT  Contact information:  [address] [place] [address].  [address]  [place] [id number] [phone]                           Patient Instructions:      Ambulatory referral/consult to Pulmonology   Standing Status: Future   Referral Priority: Routine Referral Type: Consultation   Referral Reason: Specialty Services Required   Requested Specialty: Pulmonary Disease   Number of Visits Requested: 1     Ambulatory referral/consult to Nephrology   Standing Status: Future   Referral Priority: Routine Referral Type: Consultation   Referral Reason: Specialty Services Required   Requested Specialty: Nephrology   Number of Visits Requested: 1     Diet Adult Regular     Order Specific Question Answer Comments   Na restriction, if any: 2gNa      Notify your health care provider if you experience any of the following:  temperature >100.4     Notify your health care provider if you experience any of the following:  persistent nausea and vomiting or diarrhea     Notify your health care provider if you  experience any of the following:  severe uncontrolled pain     Notify your health care provider if you experience any of the following:  redness, tenderness, or signs of infection (pain, swelling, redness, odor or green/yellow discharge around incision site)     Notify your health care provider if you experience any of the following:  difficulty breathing or increased cough     Notify your health care provider if you experience any of the following:  severe persistent headache     Notify your health care provider if you experience any of the following:  worsening rash     Notify your health care provider if you experience any of the following:  persistent dizziness, light-headedness, or visual disturbances     Notify your health care provider if you experience any of the following:  increased confusion or weakness     Activity as tolerated       Significant Diagnostic Studies: N/A    Pending Diagnostic Studies:       None           Medications:  Reconciled Home Medications:      Medication List        START taking these medications      oseltamivir 30 MG capsule  Commonly known as: TAMIFLU  Take 1 capsule (30 mg total) by mouth once daily. for 1 dose  Start taking on: February 20, 2025     predniSONE 20 MG tablet  Commonly known as: DELTASONE  Take 2 tablets (40 mg total) by mouth once daily. for 2 days  Start taking on: February 20, 2025     TRELEGY ELLIPTA 100-62.5-25 mcg Dsdv  Generic drug: fluticasone-umeclidin-vilanter  Inhale 1 puff into the lungs once daily.            CHANGE how you take these medications      blood pressure monitor Kit  1 kit by Misc.(Non-Drug; Combo Route) route once daily. Use daily to check blood pressure  What changed: Another medication with the same name was removed. Continue taking this medication, and follow the directions you see here.     hydrALAZINE 25 MG tablet  Commonly known as: APRESOLINE  Take 4 tablets (100 mg total) by mouth 3 (three) times daily.  What changed:   how much  to take  when to take this     valsartan 320 MG tablet  Commonly known as: DIOVAN  Take 1 tablet (320 mg total) by mouth once daily.  What changed:   medication strength  how much to take            CONTINUE taking these medications      albuterol 90 mcg/actuation inhaler  Commonly known as: PROVENTIL/VENTOLIN HFA  Inhale 1 to 2 puffs into the lungs every 6 (six) hours as needed for Wheezing or Shortness of Breath. Rescue     albuterol-ipratropium 2.5 mg-0.5 mg/3 mL nebulizer solution  Commonly known as: DUO-NEB  Take 3 mLs by nebulization every 6 (six) hours as needed for Wheezing. Rescue     amLODIPine 10 MG tablet  Commonly known as: NORVASC  Take 1 tablet (10 mg total) by mouth once daily.     aspirin 81 MG EC tablet  Commonly known as: ECOTRIN  Take 1 tablet (81 mg total) by mouth once daily.     atorvastatin 40 MG tablet  Commonly known as: LIPITOR  Take 1 tablet (40 mg total) by mouth once daily.     carvediloL 25 MG tablet  Commonly known as: COREG  Take 1 tablet (25 mg total) by mouth 2 (two) times daily with meals.     ELIQUIS 2.5 mg Tab  Generic drug: apixaban  Take 2.5 mg by mouth 2 (two) times daily.            STOP taking these medications      allopurinoL 200 mg Tab     cloNIDine 0.1 MG tablet  Commonly known as: CATAPRES     EScitalopram oxalate 10 MG tablet  Commonly known as: LEXAPRO     gabapentin 600 MG tablet  Commonly known as: NEURONTIN     hydroCHLOROthiazide 25 MG tablet  Commonly known as: HYDRODIURIL     HYDROcodone-acetaminophen  mg per tablet  Commonly known as: NORCO     levETIRAcetam 500 MG Tab  Commonly known as: KEPPRA     pantoprazole 40 MG tablet  Commonly known as: PROTONIX     SYMBICORT 160-4.5 mcg/actuation Hfaa  Generic drug: budesonide-formoterol 160-4.5 mcg              Indwelling Lines/Drains at time of discharge:   Lines/Drains/Airways       None                   Time spent on the discharge of patient: 35 minutes         Lucero Mcnally MD  Department of Hospital  Medicine  [facility]

## 2025-02-19 NOTE — NURSING
[facility]  Nurses Note -- 4 Eyes      2/18/2025       Patient arrived from ICU, NAD noted. Safety Precautions maintained.       Skin assessed on: Transfer      [x] No Pressure Injuries Present    []Prevention Measures Documented    [] Yes LDA  for Pressure Injury Previously documented     [] Yes New Pressure Injury Discovered   [] LDA for New Pressure Injury Added      Attending RN:  Carlota Marrero RN     Second RN:  Zofia Arias RN

## 2025-02-19 NOTE — PLAN OF CARE
Problem: Adult Inpatient Plan of Care  Goal: Plan of Care Review  Outcome: Progressing  Goal: Patient-Specific Goal (Individualized)  Outcome: Progressing  Goal: Absence of Hospital-Acquired Illness or Injury  Outcome: Progressing     Problem: COPD (Chronic Obstructive Pulmonary Disease)  Goal: Optimal Chronic Illness Coping  Outcome: Progressing  Goal: Optimal Level of Functional [place]  Outcome: Progressing     Problem: Acute Kidney Injury/Impairment  Goal: Fluid and Electrolyte Balance  Outcome: Progressing

## 2025-02-19 NOTE — NURSING
[facility] [place]  Nurses Note -- 4 Eyes      2/19/2025       Skin assessed on: Q Shift      [x] No Pressure Injuries Present    []Prevention Measures Documented    [] Yes LDA  for Pressure Injury Previously documented     [] Yes New Pressure Injury Discovered   [] LDA for New Pressure Injury Added      Attending RN:  Minesh Sanabria RN     Second RN:Carlota SOTO

## 2025-02-19 NOTE — NURSING
O 2 Challenge  Spo2 on R/a and resting 95%  Post walking around room o2 sat 95%  Pt does grunt when she breathes states she has always done this.

## 2025-02-20 NOTE — PT/OT/SLP PROGRESS
Physical Therapy Treatment    Patient Name:  Jacqueline Manuel   MRN:  [id number]    Recommendations:     Discharge Recommendations: Low Intensity Therapy  Discharge Equipment Recommendations: none  Barriers to discharge: None    Assessment:     Jacqueline Manuel is a 54 y.o. female admitted with a medical diagnosis of Acute respiratory failure with hypoxia.  She presents with the following impairments/functional limitations: weakness, impaired endurance, impaired functional mobility, gait instability, decreased lower extremity function, decreased ROM, pain, decreased safety awareness, impaired balance .    Rehab Prognosis: Fair; patient would benefit from acute skilled PT services to address these deficits and reach maximum level of function.    Recent Surgery: * No surgery found *      Plan:     During this hospitalization, patient to be seen 3 x/week to address the identified rehab impairments via gait training, therapeutic activities, therapeutic exercises and progress toward the following goals:    Plan of Care Expires:  03/04/25    Subjective     Chief Complaint: None   Patient/Family Comments/goals: pt seems happy , no distress stated she is ready to go home, daughter presents.   Pain/Comfort:  Pain Rating 1: 0/10  Pain Rating Post-Intervention 1: 0/10      Objective:     Communicated with nurse prior to session.  Patient found  long sitting in bed   with telemetry, peripheral IV upon PT entry to room.     General Precautions: Standard, fall, droplet  Orthopedic Precautions: N/A  Braces: N/A  Respiratory Status: Room air     Functional Mobility:  Bed Mobility:     Scooting: independence      AM-PAC 6 CLICK MOBILITY  Turning over in bed (including adjusting bedclothes, sheets and blankets)?: 4  Sitting down on and standing up from a chair with arms (e.g., wheelchair, bedside commode, etc.): 4  Moving from lying on back to sitting on the side of the bed?: 4  Moving to and from a bed to a chair (including a  wheelchair)?: 4  Need to walk in hospital room?: 3  Climbing 3-5 steps with a railing?: 3  Basic Mobility Total Score: 22       Treatment & Education:  BLE HEP given with instructions and demonstrations (pt verbalized understanding). Encouraged pt to perform BLE ex's per handout throughout the day.   Educated pt on pursed lip breathing technique and energy conservation techniques . Handout provided.   Patient left with bed in chair position with all lines intact, call button in reach, and daughter present..    GOALS:   Multidisciplinary Problems       Physical Therapy Goals          Problem: Physical Therapy    Goal Priority Disciplines Outcome Interventions   Physical Therapy Goal     PT, PT/OT Progressing    Description: Goals to be met by: 3/3/25     Patient will increase functional independence with mobility by performin. Supine to sit with Modified [place]  2. Rolling to Left and Right with Modified [place]  3. Sit to stand transfer with Modified [place]  4. Bed to chair transfer with Modified [place]  5. Gait x150 feet with Modified [place] using SPC   6. Ascend/descend 10 steps with right Handrail Modified [place] using Single-point Cane   7. Lower extremity exercise program 2 sets x15 reps per handout, with independence                         DME Justifications:  No DME recommended requiring DME justifications    Time Tracking:     PT Received On: 25  PT Start Time: 1627     PT Stop Time: 1635  PT Total Time (min): 8 min     Billable Minutes: Therapeutic Activity 8    Treatment Type: Treatment  PT/PTA: PTA     Number of PTA visits since last PT visit: 2025

## 2025-03-04 ENCOUNTER — HOSPITAL ENCOUNTER (EMERGENCY)
Facility: HOSPITAL | Age: 55
Discharge: HOME | End: 2025-03-05
Attending: STUDENT IN AN ORGANIZED HEALTH CARE EDUCATION/TRAINING PROGRAM
Payer: MEDICAID

## 2025-03-04 DIAGNOSIS — J44.1 COPD EXACERBATION: Primary | ICD-10-CM

## 2025-03-04 DIAGNOSIS — E83.42 HYPOMAGNESEMIA: ICD-10-CM

## 2025-03-04 DIAGNOSIS — D64.9 ANEMIA, UNSPECIFIED TYPE: ICD-10-CM

## 2025-03-04 DIAGNOSIS — R06.02 SOB (SHORTNESS OF BREATH): ICD-10-CM

## 2025-03-04 LAB
ALBUMIN SERPL BCP-MCNC: 2.3 G/DL (ref 3.5–5.2)
ALLENS TEST: ABNORMAL
ALP SERPL-CCNC: 74 U/L (ref 40–150)
ALT SERPL W/O P-5'-P-CCNC: 8 U/L (ref 10–44)
ANION GAP SERPL CALC-SCNC: 8 MMOL/L (ref 8–16)
AST SERPL-CCNC: 11 U/L (ref 10–40)
BASOPHILS # BLD AUTO: 0.04 K/UL (ref 0–0.2)
BASOPHILS NFR BLD: 0.5 % (ref 0–1.9)
BILIRUB SERPL-MCNC: 0.5 MG/DL (ref 0.1–1)
BNP SERPL-MCNC: 21 PG/ML (ref 0–99)
BUN SERPL-MCNC: 11 MG/DL (ref 6–20)
CALCIUM SERPL-MCNC: 7.9 MG/DL (ref 8.7–10.5)
CHLORIDE SERPL-SCNC: 106 MMOL/L (ref 95–110)
CO2 SERPL-SCNC: 28 MMOL/L (ref 23–29)
CREAT SERPL-MCNC: 2.2 MG/DL (ref 0.5–1.4)
DELSYS: ABNORMAL
DIFFERENTIAL METHOD BLD: ABNORMAL
EOSINOPHIL # BLD AUTO: 0.1 K/UL (ref 0–0.5)
EOSINOPHIL NFR BLD: 1.4 % (ref 0–8)
ERYTHROCYTE [DISTWIDTH] IN BLOOD BY AUTOMATED COUNT: 13 % (ref 11.5–14.5)
EST. GFR  (NO RACE VARIABLE): 26 ML/MIN/1.73 M^2
GLUCOSE SERPL-MCNC: 100 MG/DL (ref 70–110)
HCO3 UR-SCNC: 33.9 MMOL/L (ref 24–28)
HCT VFR BLD AUTO: 32.9 % (ref 37–48.5)
HGB BLD-MCNC: 10.6 G/DL (ref 12–16)
IMM GRANULOCYTES # BLD AUTO: 0.03 K/UL (ref 0–0.04)
IMM GRANULOCYTES NFR BLD AUTO: 0.4 % (ref 0–0.5)
LYMPHOCYTES # BLD AUTO: 1.4 K/UL (ref 1–4.8)
LYMPHOCYTES NFR BLD: 18 % (ref 18–48)
MAGNESIUM SERPL-MCNC: 1.4 MG/DL (ref 1.6–2.6)
MCH RBC QN AUTO: 30 PG (ref 27–31)
MCHC RBC AUTO-ENTMCNC: 32.2 G/DL (ref 32–36)
MCV RBC AUTO: 93 FL (ref 82–98)
MONOCYTES # BLD AUTO: 0.4 K/UL (ref 0.3–1)
MONOCYTES NFR BLD: 4.7 % (ref 4–15)
NEUTROPHILS # BLD AUTO: 5.9 K/UL (ref 1.8–7.7)
NEUTROPHILS NFR BLD: 75 % (ref 38–73)
NRBC BLD-RTO: 0 /100 WBC
PCO2 BLDA: 65.5 MMHG (ref 35–45)
PH SMN: 7.32 [PH] (ref 7.35–7.45)
PLATELET # BLD AUTO: 234 K/UL (ref 150–450)
PMV BLD AUTO: 9.1 FL (ref 9.2–12.9)
PO2 BLDA: 20 MMHG (ref 40–60)
POC BE: 6 MMOL/L
POC SATURATED O2: 27 % (ref 95–100)
POC TCO2: 36 MMOL/L (ref 24–29)
POTASSIUM SERPL-SCNC: 3.6 MMOL/L (ref 3.5–5.1)
PROCALCITONIN SERPL IA-MCNC: 0.08 NG/ML
PROT SERPL-MCNC: 6.5 G/DL (ref 6–8.4)
RBC # BLD AUTO: 3.53 M/UL (ref 4–5.4)
SAMPLE: ABNORMAL
SITE: ABNORMAL
SODIUM SERPL-SCNC: 142 MMOL/L (ref 136–145)
TROPONIN I SERPL DL<=0.01 NG/ML-MCNC: <0.006 NG/ML (ref 0–0.03)
WBC # BLD AUTO: 7.91 K/UL (ref 3.9–12.7)

## 2025-03-04 PROCEDURE — 84145 PROCALCITONIN (PCT): CPT | Performed by: STUDENT IN AN ORGANIZED HEALTH CARE EDUCATION/TRAINING PROGRAM

## 2025-03-04 PROCEDURE — 84484 ASSAY OF TROPONIN QUANT: CPT | Performed by: STUDENT IN AN ORGANIZED HEALTH CARE EDUCATION/TRAINING PROGRAM

## 2025-03-04 PROCEDURE — 80053 COMPREHEN METABOLIC PANEL: CPT | Performed by: STUDENT IN AN ORGANIZED HEALTH CARE EDUCATION/TRAINING PROGRAM

## 2025-03-04 PROCEDURE — 85025 COMPLETE CBC W/AUTO DIFF WBC: CPT | Performed by: STUDENT IN AN ORGANIZED HEALTH CARE EDUCATION/TRAINING PROGRAM

## 2025-03-04 PROCEDURE — 94644 CONT INHLJ TX 1ST HOUR: CPT

## 2025-03-04 PROCEDURE — 83735 ASSAY OF MAGNESIUM: CPT | Performed by: STUDENT IN AN ORGANIZED HEALTH CARE EDUCATION/TRAINING PROGRAM

## 2025-03-04 PROCEDURE — 83880 ASSAY OF NATRIURETIC PEPTIDE: CPT | Performed by: STUDENT IN AN ORGANIZED HEALTH CARE EDUCATION/TRAINING PROGRAM

## 2025-03-04 PROCEDURE — 99900035 HC TECH TIME PER 15 MIN (STAT)

## 2025-03-04 PROCEDURE — 82803 BLOOD GASES ANY COMBINATION: CPT

## 2025-03-04 PROCEDURE — 25000242 PHARM REV CODE 250 ALT 637 W/ HCPCS: Performed by: STUDENT IN AN ORGANIZED HEALTH CARE EDUCATION/TRAINING PROGRAM

## 2025-03-04 PROCEDURE — 93005 ELECTROCARDIOGRAM TRACING: CPT

## 2025-03-04 PROCEDURE — 99285 EMERGENCY DEPT VISIT HI MDM: CPT | Mod: 25

## 2025-03-04 PROCEDURE — 93010 ELECTROCARDIOGRAM REPORT: CPT | Mod: ,,, | Performed by: INTERNAL MEDICINE

## 2025-03-04 RX ORDER — MAGNESIUM SULFATE HEPTAHYDRATE 40 MG/ML
2 INJECTION, SOLUTION INTRAVENOUS ONCE
Status: COMPLETED | OUTPATIENT
Start: 2025-03-05 | End: 2025-03-05

## 2025-03-04 RX ORDER — ALBUTEROL SULFATE 2.5 MG/.5ML
15 SOLUTION RESPIRATORY (INHALATION)
Status: COMPLETED | OUTPATIENT
Start: 2025-03-04 | End: 2025-03-04

## 2025-03-04 RX ADMIN — ALBUTEROL SULFATE 15 MG: 2.5 SOLUTION RESPIRATORY (INHALATION) at 10:03

## 2025-03-05 VITALS
HEART RATE: 82 BPM | SYSTOLIC BLOOD PRESSURE: 137 MMHG | HEIGHT: 69 IN | BODY MASS INDEX: 18.07 KG/M2 | TEMPERATURE: 99 F | OXYGEN SATURATION: 94 % | WEIGHT: 122 LBS | DIASTOLIC BLOOD PRESSURE: 87 MMHG | RESPIRATION RATE: 16 BRPM

## 2025-03-05 LAB
OHS QRS DURATION: 84 MS
OHS QTC CALCULATION: 430 MS

## 2025-03-05 PROCEDURE — 96366 THER/PROPH/DIAG IV INF ADDON: CPT

## 2025-03-05 PROCEDURE — 96365 THER/PROPH/DIAG IV INF INIT: CPT

## 2025-03-05 PROCEDURE — 63600175 PHARM REV CODE 636 W HCPCS: Performed by: STUDENT IN AN ORGANIZED HEALTH CARE EDUCATION/TRAINING PROGRAM

## 2025-03-05 PROCEDURE — 25000003 PHARM REV CODE 250: Performed by: STUDENT IN AN ORGANIZED HEALTH CARE EDUCATION/TRAINING PROGRAM

## 2025-03-05 RX ORDER — PREDNISONE 20 MG/1
40 TABLET ORAL DAILY
Qty: 10 TABLET | Refills: 0 | Status: SHIPPED | OUTPATIENT
Start: 2025-03-05 | End: 2025-03-10

## 2025-03-05 RX ORDER — IPRATROPIUM BROMIDE AND ALBUTEROL SULFATE 2.5; .5 MG/3ML; MG/3ML
3 SOLUTION RESPIRATORY (INHALATION) EVERY 6 HOURS PRN
Qty: 90 ML | Refills: 0 | Status: SHIPPED | OUTPATIENT
Start: 2025-03-05 | End: 2025-04-04

## 2025-03-05 RX ORDER — ALBUTEROL SULFATE 90 UG/1
1-2 INHALANT RESPIRATORY (INHALATION) EVERY 6 HOURS PRN
Qty: 8.5 G | Refills: 0 | Status: SHIPPED | OUTPATIENT
Start: 2025-03-05 | End: 2025-04-04

## 2025-03-05 RX ADMIN — POTASSIUM BICARBONATE 25 MEQ: 977.5 TABLET, EFFERVESCENT ORAL at 12:03

## 2025-03-05 RX ADMIN — MAGNESIUM SULFATE HEPTAHYDRATE 2 G: 40 INJECTION, SOLUTION INTRAVENOUS at 12:03

## 2025-03-05 NOTE — ED NOTES
Per pt request, pt's daughter called for ride home. When nurse went in room with discharge papers, pt irritable and refusing to get out of bed for discharge. Patient stating she just wants to sleep and to stop bothering her. 2 other ED staff members assisted with educating patient about discharge. Patient able to stand out of bed and dress self and then agreed to leave in wheelchair. No distress noted. Respirations even and unlabored.

## 2025-03-05 NOTE — DISCHARGE INSTRUCTIONS
Thank you for coming to our Emergency Department today. It is important to remember that some problems are difficult to diagnose and may not be found during your first visit. Be sure to follow up with your primary care doctor and review any labs/imaging that was performed with them. If you do not have a primary care doctor, you may contact the one listed on your discharge paperwork or you may also call the [facility] Appointment Desk at [phone] to schedule an appointment with one.     All medications may potentially have side effects and it is impossible to predict which medications may give you side effects. If you feel that you are having a negative effect of any medication you should immediately stop taking them and seek medical attention.    Return to the ER with any questions/concerns, new/concerning symptoms, worsening or failure to improve. Do not drive or make any important decisions for 24 hours if you have received any pain medications, sedatives or mood altering drugs during your ER visit.

## 2025-03-05 NOTE — ED TRIAGE NOTES
Pt arrives to ED via EMS from home with c/o SOB starting today; hx of COPD with 5 ED visits last month for SOB; denies home oxygen use; pt arrived to ED with duo neb in process, SpO2 100% and also received 125mg IV Solumedrol; pt awake, alert, bizarre acting; no acute distress at this time; pt denies any pain.

## 2025-03-05 NOTE — ED PROVIDER NOTES
Encounter Date: 3/4/2025       History     Chief Complaint   Patient presents with    Shortness of Breath     [facility]  for SOB pt given 3 neb treatments (x1 at home, x2 w/ FD, x 3 w/ EMS) and is not reporting much relief.      54 y.o. female who has a past medical history of COPD (chronic obstructive pulmonary disease), Diabetes mellitus, Gout, unspecified, and Hypertension. presents to the emergency department due to  difficulty breathing after smoking cigarettes this afternoon.  Patient reports attempting to use her home inhaler without significant improvement so activated EMS.  She reports smoking half pack of cigarettes per day and intermittent use of marijuana.  EN route with EMS patient was given 3 back-to-back DuoNeb treatments and Solu-Medrol without significant relief.    Patient denies any chest pain fevers or chills.  She does report a proximally a month ago she was diagnosed with the flu and needed to be hospitalized at that time.  Denies any abdominal pain nausea or vomiting.  Denies any bowel or urinary symptoms.  Denies lower extremity swelling    The history is provided by the patient.     Review of patient's allergies indicates:  No Known Allergies  Past Medical History:   Diagnosis Date    COPD (chronic obstructive pulmonary disease)     Diabetes mellitus     Gout, unspecified     Hypertension      History reviewed. No pertinent surgical history.  No family history on file.  Social History[1]  Review of Systems   Constitutional:  Negative for fever.   HENT:  Negative for sore throat.    Respiratory:  Positive for chest tightness and shortness of breath.    Cardiovascular:  Negative for chest pain and leg swelling.   Gastrointestinal:  Negative for nausea.   Genitourinary:  Negative for dysuria.   Musculoskeletal:  Negative for back pain.   Skin:  Negative for rash.   Neurological:  Negative for weakness.   Hematological:  Does not bruise/bleed easily.       Physical Exam     Initial Vitals  [03/04/25 2211]   BP Pulse Resp Temp SpO2   (!) 159/92 85 16 98.5 °F (36.9 °C) 100 %      MAP       --         Physical Exam    Nursing note and vitals reviewed.  Constitutional: She is not diaphoretic. No distress.   HENT:   Head: Normocephalic and atraumatic.   Eyes: Conjunctivae and EOM are normal. Pupils are equal, round, and reactive to light.   Neck:   Normal range of motion.  Pulmonary/Chest: Breath sounds normal. No respiratory distress.   Abdominal: Abdomen is soft. Bowel sounds are normal. She exhibits no distension. There is no abdominal tenderness.   Musculoskeletal:         General: No tenderness. Normal range of motion.      Cervical back: Normal range of motion.     Neurological: She is alert.   Skin: Skin is warm. Capillary refill takes less than 2 seconds.   Psychiatric: Her behavior is normal.         ED Course   Procedures  Labs Reviewed   CBC W/ AUTO DIFFERENTIAL - Abnormal       Result Value    WBC 7.91      RBC 3.53 (*)     Hemoglobin 10.6 (*)     Hematocrit 32.9 (*)     MCV 93      MCH 30.0      MCHC 32.2      RDW 13.0      Platelets 234      MPV 9.1 (*)     Immature Granulocytes 0.4      Gran # (ANC) 5.9      Immature Grans (Abs) 0.03      Lymph # 1.4      Mono # 0.4      Eos # 0.1      Baso # 0.04      nRBC 0      Gran % 75.0 (*)     Lymph % 18.0      Mono % 4.7      Eosinophil % 1.4      Basophil % 0.5      Differential Method Automated     COMPREHENSIVE METABOLIC PANEL - Abnormal    Sodium 142      Potassium 3.6      Chloride 106      CO2 28      Glucose 100      BUN 11      Creatinine 2.2 (*)     Calcium 7.9 (*)     Total Protein 6.5      Albumin 2.3 (*)     Total Bilirubin 0.5      Alkaline Phosphatase 74      AST 11      ALT 8 (*)     eGFR 26 (*)     Anion Gap 8     MAGNESIUM - Abnormal    Magnesium 1.4 (*)    ISTAT PROCEDURE - Abnormal    POC PH 7.322 (*)     POC PCO2 65.5 (*)     POC PO2 20 (*)     POC HCO3 33.9 (*)     POC BE 6 (*)     POC SATURATED O2 27      POC TCO2 36 (*)      Sample VENOUS      Site Other      Allens Test N/A      DelSys Room Air     TROPONIN I    Troponin I <0.006     B-TYPE NATRIURETIC PEPTIDE    BNP 21     PROCALCITONIN    Procalcitonin 0.08            Imaging Results              X-Ray Chest AP Portable (Final result)  Result time 03/04/25 23:54:07      Final result by Loren Merchant MD (03/04/25 23:54:07)                   Impression:      No acute findings      Electronically signed by: Loren Merchant  Date:    03/04/2025  Time:    23:54               Narrative:    EXAMINATION:  XR CHEST AP PORTABLE    CLINICAL HISTORY:  Shortness of breath;    TECHNIQUE:  Single frontal view of the chest was performed.    COMPARISON:  02/16/2025    FINDINGS:  Lungs are clear. No focal consolidation. No pleural effusion. No pneumothorax. Normal heart size.                                       Medications   magnesium sulfate 2g in water 50mL IVPB (premix) (2 g Intravenous New Bag 3/5/25 0013)   albuterol sulfate nebulizer solution 15 mg (15 mg Nebulization Given 3/4/25 2238)   potassium bicarbonate disintegrating tablet 25 mEq (25 mEq Oral Given 3/5/25 0010)     Medical Decision Making:   Initial Assessment:   54 y.o. female who has a past medical history of COPD (chronic obstructive pulmonary disease), Diabetes mellitus, Gout, unspecified, and Hypertension. presents to the emergency department due to  difficulty breathing after smoking cigarettes this afternoon.  Upon presentation patient tachypneic with decreased air movement.  Suspect patient's SOB and difficulty breathing is c/w COPD exacerbation secondary to smoking. Patient given continuous albuterol treatment and IV steroids.  Lab work was notable for magnesium of 1.4 that was reviewed via IV.  No EKG changes.  Will monitor for worsening respiratory distress to considered bipap vs intubation in patient. Will reassess after treatments    Also considered but less likely:  Acs: ekg doesn't show stemi. Troponin  ordered  Pna: symptoms bilaterally and no fevers.   Bronchitis: considered but hpi most c/w copd  CHF: considered. Will compare bnp to previous and cxr for fluid overload. Possible  Pneumothorax: bilateral breath sounds    Differential Diagnosis:   Differential Diagnosis includes, but is not limited to:  PE, MI/ACS, pneumothorax, pericardial effusion/tamonade, pneumonia, lung abscess, pericarditis/myocarditis, pleural effusion, lung mass, CHF exacerbation, asthma exacerbation, COPD exacerbation, aspirated/ingested foreign body, airway obstruction, CO poisoning, anemia, metabolic derangement, allergy/atopy, influenza, viral URI, viral syndrome.   Clinical Tests:   Lab Tests: Ordered and Reviewed  Radiological Study: Ordered and Reviewed  Medical Tests: Ordered and Reviewed             ED Course as of 03/05/25 0115   Tue Mar 04, 2025   2258 Independent Interpretation of EKG:  Rhythm: Sinus   Rate: 83  QTC: 430  No STEMI  [AS]   2312 CBC Auto Differential(!)  CBC notable for hemoglobin of 10.6 downtrend from 13 2 weeks ago.  Patient denies any hemoptysis hematemesis melena vaginal bleeding or hematuria  [AS]   2312 ISTAT PROCEDURE(!) [AS]   2329 Magnesium (!): 1.4 [AS]   Wed Mar 05, 2025   0002 X-Ray Chest AP Portable  Chest x-ray without any acute intrathoracic abnormalities [AS]   0007 Patient refused rectal exam [AS]   0107 Patient reports improvement of her symptoms. Pt is currently stable for discharge. I see no indication of an emergent process beyond that addressed during our encounter but have duly counseled the patient/family regarding the need for prompt follow-up as well as the indications that should prompt immediate return to the emergency room should new or worrisome developments occur. I discussed the ED work up and diagnostic findings with the patient/family. The patient/family has been provided with verbal and printed direction regarding our final diagnosis(es) as well as instructions regarding use of  OTC and/or Rx medications intended to manage the patient's aforementioned conditions. The patient/family verbalized an understanding. The patient/family is asked if there are any questions or concerns. We discuss the case, until all issues are addressed to the patient/family's satisfaction. Patient/family understands and is agreeable to the plan.  [AS]      ED Course User Index  [AS] Mesha Anna MD          Medical Decision Making  Amount and/or Complexity of Data Reviewed  Labs: ordered. Decision-making details documented in ED Course.  Radiology: ordered. Decision-making details documented in ED Course.    Risk  Prescription drug management.         Critical Care Procedure Note  Authorized and Performed by: Mesha Anna MD  Total critical care time: 65 minutes  Due to a high probability of clinically significant, life threatening deterioration, the patient required my highest level of preparedness to intervene emergently and I personally spent this critical care time directly and personally managing the patient. This critical care time included obtaining a history; examining the patient; pulse oximetry; ordering and review of studies; arranging urgent treatment with development of a management plan; evaluation of patient's response to treatment; frequent reassessment; and, discussions with other providers.  This critical care time was performed to assess and manage the high probability of imminent, life-threatening deterioration that could result in multi-organ failure. It was exclusive of separately billable procedures and treating other patients and teaching time.  Please see MDM section and the rest of the note for further information on patient assessment and treatment.    Clinical Impression:   Final diagnoses:  [R06.02] SOB (shortness of breath)  [J44.1] COPD exacerbation (Primary)  [E83.42] Hypomagnesemia  [D64.9] Anemia, unspecified type          ED Disposition Condition    Discharge Stable           ED Prescriptions       Medication Sig Dispense Start Date End Date Auth. Provider    predniSONE (DELTASONE) 20 MG tablet Take 2 tablets (40 mg total) by mouth once daily. for 5 days 10 tablet 3/5/2025 3/10/2025 Mesha Anna MD    albuterol-ipratropium (DUO-NEB) 2.5 mg-0.5 mg/3 mL nebulizer solution Take 3 mLs by nebulization every 6 (six) hours as needed for Wheezing. Rescue 90 mL 3/5/2025 4/4/2025 Mesha Anna MD    albuterol (PROVENTIL/VENTOLIN HFA) 90 mcg/actuation inhaler Inhale 1 to 2 puffs into the lungs every 6 (six) hours as needed for Wheezing or Shortness of Breath. Rescue 8.5 g 3/5/2025 4/4/2025 Mesha Anna MD          Follow-up Information       Follow up With Specialties Details Why Contact Info    Sunny Guidry Jr., MD Hospitalist, Family Medicine Schedule an appointment as soon as possible for a visit  for reassesment and further workup of your anemia [address] [facility] [address]  [phone]      [facility] - Emergency Dept Emergency Medicine  If symptoms worsen [address] [facility] [place] [address] [phone]            DISCLAIMER: This note was prepared with [email or web address] voice recognition transcription software. Garbled syntax, mangled pronouns, and other bizarre constructions may be attributed to that software system.         [1]   Social History  Tobacco Use    Smoking status: Every Day     Types: Cigarettes    Smokeless tobacco: Never   Substance Use Topics    Alcohol use: Yes     Comment: occasionally    Drug use: Yes     Types: Marijuana        Mesha Anna MD  03/05/25 0116

## 2025-04-15 ENCOUNTER — EXTERNAL HOME HEALTH (OUTPATIENT)
Dept: HOME HEALTH SERVICES | Facility: HOSPITAL | Age: 55
End: 2025-04-15
Payer: MEDICAID

## 2025-05-23 ENCOUNTER — DOCUMENT SCAN (OUTPATIENT)
Dept: HOME HEALTH SERVICES | Facility: HOSPITAL | Age: 55
End: 2025-05-23
Payer: MEDICAID
